# Patient Record
Sex: MALE | Race: WHITE | NOT HISPANIC OR LATINO | Employment: OTHER | URBAN - METROPOLITAN AREA
[De-identification: names, ages, dates, MRNs, and addresses within clinical notes are randomized per-mention and may not be internally consistent; named-entity substitution may affect disease eponyms.]

---

## 2017-01-04 ENCOUNTER — TELEPHONE (OUTPATIENT)
Dept: INTERNAL MEDICINE | Facility: CLINIC | Age: 67
End: 2017-01-04

## 2017-01-04 NOTE — TELEPHONE ENCOUNTER
Pt. Called stating he was having shortness of breath and pain in his rib cage.  He was on hold but I lost him.  I called pt back letting him know that he needs to go to the ER ASAP.  He stated he understood.

## 2017-03-07 DIAGNOSIS — E03.9 ADULT HYPOTHYROIDISM: Primary | ICD-10-CM

## 2017-03-07 DIAGNOSIS — N13.8 BENIGN PROSTATIC HYPERPLASIA WITH URINARY OBSTRUCTION: ICD-10-CM

## 2017-03-07 DIAGNOSIS — N40.1 BENIGN PROSTATIC HYPERPLASIA WITH URINARY OBSTRUCTION: ICD-10-CM

## 2017-03-07 DIAGNOSIS — I10 ESSENTIAL (PRIMARY) HYPERTENSION: ICD-10-CM

## 2017-03-08 RX ORDER — LEVOTHYROXINE SODIUM 0.12 MG/1
TABLET ORAL
Qty: 180 TABLET | Refills: 3 | Status: SHIPPED | OUTPATIENT
Start: 2017-03-08 | End: 2017-05-04

## 2017-03-08 RX ORDER — TAMSULOSIN HYDROCHLORIDE 0.4 MG/1
CAPSULE ORAL
Qty: 90 CAPSULE | Refills: 3 | Status: SHIPPED | OUTPATIENT
Start: 2017-03-08 | End: 2017-04-24

## 2017-03-08 RX ORDER — AMLODIPINE BESYLATE 10 MG/1
TABLET ORAL
Qty: 90 TABLET | Refills: 3 | Status: SHIPPED | OUTPATIENT
Start: 2017-03-08 | End: 2017-05-04

## 2017-03-29 ENCOUNTER — OFFICE VISIT (OUTPATIENT)
Dept: INTERNAL MEDICINE | Facility: CLINIC | Age: 67
End: 2017-03-29

## 2017-03-29 VITALS
DIASTOLIC BLOOD PRESSURE: 84 MMHG | WEIGHT: 315 LBS | SYSTOLIC BLOOD PRESSURE: 146 MMHG | HEIGHT: 75 IN | BODY MASS INDEX: 39.17 KG/M2

## 2017-03-29 DIAGNOSIS — E78.00 ELEVATED CHOLESTEROL: ICD-10-CM

## 2017-03-29 DIAGNOSIS — I10 ESSENTIAL (PRIMARY) HYPERTENSION: Primary | ICD-10-CM

## 2017-03-29 DIAGNOSIS — E03.9 ADULT HYPOTHYROIDISM: ICD-10-CM

## 2017-03-29 DIAGNOSIS — R06.09 DYSPNEA ON EXERTION: ICD-10-CM

## 2017-03-29 DIAGNOSIS — E11.9 TYPE 2 DIABETES MELLITUS WITHOUT COMPLICATION, WITHOUT LONG-TERM CURRENT USE OF INSULIN (HCC): ICD-10-CM

## 2017-03-29 LAB
CHOLEST SERPL-MCNC: 191 MG/DL (ref 0–200)
HBA1C MFR BLD: 6.7 % (ref 4–6)
HDLC SERPL-MCNC: 35 MG/DL (ref 40–81)
LDLC SERPL CALC-MCNC: 118 MG/DL (ref 0–100)
LDLC/HDLC SERPL: 3.38 {RATIO}
TRIGL SERPL-MCNC: 189 MG/DL (ref 0–150)
TSH SERPL DL<=0.05 MIU/L-ACNC: 34.11 MIU/ML (ref 0.4–4.2)
VLDLC SERPL-MCNC: 37.8 MG/DL

## 2017-03-29 PROCEDURE — 99213 OFFICE O/P EST LOW 20 MIN: CPT | Performed by: INTERNAL MEDICINE

## 2017-03-29 PROCEDURE — 83036 HEMOGLOBIN GLYCOSYLATED A1C: CPT | Performed by: INTERNAL MEDICINE

## 2017-03-29 PROCEDURE — 80061 LIPID PANEL: CPT | Performed by: INTERNAL MEDICINE

## 2017-03-29 PROCEDURE — 84443 ASSAY THYROID STIM HORMONE: CPT | Performed by: INTERNAL MEDICINE

## 2017-03-29 PROCEDURE — 36415 COLL VENOUS BLD VENIPUNCTURE: CPT | Performed by: INTERNAL MEDICINE

## 2017-03-29 NOTE — PROGRESS NOTES
Subjective   Durga Valenzuela is a 66 y.o. male.     History of Present Illness he relates approximately a two-month history of dyspnea at rest and with mild exertion on a constant basis.  Yet he does not awaken from sleep or have any increase in leg swelling.  He does use CPAP nightly throughout the night.  He was seen at [facility] emergency room earlier this month and evaluation which included CT scan of the chest was unremarkable.  He was treated with anabiotic's and sterilized without improvement.  He denies any cough or wheezing.        Review of Systems   Constitutional: Negative for activity change, appetite change, chills, diaphoresis, fatigue and fever.   Respiratory: Positive for shortness of breath. Negative for cough, chest tightness and wheezing.    Cardiovascular: Negative for chest pain, palpitations and leg swelling.   Gastrointestinal: Negative for abdominal pain, diarrhea, nausea and vomiting.   Genitourinary: Negative for flank pain and hematuria.   Musculoskeletal: Negative for arthralgias, back pain and gait problem.   Neurological: Negative for dizziness, syncope, weakness and headaches.       Objective   Physical Exam   Constitutional: He is oriented to person, place, and time. He appears well-developed and well-nourished. He is active. He does not appear ill.   Eyes: Conjunctivae are normal.   Neck: Carotid bruit is not present.   Cardiovascular: Normal rate, regular rhythm, S1 normal and S2 normal.  Exam reveals no S3 and no S4.    No murmur heard.  Pulses:       Posterior tibial pulses are 2+ on the right side, and 2+ on the left side.   Pulmonary/Chest: No tachypnea. No respiratory distress. He has no decreased breath sounds. He has no wheezes. He has no rhonchi. He has no rales.   Abdominal: Soft. Normal appearance and bowel sounds are normal. He exhibits no abdominal bruit and no mass. There is no hepatosplenomegaly. There is no tenderness.       Vascular Status -  His exam exhibits  no right foot edema. His exam exhibits no left foot edema.  Neurological: He is alert and oriented to person, place, and time. Gait normal.   Psychiatric: He has a normal mood and affect. His speech is normal and behavior is normal. Judgment and thought content normal. Cognition and memory are normal.       Assessment/Plan the emergency room lab showed a normal CBC, and troponin.  BNP was normal.  CMP showed a glucose of 167.  CT scan of the chest PE protocol was negative.  O2 sat is 97% at rest and 95% post exertion.    Assessment #1 dyspnea-question etiology.  This was discussed with the patient.    Plan #1 no change medication #2 TSH, hemoglobin A1c, lipids today #3 2-D echocardiogram as soon as possible #4 Lexiscan as soon as possible.  Routine follow-up with me in 3 weeks.

## 2017-04-05 ENCOUNTER — HOSPITAL ENCOUNTER (OUTPATIENT)
Dept: CARDIOLOGY | Facility: HOSPITAL | Age: 67
Discharge: HOME | End: 2017-04-05
Attending: INTERNAL MEDICINE

## 2017-04-05 ENCOUNTER — HOSPITAL ENCOUNTER (OUTPATIENT)
Dept: NUCLEAR MEDICINE | Facility: HOSPITAL | Age: 67
Discharge: HOME | End: 2017-04-05
Attending: INTERNAL MEDICINE

## 2017-04-05 ENCOUNTER — HOSPITAL ENCOUNTER (OUTPATIENT)
Dept: CARDIOLOGY | Facility: HOSPITAL | Age: 67
Discharge: HOME | End: 2017-04-05
Attending: INTERNAL MEDICINE | Admitting: INTERNAL MEDICINE

## 2017-04-05 DIAGNOSIS — R06.09 DYSPNEA ON EXERTION: ICD-10-CM

## 2017-04-05 LAB
ASCENDING AORTA: 3.9 CM
BH CV ECHO MEAS - ACS: 2.4 CM
BH CV ECHO MEAS - AO MEAN PG (FULL): 3 MMHG
BH CV ECHO MEAS - AO MEAN PG: 5 MMHG
BH CV ECHO MEAS - AO ROOT AREA (BSA CORRECTED): 1.6
BH CV ECHO MEAS - AO ROOT AREA: 14.5 CM^2
BH CV ECHO MEAS - AO ROOT DIAM: 4.3 CM
BH CV ECHO MEAS - AO V2 MEAN: 96.7 CM/SEC
BH CV ECHO MEAS - AO V2 VTI: 30.3 CM
BH CV ECHO MEAS - ASC AORTA: 3.9 CM
BH CV ECHO MEAS - AVA(I,A): 2.2 CM^2
BH CV ECHO MEAS - AVA(I,D): 2.2 CM^2
BH CV ECHO MEAS - BSA(HAYCOCK): 3 M^2
BH CV ECHO MEAS - BSA: 2.8 M^2
BH CV ECHO MEAS - BZI_BMI: 45.6 KILOGRAMS/M^2
BH CV ECHO MEAS - BZI_METRIC_HEIGHT: 188 CM
BH CV ECHO MEAS - BZI_METRIC_WEIGHT: 161 KG
BH CV ECHO MEAS - CONTRAST EF (2CH): 59.7 ML/M^2
BH CV ECHO MEAS - CONTRAST EF 4CH: 62.8 ML/M^2
BH CV ECHO MEAS - EDV(CUBED): 227 ML
BH CV ECHO MEAS - EDV(MOD-SP2): 124 ML
BH CV ECHO MEAS - EDV(MOD-SP4): 148 ML
BH CV ECHO MEAS - EDV(TEICH): 186.9 ML
BH CV ECHO MEAS - EF(CUBED): 73.9 %
BH CV ECHO MEAS - EF(MOD-SP2): 59.7 %
BH CV ECHO MEAS - EF(MOD-SP4): 62.8 %
BH CV ECHO MEAS - EF(TEICH): 64.7 %
BH CV ECHO MEAS - ESV(CUBED): 59.3 ML
BH CV ECHO MEAS - ESV(MOD-SP2): 50 ML
BH CV ECHO MEAS - ESV(MOD-SP4): 55 ML
BH CV ECHO MEAS - ESV(TEICH): 65.9 ML
BH CV ECHO MEAS - FS: 36.1 %
BH CV ECHO MEAS - IVS/LVPW: 0.91
BH CV ECHO MEAS - IVSD: 1 CM
BH CV ECHO MEAS - LAT PEAK E' VEL: 10 CM/SEC
BH CV ECHO MEAS - LV DIASTOLIC VOL/BSA (35-75): 53.4 ML/M^2
BH CV ECHO MEAS - LV MASS(C)D: 270.5 GRAMS
BH CV ECHO MEAS - LV MASS(C)DI: 97.5 GRAMS/M^2
BH CV ECHO MEAS - LV MEAN PG: 2 MMHG
BH CV ECHO MEAS - LV SYSTOLIC VOL/BSA (12-30): 19.8 ML/M^2
BH CV ECHO MEAS - LV V1 MEAN: 54.8 CM/SEC
BH CV ECHO MEAS - LV V1 VTI: 19.5 CM
BH CV ECHO MEAS - LVIDD: 6.1 CM
BH CV ECHO MEAS - LVIDS: 3.9 CM
BH CV ECHO MEAS - LVLD AP2: 9.8 CM
BH CV ECHO MEAS - LVLD AP4: 9.6 CM
BH CV ECHO MEAS - LVLS AP2: 8.4 CM
BH CV ECHO MEAS - LVLS AP4: 8.1 CM
BH CV ECHO MEAS - LVOT AREA (M): 3.5 CM^2
BH CV ECHO MEAS - LVOT AREA: 3.5 CM^2
BH CV ECHO MEAS - LVOT DIAM: 2.1 CM
BH CV ECHO MEAS - LVPWD: 1.1 CM
BH CV ECHO MEAS - MED PEAK E' VEL: 6 CM/SEC
BH CV ECHO MEAS - MV A DUR: 0.14 SEC
BH CV ECHO MEAS - MV A MAX VEL: 88.8 CM/SEC
BH CV ECHO MEAS - MV DEC SLOPE: 186 CM/SEC^2
BH CV ECHO MEAS - MV DEC TIME: 0.27 SEC
BH CV ECHO MEAS - MV E MAX VEL: 69.6 CM/SEC
BH CV ECHO MEAS - MV E/A: 0.78
BH CV ECHO MEAS - MV MEAN PG: 1 MMHG
BH CV ECHO MEAS - MV P1/2T MAX VEL: 75.6 CM/SEC
BH CV ECHO MEAS - MV P1/2T: 119 MSEC
BH CV ECHO MEAS - MV V2 MEAN: 46.1 CM/SEC
BH CV ECHO MEAS - MV V2 VTI: 28 CM
BH CV ECHO MEAS - MVA P1/2T LCG: 2.9 CM^2
BH CV ECHO MEAS - MVA(P1/2T): 1.8 CM^2
BH CV ECHO MEAS - MVA(VTI): 2.4 CM^2
BH CV ECHO MEAS - PA ACC SLOPE: 0 CM/SEC^2
BH CV ECHO MEAS - PA ACC TIME: 0.11 SEC
BH CV ECHO MEAS - PA MAX PG: 4.2 MMHG
BH CV ECHO MEAS - PA PR(ACCEL): 30 MMHG
BH CV ECHO MEAS - PA V2 MAX: 103 CM/SEC
BH CV ECHO MEAS - PULM A REVS DUR: 0.14 SEC
BH CV ECHO MEAS - PULM A REVS VEL: 27.4 CM/SEC
BH CV ECHO MEAS - PULM DIAS VEL: 36.2 CM/SEC
BH CV ECHO MEAS - PULM S/D: 0.76
BH CV ECHO MEAS - PULM SYS VEL: 27.4 CM/SEC
BH CV ECHO MEAS - QP/QS: 0.55
BH CV ECHO MEAS - RAP SYSTOLE: 8 MMHG
BH CV ECHO MEAS - RV MEAN PG: 1 MMHG
BH CV ECHO MEAS - RV V1 MEAN: 33.5 CM/SEC
BH CV ECHO MEAS - RV V1 VTI: 11.8 CM
BH CV ECHO MEAS - RVOT AREA: 3.1 CM^2
BH CV ECHO MEAS - RVOT DIAM: 2 CM
BH CV ECHO MEAS - SI(AO): 158.7 ML/M^2
BH CV ECHO MEAS - SI(CUBED): 60.5 ML/M^2
BH CV ECHO MEAS - SI(LVOT): 24.4 ML/M^2
BH CV ECHO MEAS - SI(MOD-SP2): 26.7 ML/M^2
BH CV ECHO MEAS - SI(MOD-SP4): 33.5 ML/M^2
BH CV ECHO MEAS - SI(TEICH): 43.6 ML/M^2
BH CV ECHO MEAS - SV(AO): 440 ML
BH CV ECHO MEAS - SV(CUBED): 167.7 ML
BH CV ECHO MEAS - SV(LVOT): 67.5 ML
BH CV ECHO MEAS - SV(MOD-SP2): 74 ML
BH CV ECHO MEAS - SV(MOD-SP4): 93 ML
BH CV ECHO MEAS - SV(RVOT): 37.1 ML
BH CV ECHO MEAS - SV(TEICH): 121 ML
BH CV ECHO MEAS - TAPSE (>1.6): 2.5 CM2
BH CV XLRA - RV BASE: 3.7 CM
BH CV XLRA - TDI S': 12 CM/SEC
E/E' RATIO: 10
LEFT ATRIUM VOLUME INDEX: 22 ML/M2
LV EF 2D ECHO EST: 63 %

## 2017-04-05 PROCEDURE — 25010000002 REGADENOSON 0.4 MG/5ML SOLUTION: Performed by: INTERNAL MEDICINE

## 2017-04-05 PROCEDURE — 78452 HT MUSCLE IMAGE SPECT MULT: CPT | Performed by: INTERNAL MEDICINE

## 2017-04-05 PROCEDURE — C8929 TTE W OR WO FOL WCON,DOPPLER: HCPCS

## 2017-04-05 PROCEDURE — 0 TECHNETIUM SESTAMIBI: Performed by: INTERNAL MEDICINE

## 2017-04-05 PROCEDURE — 25010000002 PERFLUTREN (DEFINITY) 8.476 MG IN SODIUM CHLORIDE 10 ML INJECTION: Performed by: INTERNAL MEDICINE

## 2017-04-05 PROCEDURE — A9500 TC99M SESTAMIBI: HCPCS | Performed by: INTERNAL MEDICINE

## 2017-04-05 PROCEDURE — 93017 CV STRESS TEST TRACING ONLY: CPT

## 2017-04-05 PROCEDURE — 93306 TTE W/DOPPLER COMPLETE: CPT | Performed by: INTERNAL MEDICINE

## 2017-04-05 PROCEDURE — 78452 HT MUSCLE IMAGE SPECT MULT: CPT

## 2017-04-05 PROCEDURE — 93016 CV STRESS TEST SUPVJ ONLY: CPT | Performed by: INTERNAL MEDICINE

## 2017-04-05 PROCEDURE — 93018 CV STRESS TEST I&R ONLY: CPT | Performed by: INTERNAL MEDICINE

## 2017-04-05 RX ADMIN — Medication 1 DOSE: at 10:25

## 2017-04-05 RX ADMIN — REGADENOSON 0.4 MG: 0.08 INJECTION, SOLUTION INTRAVENOUS at 10:25

## 2017-04-05 RX ADMIN — SODIUM CHLORIDE 3 ML: 9 INJECTION INTRAMUSCULAR; INTRAVENOUS; SUBCUTANEOUS at 13:09

## 2017-04-06 ENCOUNTER — TELEPHONE (OUTPATIENT)
Dept: INTERNAL MEDICINE | Facility: CLINIC | Age: 67
End: 2017-04-06

## 2017-04-06 NOTE — TELEPHONE ENCOUNTER
----- Message from Lorene Harmon sent at 4/6/2017  2:57 PM EDT -----  Contact: pt - Dr Lozada's pt - RE: Results  Pt calling and would like results of procedures done yesterday. Could you please call pt w/ results of Echo, Stress test? Please advise. Thanks      Pt # [phone]

## 2017-04-07 ENCOUNTER — TELEPHONE (OUTPATIENT)
Dept: INTERNAL MEDICINE | Facility: CLINIC | Age: 67
End: 2017-04-07

## 2017-04-07 NOTE — TELEPHONE ENCOUNTER
Gave patient Dr. Lozada's instructions.  He was not happy with it.  He was still SOA and was concerned. I did tell pt that if he feels it is too bad that he could go to the ER.

## 2017-04-07 NOTE — TELEPHONE ENCOUNTER
----- Message from Margarita Hernandez MA sent at 4/7/2017  2:49 PM EDT -----  Pt calling for Echo results and does not want to worry the wkend.  Pt says he is still having SOA upon exertion.    Pt#[phone]

## 2017-04-07 NOTE — TELEPHONE ENCOUNTER
2-D echocardiogram is normal.  Cardiolite stress test is normal.  Keep follow-up appointment with me.

## 2017-04-15 LAB
BH CV STRESS COMMENTS STAGE 1: NORMAL
BH CV STRESS DOSE REGADENOSON STAGE 1: 0.4
BH CV STRESS DURATION MIN STAGE 1: 0
BH CV STRESS DURATION SEC STAGE 1: 15
BH CV STRESS PROTOCOL 1: NORMAL
BH CV STRESS RECOVERY BP: NORMAL MMHG
BH CV STRESS RECOVERY HR: 59 BPM
BH CV STRESS STAGE 1: 1
LV EF NUC BP: 66 %
MAXIMAL PREDICTED HEART RATE: 153 BPM
PERCENT MAX PREDICTED HR: 50.33 %
STRESS BASELINE BP: NORMAL MMHG
STRESS BASELINE HR: 56 BPM
STRESS PERCENT HR: 59 %
STRESS POST PEAK BP: NORMAL MMHG
STRESS POST PEAK HR: 77 BPM
STRESS TARGET HR: 130 BPM

## 2017-04-21 ENCOUNTER — OFFICE VISIT (OUTPATIENT)
Dept: INTERNAL MEDICINE | Facility: CLINIC | Age: 67
End: 2017-04-21

## 2017-04-21 VITALS
DIASTOLIC BLOOD PRESSURE: 76 MMHG | SYSTOLIC BLOOD PRESSURE: 126 MMHG | HEART RATE: 66 BPM | WEIGHT: 315 LBS | HEIGHT: 74 IN | BODY MASS INDEX: 40.43 KG/M2

## 2017-04-21 DIAGNOSIS — E78.00 ELEVATED CHOLESTEROL: Primary | ICD-10-CM

## 2017-04-21 DIAGNOSIS — R06.09 DYSPNEA ON EXERTION: ICD-10-CM

## 2017-04-21 DIAGNOSIS — I10 ESSENTIAL (PRIMARY) HYPERTENSION: ICD-10-CM

## 2017-04-21 DIAGNOSIS — E11.9 TYPE 2 DIABETES MELLITUS WITHOUT COMPLICATION, WITHOUT LONG-TERM CURRENT USE OF INSULIN (HCC): ICD-10-CM

## 2017-04-21 PROCEDURE — 99213 OFFICE O/P EST LOW 20 MIN: CPT | Performed by: INTERNAL MEDICINE

## 2017-04-21 RX ORDER — MELOXICAM 15 MG/1
15 TABLET ORAL DAILY
COMMUNITY
End: 2018-05-04

## 2017-04-21 NOTE — PROGRESS NOTES
Subjective   Durga Valenzuela is a 66 y.o. male.     History of Present Illness he has lost 20 pounds over the last month by avoiding sweets and milk.  He also was more conscious of his carbohydrate intake in general.  However she still has dyspnea at rest and with mild exertion now present for 3 months.  There is no PND or chest pain.  He denies any wheezing.  He has occasional dry cough but nothing that is daily or persistent.  He was placed on Mobic by his orthopedist recently which has significantly decreased his right knee pain.        Review of Systems   Constitutional: Negative for activity change, appetite change and fatigue.   Respiratory: Positive for cough and shortness of breath. Negative for chest tightness and wheezing.    Cardiovascular: Negative for chest pain, palpitations and leg swelling.   Gastrointestinal: Negative for abdominal pain, diarrhea, nausea and vomiting.   Genitourinary: Negative for flank pain and hematuria.   Musculoskeletal: Negative for arthralgias and back pain.   Neurological: Negative for dizziness, syncope, weakness and headaches.       Objective   Physical Exam   Constitutional: He is oriented to person, place, and time. Vital signs are normal. He appears well-developed and well-nourished. He is active. He does not appear ill.   Eyes: Conjunctivae are normal.   Cardiovascular: Normal rate, regular rhythm, S1 normal and S2 normal.  Exam reveals no S3 and no S4.    No murmur heard.  Pulmonary/Chest: No tachypnea. No respiratory distress. He has no decreased breath sounds. He has no wheezes. He has no rhonchi. He has no rales.   Abdominal: Soft. Normal appearance and bowel sounds are normal. He exhibits no abdominal bruit and no mass. There is no hepatosplenomegaly. There is no tenderness.       Vascular Status -  His exam exhibits no right foot edema. His exam exhibits no left foot edema.  Neurological: He is alert and oriented to person, place, and time. Gait normal.    Psychiatric: He has a normal mood and affect. His speech is normal and behavior is normal. Judgment and thought content normal. Cognition and memory are normal.       Assessment/Plan   Recent lab showed a hemoglobin A1c of 6.7.  Total cholesterol 191 triglycerides 189 HDL cholesterol 35 LDL cholesterol 118.  TSH 34.  Echocardiogram showed an ejection fraction of 63% and no significant valvular disease.  Lexiscan stress test was negative for ischemia.    Assessment #1 hypertension-good control #2 hypercholesterolemia-good control #3 diabetes mellitus-mild and certainly at the present time controlled with diet #4 dyspnea on exertion-question etiology.  Restrictive lung disease or pulmonary hypertension of course are possibilities.  Coronary artery disease also must be considered despite lack of chest pain.    Plan #1 PFTs #2 cardiology consult as soon as possible for possible cardiac catheterization.  Routine follow-up with me in one month.

## 2017-04-24 ENCOUNTER — TRANSCRIBE ORDERS (OUTPATIENT)
Dept: CARDIOLOGY | Facility: CLINIC | Age: 67
End: 2017-04-24

## 2017-04-24 ENCOUNTER — OFFICE VISIT (OUTPATIENT)
Dept: CARDIOLOGY | Facility: CLINIC | Age: 67
End: 2017-04-24

## 2017-04-24 VITALS
DIASTOLIC BLOOD PRESSURE: 89 MMHG | BODY MASS INDEX: 42.37 KG/M2 | WEIGHT: 315 LBS | SYSTOLIC BLOOD PRESSURE: 144 MMHG | HEART RATE: 60 BPM

## 2017-04-24 DIAGNOSIS — I26.99 OTHER ACUTE PULMONARY EMBOLISM: ICD-10-CM

## 2017-04-24 DIAGNOSIS — R06.09 DYSPNEA ON EXERTION: ICD-10-CM

## 2017-04-24 DIAGNOSIS — F31.9 BIPOLAR AFFECTIVE DISORDER, RAPID CYCLING (HCC): ICD-10-CM

## 2017-04-24 DIAGNOSIS — I10 ESSENTIAL (PRIMARY) HYPERTENSION: ICD-10-CM

## 2017-04-24 DIAGNOSIS — I10 ESSENTIAL (PRIMARY) HYPERTENSION: Primary | ICD-10-CM

## 2017-04-24 DIAGNOSIS — E78.00 ELEVATED CHOLESTEROL: ICD-10-CM

## 2017-04-24 DIAGNOSIS — E78.00 HIGH CHOLESTEROL: ICD-10-CM

## 2017-04-24 DIAGNOSIS — R06.09 DOE (DYSPNEA ON EXERTION): Primary | ICD-10-CM

## 2017-04-24 PROCEDURE — 99204 OFFICE O/P NEW MOD 45 MIN: CPT | Performed by: INTERNAL MEDICINE

## 2017-04-24 PROCEDURE — 93000 ELECTROCARDIOGRAM COMPLETE: CPT | Performed by: INTERNAL MEDICINE

## 2017-05-01 ENCOUNTER — HOSPITAL ENCOUNTER (OUTPATIENT)
Dept: RESPIRATORY THERAPY | Facility: HOSPITAL | Age: 67
Discharge: HOME | End: 2017-05-01
Attending: INTERNAL MEDICINE | Admitting: INTERNAL MEDICINE

## 2017-05-01 PROCEDURE — 94060 EVALUATION OF WHEEZING: CPT

## 2017-05-01 RX ORDER — ALBUTEROL SULFATE 2.5 MG/3ML
2.5 SOLUTION RESPIRATORY (INHALATION) ONCE
Status: COMPLETED | OUTPATIENT
Start: 2017-05-01 | End: 2017-05-01

## 2017-05-01 RX ADMIN — ALBUTEROL SULFATE 2.5 MG: 2.5 SOLUTION RESPIRATORY (INHALATION) at 10:32

## 2017-05-04 ENCOUNTER — LAB (OUTPATIENT)
Dept: LAB | Facility: HOSPITAL | Age: 67
End: 2017-05-04

## 2017-05-04 DIAGNOSIS — E78.00 HIGH CHOLESTEROL: ICD-10-CM

## 2017-05-04 DIAGNOSIS — R06.09 DOE (DYSPNEA ON EXERTION): ICD-10-CM

## 2017-05-04 DIAGNOSIS — I10 ESSENTIAL (PRIMARY) HYPERTENSION: ICD-10-CM

## 2017-05-04 LAB
ANION GAP SERPL CALCULATED.3IONS-SCNC: 16.6 MMOL/L
APTT PPP: 28.3 SECONDS (ref 22.7–35.4)
BASOPHILS # BLD AUTO: 0.02 10*3/MM3 (ref 0–0.2)
BASOPHILS NFR BLD AUTO: 0.3 % (ref 0–1.5)
BUN BLD-MCNC: 20 MG/DL (ref 8–23)
BUN/CREAT SERPL: 18.7 (ref 7–25)
CALCIUM SPEC-SCNC: 9.5 MG/DL (ref 8.6–10.5)
CHLORIDE SERPL-SCNC: 100 MMOL/L (ref 98–107)
CO2 SERPL-SCNC: 23.4 MMOL/L (ref 22–29)
CREAT BLD-MCNC: 1.07 MG/DL (ref 0.76–1.27)
DEPRECATED RDW RBC AUTO: 48.5 FL (ref 37–54)
EOSINOPHIL # BLD AUTO: 0.16 10*3/MM3 (ref 0–0.7)
EOSINOPHIL NFR BLD AUTO: 2.5 % (ref 0.3–6.2)
ERYTHROCYTE [DISTWIDTH] IN BLOOD BY AUTOMATED COUNT: 14.7 % (ref 11.5–14.5)
GFR SERPL CREATININE-BSD FRML MDRD: 69 ML/MIN/1.73
GLUCOSE BLD-MCNC: 111 MG/DL (ref 65–99)
HCT VFR BLD AUTO: 44.8 % (ref 40.4–52.2)
HGB BLD-MCNC: 15.2 G/DL (ref 13.7–17.6)
IMM GRANULOCYTES # BLD: 0 10*3/MM3 (ref 0–0.03)
IMM GRANULOCYTES NFR BLD: 0 % (ref 0–0.5)
INR PPP: 1.11 (ref 0.9–1.1)
LYMPHOCYTES # BLD AUTO: 1.35 10*3/MM3 (ref 0.9–4.8)
LYMPHOCYTES NFR BLD AUTO: 21.1 % (ref 19.6–45.3)
MCH RBC QN AUTO: 30.8 PG (ref 27–32.7)
MCHC RBC AUTO-ENTMCNC: 33.9 G/DL (ref 32.6–36.4)
MCV RBC AUTO: 90.7 FL (ref 79.8–96.2)
MONOCYTES # BLD AUTO: 0.59 10*3/MM3 (ref 0.2–1.2)
MONOCYTES NFR BLD AUTO: 9.2 % (ref 5–12)
NEUTROPHILS # BLD AUTO: 4.28 10*3/MM3 (ref 1.9–8.1)
NEUTROPHILS NFR BLD AUTO: 66.9 % (ref 42.7–76)
PLATELET # BLD AUTO: 182 10*3/MM3 (ref 140–500)
PMV BLD AUTO: 10.5 FL (ref 6–12)
POTASSIUM BLD-SCNC: 4.1 MMOL/L (ref 3.5–5.2)
PROTHROMBIN TIME: 13.9 SECONDS (ref 11.7–14.2)
RBC # BLD AUTO: 4.94 10*6/MM3 (ref 4.6–6)
SODIUM BLD-SCNC: 140 MMOL/L (ref 136–145)
WBC NRBC COR # BLD: 6.4 10*3/MM3 (ref 4.5–10.7)

## 2017-05-04 PROCEDURE — 85025 COMPLETE CBC W/AUTO DIFF WBC: CPT

## 2017-05-04 PROCEDURE — 80048 BASIC METABOLIC PNL TOTAL CA: CPT

## 2017-05-04 PROCEDURE — 85730 THROMBOPLASTIN TIME PARTIAL: CPT

## 2017-05-04 PROCEDURE — 36415 COLL VENOUS BLD VENIPUNCTURE: CPT

## 2017-05-04 PROCEDURE — 85610 PROTHROMBIN TIME: CPT

## 2017-05-04 RX ORDER — AMLODIPINE BESYLATE 10 MG/1
10 TABLET ORAL DAILY
COMMUNITY
End: 2019-09-06

## 2017-05-04 RX ORDER — LEVOTHYROXINE SODIUM 0.12 MG/1
250 TABLET ORAL DAILY
COMMUNITY
End: 2017-09-25 | Stop reason: DRUGHIGH

## 2017-05-10 DIAGNOSIS — E78.00 ELEVATED CHOLESTEROL: ICD-10-CM

## 2017-05-10 DIAGNOSIS — Z01.818 PREOPERATIVE CLEARANCE: ICD-10-CM

## 2017-05-10 DIAGNOSIS — Z51.81 ENCOUNTER FOR THERAPEUTIC DRUG MONITORING: ICD-10-CM

## 2017-05-10 DIAGNOSIS — I10 ESSENTIAL HYPERTENSION: Primary | ICD-10-CM

## 2017-05-10 DIAGNOSIS — R06.09 DYSPNEA ON EXERTION: ICD-10-CM

## 2017-05-10 RX ORDER — CLOBETASOL PROPIONATE 0.5 MG/G
OINTMENT TOPICAL
Qty: 30 G | Refills: 5 | Status: SHIPPED | OUTPATIENT
Start: 2017-05-10 | End: 2017-05-15

## 2017-05-12 ENCOUNTER — OFFICE VISIT (OUTPATIENT)
Dept: INTERNAL MEDICINE | Facility: CLINIC | Age: 67
End: 2017-05-12

## 2017-05-12 VITALS
HEIGHT: 74 IN | SYSTOLIC BLOOD PRESSURE: 122 MMHG | HEART RATE: 72 BPM | BODY MASS INDEX: 40.43 KG/M2 | DIASTOLIC BLOOD PRESSURE: 60 MMHG | WEIGHT: 315 LBS

## 2017-05-12 DIAGNOSIS — R06.09 DYSPNEA ON EXERTION: Primary | ICD-10-CM

## 2017-05-12 PROCEDURE — 99213 OFFICE O/P EST LOW 20 MIN: CPT | Performed by: INTERNAL MEDICINE

## 2017-05-15 ENCOUNTER — LAB (OUTPATIENT)
Dept: LAB | Facility: HOSPITAL | Age: 67
End: 2017-05-15

## 2017-05-15 DIAGNOSIS — R06.09 DYSPNEA ON EXERTION: ICD-10-CM

## 2017-05-15 DIAGNOSIS — Z01.818 PREOPERATIVE CLEARANCE: ICD-10-CM

## 2017-05-15 DIAGNOSIS — E78.00 ELEVATED CHOLESTEROL: ICD-10-CM

## 2017-05-15 DIAGNOSIS — Z51.81 ENCOUNTER FOR THERAPEUTIC DRUG MONITORING: ICD-10-CM

## 2017-05-15 DIAGNOSIS — I10 ESSENTIAL HYPERTENSION: ICD-10-CM

## 2017-05-15 LAB
ANION GAP SERPL CALCULATED.3IONS-SCNC: 11.2 MMOL/L
APTT PPP: 28.3 SECONDS (ref 22.7–35.4)
BASOPHILS # BLD AUTO: 0.01 10*3/MM3 (ref 0–0.2)
BASOPHILS NFR BLD AUTO: 0.2 % (ref 0–1.5)
BUN BLD-MCNC: 21 MG/DL (ref 8–23)
BUN/CREAT SERPL: 21.6 (ref 7–25)
CALCIUM SPEC-SCNC: 10 MG/DL (ref 8.6–10.5)
CHLORIDE SERPL-SCNC: 102 MMOL/L (ref 98–107)
CO2 SERPL-SCNC: 27.8 MMOL/L (ref 22–29)
CREAT BLD-MCNC: 0.97 MG/DL (ref 0.76–1.27)
DEPRECATED RDW RBC AUTO: 47 FL (ref 37–54)
EOSINOPHIL # BLD AUTO: 0.19 10*3/MM3 (ref 0–0.7)
EOSINOPHIL NFR BLD AUTO: 3.2 % (ref 0.3–6.2)
ERYTHROCYTE [DISTWIDTH] IN BLOOD BY AUTOMATED COUNT: 14.4 % (ref 11.5–14.5)
GFR SERPL CREATININE-BSD FRML MDRD: 77 ML/MIN/1.73
GLUCOSE BLD-MCNC: 128 MG/DL (ref 65–99)
HCT VFR BLD AUTO: 44.4 % (ref 40.4–52.2)
HGB BLD-MCNC: 15.4 G/DL (ref 13.7–17.6)
IMM GRANULOCYTES # BLD: 0.02 10*3/MM3 (ref 0–0.03)
IMM GRANULOCYTES NFR BLD: 0.3 % (ref 0–0.5)
INR PPP: 1.11 (ref 0.9–1.1)
LYMPHOCYTES # BLD AUTO: 1.36 10*3/MM3 (ref 0.9–4.8)
LYMPHOCYTES NFR BLD AUTO: 23.1 % (ref 19.6–45.3)
MCH RBC QN AUTO: 31 PG (ref 27–32.7)
MCHC RBC AUTO-ENTMCNC: 34.7 G/DL (ref 32.6–36.4)
MCV RBC AUTO: 89.3 FL (ref 79.8–96.2)
MONOCYTES # BLD AUTO: 0.5 10*3/MM3 (ref 0.2–1.2)
MONOCYTES NFR BLD AUTO: 8.5 % (ref 5–12)
NEUTROPHILS # BLD AUTO: 3.82 10*3/MM3 (ref 1.9–8.1)
NEUTROPHILS NFR BLD AUTO: 64.7 % (ref 42.7–76)
PLATELET # BLD AUTO: 171 10*3/MM3 (ref 140–500)
PMV BLD AUTO: 9.8 FL (ref 6–12)
POTASSIUM BLD-SCNC: 4.4 MMOL/L (ref 3.5–5.2)
PROTHROMBIN TIME: 13.9 SECONDS (ref 11.7–14.2)
RBC # BLD AUTO: 4.97 10*6/MM3 (ref 4.6–6)
SODIUM BLD-SCNC: 141 MMOL/L (ref 136–145)
WBC NRBC COR # BLD: 5.9 10*3/MM3 (ref 4.5–10.7)

## 2017-05-15 PROCEDURE — 85025 COMPLETE CBC W/AUTO DIFF WBC: CPT

## 2017-05-15 PROCEDURE — 80048 BASIC METABOLIC PNL TOTAL CA: CPT

## 2017-05-15 PROCEDURE — 36415 COLL VENOUS BLD VENIPUNCTURE: CPT

## 2017-05-15 PROCEDURE — 85730 THROMBOPLASTIN TIME PARTIAL: CPT

## 2017-05-15 PROCEDURE — 85610 PROTHROMBIN TIME: CPT

## 2017-05-15 RX ORDER — CLOBETASOL PROPIONATE 0.5 MG/G
1 OINTMENT TOPICAL 2 TIMES DAILY PRN
COMMUNITY
End: 2018-09-05 | Stop reason: SDUPTHER

## 2017-05-16 ENCOUNTER — HOSPITAL ENCOUNTER (OUTPATIENT)
Facility: HOSPITAL | Age: 67
Setting detail: HOSPITAL OUTPATIENT SURGERY
Discharge: HOME | End: 2017-05-16
Attending: INTERNAL MEDICINE | Admitting: INTERNAL MEDICINE

## 2017-05-16 VITALS
WEIGHT: 315 LBS | HEART RATE: 64 BPM | DIASTOLIC BLOOD PRESSURE: 79 MMHG | BODY MASS INDEX: 39.17 KG/M2 | RESPIRATION RATE: 16 BRPM | HEIGHT: 75 IN | SYSTOLIC BLOOD PRESSURE: 138 MMHG | TEMPERATURE: 98.9 F | OXYGEN SATURATION: 95 %

## 2017-05-16 DIAGNOSIS — I26.99 OTHER ACUTE PULMONARY EMBOLISM: ICD-10-CM

## 2017-05-16 DIAGNOSIS — F31.9 BIPOLAR AFFECTIVE DISORDER, RAPID CYCLING (HCC): ICD-10-CM

## 2017-05-16 DIAGNOSIS — R06.09 DYSPNEA ON EXERTION: ICD-10-CM

## 2017-05-16 DIAGNOSIS — I10 ESSENTIAL (PRIMARY) HYPERTENSION: ICD-10-CM

## 2017-05-16 DIAGNOSIS — E78.00 ELEVATED CHOLESTEROL: ICD-10-CM

## 2017-05-16 LAB — GLUCOSE BLDC GLUCOMTR-MCNC: 135 MG/DL (ref 70–130)

## 2017-05-16 PROCEDURE — C1894 INTRO/SHEATH, NON-LASER: HCPCS | Performed by: INTERNAL MEDICINE

## 2017-05-16 PROCEDURE — 25010000002 FENTANYL CITRATE (PF) 100 MCG/2ML SOLUTION: Performed by: INTERNAL MEDICINE

## 2017-05-16 PROCEDURE — 99153 MOD SED SAME PHYS/QHP EA: CPT | Performed by: INTERNAL MEDICINE

## 2017-05-16 PROCEDURE — 25010000002 HEPARIN (PORCINE) PER 1000 UNITS: Performed by: INTERNAL MEDICINE

## 2017-05-16 PROCEDURE — 93458 L HRT ARTERY/VENTRICLE ANGIO: CPT | Performed by: INTERNAL MEDICINE

## 2017-05-16 PROCEDURE — 0 IOPAMIDOL PER 1 ML: Performed by: INTERNAL MEDICINE

## 2017-05-16 PROCEDURE — 25010000002 MIDAZOLAM PER 1 MG: Performed by: INTERNAL MEDICINE

## 2017-05-16 PROCEDURE — C1769 GUIDE WIRE: HCPCS | Performed by: INTERNAL MEDICINE

## 2017-05-16 PROCEDURE — 99152 MOD SED SAME PHYS/QHP 5/>YRS: CPT | Performed by: INTERNAL MEDICINE

## 2017-05-16 PROCEDURE — 82962 GLUCOSE BLOOD TEST: CPT

## 2017-05-16 RX ORDER — ROSUVASTATIN CALCIUM 5 MG/1
5 TABLET, COATED ORAL DAILY
Qty: 30 TABLET | Refills: 3 | Status: SHIPPED | OUTPATIENT
Start: 2017-05-16 | End: 2017-06-13 | Stop reason: SDUPTHER

## 2017-05-16 RX ORDER — SODIUM CHLORIDE 9 MG/ML
75 INJECTION, SOLUTION INTRAVENOUS CONTINUOUS
Status: DISCONTINUED | OUTPATIENT
Start: 2017-05-16 | End: 2017-05-16 | Stop reason: HOSPADM

## 2017-05-16 RX ORDER — FENTANYL CITRATE 50 UG/ML
INJECTION, SOLUTION INTRAMUSCULAR; INTRAVENOUS AS NEEDED
Status: DISCONTINUED | OUTPATIENT
Start: 2017-05-16 | End: 2017-05-16 | Stop reason: HOSPADM

## 2017-05-16 RX ORDER — SODIUM CHLORIDE 9 MG/ML
125 INJECTION, SOLUTION INTRAVENOUS CONTINUOUS
Status: DISCONTINUED | OUTPATIENT
Start: 2017-05-16 | End: 2017-05-16 | Stop reason: HOSPADM

## 2017-05-16 RX ORDER — MIDAZOLAM HYDROCHLORIDE 1 MG/ML
INJECTION INTRAMUSCULAR; INTRAVENOUS AS NEEDED
Status: DISCONTINUED | OUTPATIENT
Start: 2017-05-16 | End: 2017-05-16 | Stop reason: HOSPADM

## 2017-05-16 RX ORDER — ONDANSETRON 2 MG/ML
4 INJECTION INTRAMUSCULAR; INTRAVENOUS EVERY 6 HOURS PRN
Status: DISCONTINUED | OUTPATIENT
Start: 2017-05-16 | End: 2017-05-16 | Stop reason: HOSPADM

## 2017-05-16 RX ORDER — PROMETHAZINE HYDROCHLORIDE 25 MG/ML
12.5 INJECTION, SOLUTION INTRAMUSCULAR; INTRAVENOUS EVERY 4 HOURS PRN
Status: DISCONTINUED | OUTPATIENT
Start: 2017-05-16 | End: 2017-05-16 | Stop reason: HOSPADM

## 2017-05-16 RX ORDER — SODIUM CHLORIDE 0.9 % (FLUSH) 0.9 %
1-10 SYRINGE (ML) INJECTION AS NEEDED
Status: DISCONTINUED | OUTPATIENT
Start: 2017-05-16 | End: 2017-05-16 | Stop reason: HOSPADM

## 2017-05-16 RX ORDER — LIDOCAINE HYDROCHLORIDE 10 MG/ML
0.1 INJECTION, SOLUTION EPIDURAL; INFILTRATION; INTRACAUDAL; PERINEURAL ONCE AS NEEDED
Status: DISCONTINUED | OUTPATIENT
Start: 2017-05-16 | End: 2017-05-16 | Stop reason: HOSPADM

## 2017-05-16 RX ORDER — METOCLOPRAMIDE HYDROCHLORIDE 5 MG/ML
10 INJECTION INTRAMUSCULAR; INTRAVENOUS EVERY 6 HOURS PRN
Status: DISCONTINUED | OUTPATIENT
Start: 2017-05-16 | End: 2017-05-16 | Stop reason: HOSPADM

## 2017-05-16 RX ORDER — LIDOCAINE HYDROCHLORIDE 20 MG/ML
INJECTION, SOLUTION INFILTRATION; PERINEURAL AS NEEDED
Status: DISCONTINUED | OUTPATIENT
Start: 2017-05-16 | End: 2017-05-16 | Stop reason: HOSPADM

## 2017-05-16 RX ADMIN — SODIUM CHLORIDE 125 ML/HR: 9 INJECTION, SOLUTION INTRAVENOUS at 09:41

## 2017-05-16 RX ADMIN — SODIUM CHLORIDE 75 ML/HR: 9 INJECTION, SOLUTION INTRAVENOUS at 07:25

## 2017-06-13 ENCOUNTER — OFFICE VISIT (OUTPATIENT)
Dept: CARDIOLOGY | Facility: CLINIC | Age: 67
End: 2017-06-13

## 2017-06-13 VITALS
SYSTOLIC BLOOD PRESSURE: 144 MMHG | BODY MASS INDEX: 40 KG/M2 | HEART RATE: 72 BPM | DIASTOLIC BLOOD PRESSURE: 84 MMHG | WEIGHT: 315 LBS

## 2017-06-13 DIAGNOSIS — G47.33 OBSTRUCTIVE APNEA: ICD-10-CM

## 2017-06-13 DIAGNOSIS — E78.00 ELEVATED CHOLESTEROL: ICD-10-CM

## 2017-06-13 DIAGNOSIS — R06.09 DYSPNEA ON EXERTION: ICD-10-CM

## 2017-06-13 DIAGNOSIS — I10 ESSENTIAL (PRIMARY) HYPERTENSION: Primary | ICD-10-CM

## 2017-06-13 PROCEDURE — 93000 ELECTROCARDIOGRAM COMPLETE: CPT | Performed by: INTERNAL MEDICINE

## 2017-06-13 PROCEDURE — 99213 OFFICE O/P EST LOW 20 MIN: CPT | Performed by: INTERNAL MEDICINE

## 2017-06-13 RX ORDER — ROSUVASTATIN CALCIUM 5 MG/1
10 TABLET, COATED ORAL DAILY
Qty: 30 TABLET | Refills: 3 | Status: SHIPPED | OUTPATIENT
Start: 2017-06-13 | End: 2017-06-14 | Stop reason: SDUPTHER

## 2017-06-13 RX ORDER — DEXTROAMPHETAMINE SACCHARATE, AMPHETAMINE ASPARTATE, DEXTROAMPHETAMINE SULFATE AND AMPHETAMINE SULFATE 5; 5; 5; 5 MG/1; MG/1; MG/1; MG/1
TABLET ORAL
Refills: 0 | Status: ON HOLD | COMMUNITY
Start: 2017-06-08 | End: 2018-05-10

## 2017-06-13 RX ORDER — CEPHALEXIN 500 MG/1
CAPSULE ORAL
Refills: 0 | COMMUNITY
Start: 2017-05-31 | End: 2017-06-13

## 2017-06-13 NOTE — PROGRESS NOTES
Procedure   [facility]  Cardiology Progress Note    Patient Identification:  Name:Durga Valenzuela  Age:66 y.o.  Sex: male  :  1950  MRN: [id number]           2017    Subjective:    Chief Complaint   Patient presents with   • Hypertension       HPI       66 year old male with past history of pulmonnary embolisim in 9 months ago, his ECG showed inferior T abnormality, he stopped the eliquis for 6 months. He said that he had two subsequent CT scans that were normal. He thinks that he developed blood clots due to inactivity.      He is mildly obese and can walk upto a block or so. No angina. He denies any tightness or heaviness in the Chest. No syncope. His ECG shows inferior T abnormality. He had cardiolyte stress test done that shows inferior abnormaliy that could be ischemia or attenuation. Because of this he underwent cardiac cath that did not show any obstructive CAD except some plaque in the LAD, due to this aggressive risk factor modification with weight loss is reemphasized.     He said that he is not short of breath that much now. His LDL is around 110. He is also moderately obese    Review of Systems   Constitution: Negative for decreased appetite, fever and malaise/fatigue.   HENT: Negative for headaches.    Cardiovascular: Negative for chest pain, irregular heartbeat, leg swelling and palpitations.   Respiratory: Positive for snoring (cpap). Negative for shortness of breath.    Skin: Negative for color change.   Musculoskeletal: Positive for arthritis and joint pain.   Gastrointestinal: Negative for abdominal pain, nausea and vomiting.   Neurological: Positive for light-headedness. Negative for dizziness and numbness.   Psychiatric/Behavioral: Depression: meds.       The following portions of the patient's history were reviewed and updated as appropriate: allergies, current medications, past family history, past medical history, past social history,and problem list.    Past  Medical History:   Diagnosis Date   • Clotting disorder    • Hyperlipidemia    • Hypertension        Past Surgical History:   Procedure Laterality Date   • BACK SURGERY  2007   • CARDIAC CATHETERIZATION N/A 5/16/2017    Procedure: Coronary angiography;  Surgeon: Malcolm Chen MD;  Location:  [facility];  Service:    • CARDIAC CATHETERIZATION N/A 5/16/2017    Procedure: Left Heart Cath;  Surgeon: Malcolm Chen MD;  Location:  [facility];  Service:    • CARDIAC CATHETERIZATION N/A 5/16/2017    Procedure: Left ventriculography;  Surgeon: Malcolm Chen MD;  Location:  [facility];  Service:    • REPLACEMENT TOTAL KNEE Left        Social History     Social History   • Marital status:      Spouse name: N/A   • Number of children: N/A   • Years of education: N/A     Occupational History   • Not on file.     Social History Main Topics   • Smoking status: Former Smoker   • Smokeless tobacco: Never Used   • Alcohol use No   • Drug use: Yes     Special: Marijuana   • Sexual activity: Not on file     Other Topics Concern   • Not on file     Social History Narrative       History reviewed. No pertinent family history.    Scheduled Meds:    Current Outpatient Prescriptions:   •  amLODIPine (NORVASC) 10 MG tablet, Take 10 mg by mouth Daily., Disp: , Rfl:   •  amphetamine-dextroamphetamine (ADDERALL) 20 MG tablet, take 1 tablet by mouth twice a day, Disp: , Rfl: 0  •  clobetasol (TEMOVATE) 0.05 % ointment, Apply 1 application topically 2 (Two) Times a Day As Needed., Disp: , Rfl:   •  FLUoxetine (PROzac) 20 MG tablet, take 2 tablets by mouth once daily, Disp: , Rfl: 0  •  lamoTRIgine (LaMICtal) 200 MG tablet, 200 mg Daily. take 1 tablet by mouth at bedtime, Disp: , Rfl: 0  •  levothyroxine (SYNTHROID, LEVOTHROID) 125 MCG tablet, Take 250 mcg by mouth Daily., Disp: , Rfl:   •  meloxicam (MOBIC) 15 MG tablet, Take 15 mg by mouth Daily., Disp: ,  Rfl:   •  rosuvastatin (CRESTOR) 5 MG tablet, Take 1 tablet by mouth Daily., Disp: 30 tablet, Rfl: 3    Objective:  /84  Pulse 72  Wt (!) 320 lb (145 kg)  BMI 40 kg/m2     Physical Exam  Physical Exam:    General: No acute distress. obese   Skin: Warm and dry, no diaphoresis noted   HEENT: No ptosis; external ear and nose normal; oral mucosa moist   Neck: Supple; no carotid bruits; no JVD, Trachea mid line   Heart: S1S2 regular rate and rhythm; no murmurs; no gallop or rub appreciated, apex not displaced   Chest: Respirations regular, unlabored at rest, bilateral breath sounds have good air entry; no  wheezes auscultated.     Abdomen: Soft, non-tender, non-distended, positive bowel sounds  No hepatosplenomegaly   Extremities: Bilateral lower extremities have no pre-tibial pitting edema; Radials are palpable   Neurological: Alert and oriented x 3; no new motor deficits,           ECG 12 Lead  Date/Time: 6/13/2017 9:41 AM  Performed by: MUSA CAMEJO  Authorized by: MUSA CAMEJO   Rhythm: sinus rhythm  Rate: normal  QRS axis: left             Comparison to previous ECG:  Similar to previous ecg     Assessment:  Problem List Items Addressed This Visit        Cardiovascular and Mediastinum    Essential (primary) hypertension - Primary    Elevated cholesterol       Respiratory    Obstructive apnea    Dyspnea on exertion          Plan:    Overall clinically cardiac-wise he has been stable.  He denies any new exertional shortness of breath or any chest pains.  He does have coronary artery disease with only mild plaque.  Hence weight loss is reemphasized.  I did ask him to go up on the dose of the Crestor to 10 mg.,  One I did ask him to follow-up with Dr. Lozada.      I not only counseled the patient today on the risk factor modification of significant factors noted in the assessment and plan, and I also recommended that the patient reduce salt and saturated animal fat intake in diet, about the  advantages of plant based diet, as well as to perform scheduled exercise on a regular basis.    06/13/2017  Vinay Chen MD, FACC

## 2017-06-14 ENCOUNTER — TELEPHONE (OUTPATIENT)
Dept: CARDIOLOGY | Facility: CLINIC | Age: 67
End: 2017-06-14

## 2017-06-14 RX ORDER — ROSUVASTATIN CALCIUM 10 MG/1
10 TABLET, COATED ORAL DAILY
Qty: 30 TABLET | Refills: 3 | Status: SHIPPED | OUTPATIENT
Start: 2017-06-14 | End: 2017-12-06 | Stop reason: SDUPTHER

## 2017-06-14 NOTE — TELEPHONE ENCOUNTER
----- Message from Marbin John sent at 6/13/2017 10:14 AM EDT -----  Regarding: PRESCRIPTION QUESTION & CONCERNS  Contact: [phone]  Lacigeorgiana Arreguin is calling on above patient and wants to know if  Rosuvastatin 5 mg , Patient was taken 1 a day but pharmacy  received 2 a day of 5 mg. Is doc is increasing medication needs to be  changed to 10 mg 1 a day for 30 day supply       Per Dr Chen crestor was increased to 10 mg qd  Pt notified   Crestor 10 mg qd was sent in

## 2017-07-24 ENCOUNTER — OFFICE VISIT (OUTPATIENT)
Dept: INTERNAL MEDICINE | Facility: CLINIC | Age: 67
End: 2017-07-24

## 2017-07-24 VITALS
HEIGHT: 72 IN | DIASTOLIC BLOOD PRESSURE: 60 MMHG | SYSTOLIC BLOOD PRESSURE: 110 MMHG | BODY MASS INDEX: 41.99 KG/M2 | WEIGHT: 310 LBS | HEART RATE: 74 BPM

## 2017-07-24 DIAGNOSIS — I10 ESSENTIAL (PRIMARY) HYPERTENSION: ICD-10-CM

## 2017-07-24 DIAGNOSIS — E78.00 ELEVATED CHOLESTEROL: ICD-10-CM

## 2017-07-24 DIAGNOSIS — I25.10 CORONARY ARTERY DISEASE INVOLVING NATIVE CORONARY ARTERY OF NATIVE HEART WITHOUT ANGINA PECTORIS: Primary | ICD-10-CM

## 2017-07-24 PROBLEM — R06.09 DYSPNEA ON EXERTION: Status: RESOLVED | Noted: 2017-03-29 | Resolved: 2017-07-24

## 2017-07-24 PROCEDURE — 99213 OFFICE O/P EST LOW 20 MIN: CPT | Performed by: INTERNAL MEDICINE

## 2017-07-24 RX ORDER — ZOLPIDEM TARTRATE 10 MG/1
TABLET ORAL
Refills: 0 | Status: ON HOLD | COMMUNITY
Start: 2017-07-03 | End: 2018-05-10

## 2017-07-24 NOTE — PROGRESS NOTES
Subjective   Durga Valenzuela is a 66 y.o. male.     History of Present Illness he is here today for evaluation of some recent orthostatic dizziness as well as follow-up on his coronary artery disease and hypercholesterolemia.  Over the last few weeks he has had daily lightheadedness lasting up to 30 seconds but only when changing position that is going from lying to sitting or sitting to standing.  There is no associated headache or focal neurologic symptoms.  He denies any chest pain or dyspnea.        Review of Systems   Constitutional: Negative for activity change and fatigue.   Respiratory: Negative for cough, chest tightness, shortness of breath and wheezing.    Cardiovascular: Negative for chest pain, palpitations and leg swelling.   Gastrointestinal: Negative for abdominal pain, blood in stool, diarrhea, nausea and vomiting.   Neurological: Positive for dizziness. Negative for syncope, facial asymmetry, speech difficulty, weakness, numbness and headaches.   Psychiatric/Behavioral: Negative for confusion and dysphoric mood. The patient is not nervous/anxious.        Objective   Physical Exam   Constitutional: He is oriented to person, place, and time. Vital signs are normal. He appears well-developed and well-nourished. He is active. He does not appear ill.   Eyes: Conjunctivae and EOM are normal.   Neck: Carotid bruit is not present.   Cardiovascular: Normal rate, regular rhythm, S1 normal and S2 normal.    Pulses:       Posterior tibial pulses are 2+ on the right side, and 2+ on the left side.   Pulmonary/Chest: No tachypnea. No respiratory distress. He has no decreased breath sounds. He has no wheezes. He has no rhonchi. He has no rales.   Abdominal: Soft. Normal appearance and bowel sounds are normal. He exhibits no abdominal bruit and no mass. There is no hepatosplenomegaly. There is no tenderness.       Vascular Status -  His exam exhibits no right foot edema. His exam exhibits no left foot  edema.  Neurological: He is alert and oriented to person, place, and time. He has normal strength. He displays no tremor. No cranial nerve deficit. He displays a negative Romberg sign. Gait normal.   Reflex Scores:       Bicep reflexes are 2+ on the right side and 2+ on the left side.  No dizziness with rapid head turn   Psychiatric: He has a normal mood and affect. His speech is normal and behavior is normal. Judgment and thought content normal. Cognition and memory are normal.       Assessment/Plan blood pressure lying 123/74 and standing 116/74.  Recent cardiac catheterization showed a 50% LAD stenosis.  Ejection fraction 60%.  Total cholesterol 191 triglycerides 189 HDL cholesterol 35  LDL cholesterol 118    Assessment #1 dizziness-secondary to brief orthostasis but no significant drop in blood pressure.  #2 coronary artery disease-subcritical stenosis and this was discussed and the rationale for using Crestor for hypercholesterolemia as well.  He agrees to continue this medication.    Plan #1 no change medication.  Routine follow-up scheduled for September and fasting lab will be done at that time.

## 2017-09-22 ENCOUNTER — OFFICE VISIT (OUTPATIENT)
Dept: INTERNAL MEDICINE | Facility: CLINIC | Age: 67
End: 2017-09-22

## 2017-09-22 ENCOUNTER — TELEPHONE (OUTPATIENT)
Dept: INTERNAL MEDICINE | Facility: CLINIC | Age: 67
End: 2017-09-22

## 2017-09-22 VITALS
BODY MASS INDEX: 38.5 KG/M2 | DIASTOLIC BLOOD PRESSURE: 68 MMHG | HEIGHT: 74 IN | WEIGHT: 300 LBS | SYSTOLIC BLOOD PRESSURE: 110 MMHG | HEART RATE: 68 BPM

## 2017-09-22 DIAGNOSIS — R79.89 HIGH THYROID STIMULATING HORMONE (TSH) LEVEL: ICD-10-CM

## 2017-09-22 DIAGNOSIS — R79.89 HIGH THYROID STIMULATING HORMONE (TSH) LEVEL: Primary | ICD-10-CM

## 2017-09-22 DIAGNOSIS — I25.10 CORONARY ARTERY DISEASE INVOLVING NATIVE CORONARY ARTERY OF NATIVE HEART WITHOUT ANGINA PECTORIS: Primary | ICD-10-CM

## 2017-09-22 DIAGNOSIS — E11.9 TYPE 2 DIABETES MELLITUS WITHOUT COMPLICATION, WITHOUT LONG-TERM CURRENT USE OF INSULIN (HCC): ICD-10-CM

## 2017-09-22 DIAGNOSIS — I10 ESSENTIAL (PRIMARY) HYPERTENSION: ICD-10-CM

## 2017-09-22 DIAGNOSIS — E78.00 ELEVATED CHOLESTEROL: ICD-10-CM

## 2017-09-22 LAB
ALBUMIN SERPL-MCNC: 4.3 G/DL (ref 3.5–5.2)
ALBUMIN/GLOB SERPL: 2 G/DL
ALP SERPL-CCNC: 59 U/L (ref 39–117)
ALT SERPL-CCNC: 21 U/L (ref 1–41)
AST SERPL-CCNC: 14 U/L (ref 1–40)
BILIRUB SERPL-MCNC: 0.6 MG/DL (ref 0.1–1.2)
BUN SERPL-MCNC: 22 MG/DL (ref 8–23)
BUN/CREAT SERPL: 23.7 (ref 7–25)
CALCIUM SERPL-MCNC: 9.7 MG/DL (ref 8.6–10.5)
CHLORIDE SERPL-SCNC: 103 MMOL/L (ref 98–107)
CHOLEST SERPL-MCNC: 86 MG/DL (ref 0–200)
CO2 SERPL-SCNC: 26.6 MMOL/L (ref 22–29)
CREAT SERPL-MCNC: 0.93 MG/DL (ref 0.76–1.27)
GLOBULIN SER CALC-MCNC: 2.2 GM/DL
GLUCOSE SERPL-MCNC: 120 MG/DL (ref 65–99)
HBA1C MFR BLD: 5.9 % (ref 4.8–5.6)
HDLC SERPL-MCNC: 31 MG/DL (ref 40–60)
LDLC SERPL CALC-MCNC: 41 MG/DL (ref 0–100)
POTASSIUM SERPL-SCNC: 4.2 MMOL/L (ref 3.5–5.2)
PROT SERPL-MCNC: 6.5 G/DL (ref 6–8.5)
SODIUM SERPL-SCNC: 142 MMOL/L (ref 136–145)
TRIGL SERPL-MCNC: 72 MG/DL (ref 0–150)
VLDLC SERPL-MCNC: 14.4 MG/DL (ref 5–40)

## 2017-09-22 PROCEDURE — 99213 OFFICE O/P EST LOW 20 MIN: CPT | Performed by: INTERNAL MEDICINE

## 2017-09-22 RX ORDER — TAMSULOSIN HYDROCHLORIDE 0.4 MG/1
1 CAPSULE ORAL DAILY
Refills: 0 | COMMUNITY
Start: 2017-08-20 | End: 2020-03-24 | Stop reason: SDUPTHER

## 2017-09-22 NOTE — TELEPHONE ENCOUNTER
----- Message from Pavan Stoner MLT sent at 9/22/2017 10:57 AM EDT -----  Regarding: Thyroid test  This patient was asking about thyroid testing.  Could you please check with Dr. Lozada to see if he needs to add on to labs drawn today?    Thanks, Pavan

## 2017-09-22 NOTE — PROGRESS NOTES
Subjective   Durga Valenzuela is a 67 y.o. male.     History of Present Illness he is here today for follow-up of hypertension, cholesterolemia, and coronary artery disease.  Generally he has been doing well.  He will have right total knee replacement October 30.  He was seen by pulmonary for clearance and no contraindications were noted.  He denies any PND, cough, wheezing, dyspnea on exertion, chest pain, focal neurologic symptoms.  He does have a 6 month history of transient dizziness with change in head position on a daily basis.  It lasts a few seconds.  There were no associated symptoms.  He has lost 10 more pounds on diet.        Review of Systems   Constitutional: Negative for activity change, appetite change and fatigue.   Respiratory: Negative for cough, chest tightness, shortness of breath and wheezing.    Cardiovascular: Negative for chest pain, palpitations and leg swelling.   Gastrointestinal: Negative for abdominal pain, anal bleeding, blood in stool, diarrhea, nausea and vomiting.   Genitourinary: Negative for dysuria, flank pain, frequency and hematuria.   Musculoskeletal: Positive for arthralgias.   Neurological: Positive for dizziness. Negative for syncope, facial asymmetry, speech difficulty, weakness, numbness and headaches.   Psychiatric/Behavioral: Negative for confusion and dysphoric mood. The patient is not nervous/anxious.        Objective   Physical Exam   Constitutional: He is oriented to person, place, and time. Vital signs are normal. He appears well-developed and well-nourished. He is sleeping and active. He does not appear ill.   Eyes: Conjunctivae are normal.   Neck: Carotid bruit is not present.   Cardiovascular: Normal rate, regular rhythm, S1 normal and S2 normal.  Exam reveals no S3 and no S4.    No murmur heard.  Pulmonary/Chest: No tachypnea. No respiratory distress. He has no decreased breath sounds. He has no wheezes. He has no rhonchi. He has no rales.   Abdominal: Soft.  "Normal appearance and bowel sounds are normal. He exhibits no abdominal bruit and no mass. There is no hepatosplenomegaly. There is no tenderness.       Vascular Status -  His exam exhibits no right foot edema. His exam exhibits no left foot edema.  Neurological: He is alert and oriented to person, place, and time. Gait normal.   Psychiatric: He has a normal mood and affect. His speech is normal and behavior is normal. Judgment and thought content normal. Cognition and memory are normal.       Assessment/Plan assessment #1 hypertension-good control #2 coronary artery disease-subclinical stenosis only #3 hypercholesterolemia-should be well-controlled now on Crestor # 4 history of pulmonary embolism-he will need to be on\" on therapy postoperatively this was discussed with him.  #5 diabetes mellitus-controlled with diet    Plan #1 no change medication #2 CMP, hemoglobin A1c, lipids.  Routine follow-up with me in 6 months.             "

## 2017-09-23 LAB — TSH SERPL-ACNC: 0.01 MIU/ML (ref 0.27–4.2)

## 2017-09-25 ENCOUNTER — TELEPHONE (OUTPATIENT)
Dept: INTERNAL MEDICINE | Facility: CLINIC | Age: 67
End: 2017-09-25

## 2017-09-25 LAB
REF LAB TEST METHOD: NORMAL
SPECIMEN STATUS: NORMAL

## 2017-09-25 RX ORDER — LEVOTHYROXINE SODIUM 112 UG/1
112 TABLET ORAL DAILY
Qty: 90 TABLET | Refills: 3 | Status: SHIPPED | OUTPATIENT
Start: 2017-09-25 | End: 2018-09-05

## 2017-09-25 NOTE — TELEPHONE ENCOUNTER
----- Message from Kentrell Lozada Jr., MD sent at 9/25/2017  8:11 AM EDT -----  LDL cholesterol is very good at 41.  Hemoglobin A1c is very good at 5.9.  Thyroid is too high.  Decrease Synthroid to 0.112 mg by mouth daily.

## 2017-10-25 ENCOUNTER — TELEPHONE (OUTPATIENT)
Dept: INTERNAL MEDICINE | Facility: CLINIC | Age: 67
End: 2017-10-25

## 2017-10-25 NOTE — TELEPHONE ENCOUNTER
I got a letter from Mr. Valenzuela asking about his dose of Levothyroxine.  He was questioning the dose.  It was 125 MG per day.  However on 9/22/2017 Mr. Valenzuela had some lab work done and his TSH was high.  Therefore Dr. Lozada reduced his medication to the 112 MG daily.    I left this message on Mr. Valenzuela's voice mail.

## 2017-12-06 ENCOUNTER — TELEPHONE (OUTPATIENT)
Dept: INTERNAL MEDICINE | Facility: CLINIC | Age: 67
End: 2017-12-06

## 2017-12-06 RX ORDER — ROSUVASTATIN CALCIUM 10 MG/1
10 TABLET, COATED ORAL DAILY
Qty: 90 TABLET | Refills: 3 | Status: SHIPPED | OUTPATIENT
Start: 2017-12-06 | End: 2019-02-05 | Stop reason: DRUGHIGH

## 2017-12-06 NOTE — TELEPHONE ENCOUNTER
----- Message from Margarita Blanco sent at 12/6/2017  1:13 PM EST -----  Contact: Patient  Patient called in requesting refill. Please advise.    rosuvastatin (CRESTOR) 10 MG tablet    [facility] [place], [place] [facility] [address] [facility] [phone]  - [phone] FX    Pt# [phone]

## 2018-03-15 DIAGNOSIS — N13.8 BENIGN PROSTATIC HYPERPLASIA WITH URINARY OBSTRUCTION: ICD-10-CM

## 2018-03-15 DIAGNOSIS — N40.1 BENIGN PROSTATIC HYPERPLASIA WITH URINARY OBSTRUCTION: ICD-10-CM

## 2018-03-15 RX ORDER — TAMSULOSIN HYDROCHLORIDE 0.4 MG/1
CAPSULE ORAL
Qty: 90 CAPSULE | Refills: 3 | Status: SHIPPED | OUTPATIENT
Start: 2018-03-15 | End: 2018-03-23

## 2018-03-23 ENCOUNTER — OFFICE VISIT (OUTPATIENT)
Dept: INTERNAL MEDICINE | Facility: CLINIC | Age: 68
End: 2018-03-23

## 2018-03-23 VITALS
HEIGHT: 74 IN | HEART RATE: 54 BPM | DIASTOLIC BLOOD PRESSURE: 82 MMHG | OXYGEN SATURATION: 98 % | SYSTOLIC BLOOD PRESSURE: 144 MMHG | BODY MASS INDEX: 40.43 KG/M2 | WEIGHT: 315 LBS

## 2018-03-23 DIAGNOSIS — I10 ESSENTIAL (PRIMARY) HYPERTENSION: ICD-10-CM

## 2018-03-23 DIAGNOSIS — N40.1 BENIGN PROSTATIC HYPERPLASIA WITH URINARY OBSTRUCTION: ICD-10-CM

## 2018-03-23 DIAGNOSIS — I25.10 CORONARY ARTERY DISEASE INVOLVING NATIVE CORONARY ARTERY OF NATIVE HEART WITHOUT ANGINA PECTORIS: ICD-10-CM

## 2018-03-23 DIAGNOSIS — E78.00 ELEVATED CHOLESTEROL: ICD-10-CM

## 2018-03-23 DIAGNOSIS — S16.1XXA CERVICAL STRAIN, ACUTE, INITIAL ENCOUNTER: ICD-10-CM

## 2018-03-23 DIAGNOSIS — E11.9 TYPE 2 DIABETES MELLITUS WITHOUT COMPLICATION, WITHOUT LONG-TERM CURRENT USE OF INSULIN (HCC): ICD-10-CM

## 2018-03-23 DIAGNOSIS — N13.8 BENIGN PROSTATIC HYPERPLASIA WITH URINARY OBSTRUCTION: ICD-10-CM

## 2018-03-23 DIAGNOSIS — G47.33 OBSTRUCTIVE APNEA: ICD-10-CM

## 2018-03-23 DIAGNOSIS — E03.9 ADULT HYPOTHYROIDISM: Primary | ICD-10-CM

## 2018-03-23 LAB
APPEARANCE UR: CLEAR
BASOPHILS # BLD AUTO: 0.03 10*3/MM3 (ref 0–0.2)
BASOPHILS NFR BLD AUTO: 0.6 % (ref 0–1.5)
BILIRUB UR QL STRIP: NEGATIVE
COLOR UR: YELLOW
EOSINOPHIL # BLD AUTO: 0.24 10*3/MM3 (ref 0–0.7)
EOSINOPHIL # BLD AUTO: 4.5 % (ref 0.3–6.2)
ERYTHROCYTE [DISTWIDTH] IN BLOOD BY AUTOMATED COUNT: 15 % (ref 11.5–14.5)
GLUCOSE UR QL: NEGATIVE
HBA1C MFR BLD: 6.1 % (ref 4.8–5.6)
HCT VFR BLD AUTO: 43 % (ref 40.4–52.2)
HGB BLD-MCNC: 13.9 G/DL (ref 13.7–17.6)
HGB UR QL STRIP: NEGATIVE
IMM GRANULOCYTES # BLD: 0 10*3/MM3 (ref 0–0.03)
IMM GRANULOCYTES NFR BLD: 0 % (ref 0–0.5)
KETONES UR QL STRIP: NEGATIVE
LEUKOCYTE ESTERASE UR QL STRIP: NEGATIVE
LYMPHOCYTES # BLD AUTO: 1.25 10*3/MM3 (ref 0.9–4.8)
LYMPHOCYTES NFR BLD AUTO: 23.4 % (ref 19.6–45.3)
MCH RBC QN AUTO: 30.3 PG (ref 27–32.7)
MCHC RBC AUTO-ENTMCNC: 32.3 G/DL (ref 32.6–36.4)
MCV RBC AUTO: 93.9 FL (ref 79.8–96.2)
MONOCYTES # BLD AUTO: 0.47 10*3/MM3 (ref 0.2–1.2)
MONOCYTES NFR BLD AUTO: 8.8 % (ref 5–12)
NEUTROPHILS # BLD AUTO: 3.36 10*3/MM3 (ref 1.9–8.1)
NEUTROPHILS NFR BLD AUTO: 62.7 % (ref 42.7–76)
NITRITE UR QL STRIP: NEGATIVE
NRBC BLD AUTO-RTO: 0 /100 WBC (ref 0–0)
PH UR STRIP.AUTO: 6 [PH] (ref 5–8)
PLATELET # BLD AUTO: 196 10*3/MM3 (ref 140–500)
PROTEIN: NEGATIVE
RBC # BLD AUTO: 4.58 10*6/MM3 (ref 4.6–6)
SP GR UR: 1.02 (ref 1–1.03)
TSH SERPL-ACNC: ABNORMAL MIU/ML (ref 0.27–4.2)
UROBILINOGEN UR STRIP-MCNC: NORMAL MG/DL
WBC # BLD AUTO: 5.35 10*3/MM3 (ref 4.5–10.7)

## 2018-03-23 PROCEDURE — 99214 OFFICE O/P EST MOD 30 MIN: CPT | Performed by: INTERNAL MEDICINE

## 2018-03-23 NOTE — PROGRESS NOTES
"Subjective   Durga Valenzuela is a 67 y.o. male.     History of Present Illness he is here today for his annual visit which includes follow-up of hypertension, hypercholesterolemia, coronary artery disease, obstructive sleep apnea, hypothyroidism, BPH, and acute neck pain.  He fell asleep working on his computer and fell to the floor one week ago.  He now has posterior neck pain.  There is no radiation of the pain down the arms or numbness in the arms or hands.  He has some limitation in motion of his head and neck due to the pain.  He relates using his CPAP nightly.  He denies any headache, dizziness, syncope, or focal neurologic episodes.  He denies any PND, FITZPATRICK, chest pain, or swelling in the ankles.  He does have minor daily cough without sputum production usually in the morning which she attributes to \"allergies\".  There is no wheezing.  He relates nocturia upon occasion up to twice per night although he may go nights without nocturia.  He does have urinary hesitancy and a sense of incomplete voiding present for 2 years.  The remainder of his review systems is negative.        Review of Systems   Constitutional: Negative for activity change, appetite change and fatigue.   HENT: Negative for trouble swallowing.    Respiratory: Positive for cough. Negative for chest tightness, shortness of breath and wheezing.    Cardiovascular: Negative for chest pain, palpitations and leg swelling.   Gastrointestinal: Negative for abdominal pain, anal bleeding, blood in stool, constipation, diarrhea, nausea and vomiting.   Genitourinary: Positive for difficulty urinating. Negative for flank pain, frequency, hematuria and urgency.   Musculoskeletal: Positive for neck pain.   Neurological: Negative for dizziness, syncope, facial asymmetry, speech difficulty, weakness, numbness and headaches.   Psychiatric/Behavioral: Negative for depressed mood. The patient is not nervous/anxious.        Objective   Physical Exam   Constitutional: " He is oriented to person, place, and time. He appears well-developed and well-nourished. He is active. He does not appear ill.   Eyes: Conjunctivae are normal.   Fundoscopic exam:       The right eye shows no AV nicking, no exudate and no hemorrhage.        The left eye shows no AV nicking, no exudate and no hemorrhage.   Neck: Carotid bruit is not present. No thyroid mass and no thyromegaly present.   Cardiovascular: Normal rate, regular rhythm, S1 normal and S2 normal.  Exam reveals no S3 and no S4.    No murmur heard.  Pulses:       Posterior tibial pulses are 2+ on the right side, and 2+ on the left side.   Pulmonary/Chest: No tachypnea. No respiratory distress. He has no decreased breath sounds. He has no wheezes. He has no rhonchi. He has no rales.   Abdominal: Soft. Normal appearance and bowel sounds are normal. He exhibits no abdominal bruit and no mass. There is no hepatosplenomegaly. There is no tenderness.   Musculoskeletal:        Arms:    Vascular Status -  His right foot exhibits normal right foot edema. His left foot exhibits normal left foot edema.  Neurological: He is alert and oriented to person, place, and time. Gait normal.   Reflex Scores:       Bicep reflexes are 2+ on the right side.  Psychiatric: He has a normal mood and affect. His speech is normal and behavior is normal. Judgment and thought content normal. Cognition and memory are normal.         Assessment/Plan September lab showed normal CMP.  Hemoglobin A1c 5.9.  Total cholesterol 86 triglycerides 72 HDL cholesterol 31 and LDL cholesterol 41.  TSH 0.014    Assessment #1 hypertension-systolic up today #2 coronary artery disease-quiescent #3 obstructive sleep apnea-compliant #4 acute cervical strain #5 hypothyroidism-hopefully appropriately supplemented now on lower dose of Synthroid #6 diabetes mellitus-treated with diet thus far #7 BPH-symptomatic on Flomax    Plan #1 no change medication #2 physical therapy consult #3 urology consult  #4 CBC, hemoglobin A1c, urinalysis, TSH, free T3.  Routine follow-up with me in 6 months.

## 2018-03-24 LAB — T3FREE SERPL-MCNC: 1.9 PG/ML (ref 2–4.4)

## 2018-05-03 DIAGNOSIS — I10 ESSENTIAL (PRIMARY) HYPERTENSION: ICD-10-CM

## 2018-05-03 RX ORDER — AMLODIPINE BESYLATE 10 MG/1
TABLET ORAL
Qty: 90 TABLET | Refills: 3 | Status: SHIPPED | OUTPATIENT
Start: 2018-05-03 | End: 2018-05-04

## 2018-05-04 ENCOUNTER — OFFICE VISIT (OUTPATIENT)
Dept: INTERNAL MEDICINE | Facility: CLINIC | Age: 68
End: 2018-05-04

## 2018-05-04 VITALS
DIASTOLIC BLOOD PRESSURE: 82 MMHG | BODY MASS INDEX: 40.43 KG/M2 | WEIGHT: 315 LBS | HEART RATE: 63 BPM | SYSTOLIC BLOOD PRESSURE: 138 MMHG | OXYGEN SATURATION: 98 % | HEIGHT: 74 IN

## 2018-05-04 DIAGNOSIS — G44.311 INTRACTABLE ACUTE POST-TRAUMATIC HEADACHE: ICD-10-CM

## 2018-05-04 DIAGNOSIS — S16.1XXA CERVICAL STRAIN, ACUTE, INITIAL ENCOUNTER: Primary | ICD-10-CM

## 2018-05-04 PROBLEM — G44.319 ACUTE POST-TRAUMATIC HEADACHE: Status: ACTIVE | Noted: 2018-05-04

## 2018-05-04 PROCEDURE — 99213 OFFICE O/P EST LOW 20 MIN: CPT | Performed by: INTERNAL MEDICINE

## 2018-05-04 NOTE — PROGRESS NOTES
Srinivas Valenzuela is a 67 y.o. male.     History of Present Illness he is here today for evaluation and treatment of global headache over the last 7 weeks since falling forward while working at his computer.  He struck the front of his head on the computer.  The headache began perhaps a few days after the injury.  He has been undergoing physical therapy for acute cervical strain which has helped that component of this problem greatly.  He denies any nausea or vomiting and he has not had any scotoma.  There is a degree of light sensitivity however.  He has a remote history of migraine as a child.  Presently he has some blurred vision but denies diplopia, paresthesias, focal paresis, dysphagia, dysarthria.  He has been using 800 mg of ibuprofen per day without improvement.  The headache is constant although it does not awaken him from sleep.        Review of Systems   Constitutional: Negative for activity change, appetite change and fatigue.   Respiratory: Negative for cough and shortness of breath.    Cardiovascular: Negative for chest pain.   Gastrointestinal: Negative for nausea and vomiting.   Neurological: Positive for headache. Negative for dizziness, syncope, facial asymmetry, speech difficulty, weakness and numbness.       Objective   Physical Exam   Constitutional: He is oriented to person, place, and time. Vital signs are normal. He appears well-developed and well-nourished. He is active. He does not appear ill.   Eyes: Conjunctivae and EOM are normal.   Fundoscopic exam:       The right eye shows no AV nicking, no exudate and no hemorrhage.        The left eye shows no AV nicking, no exudate and no hemorrhage.   Cardiovascular: Normal rate, regular rhythm, S1 normal and S2 normal.  Exam reveals no S3 and no S4.    No murmur heard.  Pulmonary/Chest: No tachypnea. No respiratory distress. He has no decreased breath sounds. He has no wheezes. He has no rhonchi. He has no rales.   Abdominal: Normal  appearance and bowel sounds are normal. He exhibits no abdominal bruit and no mass. There is no hepatosplenomegaly. There is no tenderness.     Vascular Status -  His right foot exhibits no edema. His left foot exhibits no edema.  Neurological: He is alert and oriented to person, place, and time. He has normal strength. He displays no tremor. He displays a negative Romberg sign. Gait normal.   Reflex Scores:       Bicep reflexes are 2+ on the right side and 2+ on the left side.  Psychiatric: He has a normal mood and affect. His speech is normal and behavior is normal. Judgment and thought content normal. Cognition and memory are normal.         Assessment/Plan assessment #1 posttraumatic headache-question concussion more likely than intracranial bleed.  This was discussed with the patient.    Plan #1 discontinue ibuprofen #2 Tylenol extra strength one or 2 tablets 3 times a day when necessary #3 CT scan of the head without IV contrast within the next week.

## 2018-05-08 ENCOUNTER — HOSPITAL ENCOUNTER (OUTPATIENT)
Dept: CT IMAGING | Facility: HOSPITAL | Age: 68
Discharge: HOME | End: 2018-05-08
Attending: INTERNAL MEDICINE | Admitting: INTERNAL MEDICINE

## 2018-05-08 DIAGNOSIS — G44.311 INTRACTABLE ACUTE POST-TRAUMATIC HEADACHE: ICD-10-CM

## 2018-05-08 PROCEDURE — 70450 CT HEAD/BRAIN W/O DYE: CPT

## 2018-05-09 ENCOUNTER — HOSPITAL ENCOUNTER (OUTPATIENT)
Facility: HOSPITAL | Age: 68
Setting detail: OBSERVATION
Discharge: HOME | End: 2018-05-10
Attending: INTERNAL MEDICINE | Admitting: INTERNAL MEDICINE

## 2018-05-09 ENCOUNTER — APPOINTMENT (OUTPATIENT)
Dept: CT IMAGING | Facility: HOSPITAL | Age: 68
End: 2018-05-09

## 2018-05-09 ENCOUNTER — OFFICE VISIT (OUTPATIENT)
Dept: INTERNAL MEDICINE | Facility: CLINIC | Age: 68
End: 2018-05-09

## 2018-05-09 VITALS
SYSTOLIC BLOOD PRESSURE: 164 MMHG | HEART RATE: 69 BPM | WEIGHT: 315 LBS | DIASTOLIC BLOOD PRESSURE: 82 MMHG | BODY MASS INDEX: 40.43 KG/M2 | OXYGEN SATURATION: 97 % | HEIGHT: 74 IN

## 2018-05-09 DIAGNOSIS — S12.041D CLOSED NONDISPLACED LATERAL MASS FRACTURE OF FIRST CERVICAL VERTEBRA WITH ROUTINE HEALING: Primary | ICD-10-CM

## 2018-05-09 PROBLEM — S12.9XXA CERVICAL SPINE FRACTURE (HCC): Status: ACTIVE | Noted: 2018-05-09

## 2018-05-09 PROBLEM — G44.319 ACUTE POST-TRAUMATIC HEADACHE: Status: RESOLVED | Noted: 2018-05-04 | Resolved: 2018-05-09

## 2018-05-09 LAB
ALBUMIN SERPL-MCNC: 4.6 G/DL (ref 3.5–5.2)
ALBUMIN/GLOB SERPL: 1.8 G/DL
ALP SERPL-CCNC: 61 U/L (ref 39–117)
ALT SERPL W P-5'-P-CCNC: 29 U/L (ref 1–41)
ANION GAP SERPL CALCULATED.3IONS-SCNC: 14.8 MMOL/L
AST SERPL-CCNC: 29 U/L (ref 1–40)
BILIRUB SERPL-MCNC: 0.5 MG/DL (ref 0.1–1.2)
BILIRUB UR QL STRIP: NEGATIVE
BUN BLD-MCNC: 21 MG/DL (ref 8–23)
BUN/CREAT SERPL: 24.4 (ref 7–25)
CALCIUM SPEC-SCNC: 9.4 MG/DL (ref 8.6–10.5)
CHLORIDE SERPL-SCNC: 101 MMOL/L (ref 98–107)
CLARITY UR: CLEAR
CO2 SERPL-SCNC: 24.2 MMOL/L (ref 22–29)
COLOR UR: YELLOW
CREAT BLD-MCNC: 0.86 MG/DL (ref 0.76–1.27)
DEPRECATED RDW RBC AUTO: 51.5 FL (ref 37–54)
ERYTHROCYTE [DISTWIDTH] IN BLOOD BY AUTOMATED COUNT: 14.7 % (ref 11.5–14.5)
GFR SERPL CREATININE-BSD FRML MDRD: 88 ML/MIN/1.73
GLOBULIN UR ELPH-MCNC: 2.6 GM/DL
GLUCOSE BLD-MCNC: 122 MG/DL (ref 65–99)
GLUCOSE UR STRIP-MCNC: NEGATIVE MG/DL
HCT VFR BLD AUTO: 41.9 % (ref 40.4–52.2)
HGB BLD-MCNC: 13.7 G/DL (ref 13.7–17.6)
HGB UR QL STRIP.AUTO: NEGATIVE
INR PPP: 1.11 (ref 0.9–1.1)
KETONES UR QL STRIP: NEGATIVE
LEUKOCYTE ESTERASE UR QL STRIP.AUTO: NEGATIVE
MCH RBC QN AUTO: 31.3 PG (ref 27–32.7)
MCHC RBC AUTO-ENTMCNC: 32.7 G/DL (ref 32.6–36.4)
MCV RBC AUTO: 95.7 FL (ref 79.8–96.2)
NITRITE UR QL STRIP: NEGATIVE
PH UR STRIP.AUTO: 6.5 [PH] (ref 5–8)
PLATELET # BLD AUTO: 172 10*3/MM3 (ref 140–500)
PMV BLD AUTO: 9.9 FL (ref 6–12)
POTASSIUM BLD-SCNC: 3.7 MMOL/L (ref 3.5–5.2)
PROT SERPL-MCNC: 7.2 G/DL (ref 6–8.5)
PROT UR QL STRIP: NEGATIVE
PROTHROMBIN TIME: 14.1 SECONDS (ref 11.7–14.2)
RBC # BLD AUTO: 4.38 10*6/MM3 (ref 4.6–6)
SODIUM BLD-SCNC: 140 MMOL/L (ref 136–145)
SP GR UR STRIP: 1.02 (ref 1–1.03)
UROBILINOGEN UR QL STRIP: NORMAL
WBC NRBC COR # BLD: 8.4 10*3/MM3 (ref 4.5–10.7)

## 2018-05-09 PROCEDURE — 93010 ELECTROCARDIOGRAM REPORT: CPT | Performed by: INTERNAL MEDICINE

## 2018-05-09 PROCEDURE — 99220 PR INITIAL OBSERVATION CARE/DAY 70 MINUTES: CPT | Performed by: INTERNAL MEDICINE

## 2018-05-09 PROCEDURE — G0378 HOSPITAL OBSERVATION PER HR: HCPCS

## 2018-05-09 PROCEDURE — 85027 COMPLETE CBC AUTOMATED: CPT | Performed by: INTERNAL MEDICINE

## 2018-05-09 PROCEDURE — 85610 PROTHROMBIN TIME: CPT | Performed by: INTERNAL MEDICINE

## 2018-05-09 PROCEDURE — 93005 ELECTROCARDIOGRAM TRACING: CPT | Performed by: INTERNAL MEDICINE

## 2018-05-09 PROCEDURE — 72125 CT NECK SPINE W/O DYE: CPT

## 2018-05-09 PROCEDURE — 80053 COMPREHEN METABOLIC PANEL: CPT | Performed by: INTERNAL MEDICINE

## 2018-05-09 PROCEDURE — 99213 OFFICE O/P EST LOW 20 MIN: CPT | Performed by: INTERNAL MEDICINE

## 2018-05-09 PROCEDURE — 81003 URINALYSIS AUTO W/O SCOPE: CPT | Performed by: INTERNAL MEDICINE

## 2018-05-09 RX ORDER — FLUOXETINE HYDROCHLORIDE 20 MG/5ML
20 LIQUID ORAL DAILY
Status: DISCONTINUED | OUTPATIENT
Start: 2018-05-09 | End: 2018-05-09 | Stop reason: CLARIF

## 2018-05-09 RX ORDER — AMLODIPINE BESYLATE 5 MG/1
5 TABLET ORAL
Status: DISCONTINUED | OUTPATIENT
Start: 2018-05-09 | End: 2018-05-09

## 2018-05-09 RX ORDER — SODIUM CHLORIDE 9 MG/ML
100 INJECTION, SOLUTION INTRAVENOUS CONTINUOUS
Status: DISCONTINUED | OUTPATIENT
Start: 2018-05-09 | End: 2018-05-10

## 2018-05-09 RX ORDER — HYDROXYZINE HYDROCHLORIDE 25 MG/1
25 TABLET, FILM COATED ORAL 3 TIMES DAILY PRN
Status: DISCONTINUED | OUTPATIENT
Start: 2018-05-09 | End: 2018-05-10 | Stop reason: HOSPADM

## 2018-05-09 RX ORDER — ROSUVASTATIN CALCIUM 10 MG/1
10 TABLET, COATED ORAL NIGHTLY
Status: DISCONTINUED | OUTPATIENT
Start: 2018-05-09 | End: 2018-05-10 | Stop reason: HOSPADM

## 2018-05-09 RX ORDER — PANTOPRAZOLE SODIUM 40 MG/1
40 TABLET, DELAYED RELEASE ORAL
Status: DISCONTINUED | OUTPATIENT
Start: 2018-05-10 | End: 2018-05-10 | Stop reason: HOSPADM

## 2018-05-09 RX ORDER — FLUOXETINE HYDROCHLORIDE 20 MG/1
20 CAPSULE ORAL DAILY
Status: DISCONTINUED | OUTPATIENT
Start: 2018-05-09 | End: 2018-05-10 | Stop reason: HOSPADM

## 2018-05-09 RX ORDER — HYDROCODONE BITARTRATE AND ACETAMINOPHEN 7.5; 325 MG/1; MG/1
1 TABLET ORAL EVERY 6 HOURS PRN
Status: DISCONTINUED | OUTPATIENT
Start: 2018-05-09 | End: 2018-05-09

## 2018-05-09 RX ORDER — LEVOTHYROXINE SODIUM 112 UG/1
112 TABLET ORAL
Status: DISCONTINUED | OUTPATIENT
Start: 2018-05-10 | End: 2018-05-10 | Stop reason: HOSPADM

## 2018-05-09 RX ORDER — CYCLOBENZAPRINE HCL 10 MG
10 TABLET ORAL 3 TIMES DAILY PRN
Status: DISCONTINUED | OUTPATIENT
Start: 2018-05-09 | End: 2018-05-10 | Stop reason: HOSPADM

## 2018-05-09 RX ORDER — TAMSULOSIN HYDROCHLORIDE 0.4 MG/1
0.4 CAPSULE ORAL DAILY
Status: DISCONTINUED | OUTPATIENT
Start: 2018-05-09 | End: 2018-05-10 | Stop reason: HOSPADM

## 2018-05-09 RX ORDER — ECHINACEA PURPUREA EXTRACT 125 MG
1 TABLET ORAL AS NEEDED
Status: DISCONTINUED | OUTPATIENT
Start: 2018-05-09 | End: 2018-05-10 | Stop reason: HOSPADM

## 2018-05-09 RX ORDER — AMLODIPINE BESYLATE 10 MG/1
10 TABLET ORAL
Status: DISCONTINUED | OUTPATIENT
Start: 2018-05-09 | End: 2018-05-10 | Stop reason: HOSPADM

## 2018-05-09 RX ORDER — ZOLPIDEM TARTRATE 5 MG/1
5 TABLET ORAL NIGHTLY PRN
Status: DISCONTINUED | OUTPATIENT
Start: 2018-05-09 | End: 2018-05-10 | Stop reason: HOSPADM

## 2018-05-09 RX ORDER — HYDROCODONE BITARTRATE AND ACETAMINOPHEN 7.5; 325 MG/1; MG/1
1 TABLET ORAL EVERY 6 HOURS PRN
Status: CANCELLED | OUTPATIENT
Start: 2018-05-09 | End: 2018-05-19

## 2018-05-09 RX ORDER — OXYCODONE HYDROCHLORIDE AND ACETAMINOPHEN 5; 325 MG/1; MG/1
1 TABLET ORAL EVERY 6 HOURS PRN
Status: DISCONTINUED | OUTPATIENT
Start: 2018-05-09 | End: 2018-05-10 | Stop reason: SDUPTHER

## 2018-05-09 RX ADMIN — OXYCODONE HYDROCHLORIDE AND ACETAMINOPHEN 1 TABLET: 5; 325 TABLET ORAL at 18:22

## 2018-05-09 RX ADMIN — OXYCODONE HYDROCHLORIDE AND ACETAMINOPHEN 1 TABLET: 5; 325 TABLET ORAL at 23:59

## 2018-05-09 RX ADMIN — AMLODIPINE BESYLATE 10 MG: 10 TABLET ORAL at 20:14

## 2018-05-09 RX ADMIN — TAMSULOSIN HYDROCHLORIDE 0.4 MG: 0.4 CAPSULE ORAL at 20:14

## 2018-05-09 RX ADMIN — FLUOXETINE HYDROCHLORIDE 20 MG: 20 CAPSULE ORAL at 20:14

## 2018-05-09 RX ADMIN — ROSUVASTATIN CALCIUM 10 MG: 10 TABLET, FILM COATED ORAL at 20:14

## 2018-05-09 RX ADMIN — SODIUM CHLORIDE 100 ML/HR: 9 INJECTION, SOLUTION INTRAVENOUS at 18:22

## 2018-05-09 RX ADMIN — CYCLOBENZAPRINE 10 MG: 10 TABLET, FILM COATED ORAL at 20:22

## 2018-05-09 NOTE — H&P
Problem list #1 C1 vertebral fracture #2 hypertension #3 coronary artery disease (50% LAD stenosis) #4 hypercholesterolemia #5 hypothyroidism #6 obstructive sleep apnea #7 adult-onset diabetes mellitus #8 status post pulmonary embolism (April 2016) #9 bipolar illness #10 attention deficit disorder #11 BPH #12 chronic insomnia #13 generalized osteoarthritis #14 lumbar spine degenerative joint/degenerative disc disease    Subjective  -this pleasant 67 gentleman fell forward while working at his computer approximately 7 weeks ago.  He then fell to the floor without striking his head.  There was no loss of consciousness.  He had onset of neck pain within a few days and was seen in the office at that time.  He was felt to have cervical strain and physical therapy was initiated.  He was seen in follow-up in 2 weeks and his neck pain had diminished significantly but he began having global headache.  MRI of the head was scheduled and done today.  There were no abnormalities and imaging of the head but he was found to have a C1 vertebral body fracture.  He continues to have headache with some radiation to the right lateral neck.  The pain is fairly constant and unchanged overall.  It does awaken him from sleep.  There has been no radiation down the arms or associated numbness.  He denies any bowel or bladder incontinence.  There has been no syncope, blurred vision, diplopia, paresthesias, dysarthria, dysphagia, or focal paresis.    Past medical history shows that he had a pulmonary embolism treated for 6 months with onset in April 2016.  He was evaluated for dyspnea on exertion and he underwent cardiac catheterization in April 2017.  At that time he was found to have 50% LAD stenosis in the mid artery but a normal ejection fraction.  He has never had any angina and he is not on any antianginal therapy.  He has a long history of hypertension presently treated with Norvasc 10 mg daily.  He has a long history of bipolar illness  treated with Prozac milligrams daily.  He has not been hospitalized for this problem in many years.  He is on Crestor 10 mg per day for hypercholesterolemia and Synthroid 0.112 mg by mouth daily for hypothyroidism.  He uses Flomax 0.4 mg each night for BPH and Ambien 10 mg at bedtime for chronic insomnia.  He has obstructive sleep apnea and he is CPAP compliant.  He has known diabetes mellitus diagnosed approximately 2 years ago and treated successfully with diet.  He has a history of attention deficit disorder treated with Adderall although he does not use the medication on a regular basis.    He has had minor cough with green sputum production and rhinorrhea and nasal sinus congestion over the last month.  He relates occasional wheezing.  There has been no dyspnea or PND.  He denies any chest pain or swelling in the ankles.  He denies any abdominal pain, melanotic stool, rectal bleeding, change in bowel habit, or dysphagia.  He denies any dysuria, nocturia, urinary hesitancy, or difficulty with post void residual.  He does not smoke.  He is retired.  He lives at home with his wife.    Objective  -on admission he is in no acute distress.  Blood pressure 164/82 pulse 69 respiratory rate 18 and he is afebrile.  Examination of the skin shows it to be warm and dry without rash petechiae or ecchymoses.  Examination of the eye show the sclera to be clear the conjunctivae to be pink.  Funduscopic exam shows sharp disks and normal vessels.  Examination of the oropharynx shows no buccal lingual or pharyngeal lesions.  Examination of the neck shows no carotid bruits.  There is no thyromegaly or thyroid nodules.  Auscultation of the lungs showed them to be clear without rales rhonchi or wheezes.  Cardiac exam shows a regular rhythm.  S1 and S2 are normal.  There is no S3-S4 rub click or murmur.  Examination of the abdomen shows it to be soft and flat without masses again organomegaly tenderness or bruit.  Examination of the  lower extremity shows no pretibial edema.  DP pulses are 2+ bilaterally.  Neurologic exam on mental status is shows the patient be alert cooperative appropriate and oriented.  Speech is normal as to articulation and content.  Motor exam shows 4 over 4 hand grasp and leg lift bilaterally.  Cranial nerves are grossly symmetric and intact.  Cerebellar exam shows normal gait.    MRI of the head today showed C1 fracture which is acute involving the right anterior lateral ring with 2-3 mm separation of bone fragments.    Assessment #1 C1 vertebral fracture-neurologically stable and the question is of course whether or not he needs neurosurgical intervention.  #2 prior history of pulmonary embolism-without recurrence and his coagulopathy screen in 2016 was completely negative # 3 coronary artery disease-subcritical stenosis one year ago.  No history of angina.    Plan #1 CBC, CMP, PT/INR, urinalysis, EKG now #2 oxycodone 5 mg by mouth 4 times a day when necessary #3 IV fluids with normal saline at 100 cc per hour #4 Protonix 40 mg by mouth daily #5 neurosurgery consult as soon as possible.  #6 BMP in a.m.

## 2018-05-09 NOTE — PROGRESS NOTES
Subjective   Durga Valenzuela is a 67 y.o. male.     History of Present Illness he is here today for follow-up of posttraumatic headache.  He continues to have global headache which appears to originate from the right lateral neck.  There is no radiation down the arms and no associated numbness.  He denies any bowel or bladder incontinence.  The pain is fairly constant and it can awaken him from sleep.        Review of Systems   Constitutional: Negative for activity change, appetite change and fatigue.   HENT: Negative for trouble swallowing.    Respiratory: Positive for cough, shortness of breath and wheezing. Negative for chest tightness.    Cardiovascular: Negative for chest pain and leg swelling.   Gastrointestinal: Negative for abdominal pain and nausea.   Genitourinary: Negative for flank pain.   Neurological: Positive for headache. Negative for dizziness, facial asymmetry, speech difficulty, weakness and numbness.       Objective   Physical Exam   Constitutional: He is oriented to person, place, and time. He appears well-developed and well-nourished. He is active. He does not appear ill.   Cardiovascular: Normal rate, regular rhythm, S1 normal and S2 normal.  Exam reveals no S3 and no S4.    No murmur heard.  Pulmonary/Chest: No tachypnea. No respiratory distress. He has no decreased breath sounds. He has no wheezes. He has no rhonchi. He has no rales.   Abdominal: Soft. Normal appearance and bowel sounds are normal. He exhibits no fluid wave and no mass. There is no hepatosplenomegaly. There is no tenderness.     Vascular Status -  His right foot exhibits no edema. His left foot exhibits no edema.  Neurological: He is alert and oriented to person, place, and time. He has normal strength. Gait normal.   Reflex Scores:       Bicep reflexes are 1+ on the right side and 1+ on the left side.  Psychiatric: He has a normal mood and affect. His speech is normal and behavior is normal. Judgment and thought content  normal. Cognition and memory are normal.         Assessment/Plan MRI of the head done today's showed a right anterior lateral C1 fracture with bone separation of 2-3 mm.    Assessment #1 C1 fracture-obviously he needs to be assessed expeditiously.    Plan #1 admit for pain control and neurosurgical consultation.

## 2018-05-10 ENCOUNTER — TELEPHONE (OUTPATIENT)
Dept: NEUROSURGERY | Facility: CLINIC | Age: 68
End: 2018-05-10

## 2018-05-10 VITALS
HEIGHT: 74 IN | OXYGEN SATURATION: 95 % | WEIGHT: 315 LBS | BODY MASS INDEX: 40.43 KG/M2 | HEART RATE: 61 BPM | DIASTOLIC BLOOD PRESSURE: 84 MMHG | RESPIRATION RATE: 18 BRPM | TEMPERATURE: 98.3 F | SYSTOLIC BLOOD PRESSURE: 139 MMHG

## 2018-05-10 DIAGNOSIS — S12.100A CLOSED ODONTOID FRACTURE, INITIAL ENCOUNTER (HCC): Primary | ICD-10-CM

## 2018-05-10 LAB
ANION GAP SERPL CALCULATED.3IONS-SCNC: 13.9 MMOL/L
BUN BLD-MCNC: 19 MG/DL (ref 8–23)
BUN/CREAT SERPL: 22.1 (ref 7–25)
CALCIUM SPEC-SCNC: 8.7 MG/DL (ref 8.6–10.5)
CHLORIDE SERPL-SCNC: 102 MMOL/L (ref 98–107)
CO2 SERPL-SCNC: 25.1 MMOL/L (ref 22–29)
CREAT BLD-MCNC: 0.86 MG/DL (ref 0.76–1.27)
GFR SERPL CREATININE-BSD FRML MDRD: 88 ML/MIN/1.73
GLUCOSE BLD-MCNC: 139 MG/DL (ref 65–99)
POTASSIUM BLD-SCNC: 3.8 MMOL/L (ref 3.5–5.2)
SODIUM BLD-SCNC: 141 MMOL/L (ref 136–145)

## 2018-05-10 PROCEDURE — 99222 1ST HOSP IP/OBS MODERATE 55: CPT | Performed by: NURSE PRACTITIONER

## 2018-05-10 PROCEDURE — G0378 HOSPITAL OBSERVATION PER HR: HCPCS

## 2018-05-10 PROCEDURE — 99217 PR OBSERVATION CARE DISCHARGE MANAGEMENT: CPT | Performed by: INTERNAL MEDICINE

## 2018-05-10 PROCEDURE — 80048 BASIC METABOLIC PNL TOTAL CA: CPT | Performed by: INTERNAL MEDICINE

## 2018-05-10 RX ORDER — DEXTROAMPHETAMINE SACCHARATE, AMPHETAMINE ASPARTATE, DEXTROAMPHETAMINE SULFATE AND AMPHETAMINE SULFATE 5; 5; 5; 5 MG/1; MG/1; MG/1; MG/1
20 TABLET ORAL 2 TIMES DAILY PRN
COMMUNITY
End: 2018-07-03

## 2018-05-10 RX ORDER — ZOLPIDEM TARTRATE 5 MG/1
10 TABLET ORAL NIGHTLY PRN
COMMUNITY
End: 2018-10-19 | Stop reason: HOSPADM

## 2018-05-10 RX ORDER — LOSARTAN POTASSIUM 50 MG/1
50 TABLET ORAL
Qty: 90 TABLET | Refills: 3 | Status: SHIPPED | OUTPATIENT
Start: 2018-05-11 | End: 2019-05-31 | Stop reason: SDUPTHER

## 2018-05-10 RX ORDER — CYCLOBENZAPRINE HCL 10 MG
10 TABLET ORAL 3 TIMES DAILY PRN
Qty: 60 TABLET | Refills: 0 | Status: SHIPPED | OUTPATIENT
Start: 2018-05-10 | End: 2018-05-24 | Stop reason: SDUPTHER

## 2018-05-10 RX ORDER — OXYCODONE AND ACETAMINOPHEN 10; 325 MG/1; MG/1
1 TABLET ORAL 2 TIMES DAILY PRN
Qty: 60 TABLET | Refills: 0 | Status: SHIPPED | OUTPATIENT
Start: 2018-05-10 | End: 2018-07-24

## 2018-05-10 RX ORDER — LOSARTAN POTASSIUM 50 MG/1
50 TABLET ORAL
Status: DISCONTINUED | OUTPATIENT
Start: 2018-05-10 | End: 2018-05-10 | Stop reason: HOSPADM

## 2018-05-10 RX ORDER — CYCLOBENZAPRINE HCL 10 MG
10 TABLET ORAL 3 TIMES DAILY PRN
Qty: 40 TABLET | Refills: 1 | Status: SHIPPED | OUTPATIENT
Start: 2018-05-10 | End: 2018-08-21

## 2018-05-10 RX ORDER — OXYCODONE AND ACETAMINOPHEN 10; 325 MG/1; MG/1
1 TABLET ORAL 2 TIMES DAILY PRN
Status: DISCONTINUED | OUTPATIENT
Start: 2018-05-10 | End: 2018-05-10 | Stop reason: HOSPADM

## 2018-05-10 RX ORDER — OXYCODONE AND ACETAMINOPHEN 10; 325 MG/1; MG/1
1 TABLET ORAL EVERY 4 HOURS PRN
Status: DISCONTINUED | OUTPATIENT
Start: 2018-05-10 | End: 2018-05-10

## 2018-05-10 RX ORDER — OXYCODONE HYDROCHLORIDE AND ACETAMINOPHEN 5; 325 MG/1; MG/1
1 TABLET ORAL EVERY 6 HOURS PRN
Status: DISCONTINUED | OUTPATIENT
Start: 2018-05-10 | End: 2018-05-10

## 2018-05-10 RX ORDER — OXYCODONE HYDROCHLORIDE AND ACETAMINOPHEN 5; 325 MG/1; MG/1
1 TABLET ORAL EVERY 6 HOURS PRN
Qty: 6 TABLET | Refills: 0 | Status: SHIPPED | OUTPATIENT
Start: 2018-05-10 | End: 2018-05-10 | Stop reason: HOSPADM

## 2018-05-10 RX ADMIN — AMLODIPINE BESYLATE 10 MG: 10 TABLET ORAL at 09:08

## 2018-05-10 RX ADMIN — CYCLOBENZAPRINE 10 MG: 10 TABLET, FILM COATED ORAL at 09:08

## 2018-05-10 RX ADMIN — TAMSULOSIN HYDROCHLORIDE 0.4 MG: 0.4 CAPSULE ORAL at 09:08

## 2018-05-10 RX ADMIN — OXYCODONE HYDROCHLORIDE AND ACETAMINOPHEN 1 TABLET: 5; 325 TABLET ORAL at 06:02

## 2018-05-10 RX ADMIN — SODIUM CHLORIDE 100 ML/HR: 9 INJECTION, SOLUTION INTRAVENOUS at 06:02

## 2018-05-10 RX ADMIN — CYCLOBENZAPRINE 10 MG: 10 TABLET, FILM COATED ORAL at 16:48

## 2018-05-10 RX ADMIN — ZOLPIDEM TARTRATE 5 MG: 5 TABLET ORAL at 00:00

## 2018-05-10 RX ADMIN — OXYCODONE HYDROCHLORIDE AND ACETAMINOPHEN 1 TABLET: 5; 325 TABLET ORAL at 11:50

## 2018-05-10 RX ADMIN — LOSARTAN POTASSIUM 50 MG: 50 TABLET, FILM COATED ORAL at 09:08

## 2018-05-10 RX ADMIN — FLUOXETINE HYDROCHLORIDE 20 MG: 20 CAPSULE ORAL at 09:08

## 2018-05-10 NOTE — PROGRESS NOTES
Problem list #1 C1 vertebral body fracture #2 hypertension #3 coronary artery disease #4 diabetes mellitus    Subjective -his neck pain and headache are controlled.  He denies any shortness of breath or chest pain.  He does have cough with green sputum production.  He denies any arm pain or numbness in the hands or arms.    Objective -he is afebrile.  Blood pressure 159/88 pulse 62 respiratory rate 16 auscultation of the lungs showed them to be clear without rales rhonchi or wheezes.  Cardiac exam shows S1 and S2 to be distant.  The rhythm is regular.  There is no S3, S4, or murmur.  Examination of the lower extremity shows no pretibial edema.  Neurologic exam on mental status testing shows the patient be alert cooperative appropriate and oriented.  Speech is normal as to articulation and content.  Motor exam shows 4 over 4 hand grasp bilaterally.    CT scan of the cervical spine shows a minimally displaced C1 arch fracture.  Sodium 141 potassium 3.8 BUN 19 creatinine 0.86 glucose 139 calcium 8.7.  Liver enzymes normal.  PT INR normal.  Hematocrit 41.9 hemoglobin 13.8 white count 8000 platelet count 172,000.  Urinalysis normal.  EKG shows intraventricular conduction delay and LAD.  QT interval 488 ms    Assessment #1 C1 vertebral fracture-pain controlled.  #2 coronary artery disease-quiescent and subcritical stenosis one year ago #3 hypertension-needs better control #4 diabetes mellitus-good control on diet thus far    Plan #1 begin losartan 50 mg by mouth daily #2 await neurosurgery input.  BMP in a.m.

## 2018-05-10 NOTE — CONSULTS
"Patient name: Durga Valenzuela  Referring Provider: Dr Lozada  Reason for Consultation: C1 fracture s/p fall    Patient Care Team:  Kentrell Lozada Jr., MD as PCP - General (Internal Medicine)    Chief complaint: neck pain    Subjective .     History of present illness:    Patient is a 67 y.o. right handed male who presents with headache that started approximately 7 weeks ago after going sleep and falling forward while working in his computer. He was being evaluated by his primary care provider, Dr. Lozada, for acute cervical strain following the accident.  Initially, he had posterior neck pain without radiculopathy or paresthesias in his arm, according to the patient and PCP office notes. His symptoms overall were much improved with physical therapy.  MRI of the brain was ordered yesterday for further evaluation of a headache and there was an incidental finding of a right anterior lateral C1 fracture with minimal displacement measuring approximately 2-3 mm.  He was admitted to the hospital, placed in a cervical hard collar and neurosurgery was consulted.  CT cervical imaging was ordered without contrast for further evaluation and confirmed the displaced C1 fracture.    He continues to deny any paresthesias, weakness or pain in his arms.  He does have some discomfort in the back of his neck on the right side that is slightly worse since wearing the hard collar.  He has been up walking and denies any balance or gait issues.  He also denies any problems with his legs.  He has no dizziness or lightheadedness.  Headache pain was improved with muscle relaxers.    /88 (BP Location: Left arm, Patient Position: Lying)   Pulse 63   Temp 98 °F (36.7 °C) (Oral)   Resp 18   Ht 188 cm (74.02\")   Wt (!) 154 kg (340 lb 9.8 oz)   SpO2 95%   BMI 43.71 kg/m²       Review of Systems  Review of Systems   Constitutional: Negative.    HENT: Negative.    Eyes: Negative.    Respiratory: Negative.    Cardiovascular: Negative.  "   Gastrointestinal: Negative.    Endocrine: Negative.    Genitourinary: Negative.    Musculoskeletal: Positive for neck pain and neck stiffness.   Skin: Negative.    Allergic/Immunologic: Negative.    Neurological: Negative.    Hematological: Negative.    Psychiatric/Behavioral: Negative.        History  PAST MEDICAL HISTORY  Past Medical History:   Diagnosis Date   • Clotting disorder    • Coronary artery disease involving native coronary artery of native heart without angina pectoris 7/24/2017   • Hyperlipidemia    • Hypertension        PAST SURGICAL HISTORY  Past Surgical History:   Procedure Laterality Date   • BACK SURGERY  2007   • CARDIAC CATHETERIZATION N/A 5/16/2017    Procedure: Coronary angiography;  Surgeon: Malcolm Chen MD;  Location:  [facility];  Service:    • CARDIAC CATHETERIZATION N/A 5/16/2017    Procedure: Left Heart Cath;  Surgeon: Malcolm Chen MD;  Location:  [facility];  Service:    • CARDIAC CATHETERIZATION N/A 5/16/2017    Procedure: Left ventriculography;  Surgeon: Malcolm Chen MD;  Location:  [facility];  Service:    • REPLACEMENT TOTAL KNEE Left        FAMILY HISTORY  No family history on file.    SOCIAL HISTORY  Social History   Substance Use Topics   • Smoking status: Former Smoker     Quit date: 1973   • Smokeless tobacco: Never Used   • Alcohol use No       retired      Allergies:  Hydrocodone and Zocor  [simvastatin]    MEDICATIONS:  Prescriptions Prior to Admission   Medication Sig Dispense Refill Last Dose   • amLODIPine (NORVASC) 10 MG tablet Take 10 mg by mouth Daily.   5/9/2018 at Unknown time   • amphetamine-dextroamphetamine (ADDERALL) 20 MG tablet take 1 tablet by mouth twice a day  0 Taking   • clobetasol (TEMOVATE) 0.05 % ointment Apply 1 application topically 2 (Two) Times a Day As Needed.      • FLUoxetine (PROzac) 20 MG tablet take 2 tablets by mouth once daily  0 Taking   •  levothyroxine (SYNTHROID) 112 MCG tablet Take 1 tablet by mouth Daily. 90 tablet 3 Taking   • Pseudoephedrine-Guaifenesin (MUCINEX D PO) Take  by mouth Daily.      • rosuvastatin (CRESTOR) 10 MG tablet Take 1 tablet by mouth Daily. 90 tablet 3 Taking   • tamsulosin (FLOMAX) 0.4 MG capsule 24 hr capsule   0 Taking   • zolpidem (AMBIEN) 10 MG tablet take 1 tablet by mouth at bedtime  0 Taking       Objective     Results Review:  LABS:    Admission on 05/09/2018   Component Date Value Ref Range Status   • WBC 05/09/2018 8.40  4.50 - 10.70 10*3/mm3 Final   • RBC 05/09/2018 4.38* 4.60 - 6.00 10*6/mm3 Final   • Hemoglobin 05/09/2018 13.7  13.7 - 17.6 g/dL Final   • Hematocrit 05/09/2018 41.9  40.4 - 52.2 % Final   • MCV 05/09/2018 95.7  79.8 - 96.2 fL Final   • MCH 05/09/2018 31.3  27.0 - 32.7 pg Final   • MCHC 05/09/2018 32.7  32.6 - 36.4 g/dL Final   • RDW 05/09/2018 14.7* 11.5 - 14.5 % Final   • RDW-SD 05/09/2018 51.5  37.0 - 54.0 fl Final   • MPV 05/09/2018 9.9  6.0 - 12.0 fL Final   • Platelets 05/09/2018 172  140 - 500 10*3/mm3 Final   • Glucose 05/09/2018 122* 65 - 99 mg/dL Final   • BUN 05/09/2018 21  8 - 23 mg/dL Final   • Creatinine 05/09/2018 0.86  0.76 - 1.27 mg/dL Final   • Sodium 05/09/2018 140  136 - 145 mmol/L Final   • Potassium 05/09/2018 3.7  3.5 - 5.2 mmol/L Final   • Chloride 05/09/2018 101  98 - 107 mmol/L Final   • CO2 05/09/2018 24.2  22.0 - 29.0 mmol/L Final   • Calcium 05/09/2018 9.4  8.6 - 10.5 mg/dL Final   • Total Protein 05/09/2018 7.2  6.0 - 8.5 g/dL Final   • Albumin 05/09/2018 4.60  3.50 - 5.20 g/dL Final   • ALT (SGPT) 05/09/2018 29  1 - 41 U/L Final   • AST (SGOT) 05/09/2018 29  1 - 40 U/L Final   • Alkaline Phosphatase 05/09/2018 61  39 - 117 U/L Final   • Total Bilirubin 05/09/2018 0.5  0.1 - 1.2 mg/dL Final   • eGFR Non African Amer 05/09/2018 88  >60 mL/min/1.73 Final   • Globulin 05/09/2018 2.6  gm/dL Final   • A/G Ratio 05/09/2018 1.8  g/dL Final   • BUN/Creatinine Ratio 05/09/2018  24.4  7.0 - 25.0 Final   • Anion Gap 05/09/2018 14.8  mmol/L Final   • Color, UA 05/09/2018 Yellow  Yellow, Straw Final   • Appearance, UA 05/09/2018 Clear  Clear Final   • pH, UA 05/09/2018 6.5  5.0 - 8.0 Final   • Specific Gravity, UA 05/09/2018 1.024  1.005 - 1.030 Final   • Glucose, UA 05/09/2018 Negative  Negative Final   • Ketones, UA 05/09/2018 Negative  Negative Final   • Bilirubin, UA 05/09/2018 Negative  Negative Final   • Blood, UA 05/09/2018 Negative  Negative Final   • Protein, UA 05/09/2018 Negative  Negative Final   • Leuk Esterase, UA 05/09/2018 Negative  Negative Final   • Nitrite, UA 05/09/2018 Negative  Negative Final   • Urobilinogen, UA 05/09/2018 1.0 E.U./dL  0.2 - 1.0 E.U./dL Final   • Protime 05/09/2018 14.1  11.7 - 14.2 Seconds Final   • INR 05/09/2018 1.11* 0.90 - 1.10 Final   • Glucose 05/10/2018 139* 65 - 99 mg/dL Final   • BUN 05/10/2018 19  8 - 23 mg/dL Final   • Creatinine 05/10/2018 0.86  0.76 - 1.27 mg/dL Final   • Sodium 05/10/2018 141  136 - 145 mmol/L Final   • Potassium 05/10/2018 3.8  3.5 - 5.2 mmol/L Final   • Chloride 05/10/2018 102  98 - 107 mmol/L Final   • CO2 05/10/2018 25.1  22.0 - 29.0 mmol/L Final   • Calcium 05/10/2018 8.7  8.6 - 10.5 mg/dL Final   • eGFR Non  Amer 05/10/2018 88  >60 mL/min/1.73 Final   • BUN/Creatinine Ratio 05/10/2018 22.1  7.0 - 25.0 Final   • Anion Gap 05/10/2018 13.9  mmol/L Final       DIAGNOSTICS:  C1 right-sided anterior ring fracture minimally displaced    Results Review:   I reviewed the patient's new clinical results.  I personally viewed and interpreted the patient's CT cervical spine without contrast    Vital Signs   Temp:  [97.9 °F (36.6 °C)-99 °F (37.2 °C)] 98 °F (36.7 °C)  Heart Rate:  [61-69] 62  Resp:  [16-18] 16  BP: (159-168)/(82-98) 159/88    Physical Exam:  Physical Exam   Constitutional: He is oriented to person, place, and time. He appears well-developed and well-nourished. He is cooperative.  Non-toxic appearance. He  does not have a sickly appearance. He does not appear ill.   Very pleasant, obese, older gentleman   HENT:   Head: Normocephalic and atraumatic.   Eyes:   Corrective lenses   Neck: Neck supple. No tracheal deviation present.   Wearing a well fitting Aspen cervical hard collar   Cardiovascular: Normal rate.    Pulmonary/Chest: Effort normal.   Productive cough   Abdominal: Soft.   Musculoskeletal: He exhibits tenderness (Right sided neck tenderness to palpation). He exhibits no edema or deformity.        Cervical back: He exhibits decreased range of motion, tenderness and pain. He exhibits no bony tenderness, no swelling, no edema, no deformity and no laceration.   Strength equal and normal bilateral upper and lower extremities, normal muscle bulk and tone   Neurological: He is alert and oriented to person, place, and time. He has normal reflexes. He displays no tremor and normal reflexes. No cranial nerve deficit or sensory deficit. Coordination and gait normal. GCS eye subscore is 4. GCS verbal subscore is 5. GCS motor subscore is 6.   Reflex Scores:       Tricep reflexes are 2+ on the right side and 2+ on the left side.       Bicep reflexes are 2+ on the right side and 2+ on the left side.       Brachioradialis reflexes are 2+ on the right side and 2+ on the left side.  Sensory intact throughout  Normal motor exam  Normal deep tendon reflexes  Negative Ryan's  Stable upright gait   Skin: Skin is warm and dry.   Psychiatric: He has a normal mood and affect. His behavior is normal. Judgment and thought content normal.   Vitals reviewed.    Neurologic Exam     Mental Status   Oriented to person, place, and time.     Gait, Coordination, and Reflexes     Reflexes   Right brachioradialis: 2+  Left brachioradialis: 2+  Right biceps: 2+  Left biceps: 2+  Right triceps: 2+  Left triceps: 2+      Assessment/Plan     Active Problems:    Cervical spine fracture      PLAN: He was admitted to the hospital yesterday when  outpatient MRI brain imaging revealed an incidental right anterior minimally displaced C1 ring fracture.  The patient was placed in a cervical hard collar.  CT imaging of the cervical spine confirmed the findings.  As discussed with Dr. Schmidt, he does not need any surgical intervention.  We will keep him in the cervical hard collar for at least 6-12 weeks to allow for complete fracture healing.  He will be evaluated routinely with imaging by our office.  He is okay to be discharged home from our standpoint.    We will arrange outpatient follow-up in 2 weeks with cervical x-rays.  He is to wear the cervical hard collar at all times.  He was instructed not to drive for the duration of wearing the hard collar.  He is not to lift, push or pull anything greater than 10 pounds.  Recommend sponge bathing so as not to get the collar wet.  He is to call our office at anytime with any questions or concerns.  I will give him a prescription for muscle relaxers and we will defer to PCP for pain medication.    Thank you for the consult, we will sign off.    I discussed the patients findings and my recommendations with patient, nursing staff and Dr Brayan Guan, TREY  05/10/18  9:51 AM

## 2018-05-10 NOTE — DISCHARGE SUMMARY
Problem list #1 C1 vertebral fracture #2 hypertension #3 coronary artery disease number for bipolar illness #5 status post pulmonary embolism (2016) #6 hypothyroidism #7 hypercholesterolemia #8 obstructive sleep apnea #9 diabetes mellitus #10 attention deficit disorder #11 degenerative joint disease #12 lumbar spine degenerative joint/degenerative disc disease #13 BPH #14 chronic insomnia    Subjective -this pleasant 67-year-old gentleman fell forward striking his computer approximately 7 weeks ago.  Upon attempting to get up he fell to the floor.  There was no loss of consciousness.  Within a few days he began having neck pain.  He was seen in office and felt to have a cervical strain.  He underwent physical therapy.  His neck pain improved but he began having global headache.  He was seen in follow-up for reevaluation.  MRI of the head did not show any acute cerebral injury but he was found to have a C1 anterior arch fracture.  He was admitted to the hospital for further evaluation and treatment.  On admission he did not complain of any arm pain or paresthesias.    Past medical history shows that he sustained a pulmonary embolism in April 2016.  He was treated with Coumadin for 6 months.  Evaluation at that time was negative for coagulopathy.  He has not had any recurrence.  He has known coronary artery disease and he underwent cardiac catheterization in April 2017.  He was found to have a 50% LAD mid vessel stenosis.  Ejection fraction was normal.  He has known hypertension treated with Norvasc 10 mg daily.  Hypothyroidism has been treated with Synthroid 0.112 mg daily.  Hypercholesterolemia has been treated with Crestor 10 mg by mouth daily.  He has obstructive sleep apnea and uses CPAP regularly.  Diabetes mellitus was diagnosed 2 years ago and thus far has been treated successfully with diet.  He has attention deficit disorder for which he uses Adderall 20 mg by mouth though not on a regular basis.  He uses  Flomax nightly for BPH and Ambien 10 mg daily at bedtime when necessary chronic insomnia.    Objective -physical examination showed the patient be a well-nourished well-developed pleasant white male in no acute distress.  Blood pressure was 164/82 pulse 69 respiratory rate 18 and he was afebrile.    Examination of the skin shows to be warm and dry without rash petechiae or ecchymoses.  Examination of the eye show the sclera to be clear the conjunctivae to be pink.  Funduscopic exam showed sharp disks and normal vessels.  Examination of the oropharynx shows no buccal lingual or pharyngeal lesions.  Examination of the neck showed no carotid bruits.  There was no thyromegaly or thyroid nodules.  Auscultation of the lungs showed them to be clear without rales rhonchi or wheezes.  Cardiac exam showed a regular rhythm.  S1 and S2 are normal.  There was no S3-S4 rub click or murmur.  Abdominal exam shows a soft flat abdomen without masses again mainly tenderness or bruit.  Examination of the lower extremity showed no pretibial edema.  DP pulses are 2+ bilaterally.  Neurologic exam on mental status testing shows the patient be alert cooperative appropriate and oriented.  Speech was normal as to articulation and content.  Motor exam showed 4 over 4 hand grasp and leg lift bilaterally.  Cranial nerves were symmetric and intact.  Reflex exam showed 2+ biceps bilaterally.  Cerebellar exam shows normal gait.     On admission neurosurgery was consulted.  Patient was started on normal saline 100 cc/h.  He was placed on Protonix 40 mg by mouth daily prophylactically and SCDs were placed prophylactically.  Sodium was 141 potassium 3.8 glucose 139 calcium 8.7 BUN 19 creatinine 0.86.  Liver enzymes and PT/INR were normal.  Hematocrit 41.9 hemoglobin 13.1 white count 8000 platelet count 172,000.  Urinalysis normal.  EKG showed an intraventricular conduction delay and left axis deviation.  QTC was 488 ms.  CT scan of the cervical spine  was ordered by neurosurgery.  The patient had been placed in a soft collar on admission.  This was changed to a hard collar.  CT scan of the cervical spine confirmed anterior arch fracture.  Fortunately it was felt that the patient would not require surgery.  Due to elevated blood pressure losartan 50 mg by mouth daily was added to his regular regimen.  He had no neurologic changes during admission.    He will wear the hard collar 24 hours per day for between 6 and 12 weeks.  He will be seen in follow-up by neurosurgery in 2 weeks.  Medications at discharge will include oxycodone 10/325 one tablet by mouth twice a day when necessary #60 no refill.  He will be on Flexeril 10 mg 3 times a day when necessary #40 refill ×1.  Other medication will include Norvasc 10 mg by mouth daily, losartan 50 mg by mouth daily, Prozac 20 mg by mouth daily (for bipolar illness), Synthroid 0.112 mg by mouth daily, Crestor 10 mg by mouth daily, Flomax 0.4 mg by mouth daily at bedtime, Ambien 10 mg by mouth daily at bedtime when necessary, and Adderall 20 mg by mouth daily when necessary.  I shall see the patient in follow-up in one month's time.

## 2018-05-10 NOTE — TELEPHONE ENCOUNTER
----- Message from TREY Peter sent at 5/10/2018  3:52 PM EDT -----  He needs cervical xrays in 2 weeks wearing hard collar for follow up of C1 fracture. OK to see NP. Thx

## 2018-05-11 ENCOUNTER — TELEPHONE (OUTPATIENT)
Dept: INTERNAL MEDICINE | Facility: CLINIC | Age: 68
End: 2018-05-11

## 2018-05-11 RX ORDER — AZITHROMYCIN 250 MG/1
TABLET, FILM COATED ORAL
Qty: 6 TABLET | Refills: 0 | Status: SHIPPED | OUTPATIENT
Start: 2018-05-11 | End: 2018-07-03

## 2018-05-11 RX ORDER — DEXTROMETHORPHAN HYDROBROMIDE AND PROMETHAZINE HYDROCHLORIDE 15; 6.25 MG/5ML; MG/5ML
2.5 SYRUP ORAL 4 TIMES DAILY PRN
Qty: 240 ML | Refills: 0 | Status: SHIPPED | OUTPATIENT
Start: 2018-05-11 | End: 2018-07-03

## 2018-05-11 NOTE — TELEPHONE ENCOUNTER
----- Message from Olga Kwong sent at 5/11/2018  2:54 PM EDT -----  Contact: pt  Pt states his neck is in a lot of pain, wanted to know if there was anything else he could do.      Please advise.  Pt# [phone]

## 2018-05-11 NOTE — TELEPHONE ENCOUNTER
Patient informed. He stated that the medication hasn't helped at all I advised him to alterate heat and ice three times a day for 30 minutes. He said that he would try that and if it didn't help he would give us a call back.

## 2018-05-11 NOTE — TELEPHONE ENCOUNTER
Sent in Z-steve and phenergan to patients pharmacy per Dr Lozada. Left voicemail informing patient.

## 2018-05-24 ENCOUNTER — HOSPITAL ENCOUNTER (OUTPATIENT)
Dept: GENERAL RADIOLOGY | Facility: HOSPITAL | Age: 68
Discharge: HOME | End: 2018-05-24
Admitting: NURSE PRACTITIONER

## 2018-05-24 ENCOUNTER — OFFICE VISIT (OUTPATIENT)
Dept: NEUROSURGERY | Facility: CLINIC | Age: 68
End: 2018-05-24

## 2018-05-24 VITALS
RESPIRATION RATE: 18 BRPM | BODY MASS INDEX: 40.43 KG/M2 | DIASTOLIC BLOOD PRESSURE: 76 MMHG | SYSTOLIC BLOOD PRESSURE: 110 MMHG | HEIGHT: 74 IN | HEART RATE: 73 BPM | WEIGHT: 315 LBS

## 2018-05-24 DIAGNOSIS — S12.041D CLOSED NONDISPLACED LATERAL MASS FRACTURE OF FIRST CERVICAL VERTEBRA WITH ROUTINE HEALING: Primary | ICD-10-CM

## 2018-05-24 DIAGNOSIS — S12.100A CLOSED ODONTOID FRACTURE, INITIAL ENCOUNTER (HCC): ICD-10-CM

## 2018-05-24 PROCEDURE — 99214 OFFICE O/P EST MOD 30 MIN: CPT | Performed by: NURSE PRACTITIONER

## 2018-05-24 PROCEDURE — 72040 X-RAY EXAM NECK SPINE 2-3 VW: CPT

## 2018-05-24 RX ORDER — FLUTICASONE PROPIONATE 50 MCG
2 SPRAY, SUSPENSION (ML) NASAL DAILY
COMMUNITY
End: 2018-07-24

## 2018-05-24 RX ORDER — GUAIFENESIN 600 MG/1
1200 TABLET, EXTENDED RELEASE ORAL 2 TIMES DAILY
COMMUNITY
End: 2018-07-03

## 2018-05-24 NOTE — PROGRESS NOTES
Subjective   Patient ID: Durga Valenzuela is a 67 y.o. male is here today for follow-up C1 fracture. Patient presents accompanied by his wife.    History of Present Illness  Patient presents for follow-up of C1 fracture.  Patient was seen as inpatient in early May for incidental finding of C1 anterior ring fracture.  The patient reportedly had fallen forward out of a chair when he fell asleep personally 7 weeks prior to his hospital admission.  He had neck pain at the time and was treated with physical therapy and improved.  He had persistent headaches on MRI the brain was obtained an incidental finding of the cervical fracture was found.  Patient was admitted and placed in a hard collar.  Since his last encounter he has remained in a hard collar and had cervical x-rays. He reports improved headaches and minimal neck pain. He has reduced his pain med use. He is using Flexeril PRN. No numbness, tingling, and weakness.     The following portions of the patient's history were reviewed and updated as appropriate: allergies, current medications, past family history, past medical history, past social history, past surgical history and problem list.    Review of Systems   Genitourinary: Positive for difficulty urinating (managed with flomax) and enuresis (managed with flomax).   Musculoskeletal: Positive for neck pain.        Denies arm pain     Neurological: Negative for weakness and numbness.   Psychiatric/Behavioral: Negative for sleep disturbance.       Objective   Physical Exam   Constitutional: He appears well-developed and well-nourished.   Body mass index is 43.4 kg/m².     Neck:   Hector [place] collar in place.  Repositioned and raised chin rest.  Patient and family instructed on lowering chin rest for eating   Pulmonary/Chest: Effort normal.   Neurological: He is alert. He has normal strength. Gait normal.   Reflex Scores:       Tricep reflexes are 2+ on the right side and 2+ on the left side.       Bicep reflexes  are 2+ on the right side and 2+ on the left side.       Brachioradialis reflexes are 2+ on the right side and 2+ on the left side.  Skin: Skin is warm and dry.   Psychiatric: He has a normal mood and affect. His behavior is normal. Judgment normal.   Vitals reviewed.    Neurologic Exam     Mental Status   Attention: normal. Concentration: normal.   Level of consciousness: alert  Knowledge: good.   Normal comprehension.     Motor Exam   Muscle bulk: normal    Strength   Strength 5/5 throughout.     Sensory Exam   Right arm light touch: normal  Left arm light touch: normal    Gait, Coordination, and Reflexes     Gait  Gait: normal    Reflexes   Right brachioradialis: 2+  Left brachioradialis: 2+  Right biceps: 2+  Left biceps: 2+  Right triceps: 2+  Left triceps: 2+  Right Ryan: absent  Left Ryan: absent      Assessment/Plan   Independent Review of Radiographic Studies:    Cervical x-rays from [facility] dated May 24, 2018 reveal normal cervical alignment.  Unable to evaluate the C1 right anterior ring fracture on the study.    Medical Decision Making:    Patient presents for follow-up of C1 ring fracture after fall approximately 9 weeks ago.  He has been in a hard collar for 2 weeks.  He reports compliance overall, but he has taken off to shower.  He reports limited use of pain medication at this point.  Denies any numbness tingling or weakness.  His exam as noted above with no neurologic red flags.  He is wearing his Aspen collar today.  X-rays reveal no abnormal alignment.  Unable to assess the C1 fracture on the study.  He will remain in his hard cervical collar at this time.  I will discuss with Dr. Schmidt timing of removal, but I explained to the patient that it could be up to a total of 12 weeks.  He is aware that he may not drive while in hard collar.  He is not to remove the hard collar and less we told him it is okay.  At this time we will plan follow-up in 8 weeks with cervical x-rays  including flexion-extension.  I will contact the patient after I talk with Dr. Schmidt further regarding the plan.    Plan: Return to office 2 months with cervical x-rays including flexion-extension with collar off.    Durga was seen today for neck pain.    Diagnoses and all orders for this visit:    Closed nondisplaced lateral mass fracture of first cervical vertebra with routine healing  -     Cancel: XR Spine Cervical Complete With Flex Ext; Future  -     XR Spine Cervical Complete With Flex Ext; Future      Return in about 2 months (around 7/24/2018) for Follow-up with Dr. Schmidt, with imaging.

## 2018-06-21 RX ORDER — OXYCODONE AND ACETAMINOPHEN 10; 325 MG/1; MG/1
1 TABLET ORAL 2 TIMES DAILY PRN
Qty: 60 TABLET | Refills: 0 | OUTPATIENT
Start: 2018-06-21

## 2018-06-21 NOTE — TELEPHONE ENCOUNTER
Please advise RX refill  Last ov 05/09/2018  Next ov not scheduled  Last filled 05/10/2018  Qty 60

## 2018-06-21 NOTE — TELEPHONE ENCOUNTER
----- Message from Pilar Salazar sent at 6/20/2018 11:28 AM EDT -----  Contact: Patient  Patient requesting refill on    oxyCODONE-acetaminophen (PERCOCET)  MG per tablet    Patient:[phone]

## 2018-07-03 ENCOUNTER — OFFICE VISIT (OUTPATIENT)
Dept: NEUROSURGERY | Facility: CLINIC | Age: 68
End: 2018-07-03

## 2018-07-03 ENCOUNTER — HOSPITAL ENCOUNTER (OUTPATIENT)
Dept: GENERAL RADIOLOGY | Facility: HOSPITAL | Age: 68
Discharge: HOME | End: 2018-07-03
Admitting: NURSE PRACTITIONER

## 2018-07-03 VITALS
SYSTOLIC BLOOD PRESSURE: 132 MMHG | DIASTOLIC BLOOD PRESSURE: 74 MMHG | HEIGHT: 74 IN | WEIGHT: 315 LBS | RESPIRATION RATE: 16 BRPM | HEART RATE: 76 BPM | BODY MASS INDEX: 40.43 KG/M2

## 2018-07-03 DIAGNOSIS — S12.041D CLOSED NONDISPLACED LATERAL MASS FRACTURE OF FIRST CERVICAL VERTEBRA WITH ROUTINE HEALING: Primary | ICD-10-CM

## 2018-07-03 DIAGNOSIS — S12.041D CLOSED NONDISPLACED LATERAL MASS FRACTURE OF FIRST CERVICAL VERTEBRA WITH ROUTINE HEALING: ICD-10-CM

## 2018-07-03 PROBLEM — S12.01XD: Status: ACTIVE | Noted: 2018-07-03

## 2018-07-03 PROCEDURE — 99213 OFFICE O/P EST LOW 20 MIN: CPT | Performed by: NEUROLOGICAL SURGERY

## 2018-07-03 PROCEDURE — 72052 X-RAY EXAM NECK SPINE 6/>VWS: CPT

## 2018-07-03 NOTE — PROGRESS NOTES
Subjective   Patient ID: Durga Valenzuela is a 67 y.o. male is here today for follow-up of C1 fracture. He remains in hard collar and has undergone new imaging today. He is unaccompanied.    History of Present Illness     The patient returns the office now about a month after falling out of his office chair breaking his neck.  His been in a hard collar.  Initially the CT scan demonstrated a small nondisplaced fracture of the right side of the anterior arch of C1.  He states that he is not having neck pain.  He has been wearing his hard collar religiously.  He has had flexion extension x-rays of the cervical spine.    The following portions of the patient's history were reviewed and updated as appropriate: allergies, current medications, past family history, past medical history, past social history, past surgical history and problem list.    Review of Systems   Musculoskeletal: Negative for gait problem and neck pain.   Neurological: Negative for weakness and numbness.       Objective   Physical Exam   Constitutional: He is oriented to person, place, and time.   Eyes: EOM are normal. Pupils are equal, round, and reactive to light.   Neurological: He is oriented to person, place, and time. He has a normal Finger-Nose-Finger Test, a normal Heel to Shin Test, a normal Romberg Test and a normal Tandem Gait Test. Gait normal.   Reflex Scores:       Tricep reflexes are 2+ on the right side and 2+ on the left side.       Bicep reflexes are 2+ on the right side and 2+ on the left side.       Brachioradialis reflexes are 2+ on the right side and 2+ on the left side.       Patellar reflexes are 2+ on the right side and 2+ on the left side.       Achilles reflexes are 2+ on the right side and 2+ on the left side.  Psychiatric: His speech is normal.     Neurologic Exam     Mental Status   Oriented to person, place, and time.   Registration: recalls 3 of 3 objects. Recall at 5 minutes: recalls 3 of 3 objects. Follows 3 step  commands.   Attention: normal. Concentration: normal.   Speech: speech is normal   Level of consciousness: alert  Knowledge: good.   Able to name object. Able to read. Able to repeat. Able to write. Normal comprehension.     Cranial Nerves     CN II   Visual fields full to confrontation.     CN III, IV, VI   Pupils are equal, round, and reactive to light.  Extraocular motions are normal.   Right pupil: Consensual response: intact.   Left pupil: Consensual response: intact.   CN III: no CN III palsy  CN VI: no CN VI palsy  Nystagmus: none   Diplopia: none  Ophthalmoparesis: none  Upgaze: normal  Downgaze: normal  Conjugate gaze: present  Vestibulo-ocular reflex: present    CN V   Facial sensation intact.     CN VII   Facial expression full, symmetric.     CN VIII   CN VIII normal.     CN IX, X   CN IX normal.     CN XI   CN XI normal.     CN XII   CN XII normal.     Motor Exam   Muscle bulk: normal  Overall muscle tone: normal  Right arm tone: normal  Left arm tone: normal  Right arm pronator drift: absent  Left arm pronator drift: absent  Right leg tone: normal  Left leg tone: normal    Strength   Right neck flexion: 5/5  Left neck flexion: 5/5  Right neck extension: 5/5  Left neck extension: 5/5  Right deltoid: 5/5  Left deltoid: 5/5  Right biceps: 5/5  Left biceps: 5/5  Right triceps: 5/5  Left triceps: 5/5  Right wrist flexion: 5/5  Left wrist flexion: 5/5  Right wrist extension: 5/5  Left wrist extension: 5/5  Right interossei: 5/5  Left interossei: 5/5  Right abdominals: 5/5  Left abdominals: 5/5  Right iliopsoas: 5/5  Left iliopsoas: 5/5  Right quadriceps: 5/5  Left quadriceps: 5/5  Right hamstrin/5  Left hamstrin/5  Right glutei: 5/5  Left glutei: 5/5  Right anterior tibial: 5/5  Left anterior tibial: 5/5  Right posterior tibial: 5/5  Left posterior tibial: 5/5  Right peroneal: 5/5  Left peroneal: 5/5  Right gastroc: 5/5  Left gastroc: 5/5    Sensory Exam   Light touch normal.   Vibration normal.    Proprioception normal.   Pinprick normal.     Gait, Coordination, and Reflexes     Gait  Gait: normal    Coordination   Romberg: negative  Finger to nose coordination: normal  Heel to shin coordination: normal  Tandem walking coordination: normal    Tremor   Resting tremor: absent  Intention tremor: absent  Action tremor: absent    Reflexes   Right brachioradialis: 2+  Left brachioradialis: 2+  Right biceps: 2+  Left biceps: 2+  Right triceps: 2+  Left triceps: 2+  Right patellar: 2+  Left patellar: 2+  Right achilles: 2+  Left achilles: 2+  Right : 2+  Left : 2+  Right plantar: normal  Left plantar: normal  Right Ryan: absent  Left Ryan: absent  Right ankle clonus: absent  Left ankle clonus: absent      Assessment/Plan   Independent Review of Radiographic Studies:    I personally reviewed the plain x-rays with flexion-extension of the cervical spine.  There is no evidence of abnormal subluxation at the C1 2 region.  There is of course degenerative arthritic change and cervical spine as expected.  Medical Decision Making:    The patient clinically is doing well.  Radiographically he is doing well.  I do not think that he needs further follow-up.  He may come out of his hard collar.    I suggested that he not fall asleep and fall out of his office chair again.    Durga was seen today for c1 fracture.    Diagnoses and all orders for this visit:    Closed nondisplaced lateral mass fracture of first cervical vertebra with routine healing      Return if symptoms worsen or fail to improve.

## 2018-07-24 ENCOUNTER — OFFICE VISIT (OUTPATIENT)
Dept: INTERNAL MEDICINE | Facility: CLINIC | Age: 68
End: 2018-07-24

## 2018-07-24 VITALS
HEIGHT: 74 IN | DIASTOLIC BLOOD PRESSURE: 70 MMHG | SYSTOLIC BLOOD PRESSURE: 132 MMHG | WEIGHT: 315 LBS | BODY MASS INDEX: 40.43 KG/M2

## 2018-07-24 DIAGNOSIS — I10 ESSENTIAL (PRIMARY) HYPERTENSION: Primary | ICD-10-CM

## 2018-07-24 DIAGNOSIS — S12.000G CLOSED DISPLACED FRACTURE OF FIRST CERVICAL VERTEBRA WITH DELAYED HEALING, UNSPECIFIED FRACTURE MORPHOLOGY, SUBSEQUENT ENCOUNTER: ICD-10-CM

## 2018-07-24 DIAGNOSIS — M15.9 PRIMARY OSTEOARTHRITIS INVOLVING MULTIPLE JOINTS: ICD-10-CM

## 2018-07-24 DIAGNOSIS — S12.01XD CLOSED STABLE BURST FRACTURE OF FIRST CERVICAL VERTEBRA WITH ROUTINE HEALING: ICD-10-CM

## 2018-07-24 DIAGNOSIS — I25.10 CORONARY ARTERY DISEASE INVOLVING NATIVE CORONARY ARTERY OF NATIVE HEART WITHOUT ANGINA PECTORIS: ICD-10-CM

## 2018-07-24 PROBLEM — S12.9XXA CERVICAL SPINE FRACTURE: Status: RESOLVED | Noted: 2018-05-09 | Resolved: 2018-07-24

## 2018-07-24 PROBLEM — S12.041D CLOSED NONDISPLACED LATERAL MASS FRACTURE OF FIRST CERVICAL VERTEBRA WITH ROUTINE HEALING: Status: RESOLVED | Noted: 2018-05-09 | Resolved: 2018-07-24

## 2018-07-24 PROCEDURE — 99213 OFFICE O/P EST LOW 20 MIN: CPT | Performed by: INTERNAL MEDICINE

## 2018-07-24 NOTE — PROGRESS NOTES
"Srinivas Valenzuela is a 67 y.o. male.     History of Present Illness he is here today for follow-up of hypertension, C1 burst fracture, coronary artery disease, and history of pulmonary embolism.  Fortunately he has completely recovered from his traumatic C1 vertebral fracture.  He has no associated pain or limitation in range of motion.  He does have right knee pain and clicking and he has to walk with his right foot and valgus position.  He has these problems with any weightbearing.  He had total knee replacement in November 2017.  He has been back to his orthopedist several times and he was told that there was \"nothing wrong\".  He denies any FITZPATRICK, PND, or chest pain.  There has been no swelling in the legs.  He denies any dizziness, syncope, or focal neurologic symptoms.        Review of Systems   Constitutional: Negative for activity change, appetite change and fatigue.   HENT: Negative for trouble swallowing.    Respiratory: Negative for cough, chest tightness, shortness of breath and wheezing.    Cardiovascular: Negative for chest pain, palpitations and leg swelling.   Gastrointestinal: Negative for abdominal pain.   Genitourinary: Negative for flank pain.   Musculoskeletal: Positive for arthralgias. Negative for gait problem.   Neurological: Negative for dizziness, syncope, weakness and headache.       Objective   Physical Exam   Constitutional: He is oriented to person, place, and time. Vital signs are normal. He appears well-developed and well-nourished. He is active. He does not appear ill. No distress.   Eyes: Conjunctivae are normal.   Neck: Carotid bruit is not present.   Cardiovascular: Normal rate, regular rhythm, S1 normal and S2 normal.  Exam reveals no S3 and no S4.    No murmur heard.  Pulmonary/Chest: No tachypnea. No respiratory distress. He has no decreased breath sounds. He has no wheezes. He has no rhonchi. He has no rales.   Abdominal: Soft. Normal appearance and bowel sounds are " normal. He exhibits no abdominal bruit and no mass. There is no hepatosplenomegaly. There is no tenderness.   Musculoskeletal:        Legs:    Vascular Status -  His right foot exhibits no edema. His left foot exhibits no edema.  Neurological: He is alert and oriented to person, place, and time. Gait normal.   Psychiatric: He has a normal mood and affect. His speech is normal and behavior is normal. Judgment and thought content normal. Cognition and memory are normal.         Assessment/Plan assessment #1 hypertension-good control #2 status post C1 vertebral fracture-she has done well and he has not required surgery.  #3 osteoarthritis-there is a problem with his prosthesis and we discussed this at some length.    Plan #1 no change medication #2 orthopedic consult for second opinion.  Routine follow-up in 4 months.

## 2018-08-21 ENCOUNTER — OFFICE VISIT (OUTPATIENT)
Dept: INTERNAL MEDICINE | Facility: CLINIC | Age: 68
End: 2018-08-21

## 2018-08-21 VITALS
SYSTOLIC BLOOD PRESSURE: 130 MMHG | HEIGHT: 72 IN | WEIGHT: 315 LBS | HEART RATE: 78 BPM | DIASTOLIC BLOOD PRESSURE: 70 MMHG | OXYGEN SATURATION: 98 % | BODY MASS INDEX: 42.66 KG/M2

## 2018-08-21 DIAGNOSIS — E55.9 VITAMIN D DEFICIENCY: Chronic | ICD-10-CM

## 2018-08-21 DIAGNOSIS — N40.1 BENIGN PROSTATIC HYPERPLASIA WITH URINARY OBSTRUCTION: Chronic | ICD-10-CM

## 2018-08-21 DIAGNOSIS — J30.1 CHRONIC SEASONAL ALLERGIC RHINITIS DUE TO POLLEN: Chronic | ICD-10-CM

## 2018-08-21 DIAGNOSIS — L40.9 PSORIASIS: Chronic | ICD-10-CM

## 2018-08-21 DIAGNOSIS — I10 BENIGN ESSENTIAL HYPERTENSION: Chronic | ICD-10-CM

## 2018-08-21 DIAGNOSIS — N13.8 BENIGN PROSTATIC HYPERPLASIA WITH URINARY OBSTRUCTION: Chronic | ICD-10-CM

## 2018-08-21 DIAGNOSIS — E78.5 HYPERLIPIDEMIA, UNSPECIFIED HYPERLIPIDEMIA TYPE: Chronic | ICD-10-CM

## 2018-08-21 DIAGNOSIS — F51.04 CHRONIC INSOMNIA: Chronic | ICD-10-CM

## 2018-08-21 DIAGNOSIS — E03.9 PRIMARY HYPOTHYROIDISM: Chronic | ICD-10-CM

## 2018-08-21 DIAGNOSIS — Z11.59 NEED FOR HEPATITIS C SCREENING TEST: ICD-10-CM

## 2018-08-21 DIAGNOSIS — R73.01 IMPAIRED FASTING GLUCOSE: Primary | Chronic | ICD-10-CM

## 2018-08-21 DIAGNOSIS — Z91.09 MULTIPLE ENVIRONMENTAL ALLERGIES: Chronic | ICD-10-CM

## 2018-08-21 DIAGNOSIS — Z86.711 HISTORY OF PULMONARY EMBOLISM: Chronic | ICD-10-CM

## 2018-08-21 DIAGNOSIS — F41.8 DEPRESSION WITH ANXIETY: Chronic | ICD-10-CM

## 2018-08-21 DIAGNOSIS — G47.33 OBSTRUCTIVE SLEEP APNEA: Chronic | ICD-10-CM

## 2018-08-21 DIAGNOSIS — Z51.81 THERAPEUTIC DRUG MONITORING: ICD-10-CM

## 2018-08-21 DIAGNOSIS — M51.36 LUMBAR DEGENERATIVE DISC DISEASE: Chronic | ICD-10-CM

## 2018-08-21 DIAGNOSIS — R60.0 BILATERAL LOWER EXTREMITY EDEMA: Chronic | ICD-10-CM

## 2018-08-21 DIAGNOSIS — I25.10 NON-OCCLUSIVE CORONARY ARTERY DISEASE: Chronic | ICD-10-CM

## 2018-08-21 DIAGNOSIS — F43.10 POST TRAUMATIC STRESS DISORDER (PTSD): Chronic | ICD-10-CM

## 2018-08-21 DIAGNOSIS — R73.01 IMPAIRED FASTING GLUCOSE: Chronic | ICD-10-CM

## 2018-08-21 PROBLEM — M17.12 PRIMARY OSTEOARTHRITIS OF LEFT KNEE: Status: ACTIVE | Noted: 2018-08-21

## 2018-08-21 PROBLEM — Z87.81 HISTORY OF CERVICAL FRACTURE: Status: RESOLVED | Noted: 2018-07-03 | Resolved: 2018-08-21

## 2018-08-21 PROBLEM — M17.0 PRIMARY OSTEOARTHRITIS OF BOTH KNEES: Status: ACTIVE | Noted: 2018-08-21

## 2018-08-21 PROBLEM — M17.9 DJD (DEGENERATIVE JOINT DISEASE) OF KNEE: Status: ACTIVE | Noted: 2018-08-21

## 2018-08-21 PROBLEM — J44.9 STAGE 1 MILD COPD BY GOLD CLASSIFICATION: Status: ACTIVE | Noted: 2018-08-21

## 2018-08-21 PROBLEM — Z86.718 HISTORY OF DEEP VENOUS THROMBOSIS (DVT) OF DISTAL VEIN OF RIGHT LOWER EXTREMITY: Status: ACTIVE | Noted: 2018-08-21

## 2018-08-21 PROBLEM — M17.11 PRIMARY OSTEOARTHRITIS OF RIGHT KNEE: Status: ACTIVE | Noted: 2018-08-21

## 2018-08-21 PROBLEM — J44.9 STAGE 1 MILD COPD BY GOLD CLASSIFICATION: Chronic | Status: ACTIVE | Noted: 2018-08-21

## 2018-08-21 PROBLEM — M17.11 PRIMARY OSTEOARTHRITIS OF RIGHT KNEE: Chronic | Status: RESOLVED | Noted: 2018-08-21 | Resolved: 2018-08-21

## 2018-08-21 PROBLEM — M17.11 PRIMARY OSTEOARTHRITIS OF RIGHT KNEE: Chronic | Status: ACTIVE | Noted: 2018-08-21

## 2018-08-21 PROBLEM — S12.041D CLOSED NONDISPLACED LATERAL MASS FRACTURE OF FIRST CERVICAL VERTEBRA WITH ROUTINE HEALING: Status: RESOLVED | Noted: 2018-05-09 | Resolved: 2018-08-21

## 2018-08-21 PROBLEM — S83.011A: Status: ACTIVE | Noted: 2018-08-13

## 2018-08-21 PROBLEM — Z86.718 HISTORY OF DEEP VENOUS THROMBOSIS (DVT) OF DISTAL VEIN OF RIGHT LOWER EXTREMITY: Status: RESOLVED | Noted: 2018-08-21 | Resolved: 2018-08-21

## 2018-08-21 PROBLEM — M17.0 PRIMARY OSTEOARTHRITIS OF BOTH KNEES: Chronic | Status: ACTIVE | Noted: 2018-08-21

## 2018-08-21 PROBLEM — J31.0 RHINITIS, CHRONIC: Status: ACTIVE | Noted: 2018-08-21

## 2018-08-21 PROBLEM — Z87.81 HISTORY OF CERVICAL FRACTURE: Status: ACTIVE | Noted: 2018-07-03

## 2018-08-21 PROBLEM — M65.342 TRIGGER RING FINGER OF LEFT HAND: Status: ACTIVE | Noted: 2018-08-21

## 2018-08-21 PROBLEM — M17.12 PRIMARY OSTEOARTHRITIS OF LEFT KNEE: Chronic | Status: ACTIVE | Noted: 2018-08-21

## 2018-08-21 PROCEDURE — 99215 OFFICE O/P EST HI 40 MIN: CPT | Performed by: INTERNAL MEDICINE

## 2018-08-21 NOTE — PROGRESS NOTES
08/21/2018    Patient Information  Durga Valenzuela                                                                                          [address]  [facility] [address]      1950  [phone]      Chief Complaint:     Follow-up impaired fasting glucose, hypertension, hyperlipidemia, hypothyroidism, obstructive sleep apnea, history of DVT/pulmonary embolism, nonocclusive coronary artery disease, psoriasis, environmental allergies/allergic rhinitis, depression with anxiety, PTSD, vitamin D deficiency, BPH, chronic insomnia, lumbar disc disease, bilateral lower extremity edema.  Complaining of lower extremity edema.    History of Present Illness:    New patient to me to get established but not new to the [facility].  He presents today to follow-up on multiple medical problems as listed in the chief complaint.  His medical record was reviewed extensively today and revealed multiple incorrect/inaccurate diagnoses including a diagnosis of diabetes which he does not have.  The entire medical record was corrected and brought up to date and as accurate as can be accomplished at this time.  Past medical history reviewed and updated where necessary including health maintenance parameters and this reveals several deficiencies that will be corrected over the next several weeks.  Patient has complaints of lower extremity swelling which is worse towards the end of the day and tends to go away overnight.  He also has complaints of osteoarthritis, in particular has problems with his right knee and apparently is scheduled for knee surgery in the near future for a subluxed patella.      Review of Systems   Constitution: Negative.   HENT: Negative.    Eyes: Negative.    Cardiovascular: Positive for leg swelling.   Respiratory: Negative.    Endocrine: Negative.    Hematologic/Lymphatic: Negative.    Skin: Negative.    Musculoskeletal: Positive for arthritis and joint pain.   Gastrointestinal: Negative.     Genitourinary: Negative.    Neurological: Negative.    Psychiatric/Behavioral: Negative.    Allergic/Immunologic: Negative.        Active Problems:    Patient Active Problem List   Diagnosis   • Post traumatic stress disorder (PTSD)   • Benign prostatic hypertrophy   • Chronic insomnia   • Lumbar degenerative disc disease   • Impaired fasting glucose   • Benign essential hypertension   • Primary osteoarthritis involving multiple joints   • Hyperlipidemia   • Primary hypothyroidism   • Obstructive sleep apnea, tolerates CPAP well.  Previously failed oral appliance.   • History of pulmonary embolism   • Non-occlusive coronary artery disease, 05/16/2017--normal LM; proximal LAD mild LI; 50% mid LAD.  Mild distal LAD LI; normal Cx.  Mild LI marginal branches; normal RCA, nondominant.  EF 60%.   • Therapeutic drug monitoring   • Vitamin D deficiency   • Closed traumatic lateral subluxation of patellofemoral joint, right, initial encounter   • Psoriasis   • Allergic rhinitis   • Depression with anxiety   • Trigger ring finger of left hand   • Multiple environmental allergies   • Bilateral lower extremity edema         Past Medical History:   Diagnosis Date   • Allergic rhinitis 8/21/2018    Patient has had allergy testing in the past which revealed allergies to molds and grass.  Immunotherapy for about 2 years in the 1980s.   • Benign essential hypertension 2/25/2016   • Benign prostatic hypertrophy 2/25/2016   • Bilateral lower extremity edema 8/21/2018   • Chronic insomnia 2/25/2016   • Depression with anxiety 8/21/2018   • History of deep venous thrombosis (DVT) of distal vein of right lower extremity 8/21/2018 04/26/2016--CTA of the chest performed for elevated d-dimer and progressive shortness of breath and chest pain for one month revealed bilateral occlusive in near occlusive segmental and subsegmental pulmonary emboli in all lobes of the right lung as well as within the left lower lobe.  No evidence of  pulmonary infarct.  Nonspecific multifocal groundglass attenuation in the right pulmonary apex, perhaps representing pneumonitis or submental atelectasis.  Questionable cholelithiasis.  Doppler venous study was positive for DVT in the right popliteal vein.  Hypercoagulable workup was normal.  He was treated with Eliquis for a total of 6 months and was subsequently discontinued.   • History of pneumonia    • History of pulmonary embolism 6/10/2016    04/26/2016--CTA of the chest performed for elevated d-dimer and progressive shortness of breath and chest pain for one month revealed bilateral occlusive in near occlusive segmental and subsegmental pulmonary emboli in all lobes of the right lung as well as within the left lower lobe.  No evidence of pulmonary infarct.  Nonspecific multifocal groundglass attenuation in the right pulmonary apex, perhaps representing pneumonitis or submental atelectasis.  Questionable cholelithiasis.  Doppler venous study was positive for DVT in the right popliteal vein.  Hypercoagulable workup was normal.  He was treated with Eliquis for a total of 6 months and was subsequently discontinued.   • Hyperlipidemia 2/25/2016   • Impaired fasting glucose 2/25/2016   • Lumbar degenerative disc disease 2/25/2016   • Multiple environmental allergies 8/21/2018    Patient has had allergy testing in the past which revealed allergies to molds and grass.  Immunotherapy for about 2 years in the 1980s.   • Non-occlusive coronary artery disease, 05/16/2017--normal LM; proximal LAD mild LI; 50% mid LAD.  Mild distal LAD LI; normal Cx.  Mild LI marginal branches; normal RCA, nondominant.  EF 60%. 7/24/2017 05/16/2017--cardiac catheterization performed for abnormal stress test.  Normal LV with ejection fraction 60%.  Dilated aortic root.  No wall motion abnormalities.  Normal left main.  Ramus branch small caliber and normal.  Proximal LAD large caliber and mild luminal irregularities.  50% mid LAD.  Mild  luminal irregularities distal LAD.  3 diagonal branches of small caliber with mild luminal irregularities.  Normal septal branches.  Circumflex is large caliber and free of disease.  Marginal branches are medium caliber with mild luminal irregularities.  RCA is a medium caliber and free of disease.  It is nondominant.   • Obstructive sleep apnea, tolerates CPAP well.  Previously failed oral appliance. 2/25/2016   • Post traumatic stress disorder (PTSD) 2/25/2016   • Primary hypothyroidism 2/25/2016   • Primary osteoarthritis involving multiple joints 2/25/2016   • Psoriasis 9/17/2014   • Vitamin D deficiency 8/21/2018         Past Surgical History:   Procedure Laterality Date   • CARDIAC CATHETERIZATION N/A 5/16/2017    Procedure: Coronary angiography;  Surgeon: Malcolm Chen MD;  [facility];  Service:    • CARDIAC CATHETERIZATION N/A 5/16/2017    Procedure: Left Heart Cath;  Surgeon: Malcolm Chen MD;  Location[facility] LOC[facility];  Service:    • CARDIAC CATHETERIZATION N/A 5/16/2017    Procedure: Left ventriculography;  Surgeon: Malcolm Chen MD;  Location: [facility]AS[facility] LOC[facility];  Service:    • COLONOSCOPY  2005 2005--colonoscopy reportedly normal.  Records not available.   • LUMBAR DECOMPRESSION  2007 2007--lumbar decompression without fusion or hardware.   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   • TOTAL KNEE ARTHROPLASTY Left 11/03/2017 11/03/2017--right total knee replacement complicated by patellofemoral subluxation.   • TOTAL KNEE ARTHROPLASTY Left 06/2014 June 2014--left total knee replacement.         Allergies   Allergen Reactions   • Hydrocodone Other (See Comments)     Severe vomiting - but CAN tolerate oxycodone per patient   • Zocor  [Simvastatin]      MYALGIA           Current Outpatient Prescriptions:   •  amLODIPine  "(NORVASC) 10 MG tablet, Take 10 mg by mouth Daily., Disp: , Rfl:   •  clobetasol (TEMOVATE) 0.05 % ointment, Apply 1 application topically 2 (Two) Times a Day As Needed., Disp: , Rfl:   •  FLUoxetine (PROzac) 20 MG tablet, take 2 tablets by mouth once daily, Disp: , Rfl: 0  •  levothyroxine (SYNTHROID) 112 MCG tablet, Take 1 tablet by mouth Daily., Disp: 90 tablet, Rfl: 3  •  losartan (COZAAR) 50 MG tablet, Take 1 tablet by mouth Daily., Disp: 90 tablet, Rfl: 3  •  rosuvastatin (CRESTOR) 10 MG tablet, Take 1 tablet by mouth Daily., Disp: 90 tablet, Rfl: 3  •  tamsulosin (FLOMAX) 0.4 MG capsule 24 hr capsule, 1 capsule Daily., Disp: , Rfl: 0  •  zolpidem (AMBIEN) 5 MG tablet, Take 10 mg by mouth At Night As Needed for Sleep., Disp: , Rfl:   •  aspirin 81 MG tablet, One by mouth daily, Disp: , Rfl:       Family History   Problem Relation Age of Onset   • Coronary artery disease Mother         Status post CABG   • Alzheimer's disease Mother    • Stroke Father    • Liver cancer Father         Father with biliary cancer including gallbladder   • No Known Problems Sister    • No Known Problems Brother          Social History     Social History   • Marital status:      Spouse name: N/A   • Number of children: N/A   • Years of education: N/A     Occupational History   • Retired June 2015--      Social History Main Topics   • Smoking status: Never Smoker   • Smokeless tobacco: Never Used   • Alcohol use No   • Drug use: No   • Sexual activity: Yes     Partners: Female     Other Topics Concern   • Not on file     Social History Narrative   • No narrative on file         Vitals:    08/21/18 1553   BP: 130/70   Pulse: 78   SpO2: 98%   Weight: (!) 151 kg (333 lb)   Height: 183 cm (72.05\")          Physical Exam:    General: Alert and oriented x 3.  Obese.  No acute distress.  Normal affect.  HEENT: Pupils equal, round, reactive to light; extraocular movements intact; sclerae nonicteric; pharynx, ear " canals and TMs normal.  Neck: Without JVD, thyromegaly, bruit, or adenopathy.  Lungs: Clear to auscultation in all fields.  Heart: Regular rate and rhythm without murmur, rub, gallop, or click.  Abdomen: Soft, nontender, without hepatosplenomegaly or hernia.  Bowel sounds normal.  : Deferred.  Rectal: Deferred.  Extremities: Without clubbing, cyanosis, or pulse deficit.  1+ bilateral lower extremity edema that extends midway to the knees. Neurologic: Intact without focal deficit.  Normal station and gait observed during ingress and egress from the examination room.  Skin: Without significant lesion there are changes consistent with psoriasis at the elbows on the extensor surfaces.  Musculoskeletal: Changes consistent with degenerative arthritis present.  Obvious bilateral total knee replacements.      Lab/other results:    I reviewed extensive documentation including his most recent lab work, radiographic studies including studies documented bilateral pulmonary emboli, heart catheterization, and pulmonary function tests.    Assessment/Plan:     Diagnosis Plan   1. Impaired fasting glucose     2. Benign essential hypertension     3. Hyperlipidemia, unspecified hyperlipidemia type     4. Primary hypothyroidism     5. Obstructive sleep apnea, tolerates CPAP well.  Previously failed oral appliance.     6. History of pulmonary embolism     7. Non-occlusive coronary artery disease, 05/16/2017--normal LM; proximal LAD mild LI; 50% mid LAD.  Mild distal LAD LI; normal Cx.  Mild LI marginal branches; normal RCA, nondominant.  EF 60%.  aspirin 81 MG tablet   8. Psoriasis     9. Multiple environmental allergies     10. Allergic rhinitis     11. Depression with anxiety     12. Vitamin D deficiency     13. Post traumatic stress disorder (PTSD)     14. Benign prostatic hypertrophy     15. Chronic insomnia     16. Lumbar degenerative disc disease     17. Bilateral lower extremity edema     18. Therapeutic drug monitoring        Patient has impaired fasting glucose although the medical record indicates that he has diabetes.  This is absolutely not true.  She does not have diabetes.  He has hyperlipidemia that needs to be reassessed given his multiple risk factors and the fact that he has non-occlusive coronary artery disease.  He has a history of unprovoked pulmonary emboli and DVT approximately 2 years ago without recurrence.  He has been off anticoagulation for well over a year.  He has primary hypothyroidism that needs to be reassessed.  Patient has sleep apnea and seems to tolerate CPAP well.  He has multiple environmental allergies/allergic rhinitis which currently is not a big issue.  Patient does have depression with anxiety and history of posttraumatic stress disorder related to [place] and he sees a psychiatrist as well as a therapist.  The medical record indicated that he had ADD and also bipolar disorder but this is absolutely not correct.  Patient has BPH that seems well controlled with Flomax.  He does have a history of lumbar degenerative disc disease and had previous decompression surgery that was successful and currently has no significant back pain and has absolutely no lumbar radicular symptoms.    Plan is as follows: No changes in current medical regimen except start baby aspirin 81 mg per day.  Fasting lab work to be done and patient will follow-up in the near future.  This will also be his subsequent Medicare wellness visit.  At that time we will need to schedule colonoscopy and also administer necessary vaccinations once we have better documentation regarding his previous vaccinations.  Patient will check on this and bring that information in when he comes.  We also had a discussion regarding the new shingles vaccination and I recommended that he obtain this at the local drug store.    Patient has been erroneously marked as diabetic. Based on the available clinical information, he does not have diabetes and should  therefore be excluded from diabetic health maintenance and quality measures for the remainder of the reporting period.    Note: Greater than 40 minutes was spent today evaluating his multiple medical problems and making necessary entries and corrections in the electronic medical record.  Greater than 50% of this time was spent counseling patient regarding his numerous medical problems, therapeutic options and plans as well as planned diagnostic studies.  Complex patient with multiple medical problems.  [id number] level of service justified.    Procedures

## 2018-08-23 LAB
25(OH)D3+25(OH)D2 SERPL-MCNC: 15.9 NG/ML (ref 30–100)
ALBUMIN SERPL-MCNC: 4.7 G/DL (ref 3.6–4.8)
ALBUMIN/GLOB SERPL: 2.1 {RATIO} (ref 1.2–2.2)
ALP SERPL-CCNC: 57 IU/L (ref 39–117)
ALT SERPL-CCNC: 21 IU/L (ref 0–44)
AST SERPL-CCNC: 22 IU/L (ref 0–40)
BILIRUB SERPL-MCNC: 0.4 MG/DL (ref 0–1.2)
BUN SERPL-MCNC: 21 MG/DL (ref 8–27)
BUN/CREAT SERPL: 22 (ref 10–24)
CALCIUM SERPL-MCNC: 9.8 MG/DL (ref 8.6–10.2)
CHLORIDE SERPL-SCNC: 102 MMOL/L (ref 96–106)
CHOLEST SERPL-MCNC: 97 MG/DL (ref 100–199)
CK SERPL-CCNC: 339 U/L (ref 24–204)
CO2 SERPL-SCNC: 22 MMOL/L (ref 20–29)
CREAT SERPL-MCNC: 0.96 MG/DL (ref 0.76–1.27)
ERYTHROCYTE [DISTWIDTH] IN BLOOD BY AUTOMATED COUNT: 14.1 % (ref 12.3–15.4)
GLOBULIN SER CALC-MCNC: 2.2 G/DL (ref 1.5–4.5)
GLUCOSE SERPL-MCNC: 119 MG/DL (ref 65–99)
HBA1C MFR BLD: 6.2 % (ref 4.8–5.6)
HCT VFR BLD AUTO: 41.2 % (ref 37.5–51)
HCV AB S/CO SERPL IA: <0.1 S/CO RATIO (ref 0–0.9)
HDL SERPL-SCNC: 26.7 UMOL/L
HDLC SERPL-MCNC: 30 MG/DL
HGB BLD-MCNC: 14.3 G/DL (ref 13–17.7)
LDL SERPL QN: 20.4 NM
LDL SERPL-SCNC: 570 NMOL/L
LDL SMALL SERPL-SCNC: 251 NMOL/L
LDLC SERPL CALC-MCNC: 38 MG/DL (ref 0–99)
MCH RBC QN AUTO: 31.9 PG (ref 26.6–33)
MCHC RBC AUTO-ENTMCNC: 34.7 G/DL (ref 31.5–35.7)
MCV RBC AUTO: 92 FL (ref 79–97)
PLATELET # BLD AUTO: 207 X10E3/UL (ref 150–379)
POTASSIUM SERPL-SCNC: 4.3 MMOL/L (ref 3.5–5.2)
PROT SERPL-MCNC: 6.9 G/DL (ref 6–8.5)
PSA SERPL-MCNC: 0.6 NG/ML (ref 0–4)
RBC # BLD AUTO: 4.48 X10E6/UL (ref 4.14–5.8)
SODIUM SERPL-SCNC: 141 MMOL/L (ref 134–144)
T3FREE SERPL-MCNC: 2.7 PG/ML (ref 2–4.4)
T4 FREE SERPL-MCNC: 0.86 NG/DL (ref 0.82–1.77)
TRIGL SERPL-MCNC: 143 MG/DL (ref 0–149)
TSH SERPL DL<=0.005 MIU/L-ACNC: 31.9 UIU/ML (ref 0.45–4.5)
WBC # BLD AUTO: 7 X10E3/UL (ref 3.4–10.8)

## 2018-09-05 ENCOUNTER — OFFICE VISIT (OUTPATIENT)
Dept: INTERNAL MEDICINE | Facility: CLINIC | Age: 68
End: 2018-09-05

## 2018-09-05 VITALS
HEIGHT: 72 IN | SYSTOLIC BLOOD PRESSURE: 130 MMHG | BODY MASS INDEX: 42.66 KG/M2 | DIASTOLIC BLOOD PRESSURE: 70 MMHG | OXYGEN SATURATION: 98 % | WEIGHT: 315 LBS | HEART RATE: 71 BPM

## 2018-09-05 DIAGNOSIS — Z91.09 MULTIPLE ENVIRONMENTAL ALLERGIES: Chronic | ICD-10-CM

## 2018-09-05 DIAGNOSIS — R60.0 BILATERAL LOWER EXTREMITY EDEMA: Chronic | ICD-10-CM

## 2018-09-05 DIAGNOSIS — G56.22 CUBITAL TUNNEL SYNDROME ON LEFT: ICD-10-CM

## 2018-09-05 DIAGNOSIS — J30.1 CHRONIC SEASONAL ALLERGIC RHINITIS DUE TO POLLEN: Chronic | ICD-10-CM

## 2018-09-05 DIAGNOSIS — G56.02 LEFT CARPAL TUNNEL SYNDROME: ICD-10-CM

## 2018-09-05 DIAGNOSIS — E78.5 HYPERLIPIDEMIA, UNSPECIFIED HYPERLIPIDEMIA TYPE: Chronic | ICD-10-CM

## 2018-09-05 DIAGNOSIS — Z23 NEED FOR INFLUENZA VACCINATION: ICD-10-CM

## 2018-09-05 DIAGNOSIS — E55.9 VITAMIN D DEFICIENCY: Chronic | ICD-10-CM

## 2018-09-05 DIAGNOSIS — I25.10 NON-OCCLUSIVE CORONARY ARTERY DISEASE: Chronic | ICD-10-CM

## 2018-09-05 DIAGNOSIS — Z00.00 MEDICARE ANNUAL WELLNESS VISIT, SUBSEQUENT: Primary | ICD-10-CM

## 2018-09-05 DIAGNOSIS — S83.011A: ICD-10-CM

## 2018-09-05 DIAGNOSIS — G47.33 OBSTRUCTIVE SLEEP APNEA: Chronic | ICD-10-CM

## 2018-09-05 DIAGNOSIS — M65.342 TRIGGER RING FINGER OF LEFT HAND: ICD-10-CM

## 2018-09-05 DIAGNOSIS — F51.04 CHRONIC INSOMNIA: Chronic | ICD-10-CM

## 2018-09-05 DIAGNOSIS — Z51.81 THERAPEUTIC DRUG MONITORING: ICD-10-CM

## 2018-09-05 DIAGNOSIS — R73.01 IMPAIRED FASTING GLUCOSE: Chronic | ICD-10-CM

## 2018-09-05 DIAGNOSIS — E03.9 PRIMARY HYPOTHYROIDISM: Chronic | ICD-10-CM

## 2018-09-05 DIAGNOSIS — I10 BENIGN ESSENTIAL HYPERTENSION: Chronic | ICD-10-CM

## 2018-09-05 DIAGNOSIS — N40.1 BENIGN PROSTATIC HYPERPLASIA WITH URINARY OBSTRUCTION: Chronic | ICD-10-CM

## 2018-09-05 DIAGNOSIS — Z86.711 HISTORY OF PULMONARY EMBOLISM: Chronic | ICD-10-CM

## 2018-09-05 DIAGNOSIS — L40.9 PSORIASIS: Chronic | ICD-10-CM

## 2018-09-05 DIAGNOSIS — N13.8 BENIGN PROSTATIC HYPERPLASIA WITH URINARY OBSTRUCTION: Chronic | ICD-10-CM

## 2018-09-05 DIAGNOSIS — F41.8 DEPRESSION WITH ANXIETY: Chronic | ICD-10-CM

## 2018-09-05 PROCEDURE — 99214 OFFICE O/P EST MOD 30 MIN: CPT | Performed by: INTERNAL MEDICINE

## 2018-09-05 PROCEDURE — G0008 ADMIN INFLUENZA VIRUS VAC: HCPCS | Performed by: INTERNAL MEDICINE

## 2018-09-05 PROCEDURE — G0439 PPPS, SUBSEQ VISIT: HCPCS | Performed by: INTERNAL MEDICINE

## 2018-09-05 PROCEDURE — 90662 IIV NO PRSV INCREASED AG IM: CPT | Performed by: INTERNAL MEDICINE

## 2018-09-05 RX ORDER — CLOBETASOL PROPIONATE 0.5 MG/G
OINTMENT TOPICAL
Qty: 60 G | Refills: 6 | Status: SHIPPED | OUTPATIENT
Start: 2018-09-05 | End: 2021-08-29 | Stop reason: SDUPTHER

## 2018-09-05 RX ORDER — DEXTROAMPHETAMINE SACCHARATE, AMPHETAMINE ASPARTATE, DEXTROAMPHETAMINE SULFATE AND AMPHETAMINE SULFATE 5; 5; 5; 5 MG/1; MG/1; MG/1; MG/1
1 TABLET ORAL DAILY
COMMUNITY
Start: 2017-06-08 | End: 2020-09-17

## 2018-09-05 RX ORDER — LEVOTHYROXINE SODIUM 0.15 MG/1
TABLET ORAL
Qty: 30 TABLET | Refills: 2 | Status: SHIPPED | OUTPATIENT
Start: 2018-09-05 | End: 2018-12-24

## 2018-09-05 NOTE — PROGRESS NOTES
QUICK REFERENCE INFORMATION:  The ABCs of the Annual Wellness Visit    Subsequent Medicare Wellness Visit    HEALTH RISK ASSESSMENT    1950    Recent Hospitalizations:  Recently treated at the following:  [facility].        Current Medical Providers:  Patient Care Team:  Florian Burt MD as PCP - General (Internal Medicine)        Smoking Status:  History   Smoking Status   • Never Smoker   Smokeless Tobacco   • Never Used       Alcohol Consumption:  History   Alcohol Use No       Depression Screen:   No flowsheet data found.    Health Habits and Functional and Cognitive Screening:  No flowsheet data found.        Does the patient have evidence of cognitive impairment? No    Aspirin use counseling: Taking ASA appropriately as indicated      Recent Lab Results:  CMP:  Lab Results   Component Value Date     (H) 08/21/2018    BUN 21 08/21/2018    CREATININE 0.96 08/21/2018    EGFRIFNONA 81 08/21/2018    EGFRIFAFRI 94 08/21/2018    BCR 22 08/21/2018     08/21/2018    K 4.3 08/21/2018    CO2 22 08/21/2018    CALCIUM 9.8 08/21/2018    PROTENTOTREF 6.9 08/21/2018    ALBUMIN 4.7 08/21/2018    LABGLOBREF 2.2 08/21/2018    LABIL2 2.1 08/21/2018    BILITOT 0.4 08/21/2018    ALKPHOS 57 08/21/2018    AST 22 08/21/2018    ALT 21 08/21/2018     Lipid Panel:  Lab Results   Component Value Date    CHOL 191 03/29/2017    TRIG 143 08/21/2018    HDL 31 (L) 09/22/2017    VLDL 14.4 09/22/2017    LDLHDL 3.38 03/29/2017     HbA1c:  Lab Results   Component Value Date    HGBA1C 6.2 (H) 08/21/2018       Visual Acuity:  No exam data present    Age-appropriate Screening Schedule:  Refer to the list below for future screening recommendations based on patient's age, sex and/or medical conditions. Orders for these recommended tests are listed in the plan section. The patient has been provided with a written plan.    Health Maintenance   Topic Date Due   • COLONOSCOPY  03/18/2016   • INFLUENZA VACCINE   08/01/2018   • PNEUMOCOCCAL VACCINES (65+ LOW/MEDIUM RISK) (2 of 2 - PCV13) 12/18/2018   • LIPID PANEL  08/21/2019   • TDAP/TD VACCINES  Excluded   • ZOSTER VACCINE  Excluded        Subjective   History of Present Illness    Durga Valenzuela is a 67 y.o. male who presents for an Subsequent Wellness Visit.    The following portions of the patient's history were reviewed and updated as appropriate: allergies, current medications, past family history, past medical history, past social history, past surgical history and problem list.    Outpatient Medications Prior to Visit   Medication Sig Dispense Refill   • amLODIPine (NORVASC) 10 MG tablet Take 10 mg by mouth Daily.     • aspirin 81 MG tablet One by mouth daily     • FLUoxetine (PROzac) 20 MG tablet take 2 tablets by mouth once daily  0   • losartan (COZAAR) 50 MG tablet Take 1 tablet by mouth Daily. 90 tablet 3   • rosuvastatin (CRESTOR) 10 MG tablet Take 1 tablet by mouth Daily. 90 tablet 3   • tamsulosin (FLOMAX) 0.4 MG capsule 24 hr capsule 1 capsule Daily.  0   • zolpidem (AMBIEN) 5 MG tablet Take 10 mg by mouth At Night As Needed for Sleep.     • clobetasol (TEMOVATE) 0.05 % ointment Apply 1 application topically 2 (Two) Times a Day As Needed.     • levothyroxine (SYNTHROID) 112 MCG tablet Take 1 tablet by mouth Daily. 90 tablet 3     No facility-administered medications prior to visit.        Patient Active Problem List   Diagnosis   • Post traumatic stress disorder (PTSD)   • Benign prostatic hypertrophy   • Chronic insomnia   • Lumbar degenerative disc disease   • Impaired fasting glucose   • Benign essential hypertension   • Primary osteoarthritis involving multiple joints   • Hyperlipidemia   • Primary hypothyroidism   • Obstructive sleep apnea, tolerates CPAP well.  Previously failed oral appliance.   • History of pulmonary embolism   • Non-occlusive coronary artery disease, 05/16/2017--normal LM; proximal LAD mild LI; 50% mid LAD.  Mild distal LAD LI;  normal Cx.  Mild LI marginal branches; normal RCA, nondominant.  EF 60%.   • Therapeutic drug monitoring   • Vitamin D deficiency   • Closed traumatic lateral subluxation of patellofemoral joint, right, initial encounter   • Psoriasis   • Allergic rhinitis   • Depression with anxiety   • Trigger ring finger of left hand   • Multiple environmental allergies   • Bilateral lower extremity edema   • Left carpal tunnel syndrome   • Cubital tunnel syndrome on left       Advance Care Planning:  has NO advance directive - information provided to the patient today    Identification of Risk Factors:  Risk factors include: weight  and cardiovascular risk.    Review of Systems   Constitutional:        Weight gain   Musculoskeletal:        Right knee pain   All other systems reviewed and are negative.      Compared to one year ago, the patient feels his physical health is worse.  Compared to one year ago, the patient feels his mental health is the same.    Objective       Physical Exam    General: Alert and oriented x 3.  No acute distress.  Obese. Normal affect.  HEENT: Pupils equal, round, reactive to light; extraocular movements intact; sclerae nonicteric; pharynx, ear canals and TMs normal.  Neck: Without JVD, thyromegaly, bruit, or adenopathy.  Lungs: Clear to auscultation in all fields.  Heart: Regular rate and rhythm without murmur, rub, gallop, or click.  Abdomen: Soft, nontender, without hepatosplenomegaly or hernia.  Bowel sounds normal.  : Deferred.  Rectal: Deferred.  Extremities: Without clubbing, cyanosis, edema, or pulse deficit.  Neurologic: Intact without focal deficit.  Normal station and gait observed during ingress and egress from the examination room.  Skin: Without significant lesion.  Musculoskeletal: Unremarkable.    Vitals:    09/05/18 1030   BP: 130/70  Comment: Per Dr. Burt   BP Location: Right arm   Patient Position: Sitting   Cuff Size: Large Adult   Pulse: 71   SpO2: 98%   Weight: (!) 150 kg (330  "lb)   Height: 183 cm (72.05\")   PainSc: 5  Comment: knee,neck,left arm       Patient's Body mass index is 44.7 kg/m². BMI is above normal parameters. Recommendations include: exercise counseling, nutrition counseling and referral to primary care.      Assessment/Plan   Patient Self-Management and Personalized Health Advice  The patient has been provided with information about: diet, exercise, weight management, prevention of cardiac or vascular disease, fall prevention and designing advance directives and preventive services including:   · Advance directive, Counseling for cardiovascular disease risk reduction, Diabetes screening, see lab orders, Exercise counseling provided, Fall Risk assessment done, Glaucoma screening recommended, Influenza vaccine, Nutrition counseling provided, Prostate cancer screening discussed, Zostavax vaccine (Herpes Zoster).    Visit Diagnoses:    ICD-10-CM ICD-9-CM   1. Medicare annual wellness visit, subsequent Z00.00 V70.0   2. Impaired fasting glucose R73.01 790.21   3. Hyperlipidemia, unspecified hyperlipidemia type E78.5 272.4   4. Primary hypothyroidism E03.9 244.9   5. Benign prostatic hypertrophy N40.1 600.01    N13.8 599.69   6. Benign essential hypertension I10 401.1   7. Obstructive sleep apnea, tolerates CPAP well.  Previously failed oral appliance. G47.33 327.23   8. History of pulmonary embolism Z86.711 V12.55   9. Non-occlusive coronary artery disease, 05/16/2017--normal LM; proximal LAD mild LI; 50% mid LAD.  Mild distal LAD LI; normal Cx.  Mild LI marginal branches; normal RCA, nondominant.  EF 60%. I25.10 414.00   10. Depression with anxiety F41.8 300.4   11. Multiple environmental allergies Z91.09 V15.09   12. Bilateral lower extremity edema R60.0 782.3   13. Vitamin D deficiency E55.9 268.9   14. Chronic insomnia F51.04 780.52   15. Psoriasis L40.9 696.1   16. Allergic rhinitis J30.1 477.0   17. Closed traumatic lateral subluxation of patellofemoral joint, right, " initial encounter S83.011A 836.59   18. Trigger ring finger of left hand M65.342 727.03   19. Left carpal tunnel syndrome G56.02 354.0   20. Cubital tunnel syndrome on left G56.22 354.2   21. Therapeutic drug monitoring Z51.81 V58.83   22. Need for influenza vaccination Z23 V04.81       Orders Placed This Encounter   Procedures   • Flu Vaccine High Dose PF 65YR+ ([id number])   • T4, Free   • T3, Free   • TSH       Outpatient Encounter Prescriptions as of 9/5/2018   Medication Sig Dispense Refill   • amLODIPine (NORVASC) 10 MG tablet Take 10 mg by mouth Daily.     • amphetamine-dextroamphetamine (ADDERALL) 20 MG tablet 1 tablet Daily.     • aspirin 81 MG tablet One by mouth daily     • clobetasol (TEMOVATE) 0.05 % ointment Apply twice a day as directed 60 g 6   • FLUoxetine (PROzac) 20 MG tablet take 2 tablets by mouth once daily  0   • losartan (COZAAR) 50 MG tablet Take 1 tablet by mouth Daily. 90 tablet 3   • rosuvastatin (CRESTOR) 10 MG tablet Take 1 tablet by mouth Daily. 90 tablet 3   • tamsulosin (FLOMAX) 0.4 MG capsule 24 hr capsule 1 capsule Daily.  0   • zolpidem (AMBIEN) 5 MG tablet Take 10 mg by mouth At Night As Needed for Sleep.     • [DISCONTINUED] clobetasol (TEMOVATE) 0.05 % ointment Apply 1 application topically 2 (Two) Times a Day As Needed.     • [DISCONTINUED] levothyroxine (SYNTHROID) 112 MCG tablet Take 1 tablet by mouth Daily. 90 tablet 3   • levothyroxine (SYNTHROID) 150 MCG tablet One by mouth daily for low thyroid 30 tablet 2     No facility-administered encounter medications on file as of 9/5/2018.        Reviewed use of high risk medication in the elderly: yes  Reviewed for potential of harmful drug interactions in the elderly: yes    Follow Up:  Return in about 5 weeks (around 10/10/2018) for Next scheduled follow up with lab prior.     An After Visit Summary and PPPS with all of these plans were given to the patient.

## 2018-09-05 NOTE — PROGRESS NOTES
09/05/2018    Patient Information  Durga Valenzuela                                                                                          [address]  [facility] [address]      1950  [phone]      Chief Complaint:     Subsequent Medicare wellness visit.  Follow-up impaired fasting glucose, hyperlipidemia, hypothyroidism, BPH, hypertension, sleep apnea, history of pulmonary embolism, nonocclusive coronary artery disease, history of depression with anxiety, environmental allergies/allergic rhinitis, lower extremity edema, chronic insomnia, psoriasis, closed subluxation of patellofemoral joint, left trigger finger, new diagnosis of left carpal and cubital tunnel syndrome.  No new acute complaints.    History of Present Illness:    Patient with a history of multiple medical problems as outlined in the chief complaint presents today for his subsequent Medicare wellness visit.  He also had lab work in order to monitor his chronic medical issues.  His past medical history reviewed and updated where necessary including health maintenance parameters.  This reveals he is up-to-date with the exception of influenza vaccination which we can give today.  Also discussed the new shingles vaccination which he can receive at the local drug store at later date.  Patient also needs a colonoscopy which we will defer for a few months due to upcoming surgery.    Review of Systems   Constitution: Negative.   HENT: Negative.    Eyes: Negative.    Cardiovascular: Negative.    Respiratory: Negative.    Endocrine: Negative.    Hematologic/Lymphatic: Negative.    Skin: Negative.    Musculoskeletal: Positive for arthritis.   Gastrointestinal: Negative.    Genitourinary: Negative.    Neurological: Negative.    Psychiatric/Behavioral: Negative.    Allergic/Immunologic: Negative.        Active Problems:    Patient Active Problem List   Diagnosis   • Post traumatic stress disorder (PTSD)   • Benign prostatic hypertrophy    • Chronic insomnia   • Lumbar degenerative disc disease   • Impaired fasting glucose   • Benign essential hypertension   • Primary osteoarthritis involving multiple joints   • Hyperlipidemia   • Primary hypothyroidism   • Obstructive sleep apnea, tolerates CPAP well.  Previously failed oral appliance.   • History of pulmonary embolism   • Non-occlusive coronary artery disease, 05/16/2017--normal LM; proximal LAD mild LI; 50% mid LAD.  Mild distal LAD LI; normal Cx.  Mild LI marginal branches; normal RCA, nondominant.  EF 60%.   • Therapeutic drug monitoring   • Vitamin D deficiency   • Closed traumatic lateral subluxation of patellofemoral joint, right, initial encounter   • Psoriasis   • Allergic rhinitis   • Depression with anxiety   • Trigger ring finger of left hand   • Multiple environmental allergies   • Bilateral lower extremity edema   • Left carpal tunnel syndrome   • Cubital tunnel syndrome on left         Past Medical History:   Diagnosis Date   • Allergic rhinitis 8/21/2018    Patient has had allergy testing in the past which revealed allergies to molds and grass.  Immunotherapy for about 2 years in the 1980s.   • Benign essential hypertension 2/25/2016   • Benign prostatic hypertrophy 2/25/2016   • Bilateral lower extremity edema 8/21/2018   • Chronic insomnia 2/25/2016   • Depression with anxiety 8/21/2018   • History of deep venous thrombosis (DVT) of distal vein of right lower extremity 8/21/2018 04/26/2016--CTA of the chest performed for elevated d-dimer and progressive shortness of breath and chest pain for one month revealed bilateral occlusive in near occlusive segmental and subsegmental pulmonary emboli in all lobes of the right lung as well as within the left lower lobe.  No evidence of pulmonary infarct.  Nonspecific multifocal groundglass attenuation in the right pulmonary apex, perhaps representing pneumonitis or submental atelectasis.  Questionable cholelithiasis.  Doppler venous study  was positive for DVT in the right popliteal vein.  Hypercoagulable workup was normal.  He was treated with Eliquis for a total of 6 months and was subsequently discontinued.   • History of pneumonia    • History of pulmonary embolism 6/10/2016    04/26/2016--CTA of the chest performed for elevated d-dimer and progressive shortness of breath and chest pain for one month revealed bilateral occlusive in near occlusive segmental and subsegmental pulmonary emboli in all lobes of the right lung as well as within the left lower lobe.  No evidence of pulmonary infarct.  Nonspecific multifocal groundglass attenuation in the right pulmonary apex, perhaps representing pneumonitis or submental atelectasis.  Questionable cholelithiasis.  Doppler venous study was positive for DVT in the right popliteal vein.  Hypercoagulable workup was normal.  He was treated with Eliquis for a total of 6 months and was subsequently discontinued.   • Hyperlipidemia 2/25/2016   • Impaired fasting glucose 2/25/2016   • Lumbar degenerative disc disease 2/25/2016   • Multiple environmental allergies 8/21/2018    Patient has had allergy testing in the past which revealed allergies to molds and grass.  Immunotherapy for about 2 years in the 1980s.   • Non-occlusive coronary artery disease, 05/16/2017--normal LM; proximal LAD mild LI; 50% mid LAD.  Mild distal LAD LI; normal Cx.  Mild LI marginal branches; normal RCA, nondominant.  EF 60%. 7/24/2017 05/16/2017--cardiac catheterization performed for abnormal stress test.  Normal LV with ejection fraction 60%.  Dilated aortic root.  No wall motion abnormalities.  Normal left main.  Ramus branch small caliber and normal.  Proximal LAD large caliber and mild luminal irregularities.  50% mid LAD.  Mild luminal irregularities distal LAD.  3 diagonal branches of small caliber with mild luminal irregularities.  Normal septal branches.  Circumflex is large caliber and free of disease.  Marginal branches are  medium caliber with mild luminal irregularities.  RCA is a medium caliber and free of disease.  It is nondominant.   • Obstructive sleep apnea, tolerates CPAP well.  Previously failed oral appliance. 2/25/2016   • Post traumatic stress disorder (PTSD) 2/25/2016   • Primary hypothyroidism 2/25/2016   • Primary osteoarthritis involving multiple joints 2/25/2016   • Psoriasis 9/17/2014   • Vitamin D deficiency 8/21/2018         Past Surgical History:   Procedure Laterality Date   • CARDIAC CATHETERIZATION N/A 5/16/2017    Procedure: Coronary angiography;  Surgeon: Malcolm Chen MD;  Location:  [facility];  Service:    • CARDIAC CATHETERIZATION N/A 5/16/2017    Procedure: Left Heart Cath;  Surgeon: Malcolm Chen MD;  Location:  [facility];  Service:    • CARDIAC CATHETERIZATION N/A 5/16/2017    Procedure: Left ventriculography;  Surgeon: Malcolm Chen MD;  Location:  [facility];  Service:    • COLONOSCOPY  2005 2005--colonoscopy reportedly normal.  Records not available.   • LUMBAR DECOMPRESSION  2007 2007--lumbar decompression without fusion or hardware.   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   • TOTAL KNEE ARTHROPLASTY Left 11/03/2017 11/03/2017--right total knee replacement complicated by patellofemoral subluxation.   • TOTAL KNEE ARTHROPLASTY Left 06/2014 June 2014--left total knee replacement.         Allergies   Allergen Reactions   • Hydrocodone Other (See Comments)     Severe vomiting - but CAN tolerate oxycodone per patient   • Zocor  [Simvastatin]      MYALGIA           Current Outpatient Prescriptions:   •  amLODIPine (NORVASC) 10 MG tablet, Take 10 mg by mouth Daily., Disp: , Rfl:   •  amphetamine-dextroamphetamine (ADDERALL) 20 MG tablet, 1 tablet Daily., Disp: , Rfl:   •  aspirin 81 MG tablet, One by mouth daily, Disp:  ", Rfl:   •  clobetasol (TEMOVATE) 0.05 % ointment, Apply 1 application topically 2 (Two) Times a Day As Needed., Disp: , Rfl:   •  FLUoxetine (PROzac) 20 MG tablet, take 2 tablets by mouth once daily, Disp: , Rfl: 0  •  levothyroxine (SYNTHROID) 112 MCG tablet, Take 1 tablet by mouth Daily., Disp: 90 tablet, Rfl: 3  •  losartan (COZAAR) 50 MG tablet, Take 1 tablet by mouth Daily., Disp: 90 tablet, Rfl: 3  •  rosuvastatin (CRESTOR) 10 MG tablet, Take 1 tablet by mouth Daily., Disp: 90 tablet, Rfl: 3  •  tamsulosin (FLOMAX) 0.4 MG capsule 24 hr capsule, 1 capsule Daily., Disp: , Rfl: 0  •  zolpidem (AMBIEN) 5 MG tablet, Take 10 mg by mouth At Night As Needed for Sleep., Disp: , Rfl:       Family History   Problem Relation Age of Onset   • Coronary artery disease Mother         Status post CABG   • Alzheimer's disease Mother    • Stroke Father    • Liver cancer Father         Father with biliary cancer including gallbladder   • No Known Problems Sister    • No Known Problems Brother          Social History     Social History   • Marital status:      Spouse name: N/A   • Number of children: N/A   • Years of education: N/A     Occupational History   • Retired June 2015--      Social History Main Topics   • Smoking status: Never Smoker   • Smokeless tobacco: Never Used   • Alcohol use No   • Drug use: No   • Sexual activity: Yes     Partners: Female     Other Topics Concern   • Not on file     Social History Narrative   • No narrative on file         Vitals:    09/05/18 1030   BP: 130/70   BP Location: Right arm   Patient Position: Sitting   Cuff Size: Large Adult   Pulse: 71   SpO2: 98%   Weight: (!) 150 kg (330 lb)   Height: 183 cm (72.05\")          Physical Exam:    General: Alert and oriented x 3.  No acute distress. Obese. Normal affect.  HEENT: Pupils equal, round, reactive to light; extraocular movements intact; sclerae nonicteric; pharynx, ear canals and TMs normal.  Neck: Without JVD, " thyromegaly, bruit, or adenopathy.  Lungs: Clear to auscultation in all fields.  Heart: Regular rate and rhythm without murmur, rub, gallop, or click.  Abdomen: Soft, nontender, without hepatosplenomegaly or hernia.  Bowel sounds normal.  : Deferred.  Rectal: Deferred.  Extremities: Without clubbing, cyanosis, edema, or pulse deficit.  Neurologic: Intact without focal deficit.  Normal station and gait observed during ingress and egress from the examination room.  Skin: Without significant lesion.  Musculoskeletal: Unremarkable.      Lab/other results:    NMR reveals total cholesterol 97.  Triglycerides 143.  LDL particle number excellent at 570.  Small LDL particle number excellent at 251.  HDL particle number is low at 26.7.  CMP normal except blood sugar elevated at 119.  Hemoglobin A1c 6.2.  CBC is normal.  TSH elevated at 31.9.  Free T3 normal at 2.7.  Free T4 normal at 0.86.  PSA normal at 0.6.  Vitamin D is low at 15.9.  Hepatitis C antibody screening is negative.  CPK mildly elevated at 339.    Assessment/Plan:     Diagnosis Plan   1. Medicare annual wellness visit, subsequent     2. Impaired fasting glucose     3. Hyperlipidemia, unspecified hyperlipidemia type     4. Primary hypothyroidism     5. Benign prostatic hypertrophy     6. Benign essential hypertension     7. Obstructive sleep apnea, tolerates CPAP well.  Previously failed oral appliance.     8. History of pulmonary embolism     9. Non-occlusive coronary artery disease, 05/16/2017--normal LM; proximal LAD mild LI; 50% mid LAD.  Mild distal LAD LI; normal Cx.  Mild LI marginal branches; normal RCA, nondominant.  EF 60%.     10. Depression with anxiety     11. Multiple environmental allergies     12. Bilateral lower extremity edema     13. Vitamin D deficiency     14. Chronic insomnia     15. Psoriasis     16. Allergic rhinitis     17. Closed traumatic lateral subluxation of patellofemoral joint, right, initial encounter     18. Trigger ring finger  of left hand     19. Left carpal tunnel syndrome     20. Cubital tunnel syndrome on left     21. Therapeutic drug monitoring       The subsequent Medicare wellness visit is documented on a separate note.    Patient has impaired fasting glucose at does not require medication.  He is not currently a diabetic.  Hyperlipidemia is under excellent control which is important given his nonocclusive coronary artery disease.  His thyroid is subtherapeutic and dose adjustment indicated.  BPH is symptomatic and treated with Flomax.  Blood pressure is controlled.  Patient has sleep apnea and tolerate CPAP well.  He has remote history of pulmonary embolism but is not on any blood thinner.  Depression with anxiety treated with fluoxetine.  Patient does have a history of PTSD related to [place].  Environmental allergies currently not an issue.  Bilateral lower extremity edema is well controlled.  His vitamin D is low when he needs vitamin D supplementation.  Chronic insomnia treated with Ambien.  He has no problems with right knee pain after a total knee replacement and has subluxation of the patella.  He is scheduled for surgery in the near future.  Patient had recent surgery for left carpal and cubital tunnel as well as an injection for a trigger finger.  Hopefully this will result in resolution of his symptoms.    Plan is as follows: Increase levothyroxine to 150 µg per day.  No other changes in medical regimen.  I will have patient follow-up in about 5-6 weeks with thyroid function tests prior.    Procedures

## 2018-09-06 ENCOUNTER — TELEPHONE (OUTPATIENT)
Dept: INTERNAL MEDICINE | Facility: CLINIC | Age: 68
End: 2018-09-06

## 2018-09-06 NOTE — TELEPHONE ENCOUNTER
Pt needs surgery clearance note for his knee surgery. He was just here yesterday. Can we do an addendum to that note. Call [phone].

## 2018-09-11 NOTE — TELEPHONE ENCOUNTER
I will need his preoperative lab work prior to doing an addendum.  Find out exactly what knee surgery he is having and which knee.

## 2018-09-17 NOTE — TELEPHONE ENCOUNTER
I spoke with pt several times. I called [facility] where he said that he had pre-op testing done. They said they did not do an EKG. I called pt the next day and he said it was done there the same days as labs. I called them back and I was transferred to cardiology and they said that they did have an order there for him but it was never done. They said that he could go over there right then to get done. I called pt and explained everything and he said that was not going to go get it done because he already had it done. I told him that we have not received a phone called from the surgeon concerning his surgery clearance request from us. I told him that he may want to call the surgeon and ask if they needed clearance from us since they have never called us. He said several times that he was tired of dealing with all of this that he was going to reschedule his surgery. I called him the next and asked if he was sure it was done at [facility] and he said that he may have had it at [facility]. I called [facility] and they said they would fax it to us. I got the EKG late the next ( 9-13-18 ). I called pt the next morning and left a message stating that we received an EKG but it was from 3-1-17.       9-14-18 - Pt called after I left for the day at 4:20pm wanting to know if we have faxed clearance to his surgeon. We informed him that we never received everything that we needed.

## 2018-10-16 LAB
T3FREE SERPL-MCNC: 3.1 PG/ML (ref 2–4.4)
T4 FREE SERPL-MCNC: 0.85 NG/DL (ref 0.93–1.7)
TSH SERPL DL<=0.005 MIU/L-ACNC: 39.55 MIU/ML (ref 0.27–4.2)

## 2018-10-19 ENCOUNTER — OFFICE VISIT (OUTPATIENT)
Dept: INTERNAL MEDICINE | Facility: CLINIC | Age: 68
End: 2018-10-19

## 2018-10-19 VITALS
BODY MASS INDEX: 42.66 KG/M2 | DIASTOLIC BLOOD PRESSURE: 70 MMHG | HEART RATE: 88 BPM | HEIGHT: 72 IN | WEIGHT: 315 LBS | OXYGEN SATURATION: 99 % | SYSTOLIC BLOOD PRESSURE: 124 MMHG

## 2018-10-19 DIAGNOSIS — R73.01 IMPAIRED FASTING GLUCOSE: Chronic | ICD-10-CM

## 2018-10-19 DIAGNOSIS — E55.9 VITAMIN D DEFICIENCY: Chronic | ICD-10-CM

## 2018-10-19 DIAGNOSIS — S83.011A: Chronic | ICD-10-CM

## 2018-10-19 DIAGNOSIS — E78.5 HYPERLIPIDEMIA, UNSPECIFIED HYPERLIPIDEMIA TYPE: Chronic | ICD-10-CM

## 2018-10-19 DIAGNOSIS — E03.9 PRIMARY HYPOTHYROIDISM: Primary | Chronic | ICD-10-CM

## 2018-10-19 DIAGNOSIS — I10 BENIGN ESSENTIAL HYPERTENSION: Chronic | ICD-10-CM

## 2018-10-19 DIAGNOSIS — Z51.81 THERAPEUTIC DRUG MONITORING: ICD-10-CM

## 2018-10-19 DIAGNOSIS — E66.01 MORBIDLY OBESE (HCC): ICD-10-CM

## 2018-10-19 PROBLEM — G56.22 CUBITAL TUNNEL SYNDROME ON LEFT: Chronic | Status: ACTIVE | Noted: 2018-09-05

## 2018-10-19 PROBLEM — M65.342 TRIGGER RING FINGER OF LEFT HAND: Chronic | Status: ACTIVE | Noted: 2018-08-21

## 2018-10-19 PROBLEM — G56.02 LEFT CARPAL TUNNEL SYNDROME: Chronic | Status: ACTIVE | Noted: 2018-09-05

## 2018-10-19 PROCEDURE — 99214 OFFICE O/P EST MOD 30 MIN: CPT | Performed by: INTERNAL MEDICINE

## 2018-10-19 RX ORDER — TRIAZOLAM 0.12 MG/1
1 TABLET ORAL AS NEEDED
Refills: 0 | COMMUNITY
Start: 2018-09-10 | End: 2020-09-17

## 2018-10-19 RX ORDER — CEPHALEXIN 500 MG/1
1 CAPSULE ORAL 4 TIMES DAILY
Refills: 0 | COMMUNITY
Start: 2018-10-12 | End: 2018-12-24

## 2018-10-19 NOTE — PROGRESS NOTES
10/19/2018    Patient Information  Durga Valenzuela                                                                                          [address]  [facility] [address]      1950  [phone]      Chief Complaint:     Follow-up primary hypothyroidism, hypertension, recent right knee surgery, hyperlipidemia, impaired fasting glucose.    History of Present Illness:    Patient with a history of medical problems as outlined in chief complaint presents today to follow-up with lab prior in primarily to assess his thyroid function tests which have been abnormal.  We increased his dose from 112 to 150 µg several weeks ago.  In the interim, patient had a right total knee revision.  It sounds as if it was complicated by a superficial skin infection for which he is taking cephalexin.  Seems to be improving although it still swollen.      .Review of Systems   Constitution: Negative.   HENT: Negative.    Eyes: Negative.    Cardiovascular: Negative.    Respiratory: Negative.    Endocrine: Negative.    Hematologic/Lymphatic: Negative.    Skin: Negative.    Musculoskeletal: Positive for joint pain and joint swelling.   Gastrointestinal: Negative.    Genitourinary: Negative.    Neurological: Negative.    Psychiatric/Behavioral: Negative.    Allergic/Immunologic: Negative.        Active Problems:    Patient Active Problem List   Diagnosis   • Post traumatic stress disorder (PTSD)   • Benign prostatic hypertrophy   • Chronic insomnia   • Lumbar degenerative disc disease   • Impaired fasting glucose   • Benign essential hypertension   • Primary osteoarthritis involving multiple joints   • Hyperlipidemia   • Primary hypothyroidism   • Obstructive sleep apnea, tolerates CPAP well.  Previously failed oral appliance.   • History of pulmonary embolism   • Non-occlusive coronary artery disease, 05/16/2017--normal LM; proximal LAD mild LI; 50% mid LAD.  Mild distal LAD LI; normal Cx.  Mild LI marginal branches;  normal RCA, nondominant.  EF 60%.   • Therapeutic drug monitoring   • Vitamin D deficiency   • Closed traumatic lateral subluxation of patellofemoral joint, right, initial encounter   • Psoriasis   • Allergic rhinitis   • Depression with anxiety   • Trigger ring finger of left hand   • Multiple environmental allergies   • Bilateral lower extremity edema   • Left carpal tunnel syndrome   • Cubital tunnel syndrome on left   • Morbidly obese (CMS/HCC)         Past Medical History:   Diagnosis Date   • Allergic rhinitis 8/21/2018    Patient has had allergy testing in the past which revealed allergies to molds and grass.  Immunotherapy for about 2 years in the 1980s.   • Benign essential hypertension 2/25/2016   • Benign prostatic hypertrophy 2/25/2016   • Bilateral lower extremity edema 8/21/2018   • Chronic insomnia 2/25/2016   • Closed traumatic lateral subluxation of patellofemoral joint, right, initial encounter 8/13/2018    08/10/2018--patient was evaluated by the orthopedist for right knee pain and found to have lateral subluxation of the right patella associated with presence of right artificial knee joint.  Physical therapy no help.  Surgery is scheduled 09/17/2018   • Cubital tunnel syndrome on left 9/5/2018 08/29/2018--left carpal and cubital tunnel decompression as well as A1 pulley release left ring finger for trigger finger.   • Depression with anxiety 8/21/2018   • History of deep venous thrombosis (DVT) of distal vein of right lower extremity 8/21/2018 04/26/2016--CTA of the chest performed for elevated d-dimer and progressive shortness of breath and chest pain for one month revealed bilateral occlusive in near occlusive segmental and subsegmental pulmonary emboli in all lobes of the right lung as well as within the left lower lobe.  No evidence of pulmonary infarct.  Nonspecific multifocal groundglass attenuation in the right pulmonary apex, perhaps representing pneumonitis or submental  atelectasis.  Questionable cholelithiasis.  Doppler venous study was positive for DVT in the right popliteal vein.  Hypercoagulable workup was normal.  He was treated with Eliquis for a total of 6 months and was subsequently discontinued.   • History of pneumonia    • History of pulmonary embolism 6/10/2016    04/26/2016--CTA of the chest performed for elevated d-dimer and progressive shortness of breath and chest pain for one month revealed bilateral occlusive in near occlusive segmental and subsegmental pulmonary emboli in all lobes of the right lung as well as within the left lower lobe.  No evidence of pulmonary infarct.  Nonspecific multifocal groundglass attenuation in the right pulmonary apex, perhaps representing pneumonitis or submental atelectasis.  Questionable cholelithiasis.  Doppler venous study was positive for DVT in the right popliteal vein.  Hypercoagulable workup was normal.  He was treated with Eliquis for a total of 6 months and was subsequently discontinued.   • Hyperlipidemia 2/25/2016   • Impaired fasting glucose 2/25/2016   • Left carpal tunnel syndrome 9/5/2018 08/29/2018--left carpal and cubital tunnel decompression as well as A1 pulley release left ring finger for trigger finger.   • Lumbar degenerative disc disease 2/25/2016   • Multiple environmental allergies 8/21/2018    Patient has had allergy testing in the past which revealed allergies to molds and grass.  Immunotherapy for about 2 years in the 1980s.   • Non-occlusive coronary artery disease, 05/16/2017--normal LM; proximal LAD mild LI; 50% mid LAD.  Mild distal LAD LI; normal Cx.  Mild LI marginal branches; normal RCA, nondominant.  EF 60%. 7/24/2017 05/16/2017--cardiac catheterization performed for abnormal stress test.  Normal LV with ejection fraction 60%.  Dilated aortic root.  No wall motion abnormalities.  Normal left main.  Ramus branch small caliber and normal.  Proximal LAD large caliber and mild luminal  irregularities.  50% mid LAD.  Mild luminal irregularities distal LAD.  3 diagonal branches of small caliber with mild luminal irregularities.  Normal septal branches.  Circumflex is large caliber and free of disease.  Marginal branches are medium caliber with mild luminal irregularities.  RCA is a medium caliber and free of disease.  It is nondominant.   • Obstructive sleep apnea, tolerates CPAP well.  Previously failed oral appliance. 2/25/2016   • Post traumatic stress disorder (PTSD) 2/25/2016   • Primary hypothyroidism 2/25/2016   • Primary osteoarthritis involving multiple joints 2/25/2016   • Psoriasis 9/17/2014   • Trigger ring finger of left hand 8/21/2018 08/29/2018--left carpal and cubital tunnel decompression as well as A1 pulley release left ring finger for trigger finger.   • Vitamin D deficiency 8/21/2018         Past Surgical History:   Procedure Laterality Date   • CARDIAC CATHETERIZATION N/A 5/16/2017    Procedure: Coronary angiography;  Surgeon: Malcolm Chen MD;  Location:  [facility];  Service:    • CARDIAC CATHETERIZATION N/A 5/16/2017    Procedure: Left Heart Cath;  Surgeon: Malcolm Chen MD;  Location:  [facility];  Service:    • CARDIAC CATHETERIZATION N/A 5/16/2017    Procedure: Left ventriculography;  Surgeon: Malcolm Chen MD;  Location:  [facility];  Service:    • COLONOSCOPY  2005 2005--colonoscopy reportedly normal.  Records not available.   • LUMBAR DECOMPRESSION  2007 2007--lumbar decompression without fusion or hardware.   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   • REVISION TOTAL KNEE ARTHROPLASTY Right 09/17/2018 09/17/2018--right total knee arthroplasty revision due to lateral subluxation of the patella due to multiple factors.   • TOTAL KNEE ARTHROPLASTY Left 11/03/2017 11/03/2017--right  total knee replacement complicated by patellofemoral subluxation.   • TOTAL KNEE ARTHROPLASTY Left 06/2014 June 2014--left total knee replacement.         Allergies   Allergen Reactions   • Hydrocodone Other (See Comments)     Severe vomiting - but CAN tolerate oxycodone per patient   • Zocor  [Simvastatin]      MYALGIA           Current Outpatient Prescriptions:   •  amLODIPine (NORVASC) 10 MG tablet, Take 10 mg by mouth Daily., Disp: , Rfl:   •  amphetamine-dextroamphetamine (ADDERALL) 20 MG tablet, 1 tablet Daily., Disp: , Rfl:   •  aspirin 81 MG tablet, One by mouth daily, Disp: , Rfl:   •  cephalexin (KEFLEX) 500 MG capsule, 1 capsule 4 (Four) Times a Day., Disp: , Rfl: 0  •  Cholecalciferol (VITAMIN D3) 5000 units capsule capsule, 1 by mouth daily as directed, Disp: 30 capsule, Rfl:   •  clobetasol (TEMOVATE) 0.05 % ointment, Apply twice a day as directed, Disp: 60 g, Rfl: 6  •  FLUoxetine (PROzac) 20 MG tablet, take 2 tablets by mouth once daily, Disp: , Rfl: 0  •  levothyroxine (SYNTHROID) 150 MCG tablet, One by mouth daily for low thyroid, Disp: 30 tablet, Rfl: 2  •  losartan (COZAAR) 50 MG tablet, Take 1 tablet by mouth Daily., Disp: 90 tablet, Rfl: 3  •  rosuvastatin (CRESTOR) 10 MG tablet, Take 1 tablet by mouth Daily., Disp: 90 tablet, Rfl: 3  •  tamsulosin (FLOMAX) 0.4 MG capsule 24 hr capsule, 1 capsule Daily., Disp: , Rfl: 0  •  triazolam (HALCION) 0.125 MG tablet, 1 tablet As Needed., Disp: , Rfl: 0      Family History   Problem Relation Age of Onset   • Coronary artery disease Mother         Status post CABG   • Alzheimer's disease Mother    • Stroke Father    • Liver cancer Father         Father with biliary cancer including gallbladder   • No Known Problems Sister    • No Known Problems Brother          Social History     Social History   • Marital status:      Spouse name: N/A   • Number of children: N/A   • Years of education: N/A     Occupational History   • Retired June  "2015--      Social History Main Topics   • Smoking status: Never Smoker   • Smokeless tobacco: Never Used   • Alcohol use No   • Drug use: No   • Sexual activity: Yes     Partners: Female     Other Topics Concern   • Not on file     Social History Narrative   • No narrative on file         Vitals:    10/19/18 1104   BP: 124/70   Pulse: 88   SpO2: 99%   Weight: (!) 151 kg (333 lb)   Height: 183 cm (72.05\")          Physical Exam:    General: Alert and oriented x 3.  No acute distress.  Normal affect.  HEENT: Pupils equal, round, reactive to light; extraocular movements intact; sclerae nonicteric; pharynx, ear canals and TMs normal.  Neck: Without JVD, thyromegaly, bruit, or adenopathy.  Lungs: Clear to auscultation in all fields.  Heart: Regular rate and rhythm without murmur, rub, gallop, or click.  Abdomen: Soft, nontender, without hepatosplenomegaly or hernia.  Bowel sounds normal.  : Deferred.  Rectal: Deferred.  Extremities: Without clubbing, cyanosis, edema, or pulse deficit.  Neurologic: Intact without focal deficit.  Normal station and gait observed during ingress and egress from the examination room.  Skin: Without significant lesion.  Musculoskeletal: Unremarkable.      Lab/other results:    TSH markedly elevated at 39.55.  Free T4 low at 0.85.  Surprisingly free T3 is normal at 3.1.    Assessment/Plan:     Diagnosis Plan   1. Primary hypothyroidism     2. Benign essential hypertension     3. Closed traumatic lateral subluxation of patellofemoral joint, right, initial encounter     4. Morbidly obese (CMS/HCC)     5. Hyperlipidemia, unspecified hyperlipidemia type     6. Impaired fasting glucose     7. Vitamin D deficiency     8. Therapeutic drug monitoring       Patient has primary hypothyroidism that is subtherapeutic.  Patient reports he has not been taking his thyroid medication as he should.  Importance of this was discussed today.  He has been taking it at night and I have " recommended that he take this medication every morning since he takes all the rest of his medications at night.  No other medical problems that need to be reevaluated in the near future as well.      Plan is as follows: Patient will start taking his levothyroxine in the morning without any other medications.  We will set him up for fasting lab work and follow-up in 6 weeks.  We will reassess his hyperlipidemia and impaired fasting glucose at that time.          Procedures

## 2018-11-29 DIAGNOSIS — E55.9 VITAMIN D DEFICIENCY: Chronic | ICD-10-CM

## 2018-11-29 DIAGNOSIS — E03.9 PRIMARY HYPOTHYROIDISM: Chronic | ICD-10-CM

## 2018-11-29 DIAGNOSIS — I10 BENIGN ESSENTIAL HYPERTENSION: Chronic | ICD-10-CM

## 2018-11-29 DIAGNOSIS — R73.01 IMPAIRED FASTING GLUCOSE: Chronic | ICD-10-CM

## 2018-11-29 DIAGNOSIS — Z51.81 THERAPEUTIC DRUG MONITORING: ICD-10-CM

## 2018-11-29 DIAGNOSIS — E78.5 HYPERLIPIDEMIA, UNSPECIFIED HYPERLIPIDEMIA TYPE: Chronic | ICD-10-CM

## 2018-12-02 LAB
25(OH)D3+25(OH)D2 SERPL-MCNC: 14.1 NG/ML (ref 30–100)
ALBUMIN SERPL-MCNC: 4.2 G/DL (ref 3.5–5.2)
ALBUMIN/GLOB SERPL: 1.8 G/DL
ALP SERPL-CCNC: 74 U/L (ref 39–117)
ALT SERPL-CCNC: 15 U/L (ref 1–41)
AST SERPL-CCNC: 16 U/L (ref 1–40)
BILIRUB SERPL-MCNC: 0.3 MG/DL (ref 0.1–1.2)
BUN SERPL-MCNC: 17 MG/DL (ref 8–23)
BUN/CREAT SERPL: 17.9 (ref 7–25)
CALCIUM SERPL-MCNC: 9.1 MG/DL (ref 8.6–10.5)
CHLORIDE SERPL-SCNC: 103 MMOL/L (ref 98–107)
CHOLEST SERPL-MCNC: 87 MG/DL (ref 100–199)
CK SERPL-CCNC: 332 U/L (ref 20–200)
CO2 SERPL-SCNC: 27.9 MMOL/L (ref 22–29)
CREAT SERPL-MCNC: 0.95 MG/DL (ref 0.76–1.27)
ERYTHROCYTE [DISTWIDTH] IN BLOOD BY AUTOMATED COUNT: 14 % (ref 11.5–14.5)
GLOBULIN SER CALC-MCNC: 2.4 GM/DL
GLUCOSE SERPL-MCNC: 205 MG/DL (ref 65–99)
HBA1C MFR BLD: 6.5 % (ref 4.8–5.6)
HCT VFR BLD AUTO: 41.1 % (ref 40.4–52.2)
HDL SERPL-SCNC: 23.9 UMOL/L
HDLC SERPL-MCNC: 28 MG/DL
HGB BLD-MCNC: 12.4 G/DL (ref 13.7–17.6)
LDL SERPL QN: 20.5 NM
LDL SERPL-SCNC: 418 NMOL/L
LDL SMALL SERPL-SCNC: 183 NMOL/L
LDLC SERPL CALC-MCNC: 36 MG/DL (ref 0–99)
MCH RBC QN AUTO: 27.7 PG (ref 27–32.7)
MCHC RBC AUTO-ENTMCNC: 30.2 G/DL (ref 32.6–36.4)
MCV RBC AUTO: 91.7 FL (ref 79.8–96.2)
PLATELET # BLD AUTO: 226 10*3/MM3 (ref 140–500)
POTASSIUM SERPL-SCNC: 4.7 MMOL/L (ref 3.5–5.2)
PROT SERPL-MCNC: 6.6 G/DL (ref 6–8.5)
RBC # BLD AUTO: 4.48 10*6/MM3 (ref 4.6–6)
SODIUM SERPL-SCNC: 142 MMOL/L (ref 136–145)
T3FREE SERPL-MCNC: 3.2 PG/ML (ref 2–4.4)
T4 FREE SERPL-MCNC: 0.77 NG/DL (ref 0.93–1.7)
TRIGL SERPL-MCNC: 113 MG/DL (ref 0–149)
TSH SERPL DL<=0.005 MIU/L-ACNC: 11.21 MIU/ML (ref 0.27–4.2)
WBC # BLD AUTO: 4.68 10*3/MM3 (ref 4.5–10.7)

## 2018-12-24 ENCOUNTER — OFFICE VISIT (OUTPATIENT)
Dept: INTERNAL MEDICINE | Facility: CLINIC | Age: 68
End: 2018-12-24

## 2018-12-24 VITALS
BODY MASS INDEX: 42.66 KG/M2 | HEIGHT: 72 IN | HEART RATE: 88 BPM | SYSTOLIC BLOOD PRESSURE: 122 MMHG | DIASTOLIC BLOOD PRESSURE: 78 MMHG | WEIGHT: 315 LBS | OXYGEN SATURATION: 98 %

## 2018-12-24 DIAGNOSIS — N40.1 BENIGN PROSTATIC HYPERPLASIA WITH URINARY OBSTRUCTION: Chronic | ICD-10-CM

## 2018-12-24 DIAGNOSIS — E78.5 HYPERLIPIDEMIA, UNSPECIFIED HYPERLIPIDEMIA TYPE: Chronic | ICD-10-CM

## 2018-12-24 DIAGNOSIS — E55.9 VITAMIN D DEFICIENCY: Chronic | ICD-10-CM

## 2018-12-24 DIAGNOSIS — G47.33 OBSTRUCTIVE SLEEP APNEA: Chronic | ICD-10-CM

## 2018-12-24 DIAGNOSIS — Z51.81 THERAPEUTIC DRUG MONITORING: ICD-10-CM

## 2018-12-24 DIAGNOSIS — I25.10 NON-OCCLUSIVE CORONARY ARTERY DISEASE: Chronic | ICD-10-CM

## 2018-12-24 DIAGNOSIS — N13.8 BENIGN PROSTATIC HYPERPLASIA WITH URINARY OBSTRUCTION: Chronic | ICD-10-CM

## 2018-12-24 DIAGNOSIS — R73.01 IMPAIRED FASTING GLUCOSE: Primary | Chronic | ICD-10-CM

## 2018-12-24 DIAGNOSIS — R60.0 BILATERAL LOWER EXTREMITY EDEMA: Chronic | ICD-10-CM

## 2018-12-24 DIAGNOSIS — E03.9 PRIMARY HYPOTHYROIDISM: Chronic | ICD-10-CM

## 2018-12-24 DIAGNOSIS — Z23 NEED FOR PNEUMOCOCCAL VACCINATION: ICD-10-CM

## 2018-12-24 DIAGNOSIS — Z12.11 COLON CANCER SCREENING: ICD-10-CM

## 2018-12-24 PROBLEM — E66.01 MORBIDLY OBESE: Chronic | Status: ACTIVE | Noted: 2018-10-19

## 2018-12-24 PROBLEM — S83.011A: Status: RESOLVED | Noted: 2018-08-13 | Resolved: 2018-12-24

## 2018-12-24 PROCEDURE — 99214 OFFICE O/P EST MOD 30 MIN: CPT | Performed by: INTERNAL MEDICINE

## 2018-12-24 PROCEDURE — G0009 ADMIN PNEUMOCOCCAL VACCINE: HCPCS | Performed by: INTERNAL MEDICINE

## 2018-12-24 PROCEDURE — 90670 PCV13 VACCINE IM: CPT | Performed by: INTERNAL MEDICINE

## 2018-12-24 RX ORDER — LEVOTHYROXINE SODIUM 0.2 MG/1
TABLET ORAL
Qty: 30 TABLET | Refills: 3 | Status: SHIPPED | OUTPATIENT
Start: 2018-12-24 | End: 2019-05-17 | Stop reason: SDUPTHER

## 2018-12-24 NOTE — PROGRESS NOTES
12/24/2018    Patient Information  Durga Valenzuela                                                                                          [address]  [facility] [address]      1950  [unfilled]  There is no work phone number on file.    Chief Complaint:     Follow-up impaired fasting glucose, hyperlipidemia, hypothyroidism, sleep apnea, coronary artery disease, vitamin D deficiency, lower extremity edema.  No new acute complaints.    History of Present Illness:    Patient with a history of medical problems as outlined in chief complaint presents today for a follow-up with lab prior in order to monitor his chronic medical issues.  He currently feels fairly well although he continues to have pain and swelling of his right knee which was recently revised.  Past medical history reviewed and updated where necessary including health maintenance parameters.  This reveals he is up-to-date except for a colonoscopy.  He also needs his final pneumococcal vaccination which we will give today.  We also discussed the hepatitis A and the new shingles vaccine.    Review of Systems   Constitution: Negative.   HENT: Negative.    Eyes: Negative.    Cardiovascular: Negative.    Respiratory: Negative.    Endocrine: Negative.    Hematologic/Lymphatic: Negative.    Skin: Negative.    Musculoskeletal: Positive for arthritis.   Gastrointestinal: Negative.    Genitourinary: Negative.    Neurological: Negative.    Psychiatric/Behavioral: Negative.    Allergic/Immunologic: Negative.        Active Problems:    Patient Active Problem List   Diagnosis   • Post traumatic stress disorder (PTSD)   • Benign prostatic hypertrophy   • Chronic insomnia   • Lumbar degenerative disc disease   • Impaired fasting glucose   • Benign essential hypertension   • Primary osteoarthritis involving multiple joints   • Hyperlipidemia   • Primary hypothyroidism   • Obstructive sleep apnea, tolerates CPAP well.  Previously failed oral  appliance.   • History of pulmonary embolism   • Non-occlusive coronary artery disease, 05/16/2017--normal LM; proximal LAD mild LI; 50% mid LAD.  Mild distal LAD LI; normal Cx.  Mild LI marginal branches; normal RCA, nondominant.  EF 60%.   • Therapeutic drug monitoring   • Vitamin D deficiency   • Psoriasis   • Allergic rhinitis   • Depression with anxiety   • Trigger ring finger of left hand   • Multiple environmental allergies   • Bilateral lower extremity edema   • Left carpal tunnel syndrome   • Cubital tunnel syndrome on left   • Morbidly obese (CMS/HCC)         Past Medical History:   Diagnosis Date   • Allergic rhinitis 8/21/2018    Patient has had allergy testing in the past which revealed allergies to molds and grass.  Immunotherapy for about 2 years in the 1980s.   • Benign essential hypertension 2/25/2016   • Benign prostatic hypertrophy 2/25/2016   • Bilateral lower extremity edema 8/21/2018   • Chronic insomnia 2/25/2016   • Closed traumatic lateral subluxation of patellofemoral joint, right, initial encounter 8/13/2018    08/10/2018--patient was evaluated by the orthopedist for right knee pain and found to have lateral subluxation of the right patella associated with presence of right artificial knee joint.  Physical therapy no help.  Surgery is scheduled 09/17/2018   • Cubital tunnel syndrome on left 9/5/2018 08/29/2018--left carpal and cubital tunnel decompression as well as A1 pulley release left ring finger for trigger finger.   • Depression with anxiety 8/21/2018   • History of deep venous thrombosis (DVT) of distal vein of right lower extremity 8/21/2018 04/26/2016--CTA of the chest performed for elevated d-dimer and progressive shortness of breath and chest pain for one month revealed bilateral occlusive in near occlusive segmental and subsegmental pulmonary emboli in all lobes of the right lung as well as within the left lower lobe.  No evidence of pulmonary infarct.  Nonspecific  multifocal groundglass attenuation in the right pulmonary apex, perhaps representing pneumonitis or submental atelectasis.  Questionable cholelithiasis.  Doppler venous study was positive for DVT in the right popliteal vein.  Hypercoagulable workup was normal.  He was treated with Eliquis for a total of 6 months and was subsequently discontinued.   • History of pneumonia    • History of pulmonary embolism 6/10/2016    04/26/2016--CTA of the chest performed for elevated d-dimer and progressive shortness of breath and chest pain for one month revealed bilateral occlusive in near occlusive segmental and subsegmental pulmonary emboli in all lobes of the right lung as well as within the left lower lobe.  No evidence of pulmonary infarct.  Nonspecific multifocal groundglass attenuation in the right pulmonary apex, perhaps representing pneumonitis or submental atelectasis.  Questionable cholelithiasis.  Doppler venous study was positive for DVT in the right popliteal vein.  Hypercoagulable workup was normal.  He was treated with Eliquis for a total of 6 months and was subsequently discontinued.   • Hyperlipidemia 2/25/2016   • Impaired fasting glucose 2/25/2016   • Left carpal tunnel syndrome 9/5/2018 08/29/2018--left carpal and cubital tunnel decompression as well as A1 pulley release left ring finger for trigger finger.   • Lumbar degenerative disc disease 2/25/2016   • Morbidly obese (CMS/HCC) 10/19/2018   • Multiple environmental allergies 8/21/2018    Patient has had allergy testing in the past which revealed allergies to molds and grass.  Immunotherapy for about 2 years in the 1980s.   • Non-occlusive coronary artery disease, 05/16/2017--normal LM; proximal LAD mild LI; 50% mid LAD.  Mild distal LAD LI; normal Cx.  Mild LI marginal branches; normal RCA, nondominant.  EF 60%. 7/24/2017 05/16/2017--cardiac catheterization performed for abnormal stress test.  Normal LV with ejection fraction 60%.  Dilated aortic  root.  No wall motion abnormalities.  Normal left main.  Ramus branch small caliber and normal.  Proximal LAD large caliber and mild luminal irregularities.  50% mid LAD.  Mild luminal irregularities distal LAD.  3 diagonal branches of small caliber with mild luminal irregularities.  Normal septal branches.  Circumflex is large caliber and free of disease.  Marginal branches are medium caliber with mild luminal irregularities.  RCA is a medium caliber and free of disease.  It is nondominant.   • Obstructive sleep apnea, tolerates CPAP well.  Previously failed oral appliance. 2/25/2016   • Post traumatic stress disorder (PTSD) 2/25/2016   • Primary hypothyroidism 2/25/2016   • Primary osteoarthritis involving multiple joints 2/25/2016   • Psoriasis 9/17/2014   • Trigger ring finger of left hand 8/21/2018 08/29/2018--left carpal and cubital tunnel decompression as well as A1 pulley release left ring finger for trigger finger.   • Vitamin D deficiency 8/21/2018         Past Surgical History:   Procedure Laterality Date   • CARDIAC CATHETERIZATION N/A 5/16/2017    Procedure: Coronary angiography;  Surgeon: Malcolm Chen MD;  Location: [facility] CATH INVASIVE LOCATION;  Service:    • CARDIAC CATHETERIZATION N/A 5/16/2017    Procedure: Left Heart Cath;  Surgeon: Malcolm Chen MD;  Location: [facility] CATH INVASIVE LOCATION;  Service:    • CARDIAC CATHETERIZATION N/A 5/16/2017    Procedure: Left ventriculography;  Surgeon: Malcolm Chen MD;  Location: [facility] CATH INVASIVE LOCATION;  Service:    • COLONOSCOPY  2005 2005--colonoscopy reportedly normal.  Records not available.   • LUMBAR DECOMPRESSION  2007 2007--lumbar decompression without fusion or hardware.   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   • REVISION TOTAL KNEE ARTHROPLASTY Right 09/17/2018 09/17/2018--right total knee arthroplasty  revision due to lateral subluxation of the patella due to multiple factors.   • TOTAL KNEE ARTHROPLASTY Left 11/03/2017 11/03/2017--right total knee replacement complicated by patellofemoral subluxation.   • TOTAL KNEE ARTHROPLASTY Left 06/2014 June 2014--left total knee replacement.         Allergies   Allergen Reactions   • Hydrocodone Other (See Comments)     Severe vomiting - but CAN tolerate oxycodone per patient   • Zocor  [Simvastatin]      MYALGIA           Current Outpatient Medications:   •  amLODIPine (NORVASC) 10 MG tablet, Take 10 mg by mouth Daily., Disp: , Rfl:   •  amphetamine-dextroamphetamine (ADDERALL) 20 MG tablet, 1 tablet Daily., Disp: , Rfl:   •  aspirin 81 MG tablet, One by mouth daily, Disp: , Rfl:   •  Cholecalciferol (VITAMIN D3) 5000 units capsule capsule, 1 by mouth daily as directed, Disp: 30 capsule, Rfl:   •  clobetasol (TEMOVATE) 0.05 % ointment, Apply twice a day as directed, Disp: 60 g, Rfl: 6  •  FLUoxetine (PROzac) 20 MG tablet, take 2 tablets by mouth once daily, Disp: , Rfl: 0  •  levothyroxine (SYNTHROID) 150 MCG tablet, One by mouth daily for low thyroid, Disp: 30 tablet, Rfl: 2  •  losartan (COZAAR) 50 MG tablet, Take 1 tablet by mouth Daily., Disp: 90 tablet, Rfl: 3  •  rosuvastatin (CRESTOR) 10 MG tablet, Take 1 tablet by mouth Daily., Disp: 90 tablet, Rfl: 3  •  tamsulosin (FLOMAX) 0.4 MG capsule 24 hr capsule, 1 capsule Daily., Disp: , Rfl: 0  •  triazolam (HALCION) 0.125 MG tablet, 1 tablet As Needed., Disp: , Rfl: 0    Current Facility-Administered Medications:   •  pneumococcal conj. 13-valent (PREVNAR-13) vaccine 0.5 mL, 0.5 mL, Intramuscular, Once, Florian Burt MD      Family History   Problem Relation Age of Onset   • Coronary artery disease Mother         Status post CABG   • Alzheimer's disease Mother    • Stroke Father    • Liver cancer Father         Father with biliary cancer including gallbladder   • No Known Problems Sister    • No Known Problems  "Brother          Social History     Socioeconomic History   • Marital status:      Spouse name: Not on file   • Number of children: Not on file   • Years of education: Not on file   • Highest education level: Bachelor's degree (e.g., BA, AB, BS)   Social Needs   • Financial resource strain: Not hard at all   • Food insecurity - worry: Never true   • Food insecurity - inability: Never true   • Transportation needs - medical: No   • Transportation needs - non-medical: No   Occupational History   • Occupation: Retired June 2015--   Tobacco Use   • Smoking status: Never Smoker   • Smokeless tobacco: Never Used   Substance and Sexual Activity   • Alcohol use: No   • Drug use: No   • Sexual activity: Yes     Partners: Female   Other Topics Concern   • Not on file   Social History Narrative   • Not on file         Vitals:    12/24/18 1133   BP: 122/78   Pulse: 88   SpO2: 98%   Weight: (!) 152 kg (336 lb)   Height: 183 cm (72.05\")          Physical Exam:    General: Alert and oriented x 3.  No acute distress.  Normal affect. Obese. HEENT: Pupils equal, round, reactive to light; extraocular movements intact; sclerae nonicteric; pharynx, ear canals and TMs normal.  Neck: Without JVD, thyromegaly, bruit, or adenopathy.  Lungs: Clear to auscultation in all fields.  Heart: Regular rate and rhythm without murmur, rub, gallop, or click.  Abdomen: Soft, nontender, without hepatosplenomegaly or hernia.  Bowel sounds normal.  : Deferred.  Rectal: Deferred.  Extremities: Without clubbing, cyanosis, edema, or pulse deficit.  Neurologic: Intact without focal deficit.  Normal station and gait observed during ingress and egress from the examination room.  Skin: Without significant lesion.  Musculoskeletal: Unremarkable.      Lab/other results:    NMR reveals total cholesterol of 87.  Triglycerides are 113.  LDL particle number excellent at 4 and 18.  Small LDL particle number excellent at 183.  HDL particle " number is low at 23.9.  CMP normal except blood sugar 205.  CBC normal except hemoglobin low at 12.4.  However, hematocrit is normal at 41.1.  Hemoglobin A1c 6.5.  TSH elevated 11.21.  Free T4 is low.  Free T3 is normal.  CPK mildly elevated at 333.    Assessment/Plan:     Diagnosis Plan   1. Impaired fasting glucose     2. Hyperlipidemia, unspecified hyperlipidemia type     3. Primary hypothyroidism     4. Obstructive sleep apnea, tolerates CPAP well.  Previously failed oral appliance.     5. Non-occlusive coronary artery disease, 05/16/2017--normal LM; proximal LAD mild LI; 50% mid LAD.  Mild distal LAD LI; normal Cx.  Mild LI marginal branches; normal RCA, nondominant.  EF 60%.     6. Vitamin D deficiency     7. Bilateral lower extremity edema     8. Benign prostatic hypertrophy     9. Therapeutic drug monitoring     10. Need for pneumococcal vaccination  pneumococcal conj. 13-valent (PREVNAR-13) vaccine 0.5 mL       Patient with impaired fasting glucose which technically is now mild diabetes.  However, I will not make that diagnosis based on this reading alone.  Patient has hyperlipidemia and strict control is important given his coronary artery disease.  However, I think that his total cholesterol is a little low for my comfort.  Dose adjustment indicated.  Patient does have sleep apnea and tolerate CPAP well.  His vitamin D is low when I encouraged him to take 5000 units daily.  Lower extremity edema seems controlled.  BPH symptoms controlled with Flomax.    Plan is as follows: Patient instructed to start cutting Crestor in half each day.  Increase levothyroxine to 200 µg daily.  Prevnar 13 given.  Recommend the new shingles and hepatitis A vaccine at the local drug store.  Colonoscopy ordered.  Patient will obtain fasting lab work and follow-up in about 6-8 weeks.      Procedures

## 2019-01-11 ENCOUNTER — TELEPHONE (OUTPATIENT)
Dept: INTERNAL MEDICINE | Facility: CLINIC | Age: 69
End: 2019-01-11

## 2019-01-11 NOTE — TELEPHONE ENCOUNTER
Pt called in and said he has a runny nose and has been sneezing.  He has tried otc antihistamine and not working.  He wants something called in.    [phone]

## 2019-01-11 NOTE — TELEPHONE ENCOUNTER
This sounds like a virus.  Needs symptomatic treatment over-the-counter.  I can't prescribe anything better than anything he can get over-the-counter.  He might try Stahist AD.  Antibiotics would be of no use of that is what he is asking for.

## 2019-01-15 ENCOUNTER — PREP FOR SURGERY (OUTPATIENT)
Dept: OTHER | Facility: HOSPITAL | Age: 69
End: 2019-01-15

## 2019-01-15 DIAGNOSIS — Z12.11 SCREEN FOR COLON CANCER: Primary | ICD-10-CM

## 2019-01-31 PROBLEM — Z12.11 SCREEN FOR COLON CANCER: Status: ACTIVE | Noted: 2019-01-31

## 2019-02-01 DIAGNOSIS — E03.9 PRIMARY HYPOTHYROIDISM: Chronic | ICD-10-CM

## 2019-02-01 DIAGNOSIS — E78.5 HYPERLIPIDEMIA, UNSPECIFIED HYPERLIPIDEMIA TYPE: Chronic | ICD-10-CM

## 2019-02-04 ENCOUNTER — TELEPHONE (OUTPATIENT)
Dept: SURGERY | Facility: CLINIC | Age: 69
End: 2019-02-04

## 2019-02-05 LAB
ALBUMIN SERPL-MCNC: 4.5 G/DL (ref 3.5–5.2)
ALBUMIN/GLOB SERPL: 1.9 G/DL
ALP SERPL-CCNC: 72 U/L (ref 39–117)
ALT SERPL-CCNC: 18 U/L (ref 1–41)
AST SERPL-CCNC: 13 U/L (ref 1–40)
BILIRUB SERPL-MCNC: 0.4 MG/DL (ref 0.1–1.2)
BUN SERPL-MCNC: 20 MG/DL (ref 8–23)
BUN/CREAT SERPL: 22 (ref 7–25)
CALCIUM SERPL-MCNC: 10 MG/DL (ref 8.6–10.5)
CHLORIDE SERPL-SCNC: 101 MMOL/L (ref 98–107)
CHOLEST SERPL-MCNC: 93 MG/DL (ref 100–199)
CK SERPL-CCNC: 165 U/L (ref 20–200)
CO2 SERPL-SCNC: 25.9 MMOL/L (ref 22–29)
CREAT SERPL-MCNC: 0.91 MG/DL (ref 0.76–1.27)
GLOBULIN SER CALC-MCNC: 2.4 GM/DL
GLUCOSE SERPL-MCNC: 171 MG/DL (ref 65–99)
HDL SERPL-SCNC: 25.3 UMOL/L
HDLC SERPL-MCNC: 30 MG/DL
LDL SERPL QN: 19.7 NM
LDL SERPL-SCNC: 496 NMOL/L
LDL SMALL SERPL-SCNC: 386 NMOL/L
LDLC SERPL CALC-MCNC: 41 MG/DL (ref 0–99)
POTASSIUM SERPL-SCNC: 4.6 MMOL/L (ref 3.5–5.2)
PROT SERPL-MCNC: 6.9 G/DL (ref 6–8.5)
SODIUM SERPL-SCNC: 143 MMOL/L (ref 136–145)
T3FREE SERPL-MCNC: 4.2 PG/ML (ref 2–4.4)
T4 FREE SERPL-MCNC: 1.43 NG/DL (ref 0.93–1.7)
TRIGL SERPL-MCNC: 110 MG/DL (ref 0–149)
TSH SERPL DL<=0.005 MIU/L-ACNC: 0.34 MIU/ML (ref 0.27–4.2)

## 2019-02-05 RX ORDER — ROSUVASTATIN CALCIUM 5 MG/1
TABLET, COATED ORAL
Qty: 30 TABLET | Refills: 5 | Status: SHIPPED | OUTPATIENT
Start: 2019-02-05 | End: 2019-02-11

## 2019-02-11 ENCOUNTER — OFFICE VISIT (OUTPATIENT)
Dept: INTERNAL MEDICINE | Facility: CLINIC | Age: 69
End: 2019-02-11

## 2019-02-11 VITALS
HEIGHT: 72 IN | BODY MASS INDEX: 42.66 KG/M2 | DIASTOLIC BLOOD PRESSURE: 70 MMHG | WEIGHT: 315 LBS | SYSTOLIC BLOOD PRESSURE: 122 MMHG | OXYGEN SATURATION: 98 % | HEART RATE: 81 BPM

## 2019-02-11 DIAGNOSIS — Z51.81 THERAPEUTIC DRUG MONITORING: ICD-10-CM

## 2019-02-11 DIAGNOSIS — R60.0 BILATERAL LOWER EXTREMITY EDEMA: Chronic | ICD-10-CM

## 2019-02-11 DIAGNOSIS — E55.9 VITAMIN D DEFICIENCY: Chronic | ICD-10-CM

## 2019-02-11 DIAGNOSIS — R73.01 IMPAIRED FASTING GLUCOSE: Chronic | ICD-10-CM

## 2019-02-11 DIAGNOSIS — I10 BENIGN ESSENTIAL HYPERTENSION: Chronic | ICD-10-CM

## 2019-02-11 DIAGNOSIS — Z91.09 MULTIPLE ENVIRONMENTAL ALLERGIES: Chronic | ICD-10-CM

## 2019-02-11 DIAGNOSIS — E66.01 MORBIDLY OBESE (HCC): Chronic | ICD-10-CM

## 2019-02-11 DIAGNOSIS — E78.5 HYPERLIPIDEMIA, UNSPECIFIED HYPERLIPIDEMIA TYPE: Chronic | ICD-10-CM

## 2019-02-11 DIAGNOSIS — I25.10 NON-OCCLUSIVE CORONARY ARTERY DISEASE: Chronic | ICD-10-CM

## 2019-02-11 DIAGNOSIS — J30.1 CHRONIC SEASONAL ALLERGIC RHINITIS DUE TO POLLEN: Chronic | ICD-10-CM

## 2019-02-11 DIAGNOSIS — E03.9 PRIMARY HYPOTHYROIDISM: Chronic | ICD-10-CM

## 2019-02-11 DIAGNOSIS — J01.01 ACUTE RECURRENT MAXILLARY SINUSITIS: Primary | ICD-10-CM

## 2019-02-11 PROBLEM — Z12.11 SCREEN FOR COLON CANCER: Status: RESOLVED | Noted: 2019-01-31 | Resolved: 2019-02-11

## 2019-02-11 PROCEDURE — 99214 OFFICE O/P EST MOD 30 MIN: CPT | Performed by: INTERNAL MEDICINE

## 2019-02-11 RX ORDER — CEFDINIR 300 MG/1
CAPSULE ORAL
Qty: 20 CAPSULE | Refills: 0 | Status: SHIPPED | OUTPATIENT
Start: 2019-02-11 | End: 2019-04-19

## 2019-02-11 RX ORDER — PREDNISONE 10 MG/1
TABLET ORAL
Qty: 46 TABLET | Refills: 0 | Status: SHIPPED | OUTPATIENT
Start: 2019-02-11 | End: 2019-04-19

## 2019-02-11 RX ORDER — IPRATROPIUM BROMIDE 42 UG/1
2 SPRAY, METERED NASAL 4 TIMES DAILY
Qty: 1 EACH | Refills: 12 | Status: SHIPPED | OUTPATIENT
Start: 2019-02-11 | End: 2019-06-13 | Stop reason: HOSPADM

## 2019-02-11 NOTE — PROGRESS NOTES
02/11/2019    Patient Information  Durga Valenzuela                                                                                          [address]  [place] [address]    Patient has been erroneously marked as diabetic. Based on the available clinical information, he does not have diabetes and should therefore be excluded from diabetic health maintenance and quality measures for the remainder of the reporting period.    1950  [unfilled]  There is no work phone number on file.    Chief Complaint:     Complaining of head and nasal congestion, posterior nasal drainage.  Follow-up hyperlipidemia, impaired fasting glucose, hypertension, hypothyroidism, coronary artery disease, environmental allergies/allergic rhinitis, lower extremity edema.    History of Present Illness:    Patient with a history of medical problems as outlined in chief complaint presents today to follow-up after recent medication changes.  He had lab work which we will review.  Patient also has complaints of head congestion, sinus pressure, posterior nasal drainage and this will be described below under the assessment and plan.  Past medical history reviewed and updated where necessary including health maintenance parameters.  This reveals he is up-to-date with the exception of needing a colonoscopy.    Review of Systems   Constitution: Positive for diaphoresis. Negative for chills, fever and night sweats.   HENT: Positive for congestion.         Head congestion, sinus pressure, posterior nasal drainage of green phlegm.   Eyes: Negative.    Cardiovascular: Negative.    Respiratory: Negative.    Endocrine: Negative.    Hematologic/Lymphatic: Negative.    Skin: Negative.    Musculoskeletal: Negative.    Gastrointestinal: Negative.    Genitourinary: Negative.    Neurological: Negative.    Psychiatric/Behavioral: Negative.    Allergic/Immunologic: Positive for environmental allergies.       Active Problems:    Patient Active  Problem List   Diagnosis   • Post traumatic stress disorder (PTSD)   • Benign prostatic hypertrophy   • Chronic insomnia   • Lumbar degenerative disc disease   • Impaired fasting glucose   • Benign essential hypertension   • Primary osteoarthritis involving multiple joints   • Hyperlipidemia   • Primary hypothyroidism   • Obstructive sleep apnea, tolerates CPAP well.  Previously failed oral appliance.   • History of pulmonary embolism   • Non-occlusive coronary artery disease, 05/16/2017--normal LM; proximal LAD mild LI; 50% mid LAD.  Mild distal LAD LI; normal Cx.  Mild LI marginal branches; normal RCA, nondominant.  EF 60%.   • Therapeutic drug monitoring   • Vitamin D deficiency   • Psoriasis   • Allergic rhinitis   • Depression with anxiety   • Trigger ring finger of left hand   • Multiple environmental allergies   • Bilateral lower extremity edema   • Left carpal tunnel syndrome   • Cubital tunnel syndrome on left   • Morbidly obese (CMS/HCC)   • Acute recurrent maxillary sinusitis         Past Medical History:   Diagnosis Date   • Allergic rhinitis 8/21/2018    Patient has had allergy testing in the past which revealed allergies to molds and grass.  Immunotherapy for about 2 years in the 1980s.   • Benign essential hypertension 2/25/2016   • Benign prostatic hypertrophy 2/25/2016   • Bilateral lower extremity edema 8/21/2018   • Chronic insomnia 2/25/2016   • Closed traumatic lateral subluxation of patellofemoral joint, right, initial encounter 8/13/2018    08/10/2018--patient was evaluated by the orthopedist for right knee pain and found to have lateral subluxation of the right patella associated with presence of right artificial knee joint.  Physical therapy no help.  Surgery is scheduled 09/17/2018   • Cubital tunnel syndrome on left 9/5/2018 08/29/2018--left carpal and cubital tunnel decompression as well as A1 pulley release left ring finger for trigger finger.   • Depression with anxiety 8/21/2018   •  History of deep venous thrombosis (DVT) of distal vein of right lower extremity 8/21/2018 04/26/2016--CTA of the chest performed for elevated d-dimer and progressive shortness of breath and chest pain for one month revealed bilateral occlusive in near occlusive segmental and subsegmental pulmonary emboli in all lobes of the right lung as well as within the left lower lobe.  No evidence of pulmonary infarct.  Nonspecific multifocal groundglass attenuation in the right pulmonary apex, perhaps representing pneumonitis or submental atelectasis.  Questionable cholelithiasis.  Doppler venous study was positive for DVT in the right popliteal vein.  Hypercoagulable workup was normal.  He was treated with Eliquis for a total of 6 months and was subsequently discontinued.   • History of pneumonia    • History of pulmonary embolism 6/10/2016    04/26/2016--CTA of the chest performed for elevated d-dimer and progressive shortness of breath and chest pain for one month revealed bilateral occlusive in near occlusive segmental and subsegmental pulmonary emboli in all lobes of the right lung as well as within the left lower lobe.  No evidence of pulmonary infarct.  Nonspecific multifocal groundglass attenuation in the right pulmonary apex, perhaps representing pneumonitis or submental atelectasis.  Questionable cholelithiasis.  Doppler venous study was positive for DVT in the right popliteal vein.  Hypercoagulable workup was normal.  He was treated with Eliquis for a total of 6 months and was subsequently discontinued.   • Hyperlipidemia 2/25/2016   • Impaired fasting glucose 2/25/2016   • Left carpal tunnel syndrome 9/5/2018 08/29/2018--left carpal and cubital tunnel decompression as well as A1 pulley release left ring finger for trigger finger.   • Lumbar degenerative disc disease 2/25/2016   • Morbidly obese (CMS/HCC) 10/19/2018   • Multiple environmental allergies 8/21/2018    Patient has had allergy testing in the past  which revealed allergies to molds and grass.  Immunotherapy for about 2 years in the 1980s.   • Non-occlusive coronary artery disease, 05/16/2017--normal LM; proximal LAD mild LI; 50% mid LAD.  Mild distal LAD LI; normal Cx.  Mild LI marginal branches; normal RCA, nondominant.  EF 60%. 7/24/2017 05/16/2017--cardiac catheterization performed for abnormal stress test.  Normal LV with ejection fraction 60%.  Dilated aortic root.  No wall motion abnormalities.  Normal left main.  Ramus branch small caliber and normal.  Proximal LAD large caliber and mild luminal irregularities.  50% mid LAD.  Mild luminal irregularities distal LAD.  3 diagonal branches of small caliber with mild luminal irregularities.  Normal septal branches.  Circumflex is large caliber and free of disease.  Marginal branches are medium caliber with mild luminal irregularities.  RCA is a medium caliber and free of disease.  It is nondominant.   • Obstructive sleep apnea, tolerates CPAP well.  Previously failed oral appliance. 2/25/2016   • Post traumatic stress disorder (PTSD) 2/25/2016   • Primary hypothyroidism 2/25/2016   • Primary osteoarthritis involving multiple joints 2/25/2016   • Psoriasis 9/17/2014   • Trigger ring finger of left hand 8/21/2018 08/29/2018--left carpal and cubital tunnel decompression as well as A1 pulley release left ring finger for trigger finger.   • Vitamin D deficiency 8/21/2018         Past Surgical History:   Procedure Laterality Date   • CARDIAC CATHETERIZATION N/A 5/16/2017    Procedure: Coronary angiography;  Surgeon: Malcolm Chen MD;  Location: [facility] INVASIVE LOCATION;  Service:    • CARDIAC CATHETERIZATION N/A 5/16/2017    Procedure: Left Heart Cath;  Surgeon: Malcolm Chen MD;  Location: [facility] CATH INVASIVE LOCATION;  Service:    • CARDIAC CATHETERIZATION N/A 5/16/2017    Procedure: Left ventriculography;  Surgeon: Malcolm Chen MD;  Location: [facility] INVASIVE  LOCATION;  Service:    • COLONOSCOPY  2005 2005--colonoscopy reportedly normal.  Records not available.   • LUMBAR DECOMPRESSION  2007 2007--lumbar decompression without fusion or hardware.   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   • REVISION TOTAL KNEE ARTHROPLASTY Right 09/17/2018 09/17/2018--right total knee arthroplasty revision due to lateral subluxation of the patella due to multiple factors.   • TOTAL KNEE ARTHROPLASTY Left 11/03/2017 11/03/2017--right total knee replacement complicated by patellofemoral subluxation.   • TOTAL KNEE ARTHROPLASTY Left 06/2014 June 2014--left total knee replacement.         Allergies   Allergen Reactions   • Hydrocodone Other (See Comments)     Severe vomiting - but CAN tolerate oxycodone per patient   • Zocor  [Simvastatin]      MYALGIA           Current Outpatient Medications:   •  amLODIPine (NORVASC) 10 MG tablet, Take 10 mg by mouth Daily., Disp: , Rfl:   •  amphetamine-dextroamphetamine (ADDERALL) 20 MG tablet, 1 tablet Daily., Disp: , Rfl:   •  aspirin 81 MG tablet, One by mouth daily, Disp: , Rfl:   •  Cholecalciferol (VITAMIN D3) 5000 units capsule capsule, 1 by mouth daily as directed, Disp: 30 capsule, Rfl:   •  clobetasol (TEMOVATE) 0.05 % ointment, Apply twice a day as directed, Disp: 60 g, Rfl: 6  •  FLUoxetine (PROzac) 20 MG tablet, take 2 tablets by mouth once daily, Disp: , Rfl: 0  •  levothyroxine (SYNTHROID) 200 MCG tablet, Take 1 by mouth daily for low thyroid, Disp: 30 tablet, Rfl: 3  •  losartan (COZAAR) 50 MG tablet, Take 1 tablet by mouth Daily., Disp: 90 tablet, Rfl: 3  •  rosuvastatin (CRESTOR) 5 MG tablet, take 1 tablet by mouth once daily, Disp: 30 tablet, Rfl: 5  •  tamsulosin (FLOMAX) 0.4 MG capsule 24 hr capsule, 1 capsule Daily., Disp: , Rfl: 0  •  triazolam (HALCION) 0.125 MG tablet, 1 tablet As Needed., Disp: , Rfl: 0      Family  "History   Problem Relation Age of Onset   • Coronary artery disease Mother         Status post CABG   • Alzheimer's disease Mother    • Stroke Father    • Liver cancer Father         Father with biliary cancer including gallbladder   • No Known Problems Sister    • No Known Problems Brother          Social History     Socioeconomic History   • Marital status:      Spouse name: Not on file   • Number of children: Not on file   • Years of education: Not on file   • Highest education level: Bachelor's degree (e.g., BA, AB, BS)   Social Needs   • Financial resource strain: Not hard at all   • Food insecurity - worry: Never true   • Food insecurity - inability: Never true   • Transportation needs - medical: No   • Transportation needs - non-medical: No   Occupational History   • Occupation: Retired June 2015--   Tobacco Use   • Smoking status: Never Smoker   • Smokeless tobacco: Never Used   Substance and Sexual Activity   • Alcohol use: No   • Drug use: No   • Sexual activity: Yes     Partners: Female   Other Topics Concern   • Not on file   Social History Narrative   • Not on file         Vitals:    02/11/19 1041   BP: 122/70   Pulse: 81   SpO2: 98%   Weight: (!) 147 kg (325 lb)   Height: 183 cm (72.05\")          Physical Exam:    General: Alert and oriented x 3.  No acute distress. Obese. Normal affect.  HEENT: Pupils equal, round, reactive to light; extraocular movements intact; sclerae nonicteric; pharynx, ear canals and TMs normal.  There is boggy nasal mucosa and tenderness to percussion over the maxillary sinuses.  Neck: Without JVD, thyromegaly, bruit, or adenopathy.  Lungs: Clear to auscultation in all fields.  Heart: Regular rate and rhythm without murmur, rub, gallop, or click.  Abdomen: Soft, nontender, without hepatosplenomegaly or hernia.  Bowel sounds normal.  : Deferred.  Rectal: Deferred.  Extremities: Without clubbing, cyanosis, edema, or pulse deficit.  Neurologic: Intact " without focal deficit.  Normal station and gait observed during ingress and egress from the examination room.  Skin: Without significant lesion.  Musculoskeletal: Unremarkable.    Lab/other results:    NMR reveals a total cholesterol of 93.  Triglycerides are normal at 110.  LDL particle number excellent at 496.  Small LDL particle number excellent at 386.  HDL particle number low at 25.3.  Thyroid function tests are now normal.  CMP normal except blood sugar elevated at 171.    Assessment/Plan:     Diagnosis Plan   1. Acute recurrent maxillary sinusitis     2. Hyperlipidemia, unspecified hyperlipidemia type     3. Impaired fasting glucose     4. Benign essential hypertension     5. Primary hypothyroidism     6. Non-occlusive coronary artery disease, 05/16/2017--normal LM; proximal LAD mild LI; 50% mid LAD.  Mild distal LAD LI; normal Cx.  Mild LI marginal branches; normal RCA, nondominant.  EF 60%.     7. Vitamin D deficiency     8. Multiple environmental allergies     9. Allergic rhinitis     10. Bilateral lower extremity edema     11. Morbidly obese (CMS/HCC)     12. Therapeutic drug monitoring       Patient presents with a history and exam consistent with acute maxillary sinusitis.  I definitely think environmental allergies are playing a role.  His cholesterol remains low despite cutting his Crestor in half.  I think it is time to take him off of cholesterol medication altogether and reassess.  Patient has impaired fasting glucose which is narrowing mild overt diabetes but I have not yet given him this diagnosis.  We will remove this diagnosis from the medical record unless definitely demonstrated in the future.  Blood pressure seems controlled.  His thyroid is now therapeutic after medication adjustment.  Patient does have mild nonocclusive coronary artery disease and therefore low-dose statin would be indicated but I think Crestor may be stronger than what is absolutely necessary.  Vitamin D is been  therapeutic.  Lower extremity edema seems adequate.  Patient has been working on his morbid obesity and his weight is down 11 pounds.    Plan is as follows: Discontinue Crestor altogether.  No other changes in medical regimen.  Cefdinir 300 mg by mouth twice a day ×10 days.  Stahist AD one by mouth twice a day.  Prednisone 50 mg by mouth daily ×5 days, taper and discontinue.  Prescription for ipratropium bromide nasal spray.  I will have patient follow-up in about 2 months with lab prior to reassess his situation after discontinuation of the cholesterol medication.        Procedures

## 2019-04-01 ENCOUNTER — TELEPHONE (OUTPATIENT)
Dept: INTERNAL MEDICINE | Facility: CLINIC | Age: 69
End: 2019-04-01

## 2019-04-01 RX ORDER — TRIAMCINOLONE ACETONIDE 5 MG/G
OINTMENT TOPICAL 2 TIMES DAILY
Qty: 30 G | Refills: 0 | Status: SHIPPED | OUTPATIENT
Start: 2019-04-01 | End: 2019-04-03 | Stop reason: SDUPTHER

## 2019-04-01 NOTE — TELEPHONE ENCOUNTER
Pt ins doesn't cover clobetasol 0.05%.  They cover     Betamethasone aug or triamcinolone.  Will either one work??  [facility]

## 2019-04-03 RX ORDER — TRIAMCINOLONE ACETONIDE 5 MG/G
OINTMENT TOPICAL 2 TIMES DAILY
Qty: 30 G | Refills: 1 | Status: SHIPPED | OUTPATIENT
Start: 2019-04-03 | End: 2019-06-13 | Stop reason: HOSPADM

## 2019-04-12 DIAGNOSIS — Z51.81 THERAPEUTIC DRUG MONITORING: ICD-10-CM

## 2019-04-12 DIAGNOSIS — E55.9 VITAMIN D DEFICIENCY: Chronic | ICD-10-CM

## 2019-04-12 DIAGNOSIS — E78.5 HYPERLIPIDEMIA, UNSPECIFIED HYPERLIPIDEMIA TYPE: Chronic | ICD-10-CM

## 2019-04-12 DIAGNOSIS — E03.9 PRIMARY HYPOTHYROIDISM: Chronic | ICD-10-CM

## 2019-04-12 DIAGNOSIS — R73.01 IMPAIRED FASTING GLUCOSE: Chronic | ICD-10-CM

## 2019-04-16 LAB
25(OH)D3+25(OH)D2 SERPL-MCNC: 29.4 NG/ML (ref 30–100)
ALBUMIN SERPL-MCNC: 4.6 G/DL (ref 3.5–5.2)
ALBUMIN/GLOB SERPL: 2.1 G/DL
ALP SERPL-CCNC: 71 U/L (ref 39–117)
ALT SERPL-CCNC: 23 U/L (ref 1–41)
AST SERPL-CCNC: 18 U/L (ref 1–40)
BILIRUB SERPL-MCNC: 0.3 MG/DL (ref 0.2–1.2)
BUN SERPL-MCNC: 21 MG/DL (ref 8–23)
BUN/CREAT SERPL: 22.1 (ref 7–25)
CALCIUM SERPL-MCNC: 9.9 MG/DL (ref 8.6–10.5)
CHLORIDE SERPL-SCNC: 102 MMOL/L (ref 98–107)
CHOLEST SERPL-MCNC: 163 MG/DL (ref 100–199)
CK SERPL-CCNC: 280 U/L (ref 20–200)
CO2 SERPL-SCNC: 28.6 MMOL/L (ref 22–29)
CREAT SERPL-MCNC: 0.95 MG/DL (ref 0.76–1.27)
ERYTHROCYTE [DISTWIDTH] IN BLOOD BY AUTOMATED COUNT: 15.2 % (ref 12.3–15.4)
GLOBULIN SER CALC-MCNC: 2.2 GM/DL
GLUCOSE SERPL-MCNC: 169 MG/DL (ref 65–99)
HBA1C MFR BLD: 6.8 % (ref 4.8–5.6)
HCT VFR BLD AUTO: 43.9 % (ref 37.5–51)
HDL SERPL-SCNC: 24 UMOL/L
HDLC SERPL-MCNC: 28 MG/DL
HGB BLD-MCNC: 13.5 G/DL (ref 13–17.7)
LDL SERPL QN: 20.6 NM
LDL SERPL-SCNC: 1120 NMOL/L
LDL SMALL SERPL-SCNC: 417 NMOL/L
LDLC SERPL CALC-MCNC: 89 MG/DL (ref 0–99)
MCH RBC QN AUTO: 27.5 PG (ref 26.6–33)
MCHC RBC AUTO-ENTMCNC: 30.8 G/DL (ref 31.5–35.7)
MCV RBC AUTO: 89.4 FL (ref 79–97)
PLATELET # BLD AUTO: 221 10*3/MM3 (ref 140–450)
POTASSIUM SERPL-SCNC: 4.9 MMOL/L (ref 3.5–5.2)
PROT SERPL-MCNC: 6.8 G/DL (ref 6–8.5)
RBC # BLD AUTO: 4.91 10*6/MM3 (ref 4.14–5.8)
SODIUM SERPL-SCNC: 143 MMOL/L (ref 136–145)
T3FREE SERPL-MCNC: 3.6 PG/ML (ref 2–4.4)
T4 FREE SERPL-MCNC: 0.99 NG/DL (ref 0.93–1.7)
TRIGL SERPL-MCNC: 229 MG/DL (ref 0–149)
TSH SERPL DL<=0.005 MIU/L-ACNC: 1.31 MIU/ML (ref 0.27–4.2)
WBC # BLD AUTO: 5.91 10*3/MM3 (ref 3.4–10.8)

## 2019-04-19 ENCOUNTER — OFFICE VISIT (OUTPATIENT)
Dept: INTERNAL MEDICINE | Facility: CLINIC | Age: 69
End: 2019-04-19

## 2019-04-19 VITALS
HEIGHT: 72 IN | BODY MASS INDEX: 42.66 KG/M2 | DIASTOLIC BLOOD PRESSURE: 70 MMHG | SYSTOLIC BLOOD PRESSURE: 122 MMHG | HEART RATE: 65 BPM | OXYGEN SATURATION: 98 % | WEIGHT: 315 LBS

## 2019-04-19 DIAGNOSIS — N13.8 BENIGN PROSTATIC HYPERPLASIA WITH URINARY OBSTRUCTION: Chronic | ICD-10-CM

## 2019-04-19 DIAGNOSIS — E03.9 PRIMARY HYPOTHYROIDISM: Chronic | ICD-10-CM

## 2019-04-19 DIAGNOSIS — G47.33 OBSTRUCTIVE SLEEP APNEA: Chronic | ICD-10-CM

## 2019-04-19 DIAGNOSIS — Z86.711 HISTORY OF PULMONARY EMBOLISM: Chronic | ICD-10-CM

## 2019-04-19 DIAGNOSIS — N40.1 BENIGN PROSTATIC HYPERPLASIA WITH URINARY OBSTRUCTION: Chronic | ICD-10-CM

## 2019-04-19 DIAGNOSIS — I25.10 NON-OCCLUSIVE CORONARY ARTERY DISEASE: Chronic | ICD-10-CM

## 2019-04-19 DIAGNOSIS — F41.8 DEPRESSION WITH ANXIETY: Chronic | ICD-10-CM

## 2019-04-19 DIAGNOSIS — Z12.11 COLON CANCER SCREENING: ICD-10-CM

## 2019-04-19 DIAGNOSIS — E66.01 MORBIDLY OBESE (HCC): Chronic | ICD-10-CM

## 2019-04-19 DIAGNOSIS — F43.10 POST TRAUMATIC STRESS DISORDER (PTSD): Chronic | ICD-10-CM

## 2019-04-19 DIAGNOSIS — E55.9 VITAMIN D DEFICIENCY: Chronic | ICD-10-CM

## 2019-04-19 DIAGNOSIS — E78.5 HYPERLIPIDEMIA, UNSPECIFIED HYPERLIPIDEMIA TYPE: Chronic | ICD-10-CM

## 2019-04-19 DIAGNOSIS — R60.0 BILATERAL LOWER EXTREMITY EDEMA: Chronic | ICD-10-CM

## 2019-04-19 DIAGNOSIS — R73.01 IMPAIRED FASTING GLUCOSE: Primary | Chronic | ICD-10-CM

## 2019-04-19 DIAGNOSIS — Z51.81 THERAPEUTIC DRUG MONITORING: ICD-10-CM

## 2019-04-19 DIAGNOSIS — I10 BENIGN ESSENTIAL HYPERTENSION: Chronic | ICD-10-CM

## 2019-04-19 DIAGNOSIS — Z91.09 MULTIPLE ENVIRONMENTAL ALLERGIES: Chronic | ICD-10-CM

## 2019-04-19 DIAGNOSIS — F51.04 CHRONIC INSOMNIA: Chronic | ICD-10-CM

## 2019-04-19 PROBLEM — G56.02 LEFT CARPAL TUNNEL SYNDROME: Chronic | Status: RESOLVED | Noted: 2018-09-05 | Resolved: 2019-04-19

## 2019-04-19 PROBLEM — G56.22 CUBITAL TUNNEL SYNDROME ON LEFT: Chronic | Status: RESOLVED | Noted: 2018-09-05 | Resolved: 2019-04-19

## 2019-04-19 PROBLEM — J01.01 ACUTE RECURRENT MAXILLARY SINUSITIS: Status: RESOLVED | Noted: 2019-02-11 | Resolved: 2019-04-19

## 2019-04-19 PROBLEM — M65.342 TRIGGER RING FINGER OF LEFT HAND: Chronic | Status: RESOLVED | Noted: 2018-08-21 | Resolved: 2019-04-19

## 2019-04-19 PROCEDURE — 99214 OFFICE O/P EST MOD 30 MIN: CPT | Performed by: INTERNAL MEDICINE

## 2019-04-19 RX ORDER — PRAVASTATIN SODIUM 20 MG
TABLET ORAL
Qty: 30 TABLET | Refills: 6 | Status: SHIPPED | OUTPATIENT
Start: 2019-04-19 | End: 2020-09-17 | Stop reason: SDUPTHER

## 2019-04-19 NOTE — PROGRESS NOTES
04/19/2019    Patient Information  Durga Valenzuela                                                                                          [address]  [facility] [address]      1950  [unfilled]  There is no work phone number on file.    Chief Complaint:     Follow-up impaired fasting glucose, hyperlipidemia, hypothyroidism, BPH, sleep apnea, history of pulmonary embolism, nonocclusive coronary artery disease, environmental allergies, lower extremity edema.  No new acute complaints.    History of Present Illness:    Patient with a history of medical problems as outlined in chief complaint presents today for follow-up with lab prior in order to monitor his chronic medical issues.  We saw him about 4 months ago at which time we had him start cutting his Crestor in half and then subsequently we had him discontinue it altogether.  This needs to be reassessed today.  Also his levothyroxine was increased to a total of 200 mcg/day.  Patient currently is feeling fairly well and has no new acute complaints.    Review of Systems   Constitution: Negative.   HENT: Negative.    Eyes: Negative.    Cardiovascular: Negative.    Respiratory: Negative.    Endocrine: Negative.    Hematologic/Lymphatic: Negative.    Skin: Negative.    Musculoskeletal: Negative.    Gastrointestinal: Negative.    Genitourinary: Negative.    Neurological: Negative.    Psychiatric/Behavioral: Negative.    Allergic/Immunologic: Negative.        Active Problems:    Patient Active Problem List   Diagnosis   • Post traumatic stress disorder (PTSD)   • Benign prostatic hypertrophy   • Chronic insomnia   • Lumbar degenerative disc disease   • Impaired fasting glucose   • Benign essential hypertension   • Primary osteoarthritis involving multiple joints   • Hyperlipidemia   • Primary hypothyroidism   • Obstructive sleep apnea, tolerates CPAP well.  Previously failed oral appliance.   • History of pulmonary embolism   • Non-occlusive  coronary artery disease, 05/16/2017--normal LM; proximal LAD mild LI; 50% mid LAD.  Mild distal LAD LI; normal Cx.  Mild LI marginal branches; normal RCA, nondominant.  EF 60%.   • Therapeutic drug monitoring   • Vitamin D deficiency   • Psoriasis   • Allergic rhinitis   • Depression with anxiety   • Multiple environmental allergies   • Bilateral lower extremity edema   • Morbidly obese (CMS/HCC)         Past Medical History:   Diagnosis Date   • Allergic rhinitis 8/21/2018    Patient has had allergy testing in the past which revealed allergies to molds and grass.  Immunotherapy for about 2 years in the 1980s.   • Benign essential hypertension 2/25/2016   • Benign prostatic hypertrophy 2/25/2016   • Bilateral lower extremity edema 8/21/2018   • Chronic insomnia 2/25/2016   • Closed traumatic lateral subluxation of patellofemoral joint, right, initial encounter 8/13/2018    08/10/2018--patient was evaluated by the orthopedist for right knee pain and found to have lateral subluxation of the right patella associated with presence of right artificial knee joint.  Physical therapy no help.  Surgery is scheduled 09/17/2018   • Depression with anxiety 8/21/2018   • History of deep venous thrombosis (DVT) of distal vein of right lower extremity 8/21/2018 04/26/2016--CTA of the chest performed for elevated d-dimer and progressive shortness of breath and chest pain for one month revealed bilateral occlusive in near occlusive segmental and subsegmental pulmonary emboli in all lobes of the right lung as well as within the left lower lobe.  No evidence of pulmonary infarct.  Nonspecific multifocal groundglass attenuation in the right pulmonary apex, perhaps representing pneumonitis or submental atelectasis.  Questionable cholelithiasis.  Doppler venous study was positive for DVT in the right popliteal vein.  Hypercoagulable workup was normal.  He was treated with Eliquis for a total of 6 months and was subsequently  discontinued.   • History of pneumonia    • History of pulmonary embolism 6/10/2016    04/26/2016--CTA of the chest performed for elevated d-dimer and progressive shortness of breath and chest pain for one month revealed bilateral occlusive in near occlusive segmental and subsegmental pulmonary emboli in all lobes of the right lung as well as within the left lower lobe.  No evidence of pulmonary infarct.  Nonspecific multifocal groundglass attenuation in the right pulmonary apex, perhaps representing pneumonitis or submental atelectasis.  Questionable cholelithiasis.  Doppler venous study was positive for DVT in the right popliteal vein.  Hypercoagulable workup was normal.  He was treated with Eliquis for a total of 6 months and was subsequently discontinued.   • Hyperlipidemia 2/25/2016   • Impaired fasting glucose 2/25/2016   • Lumbar degenerative disc disease 2/25/2016   • Morbidly obese (CMS/HCC) 10/19/2018   • Multiple environmental allergies 8/21/2018    Patient has had allergy testing in the past which revealed allergies to molds and grass.  Immunotherapy for about 2 years in the 1980s.   • Non-occlusive coronary artery disease, 05/16/2017--normal LM; proximal LAD mild LI; 50% mid LAD.  Mild distal LAD LI; normal Cx.  Mild LI marginal branches; normal RCA, nondominant.  EF 60%. 7/24/2017 05/16/2017--cardiac catheterization performed for abnormal stress test.  Normal LV with ejection fraction 60%.  Dilated aortic root.  No wall motion abnormalities.  Normal left main.  Ramus branch small caliber and normal.  Proximal LAD large caliber and mild luminal irregularities.  50% mid LAD.  Mild luminal irregularities distal LAD.  3 diagonal branches of small caliber with mild luminal irregularities.  Normal septal branches.  Circumflex is large caliber and free of disease.  Marginal branches are medium caliber with mild luminal irregularities.  RCA is a medium caliber and free of disease.  It is nondominant.   •  Obstructive sleep apnea, tolerates CPAP well.  Previously failed oral appliance. 2/25/2016   • Post traumatic stress disorder (PTSD) 2/25/2016   • Primary hypothyroidism 2/25/2016   • Primary osteoarthritis involving multiple joints 2/25/2016   • Psoriasis 9/17/2014   • Vitamin D deficiency 8/21/2018         Past Surgical History:   Procedure Laterality Date   • CARDIAC CATHETERIZATION N/A 5/16/2017    Procedure: Coronary angiography;  Surgeon: Malcolm Chen MD;  Location:  [facility];  Service:    • CARDIAC CATHETERIZATION N/A 5/16/2017    Procedure: Left Heart Cath;  Surgeon: Malcolm Chen MD;  Location:  [facility];  Service:    • CARDIAC CATHETERIZATION N/A 5/16/2017    Procedure: Left ventriculography;  Surgeon: Malcolm Chen MD;  Location:  [facility];  Service:    • COLONOSCOPY  2005 2005--colonoscopy reportedly normal.  Records not available.   • LUMBAR DECOMPRESSION  2007 2007--lumbar decompression without fusion or hardware.   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   • REVISION TOTAL KNEE ARTHROPLASTY Right 09/17/2018 09/17/2018--right total knee arthroplasty revision due to lateral subluxation of the patella due to multiple factors.   • TOTAL KNEE ARTHROPLASTY Left 11/03/2017 11/03/2017--right total knee replacement complicated by patellofemoral subluxation.   • TOTAL KNEE ARTHROPLASTY Left 06/2014 June 2014--left total knee replacement.         Allergies   Allergen Reactions   • Hydrocodone Other (See Comments)     Severe vomiting - but CAN tolerate oxycodone per patient   • Zocor  [Simvastatin]      MYALGIA           Current Outpatient Medications:   •  amLODIPine (NORVASC) 10 MG tablet, Take 10 mg by mouth Daily., Disp: , Rfl:   •  amphetamine-dextroamphetamine (ADDERALL) 20 MG tablet, 1 tablet Daily., Disp: ,  Rfl:   •  aspirin 81 MG tablet, One by mouth daily, Disp: , Rfl:   •  Cholecalciferol (VITAMIN D3) 5000 units capsule capsule, 1 by mouth daily as directed, Disp: 30 capsule, Rfl:   •  clobetasol (TEMOVATE) 0.05 % ointment, Apply twice a day as directed, Disp: 60 g, Rfl: 6  •  FLUoxetine (PROzac) 20 MG tablet, take 2 tablets by mouth once daily, Disp: , Rfl: 0  •  ipratropium (ATROVENT) 0.06 % nasal spray, 2 sprays into the nostril(s) as directed by provider 4 (Four) Times a Day., Disp: 1 each, Rfl: 12  •  levothyroxine (SYNTHROID) 200 MCG tablet, Take 1 by mouth daily for low thyroid, Disp: 30 tablet, Rfl: 3  •  losartan (COZAAR) 50 MG tablet, Take 1 tablet by mouth Daily., Disp: 90 tablet, Rfl: 3  •  tamsulosin (FLOMAX) 0.4 MG capsule 24 hr capsule, 1 capsule Daily., Disp: , Rfl: 0  •  triamcinolone (KENALOG) 0.5 % ointment, Apply  topically to the appropriate area as directed 2 (Two) Times a Day., Disp: 30 g, Rfl: 1  •  triazolam (HALCION) 0.125 MG tablet, 1 tablet As Needed., Disp: , Rfl: 0      Family History   Problem Relation Age of Onset   • Coronary artery disease Mother         Status post CABG   • Alzheimer's disease Mother    • Stroke Father    • Liver cancer Father         Father with biliary cancer including gallbladder   • No Known Problems Sister    • No Known Problems Brother          Social History     Socioeconomic History   • Marital status:      Spouse name: Not on file   • Number of children: Not on file   • Years of education: Not on file   • Highest education level: Bachelor's degree (e.g., BA, AB, BS)   Occupational History   • Occupation: Retired June 2015--   Social Needs   • Financial resource strain: Not hard at all   • Food insecurity:     Worry: Never true     Inability: Never true   • Transportation needs:     Medical: No     Non-medical: No   Tobacco Use   • Smoking status: Never Smoker   • Smokeless tobacco: Never Used   Substance and Sexual Activity   •  "Alcohol use: No   • Drug use: No   • Sexual activity: Yes     Partners: Female   Lifestyle   • Physical activity:     Days per week: 0 days     Minutes per session: 0 min   • Stress: Not at all   Relationships   • Social connections:     Talks on phone: More than three times a week     Gets together: More than three times a week     Attends Taoism service: Never     Active member of club or organization: No     Attends meetings of clubs or organizations: Never     Relationship status:          Vitals:    04/19/19 0937   BP: 122/70   Pulse: 65   SpO2: 98%   Weight: (!) 151 kg (332 lb)   Height: 183 cm (72.05\")          Physical Exam:    General: Alert and oriented x 3.  No acute distress.  Normal affect.  HEENT: Pupils equal, round, reactive to light; extraocular movements intact; sclerae nonicteric; pharynx, ear canals and TMs normal.  Neck: Without JVD, thyromegaly, bruit, or adenopathy.  Lungs: Clear to auscultation in all fields.  Heart: Regular rate and rhythm without murmur, rub, gallop, or click.  Abdomen: Soft, nontender, without hepatosplenomegaly or hernia.  Bowel sounds normal.  : Deferred.  Rectal: Deferred.  Extremities: Without clubbing, cyanosis, edema, or pulse deficit.  Neurologic: Intact without focal deficit.  Normal station and gait observed during ingress and egress from the examination room.  Skin: Without significant lesion.  Musculoskeletal: Unremarkable.    Lab/other results:    NMR reveals a total cholesterol 163.  Triglycerides elevated at 229.  LDL particle number slightly elevated 1120.  Small LDL particle number excellent at 417.  HDL particle number is low at 24.  CMP normal except blood sugar elevated 169.  CBC is normal.  Hemoglobin A1c 6.8.  Thyroid function tests were normal.  CPK slightly elevated at 280.  Vitamin D is a little low at 29.4.    Assessment/Plan:     Diagnosis Plan   1. Impaired fasting glucose     2. Hyperlipidemia, unspecified hyperlipidemia type     3. " Primary hypothyroidism     4. Benign prostatic hypertrophy     5. Benign essential hypertension     6. Obstructive sleep apnea, tolerates CPAP well.  Previously failed oral appliance.     7. History of pulmonary embolism     8. Non-occlusive coronary artery disease, 05/16/2017--normal LM; proximal LAD mild LI; 50% mid LAD.  Mild distal LAD LI; normal Cx.  Mild LI marginal branches; normal RCA, nondominant.  EF 60%.     9. Vitamin D deficiency     10. Multiple environmental allergies     11. Bilateral lower extremity edema     12. Morbidly obese (CMS/HCC)     13. Post traumatic stress disorder (PTSD)     14. Chronic insomnia     15. Depression with anxiety     16. Therapeutic drug monitoring       It appears that patient's impaired fasting glucose has evolved into mild overt diabetes.  However, I will not formally add that diagnosis based on the one hemoglobin A1c reading.  Patient encouraged to lose weight and exercise.  Patient has hyperlipidemia for which he was previously on Crestor.  His cholesterol profile is not that bad but it is bad enough to warrant low-dose medication, particularly given his nonocclusive coronary artery disease.  BPH symptoms controlled with Flomax.  Patient has remote history of pulmonary embolism without recurrence.  He does have sleep apnea and tolerates CPAP well.  He failed an oral appliance.  His vitamin D is a little low but patient has been taking vitamin D 5000 units/day.  We will give this more time.  Lower extremity edema well controlled.  His PTSD and depression with anxiety is being managed by the therapist.    Plan is as follows: Start pravastatin 20 mg, one half p.o. daily.  Patient encouraged to work on low carbohydrate diet, weight loss, and exercise.  I will have him follow-up after September 5, 2019 with labs prior and this will also be subsequent Medicare wellness visit.  No other changes to medication.        Procedures

## 2019-05-17 RX ORDER — HYDROXYZINE HYDROCHLORIDE 25 MG/1
25 TABLET, FILM COATED ORAL 3 TIMES DAILY PRN
Qty: 60 TABLET | Refills: 1 | Status: SHIPPED | OUTPATIENT
Start: 2019-05-17 | End: 2021-08-23 | Stop reason: SDUPTHER

## 2019-05-17 RX ORDER — LEVOTHYROXINE SODIUM 0.2 MG/1
TABLET ORAL
Qty: 30 TABLET | Refills: 3 | Status: SHIPPED | OUTPATIENT
Start: 2019-05-17 | End: 2019-10-15 | Stop reason: SDUPTHER

## 2019-05-17 NOTE — TELEPHONE ENCOUNTER
Pt called and said that his previous dr (livia) gave him a rx for hydroxyzine 25mg to help with itching.  He ask if you could give him an rx for it.    [phone]

## 2019-05-30 ENCOUNTER — PREP FOR SURGERY (OUTPATIENT)
Dept: OTHER | Facility: HOSPITAL | Age: 69
End: 2019-05-30

## 2019-05-30 DIAGNOSIS — Z12.11 SCREEN FOR COLON CANCER: Primary | ICD-10-CM

## 2019-05-31 RX ORDER — LOSARTAN POTASSIUM 50 MG/1
50 TABLET ORAL
Qty: 90 TABLET | Refills: 1 | Status: SHIPPED | OUTPATIENT
Start: 2019-05-31 | End: 2019-11-22 | Stop reason: SDUPTHER

## 2019-06-03 DIAGNOSIS — I10 ESSENTIAL (PRIMARY) HYPERTENSION: ICD-10-CM

## 2019-06-03 RX ORDER — AMLODIPINE BESYLATE 10 MG/1
TABLET ORAL
Qty: 90 TABLET | Refills: 3 | Status: SHIPPED | OUTPATIENT
Start: 2019-06-03 | End: 2020-08-21 | Stop reason: SDUPTHER

## 2019-06-13 ENCOUNTER — HOSPITAL ENCOUNTER (OUTPATIENT)
Facility: HOSPITAL | Age: 69
Setting detail: HOSPITAL OUTPATIENT SURGERY
Discharge: HOME | End: 2019-06-13
Attending: SURGERY | Admitting: SURGERY

## 2019-06-13 ENCOUNTER — ANESTHESIA (OUTPATIENT)
Dept: GASTROENTEROLOGY | Facility: HOSPITAL | Age: 69
End: 2019-06-13

## 2019-06-13 ENCOUNTER — ANESTHESIA EVENT (OUTPATIENT)
Dept: GASTROENTEROLOGY | Facility: HOSPITAL | Age: 69
End: 2019-06-13

## 2019-06-13 VITALS
HEIGHT: 74 IN | DIASTOLIC BLOOD PRESSURE: 51 MMHG | HEART RATE: 59 BPM | OXYGEN SATURATION: 96 % | BODY MASS INDEX: 40.43 KG/M2 | WEIGHT: 315 LBS | TEMPERATURE: 97.7 F | RESPIRATION RATE: 17 BRPM | SYSTOLIC BLOOD PRESSURE: 120 MMHG

## 2019-06-13 DIAGNOSIS — Z12.11 SCREEN FOR COLON CANCER: ICD-10-CM

## 2019-06-13 PROBLEM — K63.5 COLON POLYPS: Status: ACTIVE | Noted: 2019-06-13

## 2019-06-13 PROCEDURE — 88305 TISSUE EXAM BY PATHOLOGIST: CPT | Performed by: SURGERY

## 2019-06-13 PROCEDURE — 25010000002 PROPOFOL 10 MG/ML EMULSION: Performed by: NURSE ANESTHETIST, CERTIFIED REGISTERED

## 2019-06-13 PROCEDURE — S0260 H&P FOR SURGERY: HCPCS | Performed by: SURGERY

## 2019-06-13 PROCEDURE — 45380 COLONOSCOPY AND BIOPSY: CPT | Performed by: SURGERY

## 2019-06-13 RX ORDER — PROPOFOL 10 MG/ML
VIAL (ML) INTRAVENOUS CONTINUOUS PRN
Status: DISCONTINUED | OUTPATIENT
Start: 2019-06-13 | End: 2019-06-13 | Stop reason: SURG

## 2019-06-13 RX ORDER — PROPOFOL 10 MG/ML
VIAL (ML) INTRAVENOUS AS NEEDED
Status: DISCONTINUED | OUTPATIENT
Start: 2019-06-13 | End: 2019-06-13 | Stop reason: SURG

## 2019-06-13 RX ORDER — SODIUM CHLORIDE 0.9 % (FLUSH) 0.9 %
3 SYRINGE (ML) INJECTION AS NEEDED
Status: DISCONTINUED | OUTPATIENT
Start: 2019-06-13 | End: 2019-06-13 | Stop reason: HOSPADM

## 2019-06-13 RX ORDER — SODIUM CHLORIDE, SODIUM LACTATE, POTASSIUM CHLORIDE, CALCIUM CHLORIDE 600; 310; 30; 20 MG/100ML; MG/100ML; MG/100ML; MG/100ML
1000 INJECTION, SOLUTION INTRAVENOUS CONTINUOUS
Status: DISCONTINUED | OUTPATIENT
Start: 2019-06-13 | End: 2019-06-13 | Stop reason: HOSPADM

## 2019-06-13 RX ORDER — LIDOCAINE HYDROCHLORIDE 20 MG/ML
INJECTION, SOLUTION INFILTRATION; PERINEURAL AS NEEDED
Status: DISCONTINUED | OUTPATIENT
Start: 2019-06-13 | End: 2019-06-13 | Stop reason: SURG

## 2019-06-13 RX ADMIN — LIDOCAINE HYDROCHLORIDE 60 MG: 20 INJECTION, SOLUTION INFILTRATION; PERINEURAL at 08:08

## 2019-06-13 RX ADMIN — SODIUM CHLORIDE, POTASSIUM CHLORIDE, SODIUM LACTATE AND CALCIUM CHLORIDE 1000 ML: 600; 310; 30; 20 INJECTION, SOLUTION INTRAVENOUS at 07:55

## 2019-06-13 RX ADMIN — PROPOFOL 250 MCG/KG/MIN: 10 INJECTION, EMULSION INTRAVENOUS at 08:10

## 2019-06-13 RX ADMIN — PROPOFOL 100 MG: 10 INJECTION, EMULSION INTRAVENOUS at 08:10

## 2019-06-13 NOTE — ANESTHESIA PREPROCEDURE EVALUATION
Anesthesia Evaluation     Patient summary reviewed   NPO Solid Status: > 8 hours  NPO Liquid Status: > 8 hours           Airway   Mallampati: III  TM distance: >3 FB  Neck ROM: full  No difficulty expected  Dental - normal exam     Pulmonary     breath sounds clear to auscultation  Cardiovascular   Exercise tolerance: good (4-7 METS)    Rhythm: regular  Rate: normal        Neuro/Psych  GI/Hepatic/Renal/Endo      Musculoskeletal     Abdominal    Substance History      OB/GYN          Other                        Anesthesia Plan    ASA 3     MAC     intravenous induction   Anesthetic plan, all risks, benefits, and alternatives have been provided, discussed and informed consent has been obtained with: patient.

## 2019-06-13 NOTE — DISCHARGE INSTRUCTIONS

## 2019-06-13 NOTE — ANESTHESIA POSTPROCEDURE EVALUATION
Patient: Durga Valenzuela    Procedure Summary     Date:  06/13/19 Room / Location:  [facility] ENDOSCOPY 8 /  [facility]    Anesthesia Start:  0803 Anesthesia Stop:  0835    Procedure:  COLONOSCOPY INTO CECUM WITH COLD BIOPSY POLYPECTOMY (N/A ) Diagnosis:       Screen for colon cancer      (Screen for colon cancer [Z12.11])    Surgeon:  Ayaan Gallardo MD Provider:  Shaka Daniels MD    Anesthesia Type:  MAC ASA Status:  3          Anesthesia Type: MAC  Last vitals  BP   120/51 (06/13/19 0851)   Temp   36.5 °C (97.7 °F) (06/13/19 0726)   Pulse   59 (06/13/19 0851)   Resp   17 (06/13/19 0851)     SpO2   96 % (06/13/19 0851)     Post Anesthesia Care and Evaluation    Patient location during evaluation: bedside  Patient participation: complete - patient participated  Level of consciousness: awake and alert  Pain score: 0  Pain management: adequate  Airway patency: patent  Anesthetic complications: No anesthetic complications  PONV Status: none  Cardiovascular status: acceptable  Respiratory status: acceptable  Hydration status: acceptable  Post Neuraxial Block status: Motor and sensory function returned to baseline

## 2019-06-13 NOTE — H&P
Subjective:   Reason for Visit: Screening colonoscopy    HPI:  Patient presents for evaluation for colon cancer screening.  There is no family history of colon neoplasia. No family hx of gastric, ovarian, or uterine cancer.  Patient denies changes in bowel habits, diarrhea, constipation, abdominal pain, decreased caliber of stool, melena, brbpr and weight loss.  There is no personal or family history of bleeding disorder or untoward reaction to anesthesia.  Patient has had a colonoscopy 13 year(s) ago.      Past Medical History:   Diagnosis Date   • Allergic rhinitis 8/21/2018    Patient has had allergy testing in the past which revealed allergies to molds and grass.  Immunotherapy for about 2 years in the 1980s.   • Benign essential hypertension 2/25/2016   • Benign prostatic hypertrophy 2/25/2016   • Bilateral lower extremity edema 8/21/2018   • Chronic insomnia 2/25/2016   • Closed traumatic lateral subluxation of patellofemoral joint, right, initial encounter 8/13/2018    08/10/2018--patient was evaluated by the orthopedist for right knee pain and found to have lateral subluxation of the right patella associated with presence of right artificial knee joint.  Physical therapy no help.  Surgery is scheduled 09/17/2018   • Depression with anxiety 8/21/2018   • History of deep venous thrombosis (DVT) of distal vein of right lower extremity 8/21/2018 04/26/2016--CTA of the chest performed for elevated d-dimer and progressive shortness of breath and chest pain for one month revealed bilateral occlusive in near occlusive segmental and subsegmental pulmonary emboli in all lobes of the right lung as well as within the left lower lobe.  No evidence of pulmonary infarct.  Nonspecific multifocal groundglass attenuation in the right pulmonary apex, perhaps representing pneumonitis or submental atelectasis.  Questionable cholelithiasis.  Doppler venous study was positive for DVT in the right popliteal vein.   Hypercoagulable workup was normal.  He was treated with Eliquis for a total of 6 months and was subsequently discontinued.   • History of pneumonia    • History of pulmonary embolism 6/10/2016    04/26/2016--CTA of the chest performed for elevated d-dimer and progressive shortness of breath and chest pain for one month revealed bilateral occlusive in near occlusive segmental and subsegmental pulmonary emboli in all lobes of the right lung as well as within the left lower lobe.  No evidence of pulmonary infarct.  Nonspecific multifocal groundglass attenuation in the right pulmonary apex, perhaps representing pneumonitis or submental atelectasis.  Questionable cholelithiasis.  Doppler venous study was positive for DVT in the right popliteal vein.  Hypercoagulable workup was normal.  He was treated with Eliquis for a total of 6 months and was subsequently discontinued.   • Hyperlipidemia 2/25/2016   • Impaired fasting glucose 2/25/2016   • Lumbar degenerative disc disease 2/25/2016   • Morbidly obese (CMS/HCC) 10/19/2018   • Multiple environmental allergies 8/21/2018    Patient has had allergy testing in the past which revealed allergies to molds and grass.  Immunotherapy for about 2 years in the 1980s.   • Non-occlusive coronary artery disease, 05/16/2017--normal LM; proximal LAD mild LI; 50% mid LAD.  Mild distal LAD LI; normal Cx.  Mild LI marginal branches; normal RCA, nondominant.  EF 60%. 7/24/2017 05/16/2017--cardiac catheterization performed for abnormal stress test.  Normal LV with ejection fraction 60%.  Dilated aortic root.  No wall motion abnormalities.  Normal left main.  Ramus branch small caliber and normal.  Proximal LAD large caliber and mild luminal irregularities.  50% mid LAD.  Mild luminal irregularities distal LAD.  3 diagonal branches of small caliber with mild luminal irregularities.  Normal septal branches.  Circumflex is large caliber and free of disease.  Marginal branches are medium  caliber with mild luminal irregularities.  RCA is a medium caliber and free of disease.  It is nondominant.   • Obstructive sleep apnea, tolerates CPAP well.  Previously failed oral appliance. 2/25/2016   • Post traumatic stress disorder (PTSD) 2/25/2016   • Primary hypothyroidism 2/25/2016   • Primary osteoarthritis involving multiple joints 2/25/2016   • Psoriasis 9/17/2014   • Vitamin D deficiency 8/21/2018       Past Surgical History:   Procedure Laterality Date   • CARDIAC CATHETERIZATION N/A 5/16/2017    Procedure: Coronary angiography;  Surgeon: Malcolm Chen MD;  Location:  [facility];  Service:    • CARDIAC CATHETERIZATION N/A 5/16/2017    Procedure: Left Heart Cath;  Surgeon: Malcolm Chen MD;  Location:  [facility];  Service:    • CARDIAC CATHETERIZATION N/A 5/16/2017    Procedure: Left ventriculography;  Surgeon: Malcolm Chen MD;  Location:  [facility];  Service:    • COLONOSCOPY  2005 2005--colonoscopy reportedly normal.  Records not available.   • LUMBAR DECOMPRESSION  2007 2007--lumbar decompression without fusion or hardware.   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   • REVISION TOTAL KNEE ARTHROPLASTY Right 09/17/2018 09/17/2018--right total knee arthroplasty revision due to lateral subluxation of the patella due to multiple factors.   • TOTAL KNEE ARTHROPLASTY Left 11/03/2017 11/03/2017--right total knee replacement complicated by patellofemoral subluxation.   • TOTAL KNEE ARTHROPLASTY Left 06/2014 June 2014--left total knee replacement.         Current Facility-Administered Medications:   •  lactated ringers infusion 1,000 mL, 1,000 mL, Intravenous, Continuous, Ayaan Gallardo MD, Last Rate: 25 mL/hr at 06/13/19 0755, 1,000 mL at 06/13/19 0755  •  sodium chloride 0.9 % flush 3 mL, 3 mL,  Intravenous, PRN, Ayaan Gallardo MD    Facility-Administered Medications Ordered in Other Encounters:   •  lidocaine (XYLOCAINE) 2% injection, , Intravenous, PRN, Kamille Green CRNA, 60 mg at 06/13/19 0808    Allergies   Allergen Reactions   • Hydrocodone Other (See Comments)     Severe vomiting - but CAN tolerate oxycodone per patient   • Zocor  [Simvastatin]      MYALGIA       Family History   Problem Relation Age of Onset   • Coronary artery disease Mother         Status post CABG   • Alzheimer's disease Mother    • Stroke Father    • Liver cancer Father         Father with biliary cancer including gallbladder   • No Known Problems Sister    • No Known Problems Brother        Social History     Socioeconomic History   • Marital status:      Spouse name: Not on file   • Number of children: Not on file   • Years of education: Not on file   • Highest education level: Bachelor's degree (e.g., BA, AB, BS)   Occupational History   • Occupation: Retired June 2015--   Social Needs   • Financial resource strain: Not hard at all   • Food insecurity:     Worry: Never true     Inability: Never true   • Transportation needs:     Medical: No     Non-medical: No   Tobacco Use   • Smoking status: Never Smoker   • Smokeless tobacco: Never Used   Substance and Sexual Activity   • Alcohol use: No   • Drug use: Yes     Types: Marijuana   • Sexual activity: Yes     Partners: Female   Lifestyle   • Physical activity:     Days per week: 0 days     Minutes per session: 0 min   • Stress: Not at all   Relationships   • Social connections:     Talks on phone: More than three times a week     Gets together: More than three times a week     Attends Orthodoxy service: Never     Active member of club or organization: No     Attends meetings of clubs or organizations: Never     Relationship status:         Review Of Systems:  A comprehensive review of systems was negative.    Objective:  "  Physical exam:  /60 (BP Location: Left arm, Patient Position: Lying)   Pulse 54   Temp 97.7 °F (36.5 °C) (Oral)   Ht 188 cm (74\")   Wt (!) 148 kg (326 lb 2 oz)   SpO2 98%   BMI 41.87 kg/m²     General Appearance:  awake, alert, oriented, in no acute distress  Lungs:  Normal expansion.  Clear to auscultation.  No rales, rhonchi, or wheezing.  Heart:  Heart sounds are normal.  Regular rate and rhythm without murmur, gallop or rub.  Abdomen:  Soft, non-tender, normal bowel sounds; no bruits, organomegaly or masses.    Assessment:    Durga Valenzuela is a 68 y.o. male who presents for evaluation for colon cancer screening.    Patient has no increased risk factors or warning signs or symptoms.  I reviewed current ACG guidelines for colon cancer screening:    A)  Flex sig q 5yrs with yearly fecal occult blood testing  B)  Virtual colonoscopy  C)  Colonoscopy with possible biopsy/polypectomy with IV sedation at appropriate       screening intervals    The patient has no family history of colon cancer.  He  has no personal history of colon polyp who presents for colon cancer screening evaluation.   I would advise a colonscopy.     The rationale for each option as well as all risks and benefits of each alternative were discussed with the patient in detail.  The patient understands and does wish to proceed with Colonoscopy Screening        The rationale for colonoscopy with possible biopsy, possible polypectomy, with IV sedation as well as all risks, benefits, and alternatives were discussed with the patient in detail. Risks including but not limited to perforation, bleeding,need for blood transfusion or emergent surgery ,and missed neoplasm were reviewed in detail with the patient The patient demonstrates full understanding and wishes to proceed with the colonoscopy.  "

## 2019-06-14 LAB
CYTO UR: NORMAL
LAB AP CASE REPORT: NORMAL
PATH REPORT.FINAL DX SPEC: NORMAL
PATH REPORT.GROSS SPEC: NORMAL

## 2019-06-17 ENCOUNTER — TELEPHONE (OUTPATIENT)
Dept: SURGERY | Facility: CLINIC | Age: 69
End: 2019-06-17

## 2019-06-18 ENCOUNTER — TELEPHONE (OUTPATIENT)
Dept: SURGERY | Facility: CLINIC | Age: 69
End: 2019-06-18

## 2019-06-18 NOTE — TELEPHONE ENCOUNTER
I called and spoke with Durga Valenzuela regarding his colonoscopy results.     Pathology report:       Component    Final Diagnosis   1.  Cecal Biopsy:                 A.  Adenomatous colonic polyp, benign.                B.  No significant dysplastic change is identified.       I recommend that he undergoes colonoscopy in 5 years for surveillance.     He verbalized understanding and agreed    Ayaan Gallardo MD  General, Minimally Invasive and Endoscopic Surgery  [facility]    [address], [address]  [place], [place], [address]  P: [phone]  F: [phone]

## 2019-08-30 DIAGNOSIS — N13.8 BENIGN PROSTATIC HYPERPLASIA WITH URINARY OBSTRUCTION: Chronic | ICD-10-CM

## 2019-08-30 DIAGNOSIS — E55.9 VITAMIN D DEFICIENCY: Chronic | ICD-10-CM

## 2019-08-30 DIAGNOSIS — N40.1 BENIGN PROSTATIC HYPERPLASIA WITH URINARY OBSTRUCTION: Chronic | ICD-10-CM

## 2019-08-30 DIAGNOSIS — E78.5 HYPERLIPIDEMIA, UNSPECIFIED HYPERLIPIDEMIA TYPE: Chronic | ICD-10-CM

## 2019-08-30 DIAGNOSIS — E03.9 PRIMARY HYPOTHYROIDISM: Chronic | ICD-10-CM

## 2019-08-30 DIAGNOSIS — R73.01 IMPAIRED FASTING GLUCOSE: Chronic | ICD-10-CM

## 2019-09-02 LAB
25(OH)D3+25(OH)D2 SERPL-MCNC: 52.9 NG/ML (ref 30–100)
ALBUMIN SERPL-MCNC: 4.6 G/DL (ref 3.5–5.2)
ALBUMIN/GLOB SERPL: 1.8 G/DL
ALP SERPL-CCNC: 75 U/L (ref 39–117)
ALT SERPL-CCNC: 21 U/L (ref 1–41)
AST SERPL-CCNC: 19 U/L (ref 1–40)
BILIRUB SERPL-MCNC: 0.6 MG/DL (ref 0.2–1.2)
BUN SERPL-MCNC: 21 MG/DL (ref 8–23)
BUN/CREAT SERPL: 24.1 (ref 7–25)
CALCIUM SERPL-MCNC: 9.6 MG/DL (ref 8.6–10.5)
CHLORIDE SERPL-SCNC: 101 MMOL/L (ref 98–107)
CHOLEST SERPL-MCNC: 115 MG/DL (ref 100–199)
CK SERPL-CCNC: 217 U/L (ref 20–200)
CO2 SERPL-SCNC: 28.6 MMOL/L (ref 22–29)
CREAT SERPL-MCNC: 0.87 MG/DL (ref 0.76–1.27)
GLOBULIN SER CALC-MCNC: 2.5 GM/DL
GLUCOSE SERPL-MCNC: 131 MG/DL (ref 65–99)
HBA1C MFR BLD: 6.2 % (ref 4.8–5.6)
HDL SERPL-SCNC: 19.2 UMOL/L
HDLC SERPL-MCNC: 27 MG/DL
LDL SERPL QN: 20.9 NM
LDL SERPL-SCNC: 793 NMOL/L
LDL SMALL SERPL-SCNC: 237 NMOL/L
LDLC SERPL CALC-MCNC: 61 MG/DL (ref 0–99)
POTASSIUM SERPL-SCNC: 4.4 MMOL/L (ref 3.5–5.2)
PROT SERPL-MCNC: 7.1 G/DL (ref 6–8.5)
PSA SERPL-MCNC: 0.85 NG/ML (ref 0–4)
SODIUM SERPL-SCNC: 141 MMOL/L (ref 136–145)
T3FREE SERPL-MCNC: 3.5 PG/ML (ref 2–4.4)
T4 FREE SERPL-MCNC: 1.25 NG/DL (ref 0.93–1.7)
TRIGL SERPL-MCNC: 137 MG/DL (ref 0–149)
TSH SERPL DL<=0.005 MIU/L-ACNC: 0.33 UIU/ML (ref 0.27–4.2)

## 2019-09-06 ENCOUNTER — OFFICE VISIT (OUTPATIENT)
Dept: INTERNAL MEDICINE | Facility: CLINIC | Age: 69
End: 2019-09-06

## 2019-09-06 VITALS
HEART RATE: 67 BPM | DIASTOLIC BLOOD PRESSURE: 48 MMHG | HEIGHT: 74 IN | BODY MASS INDEX: 40.43 KG/M2 | SYSTOLIC BLOOD PRESSURE: 142 MMHG | WEIGHT: 315 LBS | OXYGEN SATURATION: 98 %

## 2019-09-06 DIAGNOSIS — R60.0 BILATERAL LOWER EXTREMITY EDEMA: Chronic | ICD-10-CM

## 2019-09-06 DIAGNOSIS — Z51.81 THERAPEUTIC DRUG MONITORING: ICD-10-CM

## 2019-09-06 DIAGNOSIS — E78.5 HYPERLIPIDEMIA, UNSPECIFIED HYPERLIPIDEMIA TYPE: Chronic | ICD-10-CM

## 2019-09-06 DIAGNOSIS — G47.33 OBSTRUCTIVE SLEEP APNEA: Chronic | ICD-10-CM

## 2019-09-06 DIAGNOSIS — F41.8 DEPRESSION WITH ANXIETY: Chronic | ICD-10-CM

## 2019-09-06 DIAGNOSIS — Z00.00 MEDICARE ANNUAL WELLNESS VISIT, SUBSEQUENT: Primary | ICD-10-CM

## 2019-09-06 DIAGNOSIS — N13.8 BENIGN PROSTATIC HYPERPLASIA WITH URINARY OBSTRUCTION: Chronic | ICD-10-CM

## 2019-09-06 DIAGNOSIS — I10 BENIGN ESSENTIAL HYPERTENSION: Chronic | ICD-10-CM

## 2019-09-06 DIAGNOSIS — E55.9 VITAMIN D DEFICIENCY: Chronic | ICD-10-CM

## 2019-09-06 DIAGNOSIS — E03.9 PRIMARY HYPOTHYROIDISM: Chronic | ICD-10-CM

## 2019-09-06 DIAGNOSIS — R73.01 IMPAIRED FASTING GLUCOSE: Chronic | ICD-10-CM

## 2019-09-06 DIAGNOSIS — F43.10 POST TRAUMATIC STRESS DISORDER (PTSD): Chronic | ICD-10-CM

## 2019-09-06 DIAGNOSIS — E66.01 MORBIDLY OBESE (HCC): Chronic | ICD-10-CM

## 2019-09-06 DIAGNOSIS — N40.1 BENIGN PROSTATIC HYPERPLASIA WITH URINARY OBSTRUCTION: Chronic | ICD-10-CM

## 2019-09-06 DIAGNOSIS — Z23 NEED FOR INFLUENZA VACCINATION: ICD-10-CM

## 2019-09-06 DIAGNOSIS — I25.10 NON-OCCLUSIVE CORONARY ARTERY DISEASE: Chronic | ICD-10-CM

## 2019-09-06 PROBLEM — Z12.11 SCREEN FOR COLON CANCER: Status: RESOLVED | Noted: 2019-05-30 | Resolved: 2019-09-06

## 2019-09-06 PROCEDURE — 90662 IIV NO PRSV INCREASED AG IM: CPT | Performed by: INTERNAL MEDICINE

## 2019-09-06 PROCEDURE — G0439 PPPS, SUBSEQ VISIT: HCPCS | Performed by: INTERNAL MEDICINE

## 2019-09-06 PROCEDURE — 99214 OFFICE O/P EST MOD 30 MIN: CPT | Performed by: INTERNAL MEDICINE

## 2019-09-06 PROCEDURE — G0008 ADMIN INFLUENZA VIRUS VAC: HCPCS | Performed by: INTERNAL MEDICINE

## 2019-09-06 NOTE — PROGRESS NOTES
The ABCs of the Annual Wellness Visit  Subsequent Medicare Wellness Visit    No chief complaint on file.      Subjective   History of Present Illness:  Durga Valenzuela is a 68 y.o. male who presents for a Subsequent Medicare Wellness Visit.    HEALTH RISK ASSESSMENT    Recent Hospitalizations:  No hospitalization(s) within the last year.    Current Medical Providers:  Patient Care Team:  Florian Burt MD as PCP - General (Internal Medicine)  Florian Montgomery MD as PCP - Claims Attributed    Smoking Status:  Social History     Tobacco Use   Smoking Status Never Smoker   Smokeless Tobacco Never Used       Alcohol Consumption:  Social History     Substance and Sexual Activity   Alcohol Use No       Depression Screen:   PHQ-2/PHQ-9 Depression Screening 9/6/2019   Little interest or pleasure in doing things 0   Feeling down, depressed, or hopeless 0   Total Score 0       Fall Risk Screen:  RICHARADI Fall Risk Assessment was completed, and patient is at LOW risk for falls.Assessment completed on:9/6/2019    Health Habits and Functional and Cognitive Screening:  Functional & Cognitive Status 9/6/2019   Do you have difficulty preparing food and eating? No   Do you have difficulty bathing yourself, getting dressed or grooming yourself? No   Do you have difficulty using the toilet? No   Do you have difficulty moving around from place to place? No   Do you have trouble with steps or getting out of a bed or a chair? No   Current Diet Well Balanced Diet   Dental Exam Up to date   Eye Exam Up to date   Exercise (times per week) 5 times per week   Current Exercise Activities Include Walking   Do you need help using the phone?  No   Are you deaf or do you have serious difficulty hearing?  No   Do you need help with transportation? No   Do you need help shopping? No   Do you need help preparing meals?  No   Do you need help with housework?  No   Do you need help with laundry? No   Do you need help taking your medications? No    Do you need help managing money? No   Do you ever drive or ride in a car without wearing a seat belt? No   Have you felt unusual stress, anger or loneliness in the last month? No   Who do you live with? Spouse   If you need help, do you have trouble finding someone available to you? No   Have you been bothered in the last four weeks by sexual problems? No   Do you have difficulty concentrating, remembering or making decisions? Yes         Does the patient have evidence of cognitive impairment? No    Asprin use counseling:Taking ASA appropriately as indicated    Age-appropriate Screening Schedule:  Refer to the list below for future screening recommendations based on patient's age, sex and/or medical conditions. Orders for these recommended tests are listed in the plan section. The patient has been provided with a written plan.    Health Maintenance   Topic Date Due   • INFLUENZA VACCINE  08/01/2019   • LIPID PANEL  08/30/2020   • COLONOSCOPY  06/13/2024   • PNEUMOCOCCAL VACCINES (65+ LOW/MEDIUM RISK)  Completed   • TDAP/TD VACCINES  Discontinued   • ZOSTER VACCINE  Discontinued          The following portions of the patient's history were reviewed and updated as appropriate: allergies, current medications, past family history, past medical history, past social history, past surgical history and problem list.    Outpatient Medications Prior to Visit   Medication Sig Dispense Refill   • amLODIPine (NORVASC) 10 MG tablet take 1 tablet by mouth once daily 90 tablet 3   • amphetamine-dextroamphetamine (ADDERALL) 20 MG tablet 1 tablet Daily.     • aspirin 81 MG tablet One by mouth daily     • Cholecalciferol (VITAMIN D3) 5000 units capsule capsule 1 by mouth daily as directed 30 capsule    • clobetasol (TEMOVATE) 0.05 % ointment Apply twice a day as directed 60 g 6   • FLUoxetine (PROzac) 20 MG tablet take 2 tablets by mouth once daily  0   • hydrOXYzine (ATARAX) 25 MG tablet Take 1 tablet by mouth 3 (Three) Times a Day  As Needed for Itching. 60 tablet 1   • levothyroxine (SYNTHROID, LEVOTHROID) 200 MCG tablet take 1 tablet by mouth once daily 30 tablet 3   • losartan (COZAAR) 50 MG tablet Take 1 tablet by mouth Daily. 90 tablet 1   • pravastatin (PRAVACHOL) 20 MG tablet Take 1 p.o. daily for high cholesterol 30 tablet 6   • tamsulosin (FLOMAX) 0.4 MG capsule 24 hr capsule 1 capsule Daily.  0   • triazolam (HALCION) 0.125 MG tablet 1 tablet As Needed.  0   • amLODIPine (NORVASC) 10 MG tablet Take 10 mg by mouth Daily.       No facility-administered medications prior to visit.        Patient Active Problem List   Diagnosis   • Post traumatic stress disorder (PTSD)   • Benign prostatic hypertrophy   • Chronic insomnia   • Lumbar degenerative disc disease   • Impaired fasting glucose   • Benign essential hypertension   • Primary osteoarthritis involving multiple joints   • Hyperlipidemia   • Primary hypothyroidism   • Obstructive sleep apnea, tolerates CPAP well.  Previously failed oral appliance.   • History of pulmonary embolism   • Non-occlusive coronary artery disease, 05/16/2017--normal LM; proximal LAD mild LI; 50% mid LAD.  Mild distal LAD LI; normal Cx.  Mild LI marginal branches; normal RCA, nondominant.  EF 60%.   • Therapeutic drug monitoring   • Vitamin D deficiency   • Psoriasis   • Allergic rhinitis   • Depression with anxiety   • Multiple environmental allergies   • Bilateral lower extremity edema   • Morbidly obese (CMS/[facility])       Advanced Care Planning:  Patient does not have an advance directive - information provided to the patient today    Review of Systems   Musculoskeletal: Positive for arthralgias.   All other systems reviewed and are negative.      Compared to one year ago, the patient feels his physical health is better.  Compared to one year ago, the patient feels his mental health is better.    Reviewed chart for potential of high risk medication in the elderly: yes  Reviewed chart for potential of harmful  "drug interactions in the elderly:yes    Objective         Vitals:    09/06/19 0936   BP: 142/48   BP Location: Left arm   Pulse: 67   SpO2: 98%   Weight: (!) 144 kg (317 lb 9.6 oz)   Height: 188 cm (74.02\")       Body mass index is 40.76 kg/m².  Discussed the patient's BMI with him. The BMI is above average; BMI management plan is completed.    Physical Exam  General: Alert and oriented x 3.  No acute distress.  Normal affect. Obese. HEENT: Pupils equal, round, reactive to light; extraocular movements intact; sclerae nonicteric; pharynx, ear canals and TMs normal.  Neck: Without JVD, thyromegaly, bruit, or adenopathy.  Lungs: Clear to auscultation in all fields.  Heart: Regular rate and rhythm without murmur, rub, gallop, or click.  Abdomen: Soft, nontender, without hepatosplenomegaly or hernia.  Bowel sounds normal.  : Deferred.  Rectal: Deferred.  Extremities: Without clubbing, cyanosis, edema, or pulse deficit.  Neurologic: Intact without focal deficit.  Normal station and gait observed during ingress and egress from the examination room.  Skin: Without significant lesion.  Musculoskeletal: Unremarkable.    Lab Results   Component Value Date     (H) 08/30/2019    CHLPL 115 08/30/2019    TRIG 137 08/30/2019    HGBA1C 6.20 (H) 08/30/2019        Assessment/Plan   Medicare Risks and Personalized Health Plan  CMS Preventative Services Quick Reference  Advance Directive Discussion  Cardiovascular risk  Diabetic Lab Screening   Immunizations Discussed/Encouraged (specific immunizations; Influenza )  Obesity/Overweight   Prostate Cancer Screening     The above risks/problems have been discussed with the patient.  Pertinent information has been shared with the patient in the After Visit Summary.  Follow up plans and orders are seen below in the Assessment/Plan Section.    Diagnoses and all orders for this visit:    1. Medicare annual wellness visit, subsequent (Primary)    2. Impaired fasting glucose  -     " Comprehensive Metabolic Panel; Future  -     Hemoglobin A1c; Future    3. Hyperlipidemia, unspecified hyperlipidemia type  -     CK; Future  -     Comprehensive Metabolic Panel; Future  -     NMR LipoProfile; Future    4. Primary hypothyroidism  -     TSH; Future  -     T4, Free; Future  -     T3, Free; Future    5. Non-occlusive coronary artery disease, 05/16/2017--normal LM; proximal LAD mild LI; 50% mid LAD.  Mild distal LAD LI; normal Cx.  Mild LI marginal branches; normal RCA, nondominant.  EF 60%.    6. Benign essential hypertension    7. Benign prostatic hypertrophy    8. Obstructive sleep apnea, tolerates CPAP well.  Previously failed oral appliance.    9. Vitamin D deficiency  -     Vitamin D 25 Hydroxy; Future    10. Depression with anxiety    11. Post traumatic stress disorder (PTSD)    12. Bilateral lower extremity edema    13. Morbidly obese (CMS/HCC)    14. Therapeutic drug monitoring  -     CBC (No Diff); Future    15. Need for influenza vaccination  -     FluZone High Dose 65YR+ ([id number])      Follow Up:  Return in about 6 months (around 3/6/2020) for Next scheduled follow up with lab prior.     An After Visit Summary and PPPS were given to the patient.

## 2019-09-06 NOTE — PROGRESS NOTES
09/06/2019    Patient Information  Durga Valenzuela                                                                                          [address]  [facility] [address]      1950  [unfilled]  There is no work phone number on file.    Chief Complaint:     Subsequent Medicare wellness visit.  Follow-up impaired fasting glucose, hyperlipidemia, hypothyroidism, nonocclusive coronary artery disease, BPH, sleep apnea, vitamin D deficiency, depression with anxiety and history of PTSD, lower extremity edema.  No new acute complaints.    History of Present Illness:    Patient with a history of medical problems as outlined in chief complaint presents today for subsequent Medicare wellness visit.  Patient also had lab work in order to monitor his chronic medical issues.  His past medical history reviewed and updated were necessary including health maintenance parameters.  This reveals he will be up-to-date or else accounted for after today's visit.    Review of Systems   Constitution: Negative.   HENT: Negative.    Eyes: Negative.    Cardiovascular: Negative.    Respiratory: Negative.    Endocrine: Negative.    Hematologic/Lymphatic: Negative.    Skin: Negative.    Musculoskeletal: Negative.    Gastrointestinal: Negative.    Genitourinary: Negative.    Neurological: Negative.    Psychiatric/Behavioral: Negative.    Allergic/Immunologic: Negative.        Active Problems:    Patient Active Problem List   Diagnosis   • Post traumatic stress disorder (PTSD)   • Benign prostatic hypertrophy   • Chronic insomnia   • Lumbar degenerative disc disease   • Impaired fasting glucose   • Benign essential hypertension   • Primary osteoarthritis involving multiple joints   • Hyperlipidemia   • Primary hypothyroidism   • Obstructive sleep apnea, tolerates CPAP well.  Previously failed oral appliance.   • History of pulmonary embolism   • Non-occlusive coronary artery disease, 05/16/2017--normal LM; proximal LAD  mild LI; 50% mid LAD.  Mild distal LAD LI; normal Cx.  Mild LI marginal branches; normal RCA, nondominant.  EF 60%.   • Therapeutic drug monitoring   • Vitamin D deficiency   • Psoriasis   • Allergic rhinitis   • Depression with anxiety   • Multiple environmental allergies   • Bilateral lower extremity edema   • Morbidly obese (CMS/HCC)         Past Medical History:   Diagnosis Date   • Allergic rhinitis 8/21/2018    Patient has had allergy testing in the past which revealed allergies to molds and grass.  Immunotherapy for about 2 years in the 1980s.   • Benign essential hypertension 2/25/2016   • Benign prostatic hypertrophy 2/25/2016   • Bilateral lower extremity edema 8/21/2018   • Chronic insomnia 2/25/2016   • Closed traumatic lateral subluxation of patellofemoral joint, right, initial encounter 8/13/2018    08/10/2018--patient was evaluated by the orthopedist for right knee pain and found to have lateral subluxation of the right patella associated with presence of right artificial knee joint.  Physical therapy no help.  Surgery is scheduled 09/17/2018   • Depression with anxiety 8/21/2018   • History of deep venous thrombosis (DVT) of distal vein of right lower extremity 8/21/2018 04/26/2016--CTA of the chest performed for elevated d-dimer and progressive shortness of breath and chest pain for one month revealed bilateral occlusive in near occlusive segmental and subsegmental pulmonary emboli in all lobes of the right lung as well as within the left lower lobe.  No evidence of pulmonary infarct.  Nonspecific multifocal groundglass attenuation in the right pulmonary apex, perhaps representing pneumonitis or submental atelectasis.  Questionable cholelithiasis.  Doppler venous study was positive for DVT in the right popliteal vein.  Hypercoagulable workup was normal.  He was treated with Eliquis for a total of 6 months and was subsequently discontinued.   • History of pneumonia    • History of pulmonary  embolism 6/10/2016    04/26/2016--CTA of the chest performed for elevated d-dimer and progressive shortness of breath and chest pain for one month revealed bilateral occlusive in near occlusive segmental and subsegmental pulmonary emboli in all lobes of the right lung as well as within the left lower lobe.  No evidence of pulmonary infarct.  Nonspecific multifocal groundglass attenuation in the right pulmonary apex, perhaps representing pneumonitis or submental atelectasis.  Questionable cholelithiasis.  Doppler venous study was positive for DVT in the right popliteal vein.  Hypercoagulable workup was normal.  He was treated with Eliquis for a total of 6 months and was subsequently discontinued.   • Hyperlipidemia 2/25/2016   • Impaired fasting glucose 2/25/2016   • Lumbar degenerative disc disease 2/25/2016   • Morbidly obese (CMS/HCC) 10/19/2018   • Multiple environmental allergies 8/21/2018    Patient has had allergy testing in the past which revealed allergies to molds and grass.  Immunotherapy for about 2 years in the 1980s.   • Non-occlusive coronary artery disease, 05/16/2017--normal LM; proximal LAD mild LI; 50% mid LAD.  Mild distal LAD LI; normal Cx.  Mild LI marginal branches; normal RCA, nondominant.  EF 60%. 7/24/2017 05/16/2017--cardiac catheterization performed for abnormal stress test.  Normal LV with ejection fraction 60%.  Dilated aortic root.  No wall motion abnormalities.  Normal left main.  Ramus branch small caliber and normal.  Proximal LAD large caliber and mild luminal irregularities.  50% mid LAD.  Mild luminal irregularities distal LAD.  3 diagonal branches of small caliber with mild luminal irregularities.  Normal septal branches.  Circumflex is large caliber and free of disease.  Marginal branches are medium caliber with mild luminal irregularities.  RCA is a medium caliber and free of disease.  It is nondominant.   • Obstructive sleep apnea, tolerates CPAP well.  Previously failed  oral appliance. 2/25/2016   • Post traumatic stress disorder (PTSD) 2/25/2016   • Primary hypothyroidism 2/25/2016   • Primary osteoarthritis involving multiple joints 2/25/2016   • Psoriasis 9/17/2014   • Vitamin D deficiency 8/21/2018         Past Surgical History:   Procedure Laterality Date   • CARDIAC CATHETERIZATION N/A 5/16/2017    Procedure: Coronary angiography;  Surgeon: Malcolm Chen MD;  Location:  [facility] C[facility] INVAS[facility];  Service:    • CARDIAC CATHETERIZATION N/A 5/16/2017    Procedure: Left Heart Cath;  Surgeon: Malcolm Chen MD;  Location:  [facility];  Service:    • CARDIAC CATHETERIZATION N/A 5/16/2017    Procedure: Left ventriculography;  Surgeon: Malcolm Chen MD;  [facility];  Service:    • COLONOSCOPY  2005 2005--colonoscopy reportedly normal.  Records not available.   • COLONOSCOPY N/A 6/13/2019    Procedure: COLONOSCOPY INTO CECUM WITH COLD BIOPSY POLYPECTOMY;  Surgeon: Ayaan Gallardo MD;  Location: [facility] ENDOSCOPY;  Service: General   • LUMBAR DECOMPRESSION  2007 2007--lumbar decompression without fusion or hardware.   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   • REVISION TOTAL KNEE ARTHROPLASTY Right 09/17/2018 09/17/2018--right total knee arthroplasty revision due to lateral subluxation of the patella due to multiple factors.   • TOTAL KNEE ARTHROPLASTY Left 11/03/2017 11/03/2017--right total knee replacement complicated by patellofemoral subluxation.   • TOTAL KNEE ARTHROPLASTY Left 06/2014 June 2014--left total knee replacement.         Allergies   Allergen Reactions   • Hydrocodone Other (See Comments)     Severe vomiting - but CAN tolerate oxycodone per patient   • Zocor  [Simvastatin]      MYALGIA           Current Outpatient Medications:   •  amLODIPine (NORVASC) 10 MG tablet, take 1  tablet by mouth once daily, Disp: 90 tablet, Rfl: 3  •  amphetamine-dextroamphetamine (ADDERALL) 20 MG tablet, 1 tablet Daily., Disp: , Rfl:   •  aspirin 81 MG tablet, One by mouth daily, Disp: , Rfl:   •  Cholecalciferol (VITAMIN D3) 5000 units capsule capsule, 1 by mouth daily as directed, Disp: 30 capsule, Rfl:   •  clobetasol (TEMOVATE) 0.05 % ointment, Apply twice a day as directed, Disp: 60 g, Rfl: 6  •  FLUoxetine (PROzac) 20 MG tablet, take 2 tablets by mouth once daily, Disp: , Rfl: 0  •  hydrOXYzine (ATARAX) 25 MG tablet, Take 1 tablet by mouth 3 (Three) Times a Day As Needed for Itching., Disp: 60 tablet, Rfl: 1  •  levothyroxine (SYNTHROID, LEVOTHROID) 200 MCG tablet, take 1 tablet by mouth once daily, Disp: 30 tablet, Rfl: 3  •  losartan (COZAAR) 50 MG tablet, Take 1 tablet by mouth Daily., Disp: 90 tablet, Rfl: 1  •  pravastatin (PRAVACHOL) 20 MG tablet, Take 1 p.o. daily for high cholesterol, Disp: 30 tablet, Rfl: 6  •  tamsulosin (FLOMAX) 0.4 MG capsule 24 hr capsule, 1 capsule Daily., Disp: , Rfl: 0  •  triazolam (HALCION) 0.125 MG tablet, 1 tablet As Needed., Disp: , Rfl: 0      Family History   Problem Relation Age of Onset   • Coronary artery disease Mother         Status post CABG   • Alzheimer's disease Mother    • Stroke Father    • Liver cancer Father         Father with biliary cancer including gallbladder   • No Known Problems Sister    • No Known Problems Brother          Social History     Socioeconomic History   • Marital status:      Spouse name: jan   • Number of children: Not on file   • Years of education: Not on file   • Highest education level: Bachelor's degree (e.g., BA, AB, BS)   Occupational History   • Occupation: Retired June 2015--   Social Needs   • Financial resource strain: Not hard at all   • Food insecurity:     Worry: Never true     Inability: Never true   • Transportation needs:     Medical: No     Non-medical: No   Tobacco Use   • Smoking  "status: Never Smoker   • Smokeless tobacco: Never Used   Substance and Sexual Activity   • Alcohol use: No   • Drug use: Yes     Types: Marijuana   • Sexual activity: Yes     Partners: Female   Lifestyle   • Physical activity:     Days per week: 5 days     Minutes per session: 30 min   • Stress: Only a little   Relationships   • Social connections:     Talks on phone: More than three times a week     Gets together: More than three times a week     Attends Voodoo service: Never     Active member of club or organization: No     Attends meetings of clubs or organizations: Never     Relationship status:          Vitals:    09/06/19 0936   BP: 142/48   BP Location: Left arm   Pulse: 67   SpO2: 98%   Weight: (!) 144 kg (317 lb 9.6 oz)   Height: 188 cm (74.02\")          Physical Exam:    General: Alert and oriented x 3.  No acute distress.  Normal affect. Obese. HEENT: Pupils equal, round, reactive to light; extraocular movements intact; sclerae nonicteric; pharynx, ear canals and TMs normal.  Neck: Without JVD, thyromegaly, bruit, or adenopathy.  Lungs: Clear to auscultation in all fields.  Heart: Regular rate and rhythm without murmur, rub, gallop, or click.  Abdomen: Soft, nontender, without hepatosplenomegaly or hernia.  Bowel sounds normal.  : Deferred.  Rectal: Deferred.  Extremities: Without clubbing, cyanosis, edema, or pulse deficit.  Neurologic: Intact without focal deficit.  Normal station and gait observed during ingress and egress from the examination room.  Skin: Without significant lesion.  Musculoskeletal: Unremarkable.    Lab/other results:    NMR is perfect other than HDL particle number low at 19.2.  Total cholesterol is only 115.  CMP normal except blood sugar elevated 131, hemoglobin A1c 6.2.  Thyroid function tests are normal.  PSA normal at 0.855.  Vitamin D normal.  CPK normal.    Assessment/Plan:     Diagnosis Plan   1. Medicare annual wellness visit, subsequent     2. Impaired fasting " glucose     3. Hyperlipidemia, unspecified hyperlipidemia type     4. Primary hypothyroidism     5. Non-occlusive coronary artery disease, 05/16/2017--normal LM; proximal LAD mild LI; 50% mid LAD.  Mild distal LAD LI; normal Cx.  Mild LI marginal branches; normal RCA, nondominant.  EF 60%.     6. Benign essential hypertension     7. Benign prostatic hypertrophy     8. Obstructive sleep apnea, tolerates CPAP well.  Previously failed oral appliance.     9. Vitamin D deficiency     10. Depression with anxiety     11. Post traumatic stress disorder (PTSD)     12. Bilateral lower extremity edema     13. Morbidly obese (CMS/[facility])     14. Therapeutic drug monitoring     15. Need for influenza vaccination  FluZone High Dose 65YR+ ([id number])     The subsequent Medicare wellness visit is documented on separate note.    Patient has impaired fasting glucose that does not require medication.  His A1c is better than it was previously and patient has lost some weight.  Hyperlipidemia is under the best control if we can get it.  His thyroid is therapeutic.  Nonocclusive coronary artery disease stable.  Blood pressure is little elevated today but not enough to make any changes.  BPH controlled with Flomax.  Patient has sleep apnea and tolerates CPAP well.  Vitamin D is therapeutic.  Depression with anxiety and PTSD seems to be reasonably controlled.  Patient does see a therapist which is helpful.  Lower extremity edema well controlled.  Patient does need to continue to work on his weight loss and that will improve numerous problems he has.    Plan is as follows: No change in current medical regimen.  High-dose influenza vaccine given.  Patient will follow-up in 6 months with lab prior or follow-up as needed.    Procedures

## 2019-10-15 RX ORDER — LEVOTHYROXINE SODIUM 0.2 MG/1
TABLET ORAL
Qty: 30 TABLET | Refills: 3 | Status: SHIPPED | OUTPATIENT
Start: 2019-10-15 | End: 2020-02-13

## 2019-11-22 RX ORDER — LOSARTAN POTASSIUM 50 MG/1
TABLET ORAL
Qty: 90 TABLET | Refills: 1 | Status: SHIPPED | OUTPATIENT
Start: 2019-11-22 | End: 2020-07-30 | Stop reason: SDUPTHER

## 2020-02-13 RX ORDER — LEVOTHYROXINE SODIUM 0.2 MG/1
TABLET ORAL
Qty: 90 TABLET | Refills: 0 | Status: SHIPPED | OUTPATIENT
Start: 2020-02-13 | End: 2020-05-11

## 2020-03-16 ENCOUNTER — OFFICE VISIT (OUTPATIENT)
Dept: INTERNAL MEDICINE | Facility: CLINIC | Age: 70
End: 2020-03-16

## 2020-03-16 VITALS
DIASTOLIC BLOOD PRESSURE: 68 MMHG | OXYGEN SATURATION: 98 % | HEART RATE: 60 BPM | SYSTOLIC BLOOD PRESSURE: 126 MMHG | HEIGHT: 74 IN | WEIGHT: 315 LBS | BODY MASS INDEX: 40.43 KG/M2

## 2020-03-16 DIAGNOSIS — E55.9 VITAMIN D DEFICIENCY: Chronic | ICD-10-CM

## 2020-03-16 DIAGNOSIS — I10 BENIGN ESSENTIAL HYPERTENSION: Chronic | ICD-10-CM

## 2020-03-16 DIAGNOSIS — E78.2 MIXED HYPERLIPIDEMIA: Chronic | ICD-10-CM

## 2020-03-16 DIAGNOSIS — Z91.09 MULTIPLE ENVIRONMENTAL ALLERGIES: Chronic | ICD-10-CM

## 2020-03-16 DIAGNOSIS — F41.8 DEPRESSION WITH ANXIETY: Chronic | ICD-10-CM

## 2020-03-16 DIAGNOSIS — N13.8 BENIGN PROSTATIC HYPERPLASIA WITH URINARY OBSTRUCTION: Chronic | ICD-10-CM

## 2020-03-16 DIAGNOSIS — R73.01 IMPAIRED FASTING GLUCOSE: Primary | Chronic | ICD-10-CM

## 2020-03-16 DIAGNOSIS — E03.9 PRIMARY HYPOTHYROIDISM: Chronic | ICD-10-CM

## 2020-03-16 DIAGNOSIS — G47.33 OBSTRUCTIVE SLEEP APNEA: Chronic | ICD-10-CM

## 2020-03-16 DIAGNOSIS — I25.10 NON-OCCLUSIVE CORONARY ARTERY DISEASE: Chronic | ICD-10-CM

## 2020-03-16 DIAGNOSIS — J20.9 ACUTE BRONCHITIS, UNSPECIFIED ORGANISM: ICD-10-CM

## 2020-03-16 DIAGNOSIS — N40.1 BENIGN PROSTATIC HYPERPLASIA WITH URINARY OBSTRUCTION: Chronic | ICD-10-CM

## 2020-03-16 DIAGNOSIS — Z86.711 HISTORY OF PULMONARY EMBOLISM: Chronic | ICD-10-CM

## 2020-03-16 DIAGNOSIS — R60.0 BILATERAL LOWER EXTREMITY EDEMA: Chronic | ICD-10-CM

## 2020-03-16 DIAGNOSIS — E66.01 MORBIDLY OBESE (HCC): Chronic | ICD-10-CM

## 2020-03-16 PROCEDURE — 99214 OFFICE O/P EST MOD 30 MIN: CPT | Performed by: INTERNAL MEDICINE

## 2020-03-16 RX ORDER — CEFDINIR 300 MG/1
CAPSULE ORAL
Qty: 20 CAPSULE | Refills: 0 | Status: SHIPPED | OUTPATIENT
Start: 2020-03-16 | End: 2020-09-17

## 2020-03-16 RX ORDER — BENZONATATE 200 MG/1
CAPSULE ORAL
Qty: 30 CAPSULE | Refills: 0 | Status: SHIPPED | OUTPATIENT
Start: 2020-03-16 | End: 2020-09-17

## 2020-03-16 NOTE — PROGRESS NOTES
03/16/2020    Patient Information  Durga Valenzuela                                                                                          [address]  [facility] [address]      1950  [unfilled]  There is no work phone number on file.    Chief Complaint:     Follow-up lab work in order to monitor chronic medical issues listed in the history of present illness.  No new acute complaints.    History of Present Illness:    Patient with a history of impaired fasting glucose, hyperlipidemia, hypothyroidism, hypertension, lower extremity edema, history of pulmonary embolism, nonocclusive coronary artery disease, sleep apnea, vitamin D deficiency, depression with anxiety, PTSD, multiple environmental allergies, BPH, morbid obesity.  He presents today for follow-up with lab prior in order to monitor his chronic medical issues.  His past medical history reviewed and updated were necessary including health maintenance parameters.  This reveals he is currently up-to-date or else accounted for.    Review of Systems   Constitution: Negative. Negative for chills, fever and night sweats.   HENT: Negative.    Eyes: Negative.    Cardiovascular: Positive for leg swelling.   Respiratory: Positive for cough and sputum production. Negative for shortness of breath and wheezing.    Endocrine: Negative.    Hematologic/Lymphatic: Negative.    Skin: Negative.    Musculoskeletal: Positive for arthritis, back pain and joint pain.   Gastrointestinal: Negative.    Genitourinary: Negative.    Neurological: Negative.    Psychiatric/Behavioral: Negative.    Allergic/Immunologic: Negative.        Active Problems:    Patient Active Problem List   Diagnosis   • Post traumatic stress disorder (PTSD)   • Benign prostatic hypertrophy   • Chronic insomnia   • Lumbar degenerative disc disease   • Impaired fasting glucose   • Benign essential hypertension   • Primary osteoarthritis involving multiple joints   • Hyperlipidemia   •  Primary hypothyroidism   • Obstructive sleep apnea, tolerates CPAP well.  Previously failed oral appliance.   • History of pulmonary embolism   • Non-occlusive coronary artery disease, 05/16/2017--normal LM; proximal LAD mild LI; 50% mid LAD.  Mild distal LAD LI; normal Cx.  Mild LI marginal branches; normal RCA, nondominant.  EF 60%.   • Therapeutic drug monitoring   • Vitamin D deficiency   • Psoriasis   • Allergic rhinitis   • Depression with anxiety   • Multiple environmental allergies   • Bilateral lower extremity edema   • Morbidly obese (CMS/HCC)   • Acute bronchitis         Past Medical History:   Diagnosis Date   • Allergic rhinitis 8/21/2018    Patient has had allergy testing in the past which revealed allergies to molds and grass.  Immunotherapy for about 2 years in the 1980s.   • Benign essential hypertension 2/25/2016   • Benign prostatic hypertrophy 2/25/2016   • Bilateral lower extremity edema 8/21/2018   • Chronic insomnia 2/25/2016   • Closed traumatic lateral subluxation of patellofemoral joint, right, initial encounter 8/13/2018    08/10/2018--patient was evaluated by the orthopedist for right knee pain and found to have lateral subluxation of the right patella associated with presence of right artificial knee joint.  Physical therapy no help.  Surgery is scheduled 09/17/2018   • Depression with anxiety 8/21/2018   • History of deep venous thrombosis (DVT) of distal vein of right lower extremity 8/21/2018 04/26/2016--CTA of the chest performed for elevated d-dimer and progressive shortness of breath and chest pain for one month revealed bilateral occlusive in near occlusive segmental and subsegmental pulmonary emboli in all lobes of the right lung as well as within the left lower lobe.  No evidence of pulmonary infarct.  Nonspecific multifocal groundglass attenuation in the right pulmonary apex, perhaps representing pneumonitis or submental atelectasis.  Questionable cholelithiasis.  Doppler  venous study was positive for DVT in the right popliteal vein.  Hypercoagulable workup was normal.  He was treated with Eliquis for a total of 6 months and was subsequently discontinued.   • History of pneumonia    • History of pulmonary embolism 6/10/2016    04/26/2016--CTA of the chest performed for elevated d-dimer and progressive shortness of breath and chest pain for one month revealed bilateral occlusive in near occlusive segmental and subsegmental pulmonary emboli in all lobes of the right lung as well as within the left lower lobe.  No evidence of pulmonary infarct.  Nonspecific multifocal groundglass attenuation in the right pulmonary apex, perhaps representing pneumonitis or submental atelectasis.  Questionable cholelithiasis.  Doppler venous study was positive for DVT in the right popliteal vein.  Hypercoagulable workup was normal.  He was treated with Eliquis for a total of 6 months and was subsequently discontinued.   • Hyperlipidemia 2/25/2016   • Impaired fasting glucose 2/25/2016   • Lumbar degenerative disc disease 2/25/2016   • Morbidly obese (CMS/HCC) 10/19/2018   • Multiple environmental allergies 8/21/2018    Patient has had allergy testing in the past which revealed allergies to molds and grass.  Immunotherapy for about 2 years in the 1980s.   • Non-occlusive coronary artery disease, 05/16/2017--normal LM; proximal LAD mild LI; 50% mid LAD.  Mild distal LAD LI; normal Cx.  Mild LI marginal branches; normal RCA, nondominant.  EF 60%. 7/24/2017 05/16/2017--cardiac catheterization performed for abnormal stress test.  Normal LV with ejection fraction 60%.  Dilated aortic root.  No wall motion abnormalities.  Normal left main.  Ramus branch small caliber and normal.  Proximal LAD large caliber and mild luminal irregularities.  50% mid LAD.  Mild luminal irregularities distal LAD.  3 diagonal branches of small caliber with mild luminal irregularities.  Normal septal branches.  Circumflex is large  caliber and free of disease.  Marginal branches are medium caliber with mild luminal irregularities.  RCA is a medium caliber and free of disease.  It is nondominant.   • Obstructive sleep apnea, tolerates CPAP well.  Previously failed oral appliance. 2/25/2016   • Post traumatic stress disorder (PTSD) 2/25/2016   • Primary hypothyroidism 2/25/2016   • Primary osteoarthritis involving multiple joints 2/25/2016   • Psoriasis 9/17/2014   • Vitamin D deficiency 8/21/2018         Past Surgical History:   Procedure Laterality Date   • CARDIAC CATHETERIZATION N/A 5/16/2017    Procedure: Coronary angiography;  Surgeon: Malcolm Chen MD;  Location:  [facility]VAS[facility];  Service:    • CARDIAC CATHETERIZATION N/A 5/16/2017    Procedure: Left Heart Cath;  Surgeon: Malcolm Chen MD;  Location:  [facility];  Service:    • CARDIAC CATHETERIZATION N/A 5/16/2017    Procedure: Left ventriculography;  Surgeon: Malcolm Chen MD;  [facility];  Service:    • COLONOSCOPY  2005 2005--colonoscopy reportedly normal.  Records not available.   • COLONOSCOPY N/A 6/13/2019    Procedure: COLONOSCOPY INTO CECUM WITH COLD BIOPSY POLYPECTOMY;  Surgeon: Ayaan Gallardo MD;  Location: [facility] ENDOSCOPY;  Service: General   • LUMBAR DECOMPRESSION  2007 2007--lumbar decompression without fusion or hardware.   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   • REVISION TOTAL KNEE ARTHROPLASTY Right 09/17/2018 09/17/2018--right total knee arthroplasty revision due to lateral subluxation of the patella due to multiple factors.   • TOTAL KNEE ARTHROPLASTY Left 11/03/2017 11/03/2017--right total knee replacement complicated by patellofemoral subluxation.   • TOTAL KNEE ARTHROPLASTY Left 06/2014 June 2014--left total knee replacement.         Allergies   Allergen  Reactions   • Hydrocodone Other (See Comments)     Severe vomiting - but CAN tolerate oxycodone per patient   • Zocor  [Simvastatin]      MYALGIA           Current Outpatient Medications:   •  amLODIPine (NORVASC) 10 MG tablet, take 1 tablet by mouth once daily, Disp: 90 tablet, Rfl: 3  •  amphetamine-dextroamphetamine (ADDERALL) 20 MG tablet, 1 tablet Daily., Disp: , Rfl:   •  aspirin 81 MG tablet, One by mouth daily, Disp: , Rfl:   •  Cholecalciferol (VITAMIN D3) 5000 units capsule capsule, 1 by mouth daily as directed, Disp: 30 capsule, Rfl:   •  clobetasol (TEMOVATE) 0.05 % ointment, Apply twice a day as directed, Disp: 60 g, Rfl: 6  •  FLUoxetine (PROzac) 20 MG tablet, take 2 tablets by mouth once daily, Disp: , Rfl: 0  •  hydrOXYzine (ATARAX) 25 MG tablet, Take 1 tablet by mouth 3 (Three) Times a Day As Needed for Itching., Disp: 60 tablet, Rfl: 1  •  levothyroxine (SYNTHROID, LEVOTHROID) 200 MCG tablet, TAKE 1 TABLET BY MOUTH ONCE DAILY, Disp: 90 tablet, Rfl: 0  •  losartan (COZAAR) 50 MG tablet, take 1 tablet by mouth once daily, Disp: 90 tablet, Rfl: 1  •  pravastatin (PRAVACHOL) 20 MG tablet, Take 1 p.o. daily for high cholesterol, Disp: 30 tablet, Rfl: 6  •  tamsulosin (FLOMAX) 0.4 MG capsule 24 hr capsule, 1 capsule Daily., Disp: , Rfl: 0  •  triazolam (HALCION) 0.125 MG tablet, 1 tablet As Needed., Disp: , Rfl: 0  •  benzonatate (TESSALON) 200 MG capsule, Will take 1 p.o. 3 times daily as needed for cough, Disp: 30 capsule, Rfl: 0  •  cefdinir (OMNICEF) 300 MG capsule, Take 1 p.o. twice daily until gone, Disp: 20 capsule, Rfl: 0      Family History   Problem Relation Age of Onset   • Coronary artery disease Mother         Status post CABG   • Alzheimer's disease Mother    • Stroke Father    • Liver cancer Father         Father with biliary cancer including gallbladder   • No Known Problems Sister    • No Known Problems Brother          Social History     Socioeconomic History   • Marital status:   "    Spouse name: italo   • Number of children: Not on file   • Years of education: Not on file   • Highest education level: Bachelor's degree (e.g., BA, AB, BS)   Occupational History   • Occupation: Retired June 2015--   Social Needs   • Financial resource strain: Not hard at all   • Food insecurity:     Worry: Never true     Inability: Never true   • Transportation needs:     Medical: No     Non-medical: No   Tobacco Use   • Smoking status: Never Smoker   • Smokeless tobacco: Never Used   Substance and Sexual Activity   • Alcohol use: No   • Drug use: Yes     Types: Marijuana   • Sexual activity: Yes     Partners: Female   Lifestyle   • Physical activity:     Days per week: 5 days     Minutes per session: 30 min   • Stress: Only a little   Relationships   • Social connections:     Talks on phone: More than three times a week     Gets together: More than three times a week     Attends Taoism service: Never     Active member of club or organization: No     Attends meetings of clubs or organizations: Never     Relationship status:          Vitals:    03/16/20 0925   BP: 126/68   BP Location: Left arm   Pulse: 60   SpO2: 98%   Weight: (!) 146 kg (322 lb)   Height: 188 cm (74.02\")        Body mass index is 41.32 kg/m².      Physical Exam:    General: Alert and oriented x 3.  No acute distress.  Normal affect.  Morbidly obese.  HEENT: Pupils equal, round, reactive to light; extraocular movements intact; sclerae nonicteric; pharynx, ear canals and TMs normal.  Neck: Without JVD, thyromegaly, bruit, or adenopathy.  Lungs: Clear to auscultation in all fields, except there were very faint scattered rhonchi upon close auscultation.  Heart: Regular rate and rhythm without murmur, rub, gallop, or click.  Abdomen: Soft, nontender, without hepatosplenomegaly or hernia.  Bowel sounds normal.  : Deferred.  Rectal: Deferred.  Extremities: Without clubbing, cyanosis, edema, or pulse deficit.  Neurologic: " Intact without focal deficit.  Normal station and gait observed during ingress and egress from the examination room.  Skin: Without significant lesion.  Musculoskeletal: Unremarkable.    Lab/other results:    NMR reveals a total cholesterol 152.  Triglycerides are slightly elevated 179.  LDL particle number elevated 1147.  Small LDL particle number excellent at 508.  HDL particle number is low at 23.2.  CMP normal except glucose elevated 151.  CBC is normal.  Hemoglobin A1c 6.4.  TSH mildly elevated at 5.2.  Vitamin D normal.  CPK elevated at 303.    Assessment/Plan:     Diagnosis Plan   1. Impaired fasting glucose     2. Hyperlipidemia     3. Primary hypothyroidism     4. Benign essential hypertension     5. Bilateral lower extremity edema     6. History of pulmonary embolism     7. Non-occlusive coronary artery disease, 05/16/2017--normal LM; proximal LAD mild LI; 50% mid LAD.  Mild distal LAD LI; normal Cx.  Mild LI marginal branches; normal RCA, nondominant.  EF 60%.     8. Obstructive sleep apnea, tolerates CPAP well.  Previously failed oral appliance.     9. Vitamin D deficiency     10. Depression with anxiety     11. Multiple environmental allergies     12. Morbidly obese (CMS/HCC)     13. Benign prostatic hypertrophy     14. Acute bronchitis, unspecified organism  cefdinir (OMNICEF) 300 MG capsule    benzonatate (TESSALON) 200 MG capsule     Patient has been erroneously marked as diabetic. Based on the available clinical information, he does not have diabetes and should therefore be excluded from diabetic health maintenance and quality measures for the remainder of the reporting period.    Patient has impaired fasting glucose although his hemoglobin A1c is now extremely borderline.  Also his cholesterol profile is not nearly as good as it was previously.  His TSH is also slightly elevated whereas previously it was normal.  His blood pressure is under excellent control.  Lower extremity edema is well  controlled and currently not existent.  Patient does have a history of pulmonary embolism but no recurrence.  He has nonocclusive coronary artery disease and therefore we need to be very aggressive in controlling risk factors.  Patient has sleep apnea and tolerates CPAP well.  Vitamin D in the normal range.  BPH symptoms reasonably controlled with Flomax.    Plan is as follows: Strongly encouraged patient to work on low carbohydrate diet, exercise and weight loss.  I will not make any changes to his medical regimen at the present time and we will evaluate in 6 months, specifically after September 6, 2020 with lab prior and this will also be subsequent Medicare wellness visit.     Addendum: March 16, 2020--at the end of the visit patient reports a 2-day history of chest congestion and cough productive of initially clear phlegm but subsequently has turned green.  No fever, chills, or other systemic signs or symptoms.  Patient reports he started taking Mucinex which seems to have helped some.  On closer chest exam, there are a few scattered rhonchi but no wheezes.  Given patient's medical history and risk factors, I will treat him somewhat aggressively with cefdinir 300 mg p.o. twice daily x10 days.   Patient should contact me if his symptoms persist and certainly if they worsen.    Procedures

## 2020-03-24 RX ORDER — TAMSULOSIN HYDROCHLORIDE 0.4 MG/1
1 CAPSULE ORAL DAILY
Qty: 90 CAPSULE | Refills: 0 | Status: SHIPPED | OUTPATIENT
Start: 2020-03-24 | End: 2022-06-21 | Stop reason: SDUPTHER

## 2020-05-11 RX ORDER — LEVOTHYROXINE SODIUM 0.2 MG/1
TABLET ORAL
Qty: 90 TABLET | Refills: 0 | Status: SHIPPED | OUTPATIENT
Start: 2020-05-11 | End: 2020-08-13

## 2020-07-30 RX ORDER — LOSARTAN POTASSIUM 50 MG/1
50 TABLET ORAL DAILY
Qty: 90 TABLET | Refills: 1 | Status: SHIPPED | OUTPATIENT
Start: 2020-07-30 | End: 2020-08-04 | Stop reason: SDUPTHER

## 2020-07-30 NOTE — TELEPHONE ENCOUNTER
Caller: Durga Valenzuela    Relationship: Self    Best call back number: [phone]    Medication needed:   Requested Prescriptions     Pending Prescriptions Disp Refills   • losartan (COZAAR) 50 MG tablet 90 tablet 1     Sig: Take 1 tablet by mouth Daily.       When do you need the refill by:  7/30/2020    What details did the patient provide when requesting the medication: Out of medication refills.    Does the patient have less than a 3 day supply:  [x] Yes  [] No    What [facility] the [facility] [address] [address] [place] [facility] - [phone]  - [phone] FX

## 2020-08-04 RX ORDER — LOSARTAN POTASSIUM 50 MG/1
50 TABLET ORAL DAILY
Qty: 90 TABLET | Refills: 1 | Status: SHIPPED | OUTPATIENT
Start: 2020-08-04 | End: 2021-04-16 | Stop reason: SDUPTHER

## 2020-08-13 RX ORDER — LEVOTHYROXINE SODIUM 0.2 MG/1
TABLET ORAL
Qty: 90 TABLET | Refills: 0 | Status: SHIPPED | OUTPATIENT
Start: 2020-08-13 | End: 2020-12-08 | Stop reason: SDUPTHER

## 2020-08-17 RX ORDER — FLUOXETINE 20 MG/1
40 TABLET, FILM COATED ORAL DAILY
Qty: 60 TABLET | Refills: 2 | Status: SHIPPED | OUTPATIENT
Start: 2020-08-17 | End: 2020-09-23 | Stop reason: SDUPTHER

## 2020-08-17 NOTE — TELEPHONE ENCOUNTER
Caller: Durga Valenzuela    Relationship: Self    Best call back number:  [phone]    Medication needed:   Requested Prescriptions     Pending Prescriptions Disp Refills   • FLUoxetine (PROzac) 20 MG tablet  0     Sig: Take 2 tablets by mouth Daily.       When do you need the refill by: 8/17/2020    What details did the patient provide when requesting the medication: Has one day left.    Does the patient have less than a 3 day supply:  [x] Yes  [] No    [place] the [place] [facility] [address] [address] [place] [facility] [phone] [facility] [phone] FX

## 2020-08-21 DIAGNOSIS — I10 ESSENTIAL (PRIMARY) HYPERTENSION: ICD-10-CM

## 2020-08-21 RX ORDER — AMLODIPINE BESYLATE 10 MG/1
10 TABLET ORAL DAILY
Qty: 90 TABLET | Refills: 3 | Status: SHIPPED | OUTPATIENT
Start: 2020-08-21 | End: 2020-12-17 | Stop reason: SDUPTHER

## 2020-08-21 NOTE — TELEPHONE ENCOUNTER
Caller: Durga Valenzuela    Relationship: Self    Best call back number: [phone]    Medication needed:   Requested Prescriptions     Pending Prescriptions Disp Refills   • amLODIPine (NORVASC) 10 MG tablet 90 tablet 3     Sig: Take 1 tablet by mouth Daily.       [facility] [address] [address] [place] - [phone]  - [phone] FX

## 2020-09-01 ENCOUNTER — LAB (OUTPATIENT)
Dept: LAB | Facility: HOSPITAL | Age: 70
End: 2020-09-01

## 2020-09-01 DIAGNOSIS — N40.1 BENIGN PROSTATIC HYPERPLASIA WITH URINARY OBSTRUCTION: Chronic | ICD-10-CM

## 2020-09-01 DIAGNOSIS — F41.8 DEPRESSION WITH ANXIETY: Chronic | ICD-10-CM

## 2020-09-01 DIAGNOSIS — Z51.81 THERAPEUTIC DRUG MONITORING: ICD-10-CM

## 2020-09-01 DIAGNOSIS — E55.9 VITAMIN D DEFICIENCY: Chronic | ICD-10-CM

## 2020-09-01 DIAGNOSIS — R73.01 IMPAIRED FASTING GLUCOSE: Chronic | ICD-10-CM

## 2020-09-01 DIAGNOSIS — N13.8 BENIGN PROSTATIC HYPERPLASIA WITH URINARY OBSTRUCTION: Chronic | ICD-10-CM

## 2020-09-01 DIAGNOSIS — E78.2 MIXED HYPERLIPIDEMIA: Primary | Chronic | ICD-10-CM

## 2020-09-01 DIAGNOSIS — E78.2 MIXED HYPERLIPIDEMIA: Chronic | ICD-10-CM

## 2020-09-01 DIAGNOSIS — E03.9 PRIMARY HYPOTHYROIDISM: Chronic | ICD-10-CM

## 2020-09-01 LAB
25(OH)D3 SERPL-MCNC: 68.6 NG/ML (ref 30–100)
ALBUMIN SERPL-MCNC: 4.2 G/DL (ref 3.5–5.2)
ALBUMIN/GLOB SERPL: 1.6 G/DL
ALP SERPL-CCNC: 55 U/L (ref 39–117)
ALT SERPL W P-5'-P-CCNC: 26 U/L (ref 1–41)
ANION GAP SERPL CALCULATED.3IONS-SCNC: 10.6 MMOL/L (ref 5–15)
AST SERPL-CCNC: 22 U/L (ref 1–40)
BILIRUB SERPL-MCNC: 0.5 MG/DL (ref 0–1.2)
BUN SERPL-MCNC: 23 MG/DL (ref 8–23)
BUN/CREAT SERPL: 24.2 (ref 7–25)
CALCIUM SPEC-SCNC: 9.4 MG/DL (ref 8.6–10.5)
CHLORIDE SERPL-SCNC: 103 MMOL/L (ref 98–107)
CK SERPL-CCNC: 454 U/L (ref 20–200)
CO2 SERPL-SCNC: 24.4 MMOL/L (ref 22–29)
CREAT SERPL-MCNC: 0.95 MG/DL (ref 0.76–1.27)
DEPRECATED RDW RBC AUTO: 47.7 FL (ref 37–54)
ERYTHROCYTE [DISTWIDTH] IN BLOOD BY AUTOMATED COUNT: 14.2 % (ref 12.3–15.4)
GFR SERPL CREATININE-BSD FRML MDRD: 78 ML/MIN/1.73
GLOBULIN UR ELPH-MCNC: 2.6 GM/DL
GLUCOSE SERPL-MCNC: 139 MG/DL (ref 65–99)
HBA1C MFR BLD: 6.26 % (ref 4.8–5.6)
HCT VFR BLD AUTO: 39.1 % (ref 37.5–51)
HGB BLD-MCNC: 13.2 G/DL (ref 13–17.7)
MCH RBC QN AUTO: 30.8 PG (ref 26.6–33)
MCHC RBC AUTO-ENTMCNC: 33.8 G/DL (ref 31.5–35.7)
MCV RBC AUTO: 91.1 FL (ref 79–97)
PLATELET # BLD AUTO: 200 10*3/MM3 (ref 140–450)
PMV BLD AUTO: 10.2 FL (ref 6–12)
POTASSIUM SERPL-SCNC: 4.3 MMOL/L (ref 3.5–5.2)
PROT SERPL-MCNC: 6.8 G/DL (ref 6–8.5)
PSA SERPL-MCNC: 0.66 NG/ML (ref 0–4)
RBC # BLD AUTO: 4.29 10*6/MM3 (ref 4.14–5.8)
SODIUM SERPL-SCNC: 138 MMOL/L (ref 136–145)
T3FREE SERPL-MCNC: 3.41 PG/ML (ref 2–4.4)
T4 FREE SERPL-MCNC: 1.25 NG/DL (ref 0.93–1.7)
TSH SERPL DL<=0.05 MIU/L-ACNC: 8.63 UIU/ML (ref 0.27–4.2)
WBC # BLD AUTO: 5.3 10*3/MM3 (ref 3.4–10.8)

## 2020-09-01 PROCEDURE — 84153 ASSAY OF PSA TOTAL: CPT | Performed by: INTERNAL MEDICINE

## 2020-09-01 PROCEDURE — 80061 LIPID PANEL: CPT | Performed by: INTERNAL MEDICINE

## 2020-09-01 PROCEDURE — 80053 COMPREHEN METABOLIC PANEL: CPT | Performed by: INTERNAL MEDICINE

## 2020-09-01 PROCEDURE — 36415 COLL VENOUS BLD VENIPUNCTURE: CPT

## 2020-09-01 PROCEDURE — 84439 ASSAY OF FREE THYROXINE: CPT | Performed by: INTERNAL MEDICINE

## 2020-09-01 PROCEDURE — 82306 VITAMIN D 25 HYDROXY: CPT | Performed by: INTERNAL MEDICINE

## 2020-09-01 PROCEDURE — 83036 HEMOGLOBIN GLYCOSYLATED A1C: CPT | Performed by: INTERNAL MEDICINE

## 2020-09-01 PROCEDURE — 84443 ASSAY THYROID STIM HORMONE: CPT | Performed by: INTERNAL MEDICINE

## 2020-09-01 PROCEDURE — 82550 ASSAY OF CK (CPK): CPT | Performed by: INTERNAL MEDICINE

## 2020-09-01 PROCEDURE — 85027 COMPLETE CBC AUTOMATED: CPT | Performed by: INTERNAL MEDICINE

## 2020-09-01 PROCEDURE — 83704 LIPOPROTEIN BLD QUAN PART: CPT | Performed by: INTERNAL MEDICINE

## 2020-09-01 PROCEDURE — 84481 FREE ASSAY (FT-3): CPT | Performed by: INTERNAL MEDICINE

## 2020-09-02 LAB
CHOLEST SERPL-MCNC: 155 MG/DL (ref 100–199)
HDL SERPL-SCNC: 23.1 UMOL/L
HDLC SERPL-MCNC: 28 MG/DL
LDL-P: 1184 NMOL/L
LDLC REAL SIZE PAT SERPL: 20.6 NM
LDLC SERPL CALC-MCNC: 100 MG/DL (ref 0–99)
SMALL LDL-P: 665 NMOL/L
TRIGL SERPL-MCNC: 150 MG/DL (ref 0–149)

## 2020-09-17 ENCOUNTER — OFFICE VISIT (OUTPATIENT)
Dept: INTERNAL MEDICINE | Facility: CLINIC | Age: 70
End: 2020-09-17

## 2020-09-17 VITALS
HEART RATE: 54 BPM | WEIGHT: 315 LBS | DIASTOLIC BLOOD PRESSURE: 58 MMHG | BODY MASS INDEX: 40.43 KG/M2 | OXYGEN SATURATION: 98 % | HEIGHT: 74 IN | SYSTOLIC BLOOD PRESSURE: 134 MMHG

## 2020-09-17 DIAGNOSIS — E78.5 HYPERLIPIDEMIA, UNSPECIFIED HYPERLIPIDEMIA TYPE: Chronic | ICD-10-CM

## 2020-09-17 DIAGNOSIS — R60.0 BILATERAL LOWER EXTREMITY EDEMA: Chronic | ICD-10-CM

## 2020-09-17 DIAGNOSIS — Z51.81 THERAPEUTIC DRUG MONITORING: ICD-10-CM

## 2020-09-17 DIAGNOSIS — Z00.00 MEDICARE ANNUAL WELLNESS VISIT, SUBSEQUENT: Primary | ICD-10-CM

## 2020-09-17 DIAGNOSIS — N40.1 BENIGN PROSTATIC HYPERPLASIA WITH URINARY OBSTRUCTION: Chronic | ICD-10-CM

## 2020-09-17 DIAGNOSIS — E78.2 MIXED HYPERLIPIDEMIA: Chronic | ICD-10-CM

## 2020-09-17 DIAGNOSIS — Z23 NEED FOR INFLUENZA VACCINATION: ICD-10-CM

## 2020-09-17 DIAGNOSIS — E03.9 PRIMARY HYPOTHYROIDISM: Chronic | ICD-10-CM

## 2020-09-17 DIAGNOSIS — E55.9 VITAMIN D DEFICIENCY: Chronic | ICD-10-CM

## 2020-09-17 DIAGNOSIS — F41.8 DEPRESSION WITH ANXIETY: Chronic | ICD-10-CM

## 2020-09-17 DIAGNOSIS — E66.01 MORBIDLY OBESE (HCC): Chronic | ICD-10-CM

## 2020-09-17 DIAGNOSIS — G47.33 OBSTRUCTIVE SLEEP APNEA: Chronic | ICD-10-CM

## 2020-09-17 DIAGNOSIS — I10 BENIGN ESSENTIAL HYPERTENSION: Chronic | ICD-10-CM

## 2020-09-17 DIAGNOSIS — N13.8 BENIGN PROSTATIC HYPERPLASIA WITH URINARY OBSTRUCTION: Chronic | ICD-10-CM

## 2020-09-17 DIAGNOSIS — Z91.09 MULTIPLE ENVIRONMENTAL ALLERGIES: Chronic | ICD-10-CM

## 2020-09-17 DIAGNOSIS — I25.10 NON-OCCLUSIVE CORONARY ARTERY DISEASE: Chronic | ICD-10-CM

## 2020-09-17 DIAGNOSIS — Z86.711 HISTORY OF PULMONARY EMBOLISM: Chronic | ICD-10-CM

## 2020-09-17 DIAGNOSIS — R73.01 IMPAIRED FASTING GLUCOSE: Chronic | ICD-10-CM

## 2020-09-17 PROBLEM — J20.9 ACUTE BRONCHITIS: Status: RESOLVED | Noted: 2020-03-16 | Resolved: 2020-09-17

## 2020-09-17 PROCEDURE — G0439 PPPS, SUBSEQ VISIT: HCPCS | Performed by: INTERNAL MEDICINE

## 2020-09-17 PROCEDURE — 99214 OFFICE O/P EST MOD 30 MIN: CPT | Performed by: INTERNAL MEDICINE

## 2020-09-17 PROCEDURE — 90694 VACC AIIV4 NO PRSRV 0.5ML IM: CPT | Performed by: INTERNAL MEDICINE

## 2020-09-17 PROCEDURE — G0008 ADMIN INFLUENZA VIRUS VAC: HCPCS | Performed by: INTERNAL MEDICINE

## 2020-09-17 RX ORDER — PRAVASTATIN SODIUM 20 MG
TABLET ORAL
Qty: 90 TABLET | Refills: 3 | Status: SHIPPED | OUTPATIENT
Start: 2020-09-17 | End: 2021-09-28

## 2020-09-17 NOTE — PROGRESS NOTES
09/17/2020    Patient Information  Durga Valenzuela                                                                                          [id number] [facility] [address]      1950  [unfilled]  There is no work phone number on file.    Chief Complaint:     Subsequent Medicare wellness visit.  Follow-up lab work in order to monitor chronic medical issues listed in history of present illness.  No new acute complaints.    History of Present Illness:     Patient with a history of impaired fasting glucose, hyperlipidemia, hypertension, hypothyroidism, nonocclusive coronary artery disease, history of pulmonary embolism, BPH, sleep apnea, vitamin D deficiency, depression with anxiety, environmental allergies, bilateral lower extremity edema, morbid obesity.  He presents today for subsequent Medicare wellness visit.  He also had lab work in order to monitor his chronic medical issues.  His past medical history reviewed and updated were necessary including health maintenance parameters.  This reveals he will be up-to-date or else accounted for after today's visit.    Review of Systems   Constitution: Negative.   HENT: Negative.    Eyes: Negative.    Cardiovascular: Negative.    Respiratory: Negative.    Endocrine: Negative.    Hematologic/Lymphatic: Negative.    Skin: Negative.    Musculoskeletal: Positive for arthritis, back pain and joint pain.   Gastrointestinal: Negative.    Genitourinary: Negative.    Neurological: Negative.    Psychiatric/Behavioral: Negative.    Allergic/Immunologic: Negative.        Active Problems:    Patient Active Problem List   Diagnosis   • Post traumatic stress disorder (PTSD)   • Benign prostatic hypertrophy   • Chronic insomnia   • Lumbar degenerative disc disease   • Impaired fasting glucose   • Benign essential hypertension   • Primary osteoarthritis involving multiple joints   • Hyperlipidemia   • Primary hypothyroidism   • Obstructive sleep apnea, tolerates  CPAP well.  Previously failed oral appliance.   • History of pulmonary embolism   • Non-occlusive coronary artery disease, 05/16/2017--normal LM; proximal LAD mild LI; 50% mid LAD.  Mild distal LAD LI; normal Cx.  Mild LI marginal branches; normal RCA, nondominant.  EF 60%.   • Therapeutic drug monitoring   • Vitamin D deficiency   • Psoriasis   • Allergic rhinitis   • Depression with anxiety   • Multiple environmental allergies   • Bilateral lower extremity edema   • Morbidly obese (CMS/HCC)         Past Medical History:   Diagnosis Date   • Allergic rhinitis 8/21/2018    Patient has had allergy testing in the past which revealed allergies to molds and grass.  Immunotherapy for about 2 years in the 1980s.   • Benign essential hypertension 2/25/2016   • Benign prostatic hypertrophy 2/25/2016   • Bilateral lower extremity edema 8/21/2018   • Chronic insomnia 2/25/2016   • Closed traumatic lateral subluxation of patellofemoral joint, right, initial encounter 8/13/2018    08/10/2018--patient was evaluated by the orthopedist for right knee pain and found to have lateral subluxation of the right patella associated with presence of right artificial knee joint.  Physical therapy no help.  Surgery is scheduled 09/17/2018   • Depression with anxiety 8/21/2018   • History of deep venous thrombosis (DVT) of distal vein of right lower extremity 8/21/2018 04/26/2016--CTA of the chest performed for elevated d-dimer and progressive shortness of breath and chest pain for one month revealed bilateral occlusive in near occlusive segmental and subsegmental pulmonary emboli in all lobes of the right lung as well as within the left lower lobe.  No evidence of pulmonary infarct.  Nonspecific multifocal groundglass attenuation in the right pulmonary apex, perhaps representing pneumonitis or submental atelectasis.  Questionable cholelithiasis.  Doppler venous study was positive for DVT in the right popliteal vein.  Hypercoagulable  workup was normal.  He was treated with Eliquis for a total of 6 months and was subsequently discontinued.   • History of pneumonia    • History of pulmonary embolism 6/10/2016    04/26/2016--CTA of the chest performed for elevated d-dimer and progressive shortness of breath and chest pain for one month revealed bilateral occlusive in near occlusive segmental and subsegmental pulmonary emboli in all lobes of the right lung as well as within the left lower lobe.  No evidence of pulmonary infarct.  Nonspecific multifocal groundglass attenuation in the right pulmonary apex, perhaps representing pneumonitis or submental atelectasis.  Questionable cholelithiasis.  Doppler venous study was positive for DVT in the right popliteal vein.  Hypercoagulable workup was normal.  He was treated with Eliquis for a total of 6 months and was subsequently discontinued.   • Hyperlipidemia 2/25/2016   • Impaired fasting glucose 2/25/2016   • Lumbar degenerative disc disease 2/25/2016   • Morbidly obese (CMS/HCC) 10/19/2018   • Multiple environmental allergies 8/21/2018    Patient has had allergy testing in the past which revealed allergies to molds and grass.  Immunotherapy for about 2 years in the 1980s.   • Non-occlusive coronary artery disease, 05/16/2017--normal LM; proximal LAD mild LI; 50% mid LAD.  Mild distal LAD LI; normal Cx.  Mild LI marginal branches; normal RCA, nondominant.  EF 60%. 7/24/2017 05/16/2017--cardiac catheterization performed for abnormal stress test.  Normal LV with ejection fraction 60%.  Dilated aortic root.  No wall motion abnormalities.  Normal left main.  Ramus branch small caliber and normal.  Proximal LAD large caliber and mild luminal irregularities.  50% mid LAD.  Mild luminal irregularities distal LAD.  3 diagonal branches of small caliber with mild luminal irregularities.  Normal septal branches.  Circumflex is large caliber and free of disease.  Marginal branches are medium caliber with mild  luminal irregularities.  RCA is a medium caliber and free of disease.  It is nondominant.   • Obstructive sleep apnea, tolerates CPAP well.  Previously failed oral appliance. 2/25/2016   • Post traumatic stress disorder (PTSD) 2/25/2016   • Primary hypothyroidism 2/25/2016   • Primary osteoarthritis involving multiple joints 2/25/2016   • Psoriasis 9/17/2014   • Vitamin D deficiency 8/21/2018         Past Surgical History:   Procedure Laterality Date   • CARDIAC CATHETERIZATION N/A 5/16/2017    Procedure: Coronary angiography;  Surgeon: Malcolm Chen MD;  Location:  [facility] CATH INVASIVE LOCATION;  Service:    • CARDIAC CATHETERIZATION N/A 5/16/2017    Procedure: Left Heart Cath;  Surgeon: Malcolm Chen MD;  [facility];  Service:    • CARDIAC CATHETERIZATION N/A 5/16/2017    Procedure: Left ventriculography;  Surgeon: Malcolm Chen MD;  [facility];  Service:    • COLONOSCOPY  2005 2005--colonoscopy reportedly normal.  Records not available.   • COLONOSCOPY N/A 6/13/2019    Procedure: COLONOSCOPY INTO CECUM WITH COLD BIOPSY POLYPECTOMY;  Surgeon: Ayaan Gallardo MD;  Location: [facility] ENDOSCOPY;  Service: General   • LUMBAR DECOMPRESSION  2007 2007--lumbar decompression without fusion or hardware.   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   • REVISION TOTAL KNEE ARTHROPLASTY Right 09/17/2018 09/17/2018--right total knee arthroplasty revision due to lateral subluxation of the patella due to multiple factors.   • TOTAL KNEE ARTHROPLASTY Left 11/03/2017 11/03/2017--right total knee replacement complicated by patellofemoral subluxation.   • TOTAL KNEE ARTHROPLASTY Left 06/2014 June 2014--left total knee replacement.         Allergies   Allergen Reactions   • Hydrocodone Other (See Comments)     Severe vomiting - but CAN  tolerate oxycodone per patient   • Zocor  [Simvastatin]      MYALGIA           Current Outpatient Medications:   •  amLODIPine (NORVASC) 10 MG tablet, Take 1 tablet by mouth Daily., Disp: 90 tablet, Rfl: 3  •  aspirin 81 MG tablet, One by mouth daily, Disp: , Rfl:   •  Cholecalciferol (VITAMIN D3) 5000 units capsule capsule, 1 by mouth daily as directed, Disp: 30 capsule, Rfl:   •  clobetasol (TEMOVATE) 0.05 % ointment, Apply twice a day as directed, Disp: 60 g, Rfl: 6  •  FLUoxetine (PROzac) 20 MG tablet, Take 2 tablets by mouth Daily., Disp: 60 tablet, Rfl: 2  •  hydrOXYzine (ATARAX) 25 MG tablet, Take 1 tablet by mouth 3 (Three) Times a Day As Needed for Itching., Disp: 60 tablet, Rfl: 1  •  levothyroxine (SYNTHROID, LEVOTHROID) 200 MCG tablet, TAKE 1 TABLET BY MOUTH EVERY DAY, Disp: 90 tablet, Rfl: 0  •  losartan (COZAAR) 50 MG tablet, Take 1 tablet by mouth Daily., Disp: 90 tablet, Rfl: 1  •  pravastatin (PRAVACHOL) 20 MG tablet, Take 1 p.o. daily for high cholesterol, Disp: 30 tablet, Rfl: 6  •  tamsulosin (FLOMAX) 0.4 MG capsule 24 hr capsule, Take 1 capsule by mouth Daily., Disp: 90 capsule, Rfl: 0      Family History   Problem Relation Age of Onset   • Coronary artery disease Mother         Status post CABG   • Alzheimer's disease Mother    • Stroke Father    • Liver cancer Father         Father with biliary cancer including gallbladder   • No Known Problems Sister    • No Known Problems Brother          Social History     Socioeconomic History   • Marital status:      Spouse name: jan   • Number of children: Not on file   • Years of education: Not on file   • Highest education level: Bachelor's degree (e.g., BA, AB, BS)   Occupational History   • Occupation: Retired June 2015--   Social Needs   • Financial resource strain: Not hard at all   • Food insecurity     Worry: Never true     Inability: Never true   • Transportation needs     Medical: No     Non-medical: No   Tobacco Use  "  • Smoking status: Never Smoker   • Smokeless tobacco: Never Used   Substance and Sexual Activity   • Alcohol use: No   • Drug use: Yes     Types: Marijuana   • Sexual activity: Yes     Partners: Female   Lifestyle   • Physical activity     Days per week: 5 days     Minutes per session: 30 min   • Stress: Only a little   Relationships   • Social connections     Talks on phone: More than three times a week     Gets together: More than three times a week     Attends Alevism service: Never     Active member of club or organization: No     Attends meetings of clubs or organizations: Never     Relationship status:          Vitals:    09/17/20 0939   BP: 134/58   BP Location: Left arm   Pulse: 54   SpO2: 98%   Weight: (!) 145 kg (320 lb 3.2 oz)   Height: 188 cm (74.02\")        Body mass index is 41.09 kg/m².      Physical Exam:    General: Alert and oriented x 3.  No acute distress.  Normal affect.  Morbidly obese.  HEENT: Pupils equal, round, reactive to light; extraocular movements intact; sclerae nonicteric; pharynx, ear canals and TMs normal.  Neck: Without JVD, thyromegaly, bruit, or adenopathy.  Lungs: Clear to auscultation in all fields.  Heart: Regular rate and rhythm without murmur, rub, gallop, or click.  Abdomen: Soft, nontender, without hepatosplenomegaly or hernia.  Bowel sounds normal.  : Deferred.  Rectal: Deferred.  Extremities: Without clubbing, cyanosis, edema, or pulse deficit.  Neurologic: Intact without focal deficit.  Normal station and gait observed during ingress and egress from the examination room.  Skin: Without significant lesion.  Musculoskeletal: Unremarkable.    Lab/other results:    NMR reveals a total cholesterol of 155.  Triglycerides 150.  LDL particle number slightly elevated 1184.  Small LDL particle number elevated at 665.  HDL particle number low at 23.1.  CPK elevated at 454.  CMP normal except glucose 139.  Hemoglobin A1c 6.26.  CBC is normal.  Vitamin D normal at 68.6. "  TSH is elevated at 8.63.  Free T3 and free T4 are normal.  PSA normal at 0.656.    Assessment/Plan:     Diagnosis Plan   1. Medicare annual wellness visit, subsequent     2. Impaired fasting glucose     3. Hyperlipidemia     4. Benign essential hypertension     5. Primary hypothyroidism     6. Non-occlusive coronary artery disease, 05/16/2017--normal LM; proximal LAD mild LI; 50% mid LAD.  Mild distal LAD LI; normal Cx.  Mild LI marginal branches; normal RCA, nondominant.  EF 60%.     7. History of pulmonary embolism     8. Benign prostatic hypertrophy     9. Obstructive sleep apnea, tolerates CPAP well.  Previously failed oral appliance.     10. Vitamin D deficiency     11. Depression with anxiety     12. Multiple environmental allergies     13. Bilateral lower extremity edema     14. Morbidly obese (CMS/[facility])     15. Therapeutic drug monitoring     16. Need for influenza vaccination  Fluad Quad 65+ yrs ([id number])     The subsequent Medicare wellness visit is documented on a separate note.    Patient has impaired fasting glucose that does not require medication.  Strongly recommend low carbohydrate diet and weight loss.  Hyperlipidemia is under fairly reasonable control.  Blood pressure seems to be controlled.  His TSH is elevated suggesting he is not therapeutic on his levothyroxine.  However, he is taking 200 mcg/day.  I question whether or not the patient has missed a dose or 2.  His nonocclusive coronary artery disease is stable.  He has a remote history of pulmonary embolism with no recurrence.  BPH symptoms seem to be controlled with Flomax.  He has sleep apnea and tolerates CPAP well.  Vitamin D in the normal range.  Depression with anxiety seems to be controlled with Prozac.  His environmental allergies currently not much of an issue.  Lower extremity edema is well controlled.  Patient continues to have problems with morbid obesity and likely always will.    Plan is as follows: High-dose influenza vaccine  given.  Strongly recommend low carbohydrate diet, weight loss, and exercise if at all possible.  I am not going to make any changes in his medical regimen at the present time.  I will have him follow-up in 6 months with lab prior or follow-up as needed.  If his TSH remains elevated at that time then we will increase his levothyroxine.     Addendum: At the end of the visit we realized the patient has not been taking pravastatin 20 mg/day.  Given his NMR profile and his history of coronary artery disease, we will restart this medication.    Procedures

## 2020-09-17 NOTE — PROGRESS NOTES
The ABCs of the Annual Wellness Visit  Subsequent Medicare Wellness Visit    No chief complaint on file.      Subjective   History of Present Illness:  Durga Valenzuela is a 70 y.o. male who presents for a Subsequent Medicare Wellness Visit.    HEALTH RISK ASSESSMENT    Recent Hospitalizations:  No hospitalization(s) within the last year.    Current Medical Providers:  Patient Care Team:  Florian Burt MD as PCP - General (Internal Medicine)  Florian Montgomery MD as PCP - Claims Attributed    Smoking Status:  Social History     Tobacco Use   Smoking Status Never Smoker   Smokeless Tobacco Never Used       Alcohol Consumption:  Social History     Substance and Sexual Activity   Alcohol Use No       Depression Screen:   PHQ-2/PHQ-9 Depression Screening 9/17/2020   Little interest or pleasure in doing things 0   Feeling down, depressed, or hopeless 0   Total Score 0       Fall Risk Screen:  RICHARADI Fall Risk Assessment was completed, and patient is at LOW risk for falls.Assessment completed on:9/17/2020    Health Habits and Functional and Cognitive Screening:  Functional & Cognitive Status 9/17/2020   Do you have difficulty preparing food and eating? No   Do you have difficulty bathing yourself, getting dressed or grooming yourself? No   Do you have difficulty using the toilet? No   Do you have difficulty moving around from place to place? No   Do you have trouble with steps or getting out of a bed or a chair? No   Current Diet Unhealthy Diet   Dental Exam Up to date   Eye Exam Up to date   Exercise (times per week) 3 times per week   Current Exercise Activities Include Yard Work   Do you need help using the phone?  No   Are you deaf or do you have serious difficulty hearing?  Yes   Do you need help with transportation? No   Do you need help shopping? No   Do you need help preparing meals?  No   Do you need help with housework?  No   Do you need help with laundry? No   Do you need help taking your medications? No    Do you need help managing money? No   Do you ever drive or ride in a car without wearing a seat belt? No   Have you felt unusual stress, anger or loneliness in the last month? No   Who do you live with? Spouse   If you need help, do you have trouble finding someone available to you? No   Have you been bothered in the last four weeks by sexual problems? No   Do you have difficulty concentrating, remembering or making decisions? No         Does the patient have evidence of cognitive impairment? No    Asprin use counseling:Taking ASA appropriately as indicated    Age-appropriate Screening Schedule:  Refer to the list below for future screening recommendations based on patient's age, sex and/or medical conditions. Orders for these recommended tests are listed in the plan section. The patient has been provided with a written plan.    Health Maintenance   Topic Date Due   • INFLUENZA VACCINE  08/01/2020   • LIPID PANEL  09/01/2021   • COLONOSCOPY  06/13/2024   • TDAP/TD VACCINES  Discontinued   • ZOSTER VACCINE  Discontinued          The following portions of the patient's history were reviewed and updated as appropriate: allergies, current medications, past family history, past medical history, past social history, past surgical history and problem list.    Outpatient Medications Prior to Visit   Medication Sig Dispense Refill   • amLODIPine (NORVASC) 10 MG tablet Take 1 tablet by mouth Daily. 90 tablet 3   • aspirin 81 MG tablet One by mouth daily     • Cholecalciferol (VITAMIN D3) 5000 units capsule capsule 1 by mouth daily as directed 30 capsule    • clobetasol (TEMOVATE) 0.05 % ointment Apply twice a day as directed 60 g 6   • FLUoxetine (PROzac) 20 MG tablet Take 2 tablets by mouth Daily. 60 tablet 2   • hydrOXYzine (ATARAX) 25 MG tablet Take 1 tablet by mouth 3 (Three) Times a Day As Needed for Itching. 60 tablet 1   • levothyroxine (SYNTHROID, LEVOTHROID) 200 MCG tablet TAKE 1 TABLET BY MOUTH EVERY DAY 90 tablet  0   • losartan (COZAAR) 50 MG tablet Take 1 tablet by mouth Daily. 90 tablet 1   • pravastatin (PRAVACHOL) 20 MG tablet Take 1 p.o. daily for high cholesterol 30 tablet 6   • tamsulosin (FLOMAX) 0.4 MG capsule 24 hr capsule Take 1 capsule by mouth Daily. 90 capsule 0   • amphetamine-dextroamphetamine (ADDERALL) 20 MG tablet 1 tablet Daily.     • benzonatate (TESSALON) 200 MG capsule Will take 1 p.o. 3 times daily as needed for cough 30 capsule 0   • cefdinir (OMNICEF) 300 MG capsule Take 1 p.o. twice daily until gone 20 capsule 0   • triazolam (HALCION) 0.125 MG tablet 1 tablet As Needed.  0     No facility-administered medications prior to visit.        Patient Active Problem List   Diagnosis   • Post traumatic stress disorder (PTSD)   • Benign prostatic hypertrophy   • Chronic insomnia   • Lumbar degenerative disc disease   • Impaired fasting glucose   • Benign essential hypertension   • Primary osteoarthritis involving multiple joints   • Hyperlipidemia   • Primary hypothyroidism   • Obstructive sleep apnea, tolerates CPAP well.  Previously failed oral appliance.   • History of pulmonary embolism   • Non-occlusive coronary artery disease, 05/16/2017--normal LM; proximal LAD mild LI; 50% mid LAD.  Mild distal LAD LI; normal Cx.  Mild LI marginal branches; normal RCA, nondominant.  EF 60%.   • Therapeutic drug monitoring   • Vitamin D deficiency   • Psoriasis   • Allergic rhinitis   • Depression with anxiety   • Multiple environmental allergies   • Bilateral lower extremity edema   • Morbidly obese (CMS/[facility])       Advanced Care Planning:  ACP discussion was held with the patient during this visit. Patient has an advance directive (not in EMR), copy requested.    Review of Systems   Constitutional: Negative.    HENT: Negative.    Eyes: Negative.    Respiratory: Negative.    Gastrointestinal: Negative.    Endocrine: Negative.    Genitourinary: Negative.    Musculoskeletal: Positive for arthralgias and back pain.  "  Skin: Negative.    Neurological: Negative.    Hematological: Negative.    Psychiatric/Behavioral: Negative.        Compared to one year ago, the patient feels his physical health is better.  Compared to one year ago, the patient feels his mental health is better.    Reviewed chart for potential of high risk medication in the elderly: yes  Reviewed chart for potential of harmful drug interactions in the elderly:yes    Objective         Vitals:    09/17/20 0939   BP: 134/58   BP Location: Left arm   Pulse: 54   SpO2: 98%   Weight: (!) 145 kg (320 lb 3.2 oz)   Height: 188 cm (74.02\")       Body mass index is 41.09 kg/m².  Discussed the patient's BMI with him. The BMI is above average; BMI management plan is completed.    Physical Exam     General: Alert and oriented x 3.  No acute distress.  Normal affect.  Morbidly obese.  HEENT: Pupils equal, round, reactive to light; extraocular movements intact; sclerae nonicteric; pharynx, ear canals and TMs normal.  Neck: Without JVD, thyromegaly, bruit, or adenopathy.  Lungs: Clear to auscultation in all fields.  Heart: Regular rate and rhythm without murmur, rub, gallop, or click.  Abdomen: Soft, nontender, without hepatosplenomegaly or hernia.  Bowel sounds normal.  : Deferred.  Rectal: Deferred.  Extremities: Without clubbing, cyanosis, edema, or pulse deficit.  Neurologic: Intact without focal deficit.  Normal station and gait observed during ingress and egress from the examination room.  Skin: Without significant lesion.  Musculoskeletal: Unremarkable.    Lab Results   Component Value Date    CHLPL 155 09/01/2020    TRIG 150 (H) 09/01/2020    HGBA1C 6.26 (H) 09/01/2020        Assessment/Plan   Medicare Risks and Personalized Health Plan  CMS Preventative Services Quick Reference  Advance Directive Discussion  Cardiovascular risk  Diabetic Lab Screening   Immunizations Discussed/Encouraged (specific immunizations; Influenza )  Obesity/Overweight   Prostate Cancer " Screening     The above risks/problems have been discussed with the patient.  Pertinent information has been shared with the patient in the After Visit Summary.  Follow up plans and orders are seen below in the Assessment/Plan Section.    Diagnoses and all orders for this visit:    1. Medicare annual wellness visit, subsequent (Primary)    2. Impaired fasting glucose    3. Hyperlipidemia    4. Benign essential hypertension    5. Primary hypothyroidism    6. Non-occlusive coronary artery disease, 05/16/2017--normal LM; proximal LAD mild LI; 50% mid LAD.  Mild distal LAD LI; normal Cx.  Mild LI marginal branches; normal RCA, nondominant.  EF 60%.    7. History of pulmonary embolism    8. Benign prostatic hypertrophy    9. Obstructive sleep apnea, tolerates CPAP well.  Previously failed oral appliance.    10. Vitamin D deficiency    11. Depression with anxiety    12. Multiple environmental allergies    13. Bilateral lower extremity edema    14. Morbidly obese (CMS/HCC)    15. Therapeutic drug monitoring    16. Need for influenza vaccination  -     Fluad Quad 65+ yrs ([id number])      Follow Up:  Return in about 6 months (around 3/17/2021) for Next scheduled follow up with lab prior.     An After Visit Summary and PPPS were given to the patient.

## 2020-09-24 RX ORDER — FLUOXETINE 20 MG/1
40 TABLET, FILM COATED ORAL DAILY
Qty: 60 TABLET | Refills: 5 | Status: SHIPPED | OUTPATIENT
Start: 2020-09-24 | End: 2020-12-29 | Stop reason: SDUPTHER

## 2020-12-08 NOTE — TELEPHONE ENCOUNTER
Caller: Durga Valenzuela    Relationship: Self    Best call back number: [phone]   Medication needed:   Requested Prescriptions     Pending Prescriptions Disp Refills   • levothyroxine (SYNTHROID, LEVOTHROID) 200 MCG tablet 90 tablet 0     Sig: Take 1 tablet by mouth Daily.       When do you need the refill by: TODAY    What details did the patient provide when requesting the medication: 1 DAY MEDICATION LEFT   Does the patient have less than a 3 day supply:  [x] Yes  [] No    What is the patient's preferred pharmacy: [facility] [address] [address] [facility] - [phone] [facility] [phone] FX

## 2020-12-10 RX ORDER — LEVOTHYROXINE SODIUM 0.2 MG/1
200 TABLET ORAL DAILY
Qty: 90 TABLET | Refills: 1 | Status: SHIPPED | OUTPATIENT
Start: 2020-12-10 | End: 2020-12-17 | Stop reason: SDUPTHER

## 2020-12-17 DIAGNOSIS — I10 ESSENTIAL (PRIMARY) HYPERTENSION: ICD-10-CM

## 2020-12-17 RX ORDER — LEVOTHYROXINE SODIUM 0.2 MG/1
200 TABLET ORAL DAILY
Qty: 90 TABLET | Refills: 1 | Status: SHIPPED | OUTPATIENT
Start: 2020-12-17 | End: 2021-09-30 | Stop reason: SDUPTHER

## 2020-12-17 RX ORDER — AMLODIPINE BESYLATE 10 MG/1
10 TABLET ORAL DAILY
Qty: 90 TABLET | Refills: 3 | Status: SHIPPED | OUTPATIENT
Start: 2020-12-17 | End: 2021-04-30 | Stop reason: SDUPTHER

## 2020-12-30 RX ORDER — FLUOXETINE 20 MG/1
40 TABLET, FILM COATED ORAL DAILY
Qty: 60 TABLET | Refills: 5 | Status: SHIPPED | OUTPATIENT
Start: 2020-12-30 | End: 2021-02-22 | Stop reason: SDUPTHER

## 2021-01-13 ENCOUNTER — HOSPITAL ENCOUNTER (OUTPATIENT)
Dept: GENERAL RADIOLOGY | Facility: HOSPITAL | Age: 71
Discharge: HOME | End: 2021-01-13
Admitting: INTERNAL MEDICINE

## 2021-01-13 ENCOUNTER — OFFICE VISIT (OUTPATIENT)
Dept: INTERNAL MEDICINE | Facility: CLINIC | Age: 71
End: 2021-01-13

## 2021-01-13 VITALS
OXYGEN SATURATION: 98 % | DIASTOLIC BLOOD PRESSURE: 72 MMHG | BODY MASS INDEX: 40.43 KG/M2 | HEART RATE: 55 BPM | HEIGHT: 74 IN | WEIGHT: 315 LBS | SYSTOLIC BLOOD PRESSURE: 120 MMHG

## 2021-01-13 DIAGNOSIS — E66.01 MORBIDLY OBESE (HCC): Chronic | ICD-10-CM

## 2021-01-13 DIAGNOSIS — M54.41 ACUTE RIGHT-SIDED LOW BACK PAIN WITH RIGHT-SIDED SCIATICA: Primary | ICD-10-CM

## 2021-01-13 DIAGNOSIS — M51.36 LUMBAR DEGENERATIVE DISC DISEASE: Chronic | ICD-10-CM

## 2021-01-13 PROCEDURE — 72110 X-RAY EXAM L-2 SPINE 4/>VWS: CPT

## 2021-01-13 PROCEDURE — 99214 OFFICE O/P EST MOD 30 MIN: CPT | Performed by: INTERNAL MEDICINE

## 2021-01-13 RX ORDER — FLUTICASONE PROPIONATE 50 MCG
2 SPRAY, SUSPENSION (ML) NASAL DAILY
COMMUNITY
Start: 2020-11-19 | End: 2022-04-01

## 2021-01-13 RX ORDER — PREDNISONE 10 MG/1
TABLET ORAL
Qty: 56 TABLET | Refills: 0 | Status: SHIPPED | OUTPATIENT
Start: 2021-01-13 | End: 2021-03-17

## 2021-01-13 RX ORDER — OXYCODONE AND ACETAMINOPHEN 10; 325 MG/1; MG/1
TABLET ORAL
Qty: 60 TABLET | Refills: 0 | Status: SHIPPED | OUTPATIENT
Start: 2021-01-13 | End: 2021-04-30 | Stop reason: SDUPTHER

## 2021-01-13 RX ORDER — DEXTROAMPHETAMINE SACCHARATE, AMPHETAMINE ASPARTATE, DEXTROAMPHETAMINE SULFATE AND AMPHETAMINE SULFATE 5; 5; 5; 5 MG/1; MG/1; MG/1; MG/1
20 TABLET ORAL DAILY
Status: ON HOLD | COMMUNITY
Start: 2020-12-11 | End: 2022-12-05

## 2021-01-13 RX ORDER — TRIAZOLAM 0.12 MG/1
TABLET ORAL
COMMUNITY
Start: 2020-10-28 | End: 2021-01-13

## 2021-01-13 RX ORDER — ZOLPIDEM TARTRATE 10 MG/1
10 TABLET ORAL NIGHTLY PRN
COMMUNITY
Start: 2021-01-09 | End: 2022-11-16 | Stop reason: SDUPTHER

## 2021-01-13 NOTE — PROGRESS NOTES
01/13/2021    Patient Information  Durga Valenzuela                                                                                          [id number] [facility] [address]      1950  [unfilled]  There is no work phone number on file.    Chief Complaint:     Complaining of severe right-sided lower back pain with radiation in the right leg.    History of Present Illness:    Patient with a history of hyperlipidemia, hypothyroidism, sleep apnea, previous lumbar disc degenerative disease, primary osteoarthritis, hypertension, nonocclusive coronary artery disease, history of pulmonary embolism, bilateral lower extremity edema.  He presents today with complaints of low back pain and right-sided sciatica as described below.  Past medical history reviewed and updated were necessary including health maintenance parameters and this reveals that he is currently up-to-date or else accounted for.    The history regarding lumbar disc disease and acute episode of right sciatica:    January 13, 2021--patient with a previous history of lumbar disc disease that was treated surgically several years ago with decompression.  He presents with acute onset about 2 weeks ago significant right-sided lower back pain with radiation into his right lower extremity posterior laterally down to the level of the right ankle.  The pain is so bad he is using a wheeled walker to assist him.  He took ibuprofen without any relief.  No known injury.  No change in urinary symptoms and no significant change in bowel habits.  The right leg is weak.  No other neurologic symptoms.  On exam patient is in obvious discomfort, particularly with ambulation using the assistance of a wheeled walker.  There are tight tender lumbar paraspinous muscles particularly on the right.  Right straight leg raising is positive.  No rash noted.  Patient is clearly having sciatica and could have a ruptured disc.  Plan is as follows: X-ray lumbar spine.   MRI lumbar spine.  Start prednisone 50 mg p.o. daily x 7 days taper and discontinue.  Patient does not tolerate hydrocodone but cannot tolerate oxycodone.  Prescription given.  Patient may need orthopedic spine/neurologic referral.    Review of Systems   Constitution: Negative.   HENT: Negative.    Eyes: Negative.    Cardiovascular: Negative.    Respiratory: Negative.    Endocrine: Negative.    Hematologic/Lymphatic: Negative.    Skin: Negative.    Musculoskeletal: Positive for back pain and muscle weakness.   Gastrointestinal: Negative.    Genitourinary: Negative.    Neurological: Positive for numbness and paresthesias.   Psychiatric/Behavioral: Negative.    Allergic/Immunologic: Negative.        Active Problems:    Patient Active Problem List   Diagnosis   • Post traumatic stress disorder (PTSD)   • Benign prostatic hypertrophy   • Chronic insomnia   • Lumbar degenerative disc disease   • Impaired fasting glucose   • Benign essential hypertension   • Primary osteoarthritis involving multiple joints   • Hyperlipidemia   • Primary hypothyroidism   • Obstructive sleep apnea, tolerates CPAP well.  Previously failed oral appliance.   • History of pulmonary embolism   • Non-occlusive coronary artery disease, 05/16/2017--normal LM; proximal LAD mild LI; 50% mid LAD.  Mild distal LAD LI; normal Cx.  Mild LI marginal branches; normal RCA, nondominant.  EF 60%.   • Therapeutic drug monitoring   • Vitamin D deficiency   • Psoriasis   • Allergic rhinitis   • Depression with anxiety   • Multiple environmental allergies   • Bilateral lower extremity edema   • Morbidly obese (CMS/HCC)   • Acute right-sided low back pain with right-sided sciatica         Past Medical History:   Diagnosis Date   • Allergic rhinitis 8/21/2018    Patient has had allergy testing in the past which revealed allergies to molds and grass.  Immunotherapy for about 2 years in the 1980s.   • Benign essential hypertension 2/25/2016   • Benign prostatic  hypertrophy 2/25/2016   • Bilateral lower extremity edema 8/21/2018   • Chronic insomnia 2/25/2016   • Closed traumatic lateral subluxation of patellofemoral joint, right, initial encounter 8/13/2018    08/10/2018--patient was evaluated by the orthopedist for right knee pain and found to have lateral subluxation of the right patella associated with presence of right artificial knee joint.  Physical therapy no help.  Surgery is scheduled 09/17/2018   • Depression with anxiety 8/21/2018   • History of deep venous thrombosis (DVT) of distal vein of right lower extremity 8/21/2018 04/26/2016--CTA of the chest performed for elevated d-dimer and progressive shortness of breath and chest pain for one month revealed bilateral occlusive in near occlusive segmental and subsegmental pulmonary emboli in all lobes of the right lung as well as within the left lower lobe.  No evidence of pulmonary infarct.  Nonspecific multifocal groundglass attenuation in the right pulmonary apex, perhaps representing pneumonitis or submental atelectasis.  Questionable cholelithiasis.  Doppler venous study was positive for DVT in the right popliteal vein.  Hypercoagulable workup was normal.  He was treated with Eliquis for a total of 6 months and was subsequently discontinued.   • History of pneumonia    • History of pulmonary embolism 6/10/2016    04/26/2016--CTA of the chest performed for elevated d-dimer and progressive shortness of breath and chest pain for one month revealed bilateral occlusive in near occlusive segmental and subsegmental pulmonary emboli in all lobes of the right lung as well as within the left lower lobe.  No evidence of pulmonary infarct.  Nonspecific multifocal groundglass attenuation in the right pulmonary apex, perhaps representing pneumonitis or submental atelectasis.  Questionable cholelithiasis.  Doppler venous study was positive for DVT in the right popliteal vein.  Hypercoagulable workup was normal.  He was  treated with Eliquis for a total of 6 months and was subsequently discontinued.   • Hyperlipidemia 2/25/2016   • Impaired fasting glucose 2/25/2016   • Lumbar degenerative disc disease 2/25/2016   • Morbidly obese (CMS/HCC) 10/19/2018   • Multiple environmental allergies 8/21/2018    Patient has had allergy testing in the past which revealed allergies to molds and grass.  Immunotherapy for about 2 years in the 1980s.   • Non-occlusive coronary artery disease, 05/16/2017--normal LM; proximal LAD mild LI; 50% mid LAD.  Mild distal LAD LI; normal Cx.  Mild LI marginal branches; normal RCA, nondominant.  EF 60%. 7/24/2017 05/16/2017--cardiac catheterization performed for abnormal stress test.  Normal LV with ejection fraction 60%.  Dilated aortic root.  No wall motion abnormalities.  Normal left main.  Ramus branch small caliber and normal.  Proximal LAD large caliber and mild luminal irregularities.  50% mid LAD.  Mild luminal irregularities distal LAD.  3 diagonal branches of small caliber with mild luminal irregularities.  Normal septal branches.  Circumflex is large caliber and free of disease.  Marginal branches are medium caliber with mild luminal irregularities.  RCA is a medium caliber and free of disease.  It is nondominant.   • Obstructive sleep apnea, tolerates CPAP well.  Previously failed oral appliance. 2/25/2016   • Post traumatic stress disorder (PTSD) 2/25/2016   • Primary hypothyroidism 2/25/2016   • Primary osteoarthritis involving multiple joints 2/25/2016   • Psoriasis 9/17/2014   • Vitamin D deficiency 8/21/2018         Past Surgical History:   Procedure Laterality Date   • CARDIAC CATHETERIZATION N/A 5/16/2017    Procedure: Coronary angiography;  Surgeon: Malcolm Chen MD;  Location: [facility] CATH INVASIVE LOCATION;  Service:    • CARDIAC CATHETERIZATION N/A 5/16/2017    Procedure: Left Heart Cath;  Surgeon: Malcolm Chen MD;  Location: [facility] CATH INVASIVE LOCATION;  Service:     • CARDIAC CATHETERIZATION N/A 5/16/2017    Procedure: Left ventriculography;  Surgeon: Malcolm Chen MD;  Location:  [facility] CATH INVASIVE LOCATION;  Service:    • COLONOSCOPY  2005 2005--colonoscopy reportedly normal.  Records not available.   • COLONOSCOPY N/A 6/13/2019    Procedure: COLONOSCOPY INTO CECUM WITH COLD BIOPSY POLYPECTOMY;  Surgeon: Ayaan Gallardo MD;  Location: [facility] ENDOSCOPY;  Service: General   • LUMBAR DECOMPRESSION  2007 2007--lumbar decompression without fusion or hardware.   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   • REVISION TOTAL KNEE ARTHROPLASTY Right 09/17/2018 09/17/2018--right total knee arthroplasty revision due to lateral subluxation of the patella due to multiple factors.   • TOTAL KNEE ARTHROPLASTY Left 11/03/2017 11/03/2017--right total knee replacement complicated by patellofemoral subluxation.   • TOTAL KNEE ARTHROPLASTY Left 06/2014 June 2014--left total knee replacement.         Allergies   Allergen Reactions   • Hydrocodone Other (See Comments)     Severe vomiting - but CAN tolerate oxycodone per patient   • Zocor  [Simvastatin]      MYALGIA           Current Outpatient Medications:   •  amLODIPine (NORVASC) 10 MG tablet, Take 1 tablet by mouth Daily., Disp: 90 tablet, Rfl: 3  •  amphetamine-dextroamphetamine (ADDERALL) 20 MG tablet, , Disp: , Rfl:   •  aspirin 81 MG tablet, One by mouth daily, Disp: , Rfl:   •  Cholecalciferol (VITAMIN D3) 5000 units capsule capsule, 1 by mouth daily as directed, Disp: 30 capsule, Rfl:   •  clobetasol (TEMOVATE) 0.05 % ointment, Apply twice a day as directed, Disp: 60 g, Rfl: 6  •  FLUoxetine (PROzac) 20 MG tablet, Take 2 tablets by mouth Daily., Disp: 60 tablet, Rfl: 5  •  fluticasone (FLONASE) 50 MCG/ACT nasal spray, , Disp: , Rfl:   •  hydrOXYzine (ATARAX) 25 MG tablet, Take 1 tablet by mouth 3 (Three) Times a  Day As Needed for Itching., Disp: 60 tablet, Rfl: 1  •  levothyroxine (SYNTHROID, LEVOTHROID) 200 MCG tablet, Take 1 tablet by mouth Daily., Disp: 90 tablet, Rfl: 1  •  losartan (COZAAR) 50 MG tablet, Take 1 tablet by mouth Daily., Disp: 90 tablet, Rfl: 1  •  pravastatin (Pravachol) 20 MG tablet, Take 1 p.o. daily for high cholesterol, Disp: 90 tablet, Rfl: 3  •  tamsulosin (FLOMAX) 0.4 MG capsule 24 hr capsule, Take 1 capsule by mouth Daily., Disp: 90 capsule, Rfl: 0  •  zolpidem (AMBIEN) 10 MG tablet, , Disp: , Rfl:   •  oxyCODONE-acetaminophen (PERCOCET)  MG per tablet, Take 1-2 p.o. every 6 hours for moderate to severe low back pain., Disp: 60 tablet, Rfl: 0  •  predniSONE (DELTASONE) 10 MG tablet, 5 by mouth daily 7 days, then 4 daily 2 days, 3 daily 2 days, 2 daily 2 days, 1 daily 2 days, one half daily 2 days and discontinue., Disp: 56 tablet, Rfl: 0      Family History   Problem Relation Age of Onset   • Coronary artery disease Mother         Status post CABG   • Alzheimer's disease Mother    • Stroke Father    • Liver cancer Father         Father with biliary cancer including gallbladder   • No Known Problems Sister    • No Known Problems Brother          Social History     Socioeconomic History   • Marital status:      Spouse name: jan   • Number of children: Not on file   • Years of education: Not on file   • Highest education level: Bachelor's degree (e.g., BA, AB, BS)   Occupational History   • Occupation: Retired June 2015--   Social Needs   • Financial resource strain: Not hard at all   • Food insecurity     Worry: Never true     Inability: Never true   • Transportation needs     Medical: No     Non-medical: No   Tobacco Use   • Smoking status: Never Smoker   • Smokeless tobacco: Never Used   Substance and Sexual Activity   • Alcohol use: No   • Drug use: Yes     Types: Marijuana   • Sexual activity: Yes     Partners: Female   Lifestyle   • Physical activity     Days  "per week: 5 days     Minutes per session: 30 min   • Stress: Only a little   Relationships   • Social connections     Talks on phone: More than three times a week     Gets together: More than three times a week     Attends Alevism service: Never     Active member of club or organization: No     Attends meetings of clubs or organizations: Never     Relationship status:          Vitals:    01/13/21 0902   BP: 120/72   BP Location: Right arm   Patient Position: Sitting   Cuff Size: Large Adult   Pulse: 55   SpO2: 98%   Weight: (!) 159 kg (351 lb)   Height: 188 cm (74.02\")        Body mass index is 45.04 kg/m².      Physical Exam:    General: Alert and oriented x 3.  No acute distress.  Normal affect. Morbidly obese. HEENT: Pupils equal, round, reactive to light; extraocular movements intact; sclerae nonicteric; pharynx, ear canals and TMs normal.  Neck: Without JVD, thyromegaly, bruit, or adenopathy.  Lungs: Clear to auscultation in all fields.  Heart: Regular rate and rhythm without murmur, rub, gallop, or click.  Abdomen: Soft, nontender, without hepatosplenomegaly or hernia.  Bowel sounds normal.  : Deferred.  Rectal: Deferred.  Extremities: Without clubbing, cyanosis, edema, or pulse deficit.  Neurologic: Intact without focal deficit.  Normal station and gait observed during ingress and egress from the examination room.  Skin: Without significant lesion.  Musculoskeletal: Patient is in obvious discomfort and is using a wheeled walker for ambulation.  Obvious pain with ambulation.  Straight leg raising positive on the right and are tender lumbar paraspinous muscles present.    Lab/other results:      Assessment/Plan:     Diagnosis Plan   1. Acute right-sided low back pain with right-sided sciatica  XR Spine Lumbar Complete 4+VW    MRI Lumbar Spine Without Contrast    predniSONE (DELTASONE) 10 MG tablet    oxyCODONE-acetaminophen (PERCOCET)  MG per tablet   2. Lumbar degenerative disc disease  XR " Spine Lumbar Complete 4+VW    MRI Lumbar Spine Without Contrast    predniSONE (DELTASONE) 10 MG tablet    oxyCODONE-acetaminophen (PERCOCET)  MG per tablet   3. Morbidly obese (CMS/HCC)       January 13, 2021--patient with a previous history of lumbar disc disease that was treated surgically several years ago with decompression.  He presents with acute onset about 2 weeks ago significant right-sided lower back pain with radiation into his right lower extremity posterior laterally down to the level of the right ankle.  The pain is so bad he is using a wheeled walker to assist him.  He took ibuprofen without any relief.  No known injury.  No change in urinary symptoms and no significant change in bowel habits.  The right leg is weak.  No other neurologic symptoms.  On exam patient is in obvious discomfort, particularly with ambulation using the assistance of a wheeled walker.  There are tight tender lumbar paraspinous muscles particularly on the right.  Right straight leg raising is positive.  No rash noted.  Patient is clearly having sciatica and could have a ruptured disc.      Plan is as follows: X-ray lumbar spine.  MRI lumbar spine.  Start prednisone 50 mg p.o. daily x 7 days taper and discontinue.  Patient does not tolerate hydrocodone but cannot tolerate oxycodone.  Prescription given.  Patient may need orthopedic spine/neurologic referral.  Patient can follow-up on the phone for the results of the radiographic studies.  Patient instructed to contact me for any significant change and certainly if his symptoms worsen.            Procedures

## 2021-02-06 ENCOUNTER — HOSPITAL ENCOUNTER (OUTPATIENT)
Dept: MRI IMAGING | Facility: HOSPITAL | Age: 71
Discharge: HOME | End: 2021-02-06
Admitting: INTERNAL MEDICINE

## 2021-02-06 DIAGNOSIS — M54.41 ACUTE RIGHT-SIDED LOW BACK PAIN WITH RIGHT-SIDED SCIATICA: ICD-10-CM

## 2021-02-06 DIAGNOSIS — M51.36 LUMBAR DEGENERATIVE DISC DISEASE: Chronic | ICD-10-CM

## 2021-02-06 PROCEDURE — 72148 MRI LUMBAR SPINE W/O DYE: CPT

## 2021-02-08 ENCOUNTER — APPOINTMENT (OUTPATIENT)
Dept: OTHER | Facility: HOSPITAL | Age: 71
End: 2021-02-08

## 2021-02-08 DIAGNOSIS — Z09 FOLLOW UP: ICD-10-CM

## 2021-02-11 ENCOUNTER — TELEPHONE (OUTPATIENT)
Dept: PEDIATRICS | Facility: OTHER | Age: 71
End: 2021-02-11

## 2021-02-11 NOTE — TELEPHONE ENCOUNTER
Patient called in and stated that he would like the results of the MRI that was done on him. No further info provided. Please advise @ [phone]

## 2021-02-12 NOTE — TELEPHONE ENCOUNTER
IMPRESSION:     There is a minimal degree of levoconvex scoliotic curvature of the  lumbar spine with its apex centered at L3.     At L2-3, there is a central disc extrusion which extends both cephalad  and caudad from the level of the L2-3 disc space and results in a  moderate to severe degree of central canal stenosis.     At L5-S1, there has been a previous left laminectomy procedure. There  are advanced degenerative disc changes seen at L5-S1 with adjacent  degenerative endplate marrow changes. A disc osteophyte complex at the  L5-S1 level results in a moderate degree of bilateral foraminal  compromise.     At the L4-5 level, there is a mild disc bulge with a posterior annular  fissure. A mild degree of canal stenosis is seen at L4-5 secondary to  predominantly ligamentum flavum thickening and hypertrophy of the  posterior elements. A mild degree of bilateral foraminal narrowing is  seen at the L4-5 level secondary to predominantly bulging disc material.    Tell patient that he has moderate to severe lumbar spinal stenosis, osteoarthritis, bulging disc that are pushing on nerves.  Patient needs to see a back surgeon.  We need to find out if he already has 1.  If not, I will place a referral.  It took a while to have the final report of the MRI because they asked for outside previous x-rays.

## 2021-02-12 NOTE — TELEPHONE ENCOUNTER
Caller: Durga Valenzuela    Relationship to patient: Self    Best call back number: [phone]    Patient is needing: PATIENT IS CALLING ONCE MORE IN REGARDS TO HIS MRI RESULTS. I ALSO LET PATIENT KNOW THAT MD FONG WAS SENT A MESSAGE TODAY IN REGARDS TO IT.      PLEASE ADVISE

## 2021-02-15 DIAGNOSIS — M54.41 ACUTE RIGHT-SIDED LOW BACK PAIN WITH RIGHT-SIDED SCIATICA: ICD-10-CM

## 2021-02-15 DIAGNOSIS — M51.36 LUMBAR DEGENERATIVE DISC DISEASE: Chronic | ICD-10-CM

## 2021-02-15 DIAGNOSIS — M48.062 SPINAL STENOSIS OF LUMBAR REGION WITH NEUROGENIC CLAUDICATION: Primary | ICD-10-CM

## 2021-02-17 ENCOUNTER — TELEPHONE (OUTPATIENT)
Dept: INTERNAL MEDICINE | Facility: CLINIC | Age: 71
End: 2021-02-17

## 2021-02-17 NOTE — TELEPHONE ENCOUNTER
Caller: Durga Valenzuela    Relationship to patient: Self    Best call back number: [phone]      Patient is needing: PATIENT IS REQUESTING A DISK OF THE MRI THAT HE HAD DONE ON 2/6/2021.  PLEASE CALL AND ADVISE WHEN HE CAN COME AND PICK THIS UP.

## 2021-02-22 RX ORDER — FLUOXETINE 20 MG/1
40 TABLET, FILM COATED ORAL DAILY
Qty: 60 TABLET | Refills: 5 | Status: SHIPPED | OUTPATIENT
Start: 2021-02-22 | End: 2021-09-08 | Stop reason: SDUPTHER

## 2021-03-09 DIAGNOSIS — Z23 IMMUNIZATION DUE: ICD-10-CM

## 2021-03-10 ENCOUNTER — LAB (OUTPATIENT)
Dept: LAB | Facility: HOSPITAL | Age: 71
End: 2021-03-10

## 2021-03-10 DIAGNOSIS — E03.9 HYPOTHYROIDISM, UNSPECIFIED TYPE: ICD-10-CM

## 2021-03-10 DIAGNOSIS — E55.9 VITAMIN D DEFICIENCY: ICD-10-CM

## 2021-03-10 DIAGNOSIS — N13.8 BENIGN PROSTATIC HYPERPLASIA WITH URINARY OBSTRUCTION: ICD-10-CM

## 2021-03-10 DIAGNOSIS — R73.01 IMPAIRED FASTING GLUCOSE: ICD-10-CM

## 2021-03-10 DIAGNOSIS — E78.5 HYPERLIPIDEMIA, UNSPECIFIED HYPERLIPIDEMIA TYPE: ICD-10-CM

## 2021-03-10 DIAGNOSIS — Z51.81 THERAPEUTIC DRUG MONITORING: ICD-10-CM

## 2021-03-10 DIAGNOSIS — N40.1 BENIGN PROSTATIC HYPERPLASIA WITH URINARY OBSTRUCTION: ICD-10-CM

## 2021-03-10 DIAGNOSIS — I10 ESSENTIAL (PRIMARY) HYPERTENSION: Primary | ICD-10-CM

## 2021-03-10 DIAGNOSIS — E78.2 MIXED HYPERLIPIDEMIA: ICD-10-CM

## 2021-03-10 DIAGNOSIS — I10 ESSENTIAL (PRIMARY) HYPERTENSION: ICD-10-CM

## 2021-03-10 LAB
25(OH)D3 SERPL-MCNC: 37 NG/ML
ALBUMIN SERPL-MCNC: 4.5 G/DL (ref 3.5–5.2)
ALBUMIN/GLOB SERPL: 2.1 G/DL
ALP SERPL-CCNC: 50 U/L (ref 39–117)
ALT SERPL W P-5'-P-CCNC: 31 U/L (ref 1–41)
ANION GAP SERPL CALCULATED.3IONS-SCNC: 10.1 MMOL/L (ref 5–15)
AST SERPL-CCNC: 28 U/L (ref 1–40)
BILIRUB SERPL-MCNC: 0.4 MG/DL (ref 0–1.2)
BUN SERPL-MCNC: 19 MG/DL (ref 8–23)
BUN/CREAT SERPL: 20.9 (ref 7–25)
CALCIUM SPEC-SCNC: 9 MG/DL (ref 8.6–10.5)
CHLORIDE SERPL-SCNC: 106 MMOL/L (ref 98–107)
CK SERPL-CCNC: 588 U/L (ref 20–200)
CO2 SERPL-SCNC: 25.9 MMOL/L (ref 22–29)
CREAT SERPL-MCNC: 0.91 MG/DL (ref 0.76–1.27)
DEPRECATED RDW RBC AUTO: 52.6 FL (ref 37–54)
ERYTHROCYTE [DISTWIDTH] IN BLOOD BY AUTOMATED COUNT: 15.7 % (ref 12.3–15.4)
GFR SERPL CREATININE-BSD FRML MDRD: 82 ML/MIN/1.73
GLOBULIN UR ELPH-MCNC: 2.1 GM/DL
GLUCOSE SERPL-MCNC: 130 MG/DL (ref 65–99)
HBA1C MFR BLD: 6.9 % (ref 4.8–5.6)
HCT VFR BLD AUTO: 38.9 % (ref 37.5–51)
HGB BLD-MCNC: 13 G/DL (ref 13–17.7)
MCH RBC QN AUTO: 30.7 PG (ref 26.6–33)
MCHC RBC AUTO-ENTMCNC: 33.4 G/DL (ref 31.5–35.7)
MCV RBC AUTO: 92 FL (ref 79–97)
PLATELET # BLD AUTO: 181 10*3/MM3 (ref 140–450)
PMV BLD AUTO: 11 FL (ref 6–12)
POTASSIUM SERPL-SCNC: 4.4 MMOL/L (ref 3.5–5.2)
PROT SERPL-MCNC: 6.6 G/DL (ref 6–8.5)
PSA SERPL-MCNC: 0.5 NG/ML (ref 0–4)
RBC # BLD AUTO: 4.23 10*6/MM3 (ref 4.14–5.8)
SODIUM SERPL-SCNC: 142 MMOL/L (ref 136–145)
T3FREE SERPL-MCNC: 2.69 PG/ML (ref 2–4.4)
T4 FREE SERPL-MCNC: 0.93 NG/DL (ref 0.93–1.7)
TSH SERPL DL<=0.05 MIU/L-ACNC: 40.9 UIU/ML (ref 0.27–4.2)
WBC # BLD AUTO: 5.16 10*3/MM3 (ref 3.4–10.8)

## 2021-03-10 PROCEDURE — 80061 LIPID PANEL: CPT | Performed by: INTERNAL MEDICINE

## 2021-03-10 PROCEDURE — 82550 ASSAY OF CK (CPK): CPT | Performed by: INTERNAL MEDICINE

## 2021-03-10 PROCEDURE — 80053 COMPREHEN METABOLIC PANEL: CPT | Performed by: INTERNAL MEDICINE

## 2021-03-10 PROCEDURE — 82306 VITAMIN D 25 HYDROXY: CPT | Performed by: INTERNAL MEDICINE

## 2021-03-10 PROCEDURE — 84443 ASSAY THYROID STIM HORMONE: CPT | Performed by: INTERNAL MEDICINE

## 2021-03-10 PROCEDURE — 84153 ASSAY OF PSA TOTAL: CPT | Performed by: INTERNAL MEDICINE

## 2021-03-10 PROCEDURE — 84439 ASSAY OF FREE THYROXINE: CPT | Performed by: INTERNAL MEDICINE

## 2021-03-10 PROCEDURE — 36415 COLL VENOUS BLD VENIPUNCTURE: CPT

## 2021-03-10 PROCEDURE — 85027 COMPLETE CBC AUTOMATED: CPT | Performed by: INTERNAL MEDICINE

## 2021-03-10 PROCEDURE — 84481 FREE ASSAY (FT-3): CPT | Performed by: INTERNAL MEDICINE

## 2021-03-10 PROCEDURE — 83704 LIPOPROTEIN BLD QUAN PART: CPT | Performed by: INTERNAL MEDICINE

## 2021-03-10 PROCEDURE — 83036 HEMOGLOBIN GLYCOSYLATED A1C: CPT | Performed by: INTERNAL MEDICINE

## 2021-03-12 LAB
CHOLEST SERPL-MCNC: 139 MG/DL (ref 100–199)
HDL SERPL-SCNC: 24.5 UMOL/L
HDLC SERPL-MCNC: 32 MG/DL
LDL SERPL QN: 20.6 NM
LDL SERPL-SCNC: 1012 NMOL/L
LDL SMALL SERPL-SCNC: 548 NMOL/L
LDLC SERPL CALC-MCNC: 83 MG/DL (ref 0–99)
TRIGL SERPL-MCNC: 134 MG/DL (ref 0–149)

## 2021-03-17 ENCOUNTER — OFFICE VISIT (OUTPATIENT)
Dept: INTERNAL MEDICINE | Facility: CLINIC | Age: 71
End: 2021-03-17

## 2021-03-17 VITALS
HEIGHT: 74 IN | HEART RATE: 51 BPM | OXYGEN SATURATION: 99 % | DIASTOLIC BLOOD PRESSURE: 70 MMHG | WEIGHT: 315 LBS | SYSTOLIC BLOOD PRESSURE: 118 MMHG | BODY MASS INDEX: 40.43 KG/M2

## 2021-03-17 DIAGNOSIS — Z91.09 MULTIPLE ENVIRONMENTAL ALLERGIES: Chronic | ICD-10-CM

## 2021-03-17 DIAGNOSIS — E55.9 VITAMIN D DEFICIENCY: Chronic | ICD-10-CM

## 2021-03-17 DIAGNOSIS — N40.1 BENIGN PROSTATIC HYPERPLASIA WITH URINARY OBSTRUCTION: Chronic | ICD-10-CM

## 2021-03-17 DIAGNOSIS — E78.2 MIXED HYPERLIPIDEMIA: Chronic | ICD-10-CM

## 2021-03-17 DIAGNOSIS — R60.0 BILATERAL LOWER EXTREMITY EDEMA: Chronic | ICD-10-CM

## 2021-03-17 DIAGNOSIS — S46.012D TRAUMATIC COMPLETE TEAR OF LEFT ROTATOR CUFF, SUBSEQUENT ENCOUNTER: ICD-10-CM

## 2021-03-17 DIAGNOSIS — I25.10 NON-OCCLUSIVE CORONARY ARTERY DISEASE: Chronic | ICD-10-CM

## 2021-03-17 DIAGNOSIS — N13.8 BENIGN PROSTATIC HYPERPLASIA WITH URINARY OBSTRUCTION: Chronic | ICD-10-CM

## 2021-03-17 DIAGNOSIS — N39.42 URINARY INCONTINENCE WITHOUT SENSORY AWARENESS: ICD-10-CM

## 2021-03-17 DIAGNOSIS — M15.9 PRIMARY OSTEOARTHRITIS INVOLVING MULTIPLE JOINTS: Chronic | ICD-10-CM

## 2021-03-17 DIAGNOSIS — F41.8 DEPRESSION WITH ANXIETY: Chronic | ICD-10-CM

## 2021-03-17 DIAGNOSIS — E03.9 PRIMARY HYPOTHYROIDISM: Chronic | ICD-10-CM

## 2021-03-17 DIAGNOSIS — R73.01 IMPAIRED FASTING GLUCOSE: Primary | Chronic | ICD-10-CM

## 2021-03-17 DIAGNOSIS — M54.41 ACUTE RIGHT-SIDED LOW BACK PAIN WITH RIGHT-SIDED SCIATICA: ICD-10-CM

## 2021-03-17 DIAGNOSIS — G47.33 OBSTRUCTIVE SLEEP APNEA: Chronic | ICD-10-CM

## 2021-03-17 DIAGNOSIS — Z86.711 HISTORY OF PULMONARY EMBOLISM: Chronic | ICD-10-CM

## 2021-03-17 DIAGNOSIS — I10 BENIGN ESSENTIAL HYPERTENSION: Chronic | ICD-10-CM

## 2021-03-17 DIAGNOSIS — Z51.81 THERAPEUTIC DRUG MONITORING: ICD-10-CM

## 2021-03-17 DIAGNOSIS — M48.062 SPINAL STENOSIS OF LUMBAR REGION WITH NEUROGENIC CLAUDICATION: ICD-10-CM

## 2021-03-17 DIAGNOSIS — E66.01 MORBIDLY OBESE (HCC): Chronic | ICD-10-CM

## 2021-03-17 PROBLEM — S46.012A TRAUMATIC COMPLETE TEAR OF LEFT ROTATOR CUFF: Status: ACTIVE | Noted: 2021-03-17

## 2021-03-17 PROCEDURE — 99214 OFFICE O/P EST MOD 30 MIN: CPT | Performed by: INTERNAL MEDICINE

## 2021-03-17 NOTE — PROGRESS NOTES
03/17/2021    Patient Information  Durga Valenzuela                                                                                          [address] [facility] [address]      1950  [unfilled]  There is no work phone number on file.    Chief Complaint:     Follow-up lab work in order to monitor chronic medical issues listed in history of present illness.  No new acute complaints.    History of Present Illness:    Patient with a history of multiple medical problems including impaired fasting glucose, hyperlipidemia, hypertension, BPH, hypothyroidism, sleep apnea, history of pulmonary embolism, nonocclusive coronary artery disease, vitamin D deficiency, depression with anxiety in remission, PTSD, multiple environmental allergies, bilateral lower extremity edema, morbid obesity, acute right-sided sciatica due to lumbar spinal stenosis, osteoarthritis of multiple joints.  He presents today for follow-up with lab prior in order to monitor his chronic medical issues.  Past medical history reviewed and updated were necessary including health maintenance parameters.  This reveals he is currently up-to-date or else accounted for.    Review of Systems   Constitutional: Negative.   HENT: Negative.    Eyes: Negative.    Cardiovascular: Negative.    Respiratory: Negative.    Endocrine: Negative.    Hematologic/Lymphatic: Negative.    Skin: Negative.    Musculoskeletal: Positive for back pain.   Gastrointestinal: Negative.    Genitourinary: Negative.    Neurological: Negative.    Psychiatric/Behavioral: Negative.    Allergic/Immunologic: Negative.        Active Problems:    Patient Active Problem List   Diagnosis   • Post traumatic stress disorder (PTSD)   • Benign prostatic hypertrophy   • Chronic insomnia   • Lumbar degenerative disc disease   • Impaired fasting glucose   • Benign essential hypertension   • Primary osteoarthritis involving multiple joints   • Hyperlipidemia   • Primary  hypothyroidism   • Obstructive sleep apnea, tolerates CPAP well.  Previously failed oral appliance.   • History of pulmonary embolism   • Non-occlusive coronary artery disease, 05/16/2017--normal LM; proximal LAD mild LI; 50% mid LAD.  Mild distal LAD LI; normal Cx.  Mild LI marginal branches; normal RCA, nondominant.  EF 60%.   • Therapeutic drug monitoring   • Vitamin D deficiency   • Psoriasis   • Allergic rhinitis   • Depression with anxiety   • Multiple environmental allergies   • Bilateral lower extremity edema   • Morbidly obese (CMS/HCC)   • Acute right-sided low back pain with right-sided sciatica   • Spinal stenosis of lumbar region with neurogenic claudication   • Traumatic complete tear of left rotator cuff   • Urinary incontinence without sensory awareness         Past Medical History:   Diagnosis Date   • Allergic rhinitis 8/21/2018    Patient has had allergy testing in the past which revealed allergies to molds and grass.  Immunotherapy for about 2 years in the 1980s.   • Benign essential hypertension 2/25/2016   • Benign prostatic hypertrophy 2/25/2016   • Bilateral lower extremity edema 8/21/2018   • Chronic insomnia 2/25/2016   • Closed traumatic lateral subluxation of patellofemoral joint, right, initial encounter 8/13/2018    08/10/2018--patient was evaluated by the orthopedist for right knee pain and found to have lateral subluxation of the right patella associated with presence of right artificial knee joint.  Physical therapy no help.  Surgery is scheduled 09/17/2018   • Depression with anxiety 8/21/2018   • History of deep venous thrombosis (DVT) of distal vein of right lower extremity 8/21/2018 04/26/2016--CTA of the chest performed for elevated d-dimer and progressive shortness of breath and chest pain for one month revealed bilateral occlusive in near occlusive segmental and subsegmental pulmonary emboli in all lobes of the right lung as well as within the left lower lobe.  No evidence  of pulmonary infarct.  Nonspecific multifocal groundglass attenuation in the right pulmonary apex, perhaps representing pneumonitis or submental atelectasis.  Questionable cholelithiasis.  Doppler venous study was positive for DVT in the right popliteal vein.  Hypercoagulable workup was normal.  He was treated with Eliquis for a total of 6 months and was subsequently discontinued.   • History of pneumonia    • History of pulmonary embolism 6/10/2016    04/26/2016--CTA of the chest performed for elevated d-dimer and progressive shortness of breath and chest pain for one month revealed bilateral occlusive in near occlusive segmental and subsegmental pulmonary emboli in all lobes of the right lung as well as within the left lower lobe.  No evidence of pulmonary infarct.  Nonspecific multifocal groundglass attenuation in the right pulmonary apex, perhaps representing pneumonitis or submental atelectasis.  Questionable cholelithiasis.  Doppler venous study was positive for DVT in the right popliteal vein.  Hypercoagulable workup was normal.  He was treated with Eliquis for a total of 6 months and was subsequently discontinued.   • Hyperlipidemia 2/25/2016   • Impaired fasting glucose 2/25/2016   • Lumbar degenerative disc disease 2/25/2016   • Morbidly obese (CMS/HCC) 10/19/2018   • Multiple environmental allergies 8/21/2018    Patient has had allergy testing in the past which revealed allergies to molds and grass.  Immunotherapy for about 2 years in the 1980s.   • Non-occlusive coronary artery disease, 05/16/2017--normal LM; proximal LAD mild LI; 50% mid LAD.  Mild distal LAD LI; normal Cx.  Mild LI marginal branches; normal RCA, nondominant.  EF 60%. 7/24/2017 05/16/2017--cardiac catheterization performed for abnormal stress test.  Normal LV with ejection fraction 60%.  Dilated aortic root.  No wall motion abnormalities.  Normal left main.  Ramus branch small caliber and normal.  Proximal LAD large caliber and mild  luminal irregularities.  50% mid LAD.  Mild luminal irregularities distal LAD.  3 diagonal branches of small caliber with mild luminal irregularities.  Normal septal branches.  Circumflex is large caliber and free of disease.  Marginal branches are medium caliber with mild luminal irregularities.  RCA is a medium caliber and free of disease.  It is nondominant.   • Obstructive sleep apnea, tolerates CPAP well.  Previously failed oral appliance. 2/25/2016   • Post traumatic stress disorder (PTSD) 2/25/2016   • Primary hypothyroidism 2/25/2016   • Primary osteoarthritis involving multiple joints 2/25/2016   • Psoriasis 9/17/2014   • Vitamin D deficiency 8/21/2018         Past Surgical History:   Procedure Laterality Date   • CARDIAC CATHETERIZATION N/A 5/16/2017    Procedure: Coronary angiography;  Surgeon: Malcolm Chen MD;  Location: [facility] CATH INVASIVE LOCATION;  Service:    • CARDIAC CATHETERIZATION N/A 5/16/2017    Procedure: Left Heart Cath;  Surgeon: Malcolm Chen MD;  [facility];  Service:    • CARDIAC CATHETERIZATION N/A 5/16/2017    Procedure: Left ventriculography;  Surgeon: Malcolm Chen MD;  Location: [facility] CATH INVASIVE LOCATION;  Service:    • COLONOSCOPY  2005 2005--colonoscopy reportedly normal.  Records not available.   • COLONOSCOPY N/A 6/13/2019    Procedure: COLONOSCOPY INTO CECUM WITH COLD BIOPSY POLYPECTOMY;  Surgeon: Ayaan Gallardo MD;  Location: [facility] ENDOSCOPY;  Service: General   • LUMBAR DECOMPRESSION  2007 2007--lumbar decompression without fusion or hardware.   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   • REVISION TOTAL KNEE ARTHROPLASTY Right 09/17/2018 09/17/2018--right total knee arthroplasty revision due to lateral subluxation of the patella due to multiple factors.   • TOTAL KNEE ARTHROPLASTY Left 11/03/2017     11/03/2017--right total knee replacement complicated by patellofemoral subluxation.   • TOTAL KNEE ARTHROPLASTY Left 06/2014 June 2014--left total knee replacement.         Allergies   Allergen Reactions   • Hydrocodone Other (See Comments)     Severe vomiting - but CAN tolerate oxycodone per patient   • Zocor  [Simvastatin]      MYALGIA           Current Outpatient Medications:   •  amLODIPine (NORVASC) 10 MG tablet, Take 1 tablet by mouth Daily., Disp: 90 tablet, Rfl: 3  •  amphetamine-dextroamphetamine (ADDERALL) 20 MG tablet, Take 20 mg by mouth Daily. prn, Disp: , Rfl:   •  aspirin 81 MG tablet, One by mouth daily, Disp: , Rfl:   •  Cholecalciferol (VITAMIN D3) 5000 units capsule capsule, 1 by mouth daily as directed, Disp: 30 capsule, Rfl:   •  clobetasol (TEMOVATE) 0.05 % ointment, Apply twice a day as directed, Disp: 60 g, Rfl: 6  •  FLUoxetine (PROzac) 20 MG tablet, Take 2 tablets by mouth Daily., Disp: 60 tablet, Rfl: 5  •  fluticasone (FLONASE) 50 MCG/ACT nasal spray, 2 sprays into the nostril(s) as directed by provider Daily., Disp: , Rfl:   •  hydrOXYzine (ATARAX) 25 MG tablet, Take 1 tablet by mouth 3 (Three) Times a Day As Needed for Itching., Disp: 60 tablet, Rfl: 1  •  levothyroxine (SYNTHROID, LEVOTHROID) 200 MCG tablet, Take 1 tablet by mouth Daily., Disp: 90 tablet, Rfl: 1  •  losartan (COZAAR) 50 MG tablet, Take 1 tablet by mouth Daily., Disp: 90 tablet, Rfl: 1  •  oxyCODONE-acetaminophen (PERCOCET)  MG per tablet, Take 1-2 p.o. every 6 hours for moderate to severe low back pain., Disp: 60 tablet, Rfl: 0  •  pravastatin (Pravachol) 20 MG tablet, Take 1 p.o. daily for high cholesterol, Disp: 90 tablet, Rfl: 3  •  tamsulosin (FLOMAX) 0.4 MG capsule 24 hr capsule, Take 1 capsule by mouth Daily., Disp: 90 capsule, Rfl: 0  •  zolpidem (AMBIEN) 10 MG tablet, Take 10 mg by mouth At Night As Needed., Disp: , Rfl:       Family History   Problem Relation Age of Onset   • Coronary artery disease  "Mother         Status post CABG   • Alzheimer's disease Mother    • Stroke Father    • Liver cancer Father         Father with biliary cancer including gallbladder   • No Known Problems Sister    • No Known Problems Brother          Social History     Socioeconomic History   • Marital status:      Spouse name: italo   • Number of children: Not on file   • Years of education: Not on file   • Highest education level: Bachelor's degree (e.g., BA, AB, BS)   Tobacco Use   • Smoking status: Never Smoker   • Smokeless tobacco: Never Used   Vaping Use   • Vaping Use: Never used   Substance and Sexual Activity   • Alcohol use: No   • Drug use: Yes     Types: Marijuana   • Sexual activity: Yes     Partners: Female         Vitals:    03/17/21 1012   BP: 118/70   BP Location: Left arm   Patient Position: Sitting   Cuff Size: Large Adult   Pulse: 51   SpO2: 99%   Weight: (!) 158 kg (349 lb 3.2 oz)   Height: 188 cm (74.02\")        Body mass index is 44.81 kg/m².      Physical Exam:    General: Alert and oriented x 3.  No acute distress.  Normal affect.  Morbidly obese.  HEENT: Pupils equal, round, reactive to light; extraocular movements intact; sclerae nonicteric; pharynx, ear canals and TMs normal.  Neck: Without JVD, thyromegaly, bruit, or adenopathy.  Lungs: Clear to auscultation in all fields.  Heart: Regular rate and rhythm without murmur, rub, gallop, or click.  Abdomen: Soft, nontender, without hepatosplenomegaly or hernia.  Bowel sounds normal.  : Deferred.  Rectal: Deferred.  Extremities: Without clubbing, cyanosis, edema, or pulse deficit.  Neurologic: Intact without focal deficit.  Normal station and gait observed during ingress and egress from the examination room.  Skin: Without significant lesion.  Musculoskeletal: Unremarkable.    Lab/other results:    NMR reveals a total cholesterol of 139.  Triglycerides 134.  LDL particle number slightly elevated 1012.  Small LDL particle number slightly elevated at 548. "  HDL particle number low at 24.5.  CMP normal except glucose 130.  Hemoglobin A1c 6.9.  CBC normal.  CPK elevated at 588.  TSH markedly elevated at 40.9.  Free T3 and free T4 are normal.  PSA normal at 0.496.  Vitamin D normal at 37.    Assessment/Plan:     Diagnosis Plan   1. Impaired fasting glucose  Comprehensive Metabolic Panel    Hemoglobin A1c   2. Hyperlipidemia  CK    Comprehensive Metabolic Panel    NMR LipoProfile   3. Benign essential hypertension     4. Benign prostatic hypertrophy  Ambulatory Referral to Urology    PSA DIAGNOSTIC   5. Primary hypothyroidism  TSH    T4, Free    T3, Free    TSH    T4, Free    T3, Free   6. Obstructive sleep apnea, tolerates CPAP well.  Previously failed oral appliance.     7. History of pulmonary embolism     8. Non-occlusive coronary artery disease, 05/16/2017--normal LM; proximal LAD mild LI; 50% mid LAD.  Mild distal LAD LI; normal Cx.  Mild LI marginal branches; normal RCA, nondominant.  EF 60%.     9. Vitamin D deficiency  Vitamin D 25 Hydroxy   10. Depression with anxiety     11. Multiple environmental allergies     12. Bilateral lower extremity edema     13. Morbidly obese (CMS/HCC)     14. Acute right-sided low back pain with right-sided sciatica     15. Spinal stenosis of lumbar region with neurogenic claudication     16. Primary osteoarthritis involving multiple joints     17. Therapeutic drug monitoring  CBC (No Diff)   18. Traumatic complete tear of left rotator cuff, subsequent encounter     19. Urinary incontinence without sensory awareness  Ambulatory Referral to Urology     It appears that patient's impaired fasting glucose has evolved into overt diabetes.  However, I am reluctant to give him this diagnosis currently because he has been on steroids for his lumbar radiculopathy and also he is markedly subtherapeutic on his thyroid supplementation.  TSH is elevated at 40.  Strongly suspected patient has not been taking his medication.  His blood pressure  appears to be controlled.  Hyperlipidemia is under excellent control.  BPH treated with Flomax.  Patient is having some problems with urinary incontinence.  Patient needs to see a urologist.  He has sleep apnea and tolerates CPAP well.  He has remote history of pulmonary embolism without recurrence.  Patient has nonocclusive coronary artery disease and we need to be aggressive with his risk factors.  Vitamin D is in the normal range.  His depression with anxiety seems to be controlled on the current medication.  He also has PTSD and he sees a therapist/psychiatrist.  Multiple environmental allergies controlled with Flonase currently.  Lower extremity edema seems to be controlled.  Patient has morbid obesity and he strongly needs to follow a low carbohydrate diet and lose weight.  Patient presented with acute right-sided sciatica and has lumbar spinal stenosis and herniated disc and this has been evaluated by the orthopedic spine service.  I have reviewed the documentation.    Plan is as follows: Strongly encourage patient to be compliant with his medications and in particular his thyroid supplementation.  Patient takes the thyroid supplementation in the morning and I have recommended that he start taking the thyroid supplementation at night when he does not have any additional medications.  Patient will obtain nonfasting thyroid function tests in about 6 weeks and can follow-up on the phone.  Strongly recommend low carbohydrate diet and weight loss.  We will schedule him for subsequent Medicare wellness visit with lab prior after September 17, 2021.    Addendum: Patient is having problems with urinary incontinence.  He leaks without presensory awareness.  Urology referral given.    Procedures

## 2021-04-19 RX ORDER — LOSARTAN POTASSIUM 50 MG/1
50 TABLET ORAL DAILY
Qty: 90 TABLET | Refills: 1 | Status: SHIPPED | OUTPATIENT
Start: 2021-04-19 | End: 2022-05-09

## 2021-04-26 ENCOUNTER — APPOINTMENT (OUTPATIENT)
Dept: CARDIOLOGY | Facility: HOSPITAL | Age: 71
End: 2021-04-26

## 2021-04-26 ENCOUNTER — APPOINTMENT (OUTPATIENT)
Dept: CT IMAGING | Facility: HOSPITAL | Age: 71
End: 2021-04-26

## 2021-04-26 ENCOUNTER — HOSPITAL ENCOUNTER (EMERGENCY)
Facility: HOSPITAL | Age: 71
Discharge: HOME | End: 2021-04-26
Attending: EMERGENCY MEDICINE | Admitting: EMERGENCY MEDICINE

## 2021-04-26 ENCOUNTER — LAB (OUTPATIENT)
Dept: LAB | Facility: HOSPITAL | Age: 71
End: 2021-04-26

## 2021-04-26 VITALS
TEMPERATURE: 98 F | HEART RATE: 65 BPM | HEIGHT: 73 IN | WEIGHT: 315 LBS | BODY MASS INDEX: 41.75 KG/M2 | SYSTOLIC BLOOD PRESSURE: 130 MMHG | OXYGEN SATURATION: 98 % | DIASTOLIC BLOOD PRESSURE: 93 MMHG | RESPIRATION RATE: 18 BRPM

## 2021-04-26 DIAGNOSIS — R60.9 PERIPHERAL EDEMA: Primary | ICD-10-CM

## 2021-04-26 DIAGNOSIS — R06.09 DYSPNEA ON EXERTION: ICD-10-CM

## 2021-04-26 DIAGNOSIS — E03.9 PRIMARY HYPOTHYROIDISM: Chronic | ICD-10-CM

## 2021-04-26 DIAGNOSIS — R91.1 PULMONARY NODULE: ICD-10-CM

## 2021-04-26 LAB
ALBUMIN SERPL-MCNC: 4.7 G/DL (ref 3.5–5.2)
ALBUMIN/GLOB SERPL: 2.2 G/DL
ALP SERPL-CCNC: 54 U/L (ref 39–117)
ALT SERPL W P-5'-P-CCNC: 24 U/L (ref 1–41)
ANION GAP SERPL CALCULATED.3IONS-SCNC: 11.1 MMOL/L (ref 5–15)
AST SERPL-CCNC: 19 U/L (ref 1–40)
BASOPHILS # BLD AUTO: 0.03 10*3/MM3 (ref 0–0.2)
BASOPHILS NFR BLD AUTO: 0.5 % (ref 0–1.5)
BH CV LOW VAS LEFT LESSER SAPH VESSEL: 1
BH CV LOW VAS RIGHT LESSER SAPH VESSEL: 1
BH CV LOWER VASCULAR LEFT COMMON FEMORAL AUGMENT: NORMAL
BH CV LOWER VASCULAR LEFT COMMON FEMORAL COMPETENT: NORMAL
BH CV LOWER VASCULAR LEFT COMMON FEMORAL COMPRESS: NORMAL
BH CV LOWER VASCULAR LEFT COMMON FEMORAL PHASIC: NORMAL
BH CV LOWER VASCULAR LEFT COMMON FEMORAL SPONT: NORMAL
BH CV LOWER VASCULAR LEFT DISTAL FEMORAL COMPRESS: NORMAL
BH CV LOWER VASCULAR LEFT GASTRONEMIUS COMPRESS: NORMAL
BH CV LOWER VASCULAR LEFT GREATER SAPH AK COMPRESS: NORMAL
BH CV LOWER VASCULAR LEFT GREATER SAPH BK COMPRESS: NORMAL
BH CV LOWER VASCULAR LEFT LESSER SAPH COMPRESS: NORMAL
BH CV LOWER VASCULAR LEFT LESSER SAPH THROMBUS: NORMAL
BH CV LOWER VASCULAR LEFT MID FEMORAL AUGMENT: NORMAL
BH CV LOWER VASCULAR LEFT MID FEMORAL COMPETENT: NORMAL
BH CV LOWER VASCULAR LEFT MID FEMORAL COMPRESS: NORMAL
BH CV LOWER VASCULAR LEFT MID FEMORAL PHASIC: NORMAL
BH CV LOWER VASCULAR LEFT MID FEMORAL SPONT: NORMAL
BH CV LOWER VASCULAR LEFT PERONEAL COMPRESS: NORMAL
BH CV LOWER VASCULAR LEFT POPLITEAL AUGMENT: NORMAL
BH CV LOWER VASCULAR LEFT POPLITEAL COMPETENT: NORMAL
BH CV LOWER VASCULAR LEFT POPLITEAL COMPRESS: NORMAL
BH CV LOWER VASCULAR LEFT POPLITEAL PHASIC: NORMAL
BH CV LOWER VASCULAR LEFT POPLITEAL SPONT: NORMAL
BH CV LOWER VASCULAR LEFT POSTERIOR TIBIAL COMPRESS: NORMAL
BH CV LOWER VASCULAR LEFT PROFUNDA FEMORAL COMPRESS: NORMAL
BH CV LOWER VASCULAR LEFT PROXIMAL FEMORAL COMPRESS: NORMAL
BH CV LOWER VASCULAR LEFT SAPHENOFEMORAL JUNCTION COMPRESS: NORMAL
BH CV LOWER VASCULAR RIGHT COMMON FEMORAL AUGMENT: NORMAL
BH CV LOWER VASCULAR RIGHT COMMON FEMORAL COMPETENT: NORMAL
BH CV LOWER VASCULAR RIGHT COMMON FEMORAL COMPRESS: NORMAL
BH CV LOWER VASCULAR RIGHT COMMON FEMORAL PHASIC: NORMAL
BH CV LOWER VASCULAR RIGHT COMMON FEMORAL SPONT: NORMAL
BH CV LOWER VASCULAR RIGHT DISTAL FEMORAL COMPRESS: NORMAL
BH CV LOWER VASCULAR RIGHT GASTRONEMIUS COMPRESS: NORMAL
BH CV LOWER VASCULAR RIGHT GREATER SAPH AK COMPRESS: NORMAL
BH CV LOWER VASCULAR RIGHT GREATER SAPH BK COMPRESS: NORMAL
BH CV LOWER VASCULAR RIGHT LESSER SAPH COMPRESS: NORMAL
BH CV LOWER VASCULAR RIGHT LESSER SAPH THROMBUS: NORMAL
BH CV LOWER VASCULAR RIGHT MID FEMORAL AUGMENT: NORMAL
BH CV LOWER VASCULAR RIGHT MID FEMORAL COMPETENT: NORMAL
BH CV LOWER VASCULAR RIGHT MID FEMORAL COMPRESS: NORMAL
BH CV LOWER VASCULAR RIGHT MID FEMORAL PHASIC: NORMAL
BH CV LOWER VASCULAR RIGHT MID FEMORAL SPONT: NORMAL
BH CV LOWER VASCULAR RIGHT PERONEAL COMPRESS: NORMAL
BH CV LOWER VASCULAR RIGHT POPLITEAL AUGMENT: NORMAL
BH CV LOWER VASCULAR RIGHT POPLITEAL COMPETENT: NORMAL
BH CV LOWER VASCULAR RIGHT POPLITEAL COMPRESS: NORMAL
BH CV LOWER VASCULAR RIGHT POPLITEAL PHASIC: NORMAL
BH CV LOWER VASCULAR RIGHT POPLITEAL SPONT: NORMAL
BH CV LOWER VASCULAR RIGHT POSTERIOR TIBIAL COMPRESS: NORMAL
BH CV LOWER VASCULAR RIGHT PROFUNDA FEMORAL COMPRESS: NORMAL
BH CV LOWER VASCULAR RIGHT PROXIMAL FEMORAL COMPRESS: NORMAL
BH CV LOWER VASCULAR RIGHT SAPHENOFEMORAL JUNCTION COMPRESS: NORMAL
BILIRUB SERPL-MCNC: 0.4 MG/DL (ref 0–1.2)
BUN SERPL-MCNC: 21 MG/DL (ref 8–23)
BUN/CREAT SERPL: 22.6 (ref 7–25)
CALCIUM SPEC-SCNC: 9.6 MG/DL (ref 8.6–10.5)
CHLORIDE SERPL-SCNC: 105 MMOL/L (ref 98–107)
CO2 SERPL-SCNC: 25.9 MMOL/L (ref 22–29)
CREAT SERPL-MCNC: 0.93 MG/DL (ref 0.76–1.27)
DEPRECATED RDW RBC AUTO: 44.3 FL (ref 37–54)
EOSINOPHIL # BLD AUTO: 0.19 10*3/MM3 (ref 0–0.4)
EOSINOPHIL NFR BLD AUTO: 3.3 % (ref 0.3–6.2)
ERYTHROCYTE [DISTWIDTH] IN BLOOD BY AUTOMATED COUNT: 13.2 % (ref 12.3–15.4)
GFR SERPL CREATININE-BSD FRML MDRD: 80 ML/MIN/1.73
GLOBULIN UR ELPH-MCNC: 2.1 GM/DL
GLUCOSE SERPL-MCNC: 120 MG/DL (ref 65–99)
HCT VFR BLD AUTO: 39.6 % (ref 37.5–51)
HGB BLD-MCNC: 13.3 G/DL (ref 13–17.7)
HOLD SPECIMEN: NORMAL
HOLD SPECIMEN: NORMAL
IMM GRANULOCYTES # BLD AUTO: 0.01 10*3/MM3 (ref 0–0.05)
IMM GRANULOCYTES NFR BLD AUTO: 0.2 % (ref 0–0.5)
LYMPHOCYTES # BLD AUTO: 0.93 10*3/MM3 (ref 0.7–3.1)
LYMPHOCYTES NFR BLD AUTO: 16.1 % (ref 19.6–45.3)
MCH RBC QN AUTO: 31 PG (ref 26.6–33)
MCHC RBC AUTO-ENTMCNC: 33.6 G/DL (ref 31.5–35.7)
MCV RBC AUTO: 92.3 FL (ref 79–97)
MONOCYTES # BLD AUTO: 0.62 10*3/MM3 (ref 0.1–0.9)
MONOCYTES NFR BLD AUTO: 10.8 % (ref 5–12)
NEUTROPHILS NFR BLD AUTO: 3.98 10*3/MM3 (ref 1.7–7)
NEUTROPHILS NFR BLD AUTO: 69.1 % (ref 42.7–76)
NRBC BLD AUTO-RTO: 0 /100 WBC (ref 0–0.2)
NT-PROBNP SERPL-MCNC: 52.7 PG/ML (ref 0–900)
PLATELET # BLD AUTO: 200 10*3/MM3 (ref 140–450)
PMV BLD AUTO: 9.7 FL (ref 6–12)
POTASSIUM SERPL-SCNC: 4.2 MMOL/L (ref 3.5–5.2)
PROT SERPL-MCNC: 6.8 G/DL (ref 6–8.5)
RBC # BLD AUTO: 4.29 10*6/MM3 (ref 4.14–5.8)
SODIUM SERPL-SCNC: 142 MMOL/L (ref 136–145)
T3FREE SERPL-MCNC: 3.56 PG/ML (ref 2–4.4)
T4 FREE SERPL-MCNC: 1.3 NG/DL (ref 0.93–1.7)
TROPONIN T SERPL-MCNC: <0.01 NG/ML (ref 0–0.03)
TSH SERPL DL<=0.05 MIU/L-ACNC: 1.93 UIU/ML (ref 0.27–4.2)
WBC # BLD AUTO: 5.76 10*3/MM3 (ref 3.4–10.8)
WHOLE BLOOD HOLD SPECIMEN: NORMAL
WHOLE BLOOD HOLD SPECIMEN: NORMAL

## 2021-04-26 PROCEDURE — 93970 EXTREMITY STUDY: CPT

## 2021-04-26 PROCEDURE — 93010 ELECTROCARDIOGRAM REPORT: CPT | Performed by: INTERNAL MEDICINE

## 2021-04-26 PROCEDURE — 84443 ASSAY THYROID STIM HORMONE: CPT | Performed by: INTERNAL MEDICINE

## 2021-04-26 PROCEDURE — 84481 FREE ASSAY (FT-3): CPT | Performed by: INTERNAL MEDICINE

## 2021-04-26 PROCEDURE — 99284 EMERGENCY DEPT VISIT MOD MDM: CPT

## 2021-04-26 PROCEDURE — 84439 ASSAY OF FREE THYROXINE: CPT | Performed by: INTERNAL MEDICINE

## 2021-04-26 PROCEDURE — 83880 ASSAY OF NATRIURETIC PEPTIDE: CPT | Performed by: EMERGENCY MEDICINE

## 2021-04-26 PROCEDURE — 36415 COLL VENOUS BLD VENIPUNCTURE: CPT | Performed by: INTERNAL MEDICINE

## 2021-04-26 PROCEDURE — 85025 COMPLETE CBC W/AUTO DIFF WBC: CPT

## 2021-04-26 PROCEDURE — 84484 ASSAY OF TROPONIN QUANT: CPT | Performed by: EMERGENCY MEDICINE

## 2021-04-26 PROCEDURE — 0 IOPAMIDOL PER 1 ML: Performed by: EMERGENCY MEDICINE

## 2021-04-26 PROCEDURE — 93005 ELECTROCARDIOGRAM TRACING: CPT | Performed by: EMERGENCY MEDICINE

## 2021-04-26 PROCEDURE — 80053 COMPREHEN METABOLIC PANEL: CPT

## 2021-04-26 PROCEDURE — 71275 CT ANGIOGRAPHY CHEST: CPT

## 2021-04-26 RX ADMIN — IOPAMIDOL 95 ML: 755 INJECTION, SOLUTION INTRAVENOUS at 19:17

## 2021-04-27 ENCOUNTER — TELEPHONE (OUTPATIENT)
Dept: INTERNAL MEDICINE | Facility: CLINIC | Age: 71
End: 2021-04-27

## 2021-04-27 LAB — QT INTERVAL: 474 MS

## 2021-04-27 NOTE — TELEPHONE ENCOUNTER
[facility] called and wants to know if you can give the OK for the patient to hold his Aspirin 7 days prior to surgery?

## 2021-04-30 ENCOUNTER — TELEPHONE (OUTPATIENT)
Dept: INTERNAL MEDICINE | Facility: CLINIC | Age: 71
End: 2021-04-30

## 2021-04-30 DIAGNOSIS — I10 ESSENTIAL (PRIMARY) HYPERTENSION: ICD-10-CM

## 2021-04-30 DIAGNOSIS — M51.36 LUMBAR DEGENERATIVE DISC DISEASE: Chronic | ICD-10-CM

## 2021-04-30 DIAGNOSIS — M54.41 ACUTE RIGHT-SIDED LOW BACK PAIN WITH RIGHT-SIDED SCIATICA: ICD-10-CM

## 2021-04-30 NOTE — TELEPHONE ENCOUNTER
Caller: Durga Valenzuela    Relationship: Self    Best call back number:[phone]     Caller requesting test results:     What test was performed: LABS    When was the test performed: 04/26/2021    Where was the test performed: IN OFFICE    Additional notes: PATIENT WANTS TO KNOW IF EVERYTHING LOOKED OKAY OR IS THERE A NEED TO CHANGE MEDICATIONS.

## 2021-04-30 NOTE — TELEPHONE ENCOUNTER
Lab work looks fairly good except his thyroid is extremely low and I think the patient is not compliant with regularly with his levothyroxine.  It could be that he is taking it with other medications that are interfering.  Make sure he takes this medication by itself.  He also needs to keep the previously scheduled lab and follow-up.

## 2021-05-03 RX ORDER — OXYCODONE AND ACETAMINOPHEN 10; 325 MG/1; MG/1
TABLET ORAL
Qty: 60 TABLET | Refills: 0 | Status: SHIPPED | OUTPATIENT
Start: 2021-05-03 | End: 2022-11-16

## 2021-05-03 RX ORDER — AMLODIPINE BESYLATE 10 MG/1
10 TABLET ORAL DAILY
Qty: 90 TABLET | Refills: 3 | Status: SHIPPED | OUTPATIENT
Start: 2021-05-03 | End: 2021-09-24 | Stop reason: SDUPTHER

## 2021-05-05 NOTE — TELEPHONE ENCOUNTER
Patient notified and expressed understanding.  He said that he does take the medication by itself and first thing in the morning.  Please advise.

## 2021-07-12 ENCOUNTER — TELEPHONE (OUTPATIENT)
Dept: INTERNAL MEDICINE | Facility: CLINIC | Age: 71
End: 2021-07-12

## 2021-08-23 RX ORDER — HYDROXYZINE HYDROCHLORIDE 25 MG/1
25 TABLET, FILM COATED ORAL 3 TIMES DAILY PRN
Qty: 60 TABLET | Refills: 3 | Status: SHIPPED | OUTPATIENT
Start: 2021-08-23 | End: 2023-03-10

## 2021-08-23 NOTE — TELEPHONE ENCOUNTER
Caller: Durga Valenzuela    Relationship: Self    Best call back number: [phone]    Medication needed:   Requested Prescriptions     Pending Prescriptions Disp Refills   • hydrOXYzine (ATARAX) 25 MG tablet 60 tablet 1     Sig: Take 1 tablet by mouth 3 (Three) Times a Day As Needed for Itching.       When do you need the refill by: ASAP     What additional details did the patient provide when requesting the medication: PATIENT HAS BEEN OUT FOR A WEEK     Does the patient have less than a 3 day supply:  [x] Yes  [] No    What [facility] the [facility] [address] [address] [address] [address] - [phone] [facility] [phone] FX

## 2021-08-29 DIAGNOSIS — L40.9 PSORIASIS: Chronic | ICD-10-CM

## 2021-08-30 RX ORDER — CLOBETASOL PROPIONATE 0.5 MG/G
OINTMENT TOPICAL
Qty: 60 G | Refills: 6 | Status: SHIPPED | OUTPATIENT
Start: 2021-08-30 | End: 2022-03-30 | Stop reason: SDUPTHER

## 2021-09-09 RX ORDER — FLUOXETINE 20 MG/1
40 TABLET, FILM COATED ORAL DAILY
Qty: 60 TABLET | Refills: 5 | Status: SHIPPED | OUTPATIENT
Start: 2021-09-09 | End: 2021-09-30 | Stop reason: ALTCHOICE

## 2021-09-24 DIAGNOSIS — I10 ESSENTIAL (PRIMARY) HYPERTENSION: ICD-10-CM

## 2021-09-25 DIAGNOSIS — E78.2 MIXED HYPERLIPIDEMIA: Chronic | ICD-10-CM

## 2021-09-27 ENCOUNTER — LAB (OUTPATIENT)
Dept: LAB | Facility: HOSPITAL | Age: 71
End: 2021-09-27

## 2021-09-27 DIAGNOSIS — E55.9 VITAMIN D DEFICIENCY: Chronic | ICD-10-CM

## 2021-09-27 DIAGNOSIS — Z51.81 THERAPEUTIC DRUG MONITORING: ICD-10-CM

## 2021-09-27 DIAGNOSIS — R73.01 IMPAIRED FASTING GLUCOSE: Chronic | ICD-10-CM

## 2021-09-27 DIAGNOSIS — E78.2 MIXED HYPERLIPIDEMIA: Chronic | ICD-10-CM

## 2021-09-27 DIAGNOSIS — N13.8 BENIGN PROSTATIC HYPERPLASIA WITH URINARY OBSTRUCTION: Chronic | ICD-10-CM

## 2021-09-27 DIAGNOSIS — N40.1 BENIGN PROSTATIC HYPERPLASIA WITH URINARY OBSTRUCTION: Chronic | ICD-10-CM

## 2021-09-27 DIAGNOSIS — E03.9 PRIMARY HYPOTHYROIDISM: Chronic | ICD-10-CM

## 2021-09-27 LAB
25(OH)D3 SERPL-MCNC: 26.3 NG/ML (ref 30–100)
ALBUMIN SERPL-MCNC: 4.6 G/DL (ref 3.5–5.2)
ALBUMIN/GLOB SERPL: 2.1 G/DL
ALP SERPL-CCNC: 59 U/L (ref 39–117)
ALT SERPL W P-5'-P-CCNC: 30 U/L (ref 1–41)
ANION GAP SERPL CALCULATED.3IONS-SCNC: 12.1 MMOL/L (ref 5–15)
AST SERPL-CCNC: 28 U/L (ref 1–40)
BILIRUB SERPL-MCNC: 0.5 MG/DL (ref 0–1.2)
BUN SERPL-MCNC: 23 MG/DL (ref 8–23)
BUN/CREAT SERPL: 24.5 (ref 7–25)
CALCIUM SPEC-SCNC: 9.7 MG/DL (ref 8.6–10.5)
CHLORIDE SERPL-SCNC: 106 MMOL/L (ref 98–107)
CK SERPL-CCNC: 470 U/L (ref 20–200)
CO2 SERPL-SCNC: 22.9 MMOL/L (ref 22–29)
CREAT SERPL-MCNC: 0.94 MG/DL (ref 0.76–1.27)
DEPRECATED RDW RBC AUTO: 46.4 FL (ref 37–54)
ERYTHROCYTE [DISTWIDTH] IN BLOOD BY AUTOMATED COUNT: 14.1 % (ref 12.3–15.4)
GFR SERPL CREATININE-BSD FRML MDRD: 79 ML/MIN/1.73
GLOBULIN UR ELPH-MCNC: 2.2 GM/DL
GLUCOSE SERPL-MCNC: 117 MG/DL (ref 65–99)
HBA1C MFR BLD: 6.23 % (ref 4.8–5.6)
HCT VFR BLD AUTO: 41.5 % (ref 37.5–51)
HGB BLD-MCNC: 13.9 G/DL (ref 13–17.7)
MCH RBC QN AUTO: 30.3 PG (ref 26.6–33)
MCHC RBC AUTO-ENTMCNC: 33.5 G/DL (ref 31.5–35.7)
MCV RBC AUTO: 90.6 FL (ref 79–97)
PLATELET # BLD AUTO: 197 10*3/MM3 (ref 140–450)
PMV BLD AUTO: 10.7 FL (ref 6–12)
POTASSIUM SERPL-SCNC: 4.2 MMOL/L (ref 3.5–5.2)
PROT SERPL-MCNC: 6.8 G/DL (ref 6–8.5)
PSA SERPL-MCNC: 0.64 NG/ML (ref 0–4)
RBC # BLD AUTO: 4.58 10*6/MM3 (ref 4.14–5.8)
SODIUM SERPL-SCNC: 141 MMOL/L (ref 136–145)
T3FREE SERPL-MCNC: 2.38 PG/ML (ref 2–4.4)
T4 FREE SERPL-MCNC: 0.47 NG/DL (ref 0.93–1.7)
TSH SERPL DL<=0.05 MIU/L-ACNC: 26.8 UIU/ML (ref 0.27–4.2)
WBC # BLD AUTO: 6.01 10*3/MM3 (ref 3.4–10.8)

## 2021-09-27 PROCEDURE — 84439 ASSAY OF FREE THYROXINE: CPT

## 2021-09-27 PROCEDURE — 84481 FREE ASSAY (FT-3): CPT

## 2021-09-27 PROCEDURE — 84153 ASSAY OF PSA TOTAL: CPT

## 2021-09-27 PROCEDURE — 83036 HEMOGLOBIN GLYCOSYLATED A1C: CPT

## 2021-09-27 PROCEDURE — 82306 VITAMIN D 25 HYDROXY: CPT

## 2021-09-27 PROCEDURE — 82550 ASSAY OF CK (CPK): CPT

## 2021-09-27 PROCEDURE — 85027 COMPLETE CBC AUTOMATED: CPT

## 2021-09-27 PROCEDURE — 83704 LIPOPROTEIN BLD QUAN PART: CPT

## 2021-09-27 PROCEDURE — 84443 ASSAY THYROID STIM HORMONE: CPT

## 2021-09-27 PROCEDURE — 36415 COLL VENOUS BLD VENIPUNCTURE: CPT

## 2021-09-27 PROCEDURE — 80053 COMPREHEN METABOLIC PANEL: CPT

## 2021-09-27 PROCEDURE — 80061 LIPID PANEL: CPT

## 2021-09-28 RX ORDER — AMLODIPINE BESYLATE 10 MG/1
10 TABLET ORAL DAILY
Qty: 90 TABLET | Refills: 1 | Status: SHIPPED | OUTPATIENT
Start: 2021-09-28 | End: 2022-01-11

## 2021-09-28 RX ORDER — PRAVASTATIN SODIUM 20 MG
TABLET ORAL
Qty: 90 TABLET | Refills: 1 | Status: SHIPPED | OUTPATIENT
Start: 2021-09-28 | End: 2022-05-25 | Stop reason: SDUPTHER

## 2021-09-30 ENCOUNTER — OFFICE VISIT (OUTPATIENT)
Dept: INTERNAL MEDICINE | Facility: CLINIC | Age: 71
End: 2021-09-30

## 2021-09-30 VITALS
DIASTOLIC BLOOD PRESSURE: 68 MMHG | HEIGHT: 73 IN | WEIGHT: 315 LBS | OXYGEN SATURATION: 98 % | HEART RATE: 60 BPM | SYSTOLIC BLOOD PRESSURE: 112 MMHG | BODY MASS INDEX: 41.75 KG/M2

## 2021-09-30 DIAGNOSIS — N39.42 URINARY INCONTINENCE WITHOUT SENSORY AWARENESS: Chronic | ICD-10-CM

## 2021-09-30 DIAGNOSIS — E55.9 VITAMIN D DEFICIENCY: Chronic | ICD-10-CM

## 2021-09-30 DIAGNOSIS — E78.2 MIXED HYPERLIPIDEMIA: Chronic | ICD-10-CM

## 2021-09-30 DIAGNOSIS — N40.1 BENIGN PROSTATIC HYPERPLASIA WITH URINARY OBSTRUCTION: Chronic | ICD-10-CM

## 2021-09-30 DIAGNOSIS — F41.8 DEPRESSION WITH ANXIETY: Chronic | ICD-10-CM

## 2021-09-30 DIAGNOSIS — M51.36 LUMBAR DEGENERATIVE DISC DISEASE: Chronic | ICD-10-CM

## 2021-09-30 DIAGNOSIS — Z86.711 HISTORY OF PULMONARY EMBOLISM: Chronic | ICD-10-CM

## 2021-09-30 DIAGNOSIS — N13.8 BENIGN PROSTATIC HYPERPLASIA WITH URINARY OBSTRUCTION: Chronic | ICD-10-CM

## 2021-09-30 DIAGNOSIS — F51.04 CHRONIC INSOMNIA: Chronic | ICD-10-CM

## 2021-09-30 DIAGNOSIS — I25.10 NON-OCCLUSIVE CORONARY ARTERY DISEASE: Chronic | ICD-10-CM

## 2021-09-30 DIAGNOSIS — R60.0 BILATERAL LOWER EXTREMITY EDEMA: Chronic | ICD-10-CM

## 2021-09-30 DIAGNOSIS — Z91.09 MULTIPLE ENVIRONMENTAL ALLERGIES: Chronic | ICD-10-CM

## 2021-09-30 DIAGNOSIS — M15.9 PRIMARY OSTEOARTHRITIS INVOLVING MULTIPLE JOINTS: Chronic | ICD-10-CM

## 2021-09-30 DIAGNOSIS — I10 BENIGN ESSENTIAL HYPERTENSION: Chronic | ICD-10-CM

## 2021-09-30 DIAGNOSIS — Z51.81 THERAPEUTIC DRUG MONITORING: ICD-10-CM

## 2021-09-30 DIAGNOSIS — F31.4 BIPOLAR DISORDER, CURRENT EPISODE DEPRESSED, SEVERE, WITHOUT PSYCHOTIC FEATURES (HCC): ICD-10-CM

## 2021-09-30 DIAGNOSIS — M48.062 SPINAL STENOSIS OF LUMBAR REGION WITH NEUROGENIC CLAUDICATION: Chronic | ICD-10-CM

## 2021-09-30 DIAGNOSIS — Z00.00 ENCOUNTER FOR SUBSEQUENT ANNUAL WELLNESS VISIT (AWV) IN MEDICARE PATIENT: Primary | ICD-10-CM

## 2021-09-30 DIAGNOSIS — R73.01 IMPAIRED FASTING GLUCOSE: Chronic | ICD-10-CM

## 2021-09-30 DIAGNOSIS — E03.9 PRIMARY HYPOTHYROIDISM: Chronic | ICD-10-CM

## 2021-09-30 DIAGNOSIS — E66.01 MORBIDLY OBESE (HCC): Chronic | ICD-10-CM

## 2021-09-30 DIAGNOSIS — G47.33 OBSTRUCTIVE SLEEP APNEA: Chronic | ICD-10-CM

## 2021-09-30 PROBLEM — M54.41 ACUTE RIGHT-SIDED LOW BACK PAIN WITH RIGHT-SIDED SCIATICA: Status: RESOLVED | Noted: 2021-01-13 | Resolved: 2021-09-30

## 2021-09-30 PROBLEM — F31.9 AFFECTIVE PSYCHOSIS, BIPOLAR (HCC): Status: ACTIVE | Noted: 2021-09-30

## 2021-09-30 PROBLEM — F31.9 AFFECTIVE PSYCHOSIS, BIPOLAR (HCC): Chronic | Status: ACTIVE | Noted: 2021-09-30

## 2021-09-30 LAB
CHOLEST SERPL-MCNC: 130 MG/DL (ref 100–199)
HDL SERPL-SCNC: 23 UMOL/L
HDLC SERPL-MCNC: 28 MG/DL
LDL SERPL QN: 21 NM
LDL SERPL-SCNC: 934 NMOL/L
LDL SMALL SERPL-SCNC: 429 NMOL/L
LDLC SERPL CALC-MCNC: 70 MG/DL (ref 0–99)
TRIGL SERPL-MCNC: 188 MG/DL (ref 0–149)

## 2021-09-30 PROCEDURE — G0439 PPPS, SUBSEQ VISIT: HCPCS | Performed by: INTERNAL MEDICINE

## 2021-09-30 PROCEDURE — 99214 OFFICE O/P EST MOD 30 MIN: CPT | Performed by: INTERNAL MEDICINE

## 2021-09-30 RX ORDER — MELOXICAM 15 MG/1
TABLET ORAL
COMMUNITY
Start: 2021-09-28 | End: 2022-06-15 | Stop reason: ALTCHOICE

## 2021-09-30 RX ORDER — FLUOXETINE HYDROCHLORIDE 60 MG/1
60 TABLET, FILM COATED ORAL; ORAL DAILY
COMMUNITY
Start: 2021-09-28

## 2021-09-30 RX ORDER — LEVOTHYROXINE SODIUM 0.2 MG/1
200 TABLET ORAL DAILY
Qty: 90 TABLET | Refills: 1 | Status: SHIPPED | OUTPATIENT
Start: 2021-09-30 | End: 2021-12-06

## 2021-09-30 NOTE — PROGRESS NOTES
The ABCs of the Annual Wellness Visit  Subsequent Medicare Wellness Visit    No chief complaint on file.     Subjective    History of Present Illness:  Durga Valenzuela is a 71 y.o. male who presents for a Subsequent Medicare Wellness Visit.    The following portions of the patient's history were reviewed and   updated as appropriate: allergies, current medications, past family history, past medical history, past social history, past surgical history and problem list.    Compared to one year ago, the patient feels his physical   health is better.    Compared to one year ago, the patient feels his mental   health is better.    Recent Hospitalizations:  He was admitted within the past 365 days at  hospital.       Current Medical Providers:  Patient Care Team:  Florian Burt MD as PCP - General (Internal Medicine)    Outpatient Medications Prior to Visit   Medication Sig Dispense Refill   • amLODIPine (NORVASC) 10 MG tablet Take 1 tablet by mouth Daily. 90 tablet 1   • amphetamine-dextroamphetamine (ADDERALL) 20 MG tablet Take 20 mg by mouth Daily. prn     • aspirin 81 MG tablet One by mouth daily     • Cholecalciferol (VITAMIN D3) 5000 units capsule capsule 1 by mouth daily as directed 30 capsule    • clobetasol (TEMOVATE) 0.05 % ointment Apply twice a day as directed 60 g 6   • FLUoxetine (PROzac) 60 MG tablet      • fluticasone (FLONASE) 50 MCG/ACT nasal spray 2 sprays into the nostril(s) as directed by provider Daily.     • hydrOXYzine (ATARAX) 25 MG tablet Take 1 tablet by mouth 3 (Three) Times a Day As Needed for Itching. 60 tablet 3   • losartan (COZAAR) 50 MG tablet Take 1 tablet by mouth Daily. 90 tablet 1   • meloxicam (MOBIC) 15 MG tablet      • oxyCODONE-acetaminophen (PERCOCET)  MG per tablet Take 1-2 p.o. every 6 hours for moderate to severe low back pain. 60 tablet 0   • pravastatin (PRAVACHOL) 20 MG tablet TAKE ONE TABLET BY MOUTH DAILY FOR HIGH CHOLESTEROL 90 tablet 1   • tamsulosin (FLOMAX)  0.4 MG capsule 24 hr capsule Take 1 capsule by mouth Daily. 90 capsule 0   • zolpidem (AMBIEN) 10 MG tablet Take 10 mg by mouth At Night As Needed.     • levothyroxine (SYNTHROID, LEVOTHROID) 200 MCG tablet Take 1 tablet by mouth Daily. 90 tablet 1   • FLUoxetine (PROzac) 20 MG tablet Take 2 tablets by mouth Daily. 60 tablet 5     No facility-administered medications prior to visit.       Opioid medication/s are on active medication list.  and I have evaluated his active treatment plan and pain score trends (see table).  There were no vitals filed for this visit.  I have reviewed the chart for potential of high risk medication and harmful drug interactions in the elderly.            Aspirin is on active medication list. Aspirin use is indicated based on review of current medical condition/s. Pros and cons of this therapy have been discussed today. Benefits of this medication outweigh potential harm.  Patient has been encouraged to continue taking this medication.  .      Patient Active Problem List   Diagnosis   • Post traumatic stress disorder (PTSD)   • Benign prostatic hypertrophy   • Chronic insomnia   • Lumbar degenerative disc disease   • Impaired fasting glucose   • Benign essential hypertension   • Primary osteoarthritis involving multiple joints   • Hyperlipidemia   • Primary hypothyroidism   • Obstructive sleep apnea, tolerates CPAP well.  Previously failed oral appliance.   • History of pulmonary embolism   • Non-occlusive coronary artery disease, 05/16/2017--normal LM; proximal LAD mild LI; 50% mid LAD.  Mild distal LAD LI; normal Cx.  Mild LI marginal branches; normal RCA, nondominant.  EF 60%.   • Therapeutic drug monitoring   • Vitamin D deficiency   • Psoriasis   • Allergic rhinitis   • Depression with anxiety   • Multiple environmental allergies   • Bilateral lower extremity edema   • Morbidly obese (CMS/HCC)   • Spinal stenosis of lumbar region with neurogenic claudication   • Traumatic complete  "tear of left rotator cuff   • Urinary incontinence without sensory awareness   • Affective psychosis, bipolar (HCC)     Advance Care Planning  Advance Directive is not on file.  ACP discussion was held with the patient during this visit. Patient has an advance directive (not in EMR), copy requested.    Review of Systems   Constitutional: Negative.    HENT: Negative.    Eyes: Negative.    Respiratory: Negative.    Cardiovascular: Negative.    Gastrointestinal: Negative.    Endocrine: Negative.    Genitourinary: Negative.    Musculoskeletal: Positive for arthralgias and back pain.   Skin: Negative.    Allergic/Immunologic: Negative.    Neurological: Negative.    Hematological: Negative.    Psychiatric/Behavioral: Negative.         Objective    Vitals:    09/30/21 1436   BP: 112/68   Pulse: 60   SpO2: 98%   Weight: (!) 145 kg (319 lb 11.2 oz)   Height: 185.4 cm (73\")     BMI Readings from Last 1 Encounters:   09/30/21 42.18 kg/m²   BMI is above normal parameters. Recommendations include: educational material and none (medical contraindication)    Does the patient have evidence of cognitive impairment? No    Physical Exam     General: Alert and oriented x 3.  No acute distress.  Morbidly obese.  Normal affect.  HEENT: Pupils equal, round, reactive to light; extraocular movements intact; sclerae nonicteric; pharynx, ear canals and TMs normal.  Neck: Without JVD, thyromegaly, bruit, or adenopathy.  Lungs: Clear to auscultation in all fields.  Heart: Regular rate and rhythm without murmur, rub, gallop, or click.  Abdomen: Soft, nontender, without hepatosplenomegaly or hernia.  Bowel sounds normal.  : Deferred.  Rectal: Deferred.  Extremities: Without clubbing, cyanosis, significant edema, or pulse deficit.  Neurologic: Intact without focal deficit.  Normal station and gait observed during ingress and egress from the examination room.  Skin: Without significant lesion.  Musculoskeletal: Unremarkable.    Lab Results "   Component Value Date    CHLPL 130 09/27/2021    TRIG 188 (H) 09/27/2021    HGBA1C 6.23 (H) 09/27/2021            HEALTH RISK ASSESSMENT    Smoking Status:  Social History     Tobacco Use   Smoking Status Never Smoker   Smokeless Tobacco Never Used     Alcohol Consumption:  Social History     Substance and Sexual Activity   Alcohol Use No     Fall Risk Screen:    MAKI Fall Risk Assessment was completed, and patient is at LOW risk for falls.Assessment completed on:3/17/2021    Depression Screening:  PHQ-2/PHQ-9 Depression Screening 3/17/2021   Little interest or pleasure in doing things 0   Feeling down, depressed, or hopeless 0   Total Score 0       Health Habits and Functional and Cognitive Screening:  Functional & Cognitive Status 9/30/2021   Do you have difficulty preparing food and eating? No   Do you have difficulty bathing yourself, getting dressed or grooming yourself? No   Do you have difficulty using the toilet? No   Do you have difficulty moving around from place to place? No   Do you have trouble with steps or getting out of a bed or a chair? No   Current Diet Limited Junk Food   Dental Exam Up to date   Eye Exam Up to date   Exercise (times per week) 5 times per week   Current Exercises Include Yard Work   Current Exercise Activities Include -   Do you need help using the phone?  No   Are you deaf or do you have serious difficulty hearing?  No   Do you need help with transportation? No   Do you need help shopping? No   Do you need help preparing meals?  No   Do you need help with housework?  No   Do you need help with laundry? No   Do you need help taking your medications? No   Do you need help managing money? No   Do you ever drive or ride in a car without wearing a seat belt? No   Have you felt unusual stress, anger or loneliness in the last month? Yes   Who do you live with? Spouse   If you need help, do you have trouble finding someone available to you? No   Have you been bothered in the last four  weeks by sexual problems? No   Do you have difficulty concentrating, remembering or making decisions? No       Age-appropriate Screening Schedule:  Refer to the list below for future screening recommendations based on patient's age, sex and/or medical conditions. Orders for these recommended tests are listed in the plan section. The patient has been provided with a written plan.    Health Maintenance   Topic Date Due   • INFLUENZA VACCINE  10/01/2021   • LIPID PANEL  09/27/2022   • TDAP/TD VACCINES  Discontinued   • ZOSTER VACCINE  Discontinued              Assessment/Plan   CMS Preventative Services Quick Reference  Risk Factors Identified During Encounter  Cardiovascular Disease  Chronic Pain   Depression/Dysphoria  Obesity/Overweight   The above risks/problems have been discussed with the patient.  Follow up actions/plans if indicated are seen below in the Assessment/Plan Section.  Pertinent information has been shared with the patient in the After Visit Summary.    Diagnoses and all orders for this visit:    1. Encounter for subsequent annual wellness visit (AWV) in Medicare patient (Primary)    2. Impaired fasting glucose  -     Comprehensive Metabolic Panel; Future  -     Hemoglobin A1c; Future    3. Hyperlipidemia  -     CK; Future  -     Comprehensive Metabolic Panel; Future  -     NMR LipoProfile; Future    4. Primary hypothyroidism  -     TSH; Future  -     T4, Free; Future  -     T3, Free; Future  -     levothyroxine (SYNTHROID, LEVOTHROID) 200 MCG tablet; Take 1 tablet by mouth Daily. Name brand.  Dispense: 90 tablet; Refill: 1    5. Obstructive sleep apnea, tolerates CPAP well.  Previously failed oral appliance.    6. Benign prostatic hypertrophy    7. Chronic insomnia    8. Lumbar degenerative disc disease    9. Spinal stenosis of lumbar region with neurogenic claudication    10. Primary osteoarthritis involving multiple joints    11. Benign essential hypertension    12. History of pulmonary  embolism    13. Non-occlusive coronary artery disease, 05/16/2017--normal LM; proximal LAD mild LI; 50% mid LAD.  Mild distal LAD LI; normal Cx.  Mild LI marginal branches; normal RCA, nondominant.  EF 60%.    14. Vitamin D deficiency  -     Vitamin D 25 Hydroxy; Future    15. Depression with anxiety    16. Multiple environmental allergies    17. Bilateral lower extremity edema    18. Morbidly obese (CMS/HCC)    19. Urinary incontinence without sensory awareness    20. Bipolar disorder, current episode depressed, severe, without psychotic features (CMS/HCC)    21. Therapeutic drug monitoring  -     CBC (No Diff); Future        Follow Up:   No follow-ups on file.     An After Visit Summary and PPPS were made available to the patient.

## 2021-09-30 NOTE — PROGRESS NOTES
09/30/2021    Patient Information  Durga Valenzuela                                                                                          [id number] [facility] [address]      1950  [unfilled]  There is no work phone number on file.    Chief Complaint:     Medicare wellness visit.  Follow-up lab work in order to monitor chronic medical issues listed in history of present illness.  No new acute complaints.    History of Present Illness:    Patient with impaired fasting glucose, hyperlipidemia, hypothyroidism, sleep apnea, BPH, chronic insomnia, lumbar spinal stenosis/degenerative lumbar disc disease, primary osteoarthritis of multiple sites, hypertension, history of pulmonary embolism, nonocclusive coronary artery disease, vitamin D deficiency, depression with anxiety and bipolar disorder treated by psychiatry, multiple environmental allergies, bilateral lower extremity edema, morbid obesity, urinary incontinence.  He presents today for his subsequent Medicare wellness visit.  Also patient was supposed to have lab work prior to this visit in order to monitor his chronic medical issues.  His past medical history reviewed and updated were necessary including health maintenance parameters.  This reveals he will be up-to-date or else accounted for after today's visit.    Review of Systems   Constitutional: Negative.   HENT: Negative.    Eyes: Negative.    Cardiovascular: Positive for leg swelling.   Respiratory: Negative.    Endocrine: Negative.    Hematologic/Lymphatic: Negative.    Skin: Negative.    Musculoskeletal: Positive for arthritis, back pain and joint pain.   Gastrointestinal: Negative.    Genitourinary: Negative.    Neurological: Negative.    Psychiatric/Behavioral: Negative.    Allergic/Immunologic: Negative.        Active Problems:    Patient Active Problem List   Diagnosis   • Post traumatic stress disorder (PTSD)   • Benign prostatic hypertrophy   • Chronic insomnia   • Lumbar  degenerative disc disease   • Impaired fasting glucose   • Benign essential hypertension   • Primary osteoarthritis involving multiple joints   • Hyperlipidemia   • Primary hypothyroidism   • Obstructive sleep apnea, tolerates CPAP well.  Previously failed oral appliance.   • History of pulmonary embolism   • Non-occlusive coronary artery disease, 05/16/2017--normal LM; proximal LAD mild LI; 50% mid LAD.  Mild distal LAD LI; normal Cx.  Mild LI marginal branches; normal RCA, nondominant.  EF 60%.   • Therapeutic drug monitoring   • Vitamin D deficiency   • Psoriasis   • Allergic rhinitis   • Depression with anxiety   • Multiple environmental allergies   • Bilateral lower extremity edema   • Morbidly obese (CMS/[facility])   • Spinal stenosis of lumbar region with neurogenic claudication   • Traumatic complete tear of left rotator cuff   • Urinary incontinence without sensory awareness   • Affective psychosis, bipolar ([facility])         Past Medical History:   Diagnosis Date   • Affective psychosis, bipolar (CMS/[facility]) 9/30/2021   • Allergic rhinitis 8/21/2018    Patient has had allergy testing in the past which revealed allergies to molds and grass.  Immunotherapy for about 2 years in the 1980s.   • Benign essential hypertension 2/25/2016   • Benign prostatic hypertrophy 2/25/2016   • Bilateral lower extremity edema 8/21/2018   • Chronic insomnia 2/25/2016   • Closed traumatic lateral subluxation of patellofemoral joint, right, initial encounter 8/13/2018    08/10/2018--patient was evaluated by the orthopedist for right knee pain and found to have lateral subluxation of the right patella associated with presence of right artificial knee joint.  Physical therapy no help.  Surgery is scheduled 09/17/2018   • Depression with anxiety 8/21/2018   • History of deep venous thrombosis (DVT) of distal vein of right lower extremity 8/21/2018 04/26/2016--CTA of the chest performed for elevated d-dimer and progressive shortness of breath  and chest pain for one month revealed bilateral occlusive in near occlusive segmental and subsegmental pulmonary emboli in all lobes of the right lung as well as within the left lower lobe.  No evidence of pulmonary infarct.  Nonspecific multifocal groundglass attenuation in the right pulmonary apex, perhaps representing pneumonitis or submental atelectasis.  Questionable cholelithiasis.  Doppler venous study was positive for DVT in the right popliteal vein.  Hypercoagulable workup was normal.  He was treated with Eliquis for a total of 6 months and was subsequently discontinued.   • History of pneumonia    • History of pulmonary embolism 6/10/2016    04/26/2016--CTA of the chest performed for elevated d-dimer and progressive shortness of breath and chest pain for one month revealed bilateral occlusive in near occlusive segmental and subsegmental pulmonary emboli in all lobes of the right lung as well as within the left lower lobe.  No evidence of pulmonary infarct.  Nonspecific multifocal groundglass attenuation in the right pulmonary apex, perhaps representing pneumonitis or submental atelectasis.  Questionable cholelithiasis.  Doppler venous study was positive for DVT in the right popliteal vein.  Hypercoagulable workup was normal.  He was treated with Eliquis for a total of 6 months and was subsequently discontinued.   • Hyperlipidemia 2/25/2016   • Impaired fasting glucose 2/25/2016   • Lumbar degenerative disc disease 2/25/2016   • Morbidly obese (CMS/HCC) 10/19/2018   • Multiple environmental allergies 8/21/2018    Patient has had allergy testing in the past which revealed allergies to molds and grass.  Immunotherapy for about 2 years in the 1980s.   • Non-occlusive coronary artery disease, 05/16/2017--normal LM; proximal LAD mild LI; 50% mid LAD.  Mild distal LAD LI; normal Cx.  Mild LI marginal branches; normal RCA, nondominant.  EF 60%. 7/24/2017 05/16/2017--cardiac catheterization performed for  abnormal stress test.  Normal LV with ejection fraction 60%.  Dilated aortic root.  No wall motion abnormalities.  Normal left main.  Ramus branch small caliber and normal.  Proximal LAD large caliber and mild luminal irregularities.  50% mid LAD.  Mild luminal irregularities distal LAD.  3 diagonal branches of small caliber with mild luminal irregularities.  Normal septal branches.  Circumflex is large caliber and free of disease.  Marginal branches are medium caliber with mild luminal irregularities.  RCA is a medium caliber and free of disease.  It is nondominant.   • Obstructive sleep apnea, tolerates CPAP well.  Previously failed oral appliance. 2/25/2016   • Post traumatic stress disorder (PTSD) 2/25/2016   • Primary hypothyroidism 2/25/2016   • Primary osteoarthritis involving multiple joints 2/25/2016   • Psoriasis 9/17/2014   • Spinal stenosis of lumbar region with neurogenic claudication 2/15/2021   • Traumatic complete tear of left rotator cuff 3/17/2021    March 14, 2021--patient was evaluated by the orthopedic surgeon after he slipped on ice and fell onto his left shoulder.  Work-up revealed complete rotator cuff tear involving 2 tendons.  Specifically, he has an acute left supraspinatus tear and acute left infraspinatus tear.  He also has left glenohumeral joint osteoarthritis.  Apparently this is not repairable and patient would need a shoulder r   • Urinary incontinence without sensory awareness 3/17/2021   • Vitamin D deficiency 8/21/2018         Past Surgical History:   Procedure Laterality Date   • CARDIAC CATHETERIZATION N/A 5/16/2017    Procedure: Coronary angiography;  Surgeon: Malcolm Chen MD;  Location: [facility] INVASIVE LOCATION;  Service:    • CARDIAC CATHETERIZATION N/A 5/16/2017    Procedure: Left Heart Cath;  Surgeon: Malcolm Chen MD;  Location: [facility] CATH INVASIVE LOCATION;  Service:    • CARDIAC CATHETERIZATION N/A 5/16/2017    Procedure: Left ventriculography;   Surgeon: Malcolm Chen MD;  Location: [facility] CATH INVASIVE LOCATION;  Service:    • COLONOSCOPY  2005 2005--colonoscopy reportedly normal.  Records not available.   • COLONOSCOPY N/A 6/13/2019    Procedure: COLONOSCOPY INTO CECUM WITH COLD BIOPSY POLYPECTOMY;  Surgeon: Ayaan Gallardo MD;  Location: [facility] ENDOSCOPY;  Service: General   • LUMBAR DECOMPRESSION  2007 2007--lumbar decompression without fusion or hardware.   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   • REVISION TOTAL KNEE ARTHROPLASTY Right 09/17/2018 09/17/2018--right total knee arthroplasty revision due to lateral subluxation of the patella due to multiple factors.   • TOTAL KNEE ARTHROPLASTY Left 11/03/2017 11/03/2017--right total knee replacement complicated by patellofemoral subluxation.   • TOTAL KNEE ARTHROPLASTY Left 06/2014 June 2014--left total knee replacement.         Allergies   Allergen Reactions   • Hydrocodone Other (See Comments)     Severe vomiting - but CAN tolerate oxycodone per patient   • Zocor  [Simvastatin]      MYALGIA           Current Outpatient Medications:   •  amLODIPine (NORVASC) 10 MG tablet, Take 1 tablet by mouth Daily., Disp: 90 tablet, Rfl: 1  •  amphetamine-dextroamphetamine (ADDERALL) 20 MG tablet, Take 20 mg by mouth Daily. prn, Disp: , Rfl:   •  aspirin 81 MG tablet, One by mouth daily, Disp: , Rfl:   •  Cholecalciferol (VITAMIN D3) 5000 units capsule capsule, 1 by mouth daily as directed, Disp: 30 capsule, Rfl:   •  clobetasol (TEMOVATE) 0.05 % ointment, Apply twice a day as directed, Disp: 60 g, Rfl: 6  •  FLUoxetine (PROzac) 60 MG tablet, , Disp: , Rfl:   •  fluticasone (FLONASE) 50 MCG/ACT nasal spray, 2 sprays into the nostril(s) as directed by provider Daily., Disp: , Rfl:   •  hydrOXYzine (ATARAX) 25 MG tablet, Take 1 tablet by mouth 3 (Three) Times a Day As Needed for Itching.,  "Disp: 60 tablet, Rfl: 3  •  levothyroxine (SYNTHROID, LEVOTHROID) 200 MCG tablet, Take 1 tablet by mouth Daily. Name brand., Disp: 90 tablet, Rfl: 1  •  losartan (COZAAR) 50 MG tablet, Take 1 tablet by mouth Daily., Disp: 90 tablet, Rfl: 1  •  meloxicam (MOBIC) 15 MG tablet, , Disp: , Rfl:   •  oxyCODONE-acetaminophen (PERCOCET)  MG per tablet, Take 1-2 p.o. every 6 hours for moderate to severe low back pain., Disp: 60 tablet, Rfl: 0  •  pravastatin (PRAVACHOL) 20 MG tablet, TAKE ONE TABLET BY MOUTH DAILY FOR HIGH CHOLESTEROL, Disp: 90 tablet, Rfl: 1  •  tamsulosin (FLOMAX) 0.4 MG capsule 24 hr capsule, Take 1 capsule by mouth Daily., Disp: 90 capsule, Rfl: 0  •  zolpidem (AMBIEN) 10 MG tablet, Take 10 mg by mouth At Night As Needed., Disp: , Rfl:       Family History   Problem Relation Age of Onset   • Coronary artery disease Mother         Status post CABG   • Alzheimer's disease Mother    • Stroke Father    • Liver cancer Father         Father with biliary cancer including gallbladder   • No Known Problems Sister    • No Known Problems Brother          Social History     Socioeconomic History   • Marital status:      Spouse name: italo   • Number of children: Not on file   • Years of education: Not on file   • Highest education level: Bachelor's degree (e.g., BA, AB, BS)   Tobacco Use   • Smoking status: Never Smoker   • Smokeless tobacco: Never Used   Vaping Use   • Vaping Use: Never used   Substance and Sexual Activity   • Alcohol use: No   • Drug use: Yes     Types: Marijuana   • Sexual activity: Yes     Partners: Female         Vitals:    09/30/21 1436   BP: 112/68   Pulse: 60   SpO2: 98%   Weight: (!) 145 kg (319 lb 11.2 oz)   Height: 185.4 cm (73\")        Body mass index is 42.18 kg/m².      Physical Exam:    General: Alert and oriented x 3.  No acute distress.  Morbidly obese.  Normal affect.  HEENT: Pupils equal, round, reactive to light; extraocular movements intact; sclerae nonicteric; " pharynx, ear canals and TMs normal.  Neck: Without JVD, thyromegaly, bruit, or adenopathy.  Lungs: Clear to auscultation in all fields.  Heart: Regular rate and rhythm without murmur, rub, gallop, or click.  Abdomen: Soft, nontender, without hepatosplenomegaly or hernia.  Bowel sounds normal.  : Deferred.  Rectal: Deferred.  Extremities: Without clubbing, cyanosis, significant edema, or pulse deficit.  Neurologic: Intact without focal deficit.  Normal station and gait observed during ingress and egress from the examination room.  Skin: Without significant lesion.  Musculoskeletal: Unremarkable.     Lab/other results:    NMR reveals total cholesterol 130.  Triglycerides elevated 188.  LDL particle number excellent at 934.  Small LDL particle number excellent 429.  HDL particle number low at 23.0.  CMP normal except glucose 117.  Hemoglobin A1c 6.23.  CPK elevated at 470.  Vitamin D is low at 26.3.  PSA normal at 0.641.  TSH elevated at 26.8.  Free T4 is low.  Free T3 is normal.    Assessment/Plan:     Diagnosis Plan   1. Encounter for subsequent annual wellness visit (AWV) in Medicare patient     2. Impaired fasting glucose  Comprehensive Metabolic Panel    Hemoglobin A1c   3. Hyperlipidemia  CK    Comprehensive Metabolic Panel    NMR LipoProfile   4. Primary hypothyroidism  TSH    T4, Free    T3, Free    levothyroxine (SYNTHROID, LEVOTHROID) 200 MCG tablet   5. Obstructive sleep apnea, tolerates CPAP well.  Previously failed oral appliance.     6. Benign prostatic hypertrophy  Ambulatory Referral to Urology   7. Chronic insomnia     8. Lumbar degenerative disc disease     9. Spinal stenosis of lumbar region with neurogenic claudication     10. Primary osteoarthritis involving multiple joints     11. Benign essential hypertension     12. History of pulmonary embolism     13. Non-occlusive coronary artery disease, 05/16/2017--normal LM; proximal LAD mild LI; 50% mid LAD.  Mild distal LAD LI; normal Cx.  Mild LI  marginal branches; normal RCA, nondominant.  EF 60%.     14. Vitamin D deficiency  Vitamin D 25 Hydroxy   15. Depression with anxiety     16. Multiple environmental allergies     17. Bilateral lower extremity edema     18. Morbidly obese (CMS/[facility])     19. Urinary incontinence without sensory awareness  Ambulatory Referral to Urology   20. Bipolar disorder, current episode depressed, severe, without psychotic features (CMS/[facility])     21. Therapeutic drug monitoring  CBC (No Diff)     The subsequent Medicare wellness visit is documented on separate note.    Patient has impaired fasting glucose as well as morbid obesity and he is at risk for developing overt diabetes.  He has hyperlipidemia which is under fairly reasonable control.  His thyroid is markedly subtherapeutic and this is because patient has not been compliant with his levothyroxine.  He has sleep apnea and reports that he tolerates CPAP well and reports he is compliant.  BPH symptoms are controlled fairly well with Flomax.  Patient does have urinary incontinence and he is being treated for this as well by the urologist.  He has lumbar spinal stenosis and lumbar disc disease and his symptoms are currently tolerable with oxycodone which is prescribed by another physician.  He also has osteoarthritis of multiple joints.  His blood pressure seems to be controlled on the current regimen.  His coronary artery disease is nonocclusive and stable.  Vitamin D is low and I encouraged him to take vitamin D supplementation.  His depression with anxiety and bipolar disorder is being evaluated and treated by psychiatrist.  His environmental allergies currently not an issue.  Lower extremity edema is not evident today.    Plan is as follows: Strongly recommend low carbohydrate diet, exercise, and weight loss.  Strongly stressed compliance with his medication, particularly his thyroid supplementation.  Patient reports he has been religiously compliant with his levothyroxine.   He is getting the generic preparation and I think he needs to be on name brand preparation.  Prescription changed.  Keep follow-up with urology and certainly with his psychiatrist.  Patient will follow-up in 6 months with lab prior or follow-up as needed.      Procedures

## 2021-11-24 ENCOUNTER — LAB (OUTPATIENT)
Dept: LAB | Facility: HOSPITAL | Age: 71
End: 2021-11-24

## 2021-11-24 LAB
T3FREE SERPL-MCNC: 3.26 PG/ML (ref 2–4.4)
T4 FREE SERPL-MCNC: 1.08 NG/DL (ref 0.93–1.7)
TSH SERPL DL<=0.05 MIU/L-ACNC: 0.16 UIU/ML (ref 0.27–4.2)

## 2021-11-24 PROCEDURE — 84443 ASSAY THYROID STIM HORMONE: CPT | Performed by: INTERNAL MEDICINE

## 2021-11-24 PROCEDURE — 84481 FREE ASSAY (FT-3): CPT | Performed by: INTERNAL MEDICINE

## 2021-11-24 PROCEDURE — 84439 ASSAY OF FREE THYROXINE: CPT | Performed by: INTERNAL MEDICINE

## 2021-11-24 PROCEDURE — 36415 COLL VENOUS BLD VENIPUNCTURE: CPT | Performed by: INTERNAL MEDICINE

## 2021-12-06 ENCOUNTER — TELEPHONE (OUTPATIENT)
Dept: INTERNAL MEDICINE | Facility: CLINIC | Age: 71
End: 2021-12-06

## 2021-12-06 DIAGNOSIS — E03.9 PRIMARY HYPOTHYROIDISM: Primary | Chronic | ICD-10-CM

## 2021-12-06 RX ORDER — LEVOTHYROXINE SODIUM 175 MCG
TABLET ORAL
Qty: 90 TABLET | Refills: 1 | Status: SHIPPED | OUTPATIENT
Start: 2021-12-06 | End: 2022-07-05

## 2021-12-06 NOTE — TELEPHONE ENCOUNTER
Caller: Durga Valenzuela    Relationship: Self  Best call back number: [phone]    Caller requesting test results: SELF    What test was performed: TSH, THE PATIENT NOTICED HIS LEVELS WERE LOW    When was the test performed: 11/24/2021    Where was the test performed: IN-OFFICE    Additional notes: YES, PLEASE

## 2021-12-06 NOTE — TELEPHONE ENCOUNTER
The decreased TSH indicates that he is now getting too much thyroid supplementation.  I think switching to the name brand preparation is going to make things a lot better.  We now need to decrease the dose down to 175 mcg/day and I sent a new prescription to his pharmacy of the name brand Synthroid.  Patient has a lab and follow-up in March which I will have him keep and I feel like that his thyroid function test will be normal at that time and also on a reasonable dose.

## 2022-01-10 DIAGNOSIS — I10 ESSENTIAL (PRIMARY) HYPERTENSION: ICD-10-CM

## 2022-01-11 RX ORDER — AMLODIPINE BESYLATE 10 MG/1
TABLET ORAL
Qty: 90 TABLET | Refills: 2 | Status: SHIPPED | OUTPATIENT
Start: 2022-01-11 | End: 2022-05-25 | Stop reason: SDUPTHER

## 2022-02-14 NOTE — PLAN OF CARE
Problem: Fall Risk (Adult)  Goal: Absence of Fall  Outcome: Ongoing (interventions implemented as appropriate)      Problem: Patient Care Overview  Goal: Plan of Care Review   05/09/18 2919   Plan of Care Review   Progress no change   Coping/Psychosocial   Plan of Care Reviewed With patient   OTHER   Outcome Summary pt direct admit from home. hx of fall at home weeks ago then went to PT. complains of neck, awaiting for hard collar.          
Problem: Patient Care Overview  Goal: Plan of Care Review  Outcome: Ongoing (interventions implemented as appropriate)   05/10/18 0242   Plan of Care Review   Progress improving   Coping/Psychosocial   Plan of Care Reviewed With patient   OTHER   Outcome Summary pain cotrolled during shift with PO pain meds; NPO at midnight for possible surgery; NS @ 100; vss; hard collar in place; educated pt about importance of BP monitoring and meds r/t hx of HTN; will continue to monitor       Problem: Pain, Acute (Adult)  Goal: Identify Related Risk Factors and Signs and Symptoms  Outcome: Outcome(s) achieved Date Met: 05/10/18    Goal: Acceptable Pain Control/Comfort Level  Outcome: Ongoing (interventions implemented as appropriate)      Problem: Fracture Orthopaedic (Adult)  Goal: Signs and Symptoms of Listed Potential Problems Will be Absent, Minimized or Managed (Fracture Orthopaedic)  Outcome: Ongoing (interventions implemented as appropriate)        
English

## 2022-03-25 DIAGNOSIS — R73.01 IMPAIRED FASTING GLUCOSE: Chronic | ICD-10-CM

## 2022-03-25 DIAGNOSIS — E78.2 MIXED HYPERLIPIDEMIA: Chronic | ICD-10-CM

## 2022-03-25 DIAGNOSIS — Z51.81 THERAPEUTIC DRUG MONITORING: ICD-10-CM

## 2022-03-25 DIAGNOSIS — E55.9 VITAMIN D DEFICIENCY: Chronic | ICD-10-CM

## 2022-03-25 DIAGNOSIS — E03.9 PRIMARY HYPOTHYROIDISM: Chronic | ICD-10-CM

## 2022-03-28 ENCOUNTER — LAB (OUTPATIENT)
Dept: LAB | Facility: HOSPITAL | Age: 72
End: 2022-03-28

## 2022-03-28 LAB
25(OH)D3 SERPL-MCNC: 37.8 NG/ML (ref 30–100)
ALBUMIN SERPL-MCNC: 4.6 G/DL (ref 3.5–5.2)
ALBUMIN/GLOB SERPL: 1.9 G/DL
ALP SERPL-CCNC: 65 U/L (ref 39–117)
ALT SERPL W P-5'-P-CCNC: 27 U/L (ref 1–41)
ANION GAP SERPL CALCULATED.3IONS-SCNC: 9 MMOL/L (ref 5–15)
AST SERPL-CCNC: 22 U/L (ref 1–40)
BILIRUB SERPL-MCNC: 0.4 MG/DL (ref 0–1.2)
BUN SERPL-MCNC: 18 MG/DL (ref 8–23)
BUN/CREAT SERPL: 20.2 (ref 7–25)
CALCIUM SPEC-SCNC: 9.9 MG/DL (ref 8.6–10.5)
CHLORIDE SERPL-SCNC: 104 MMOL/L (ref 98–107)
CK SERPL-CCNC: 314 U/L (ref 20–200)
CO2 SERPL-SCNC: 29 MMOL/L (ref 22–29)
CREAT SERPL-MCNC: 0.89 MG/DL (ref 0.76–1.27)
DEPRECATED RDW RBC AUTO: 46 FL (ref 37–54)
EGFRCR SERPLBLD CKD-EPI 2021: 91.1 ML/MIN/1.73
ERYTHROCYTE [DISTWIDTH] IN BLOOD BY AUTOMATED COUNT: 13.7 % (ref 12.3–15.4)
GLOBULIN UR ELPH-MCNC: 2.4 GM/DL
GLUCOSE SERPL-MCNC: 138 MG/DL (ref 65–99)
HBA1C MFR BLD: 6.6 % (ref 4.8–5.6)
HCT VFR BLD AUTO: 43.2 % (ref 37.5–51)
HGB BLD-MCNC: 14.7 G/DL (ref 13–17.7)
MCH RBC QN AUTO: 31.1 PG (ref 26.6–33)
MCHC RBC AUTO-ENTMCNC: 34 G/DL (ref 31.5–35.7)
MCV RBC AUTO: 91.3 FL (ref 79–97)
PLATELET # BLD AUTO: 205 10*3/MM3 (ref 140–450)
PMV BLD AUTO: 10.4 FL (ref 6–12)
POTASSIUM SERPL-SCNC: 4.4 MMOL/L (ref 3.5–5.2)
PROT SERPL-MCNC: 7 G/DL (ref 6–8.5)
RBC # BLD AUTO: 4.73 10*6/MM3 (ref 4.14–5.8)
SODIUM SERPL-SCNC: 142 MMOL/L (ref 136–145)
T3FREE SERPL-MCNC: 3.49 PG/ML (ref 2–4.4)
T4 FREE SERPL-MCNC: 0.89 NG/DL (ref 0.93–1.7)
TSH SERPL DL<=0.05 MIU/L-ACNC: 4.55 UIU/ML (ref 0.27–4.2)
WBC NRBC COR # BLD: 5.78 10*3/MM3 (ref 3.4–10.8)

## 2022-03-28 PROCEDURE — 82306 VITAMIN D 25 HYDROXY: CPT | Performed by: INTERNAL MEDICINE

## 2022-03-28 PROCEDURE — 85027 COMPLETE CBC AUTOMATED: CPT | Performed by: INTERNAL MEDICINE

## 2022-03-28 PROCEDURE — 84481 FREE ASSAY (FT-3): CPT | Performed by: INTERNAL MEDICINE

## 2022-03-28 PROCEDURE — 83036 HEMOGLOBIN GLYCOSYLATED A1C: CPT | Performed by: INTERNAL MEDICINE

## 2022-03-28 PROCEDURE — 80053 COMPREHEN METABOLIC PANEL: CPT | Performed by: INTERNAL MEDICINE

## 2022-03-28 PROCEDURE — 82550 ASSAY OF CK (CPK): CPT | Performed by: INTERNAL MEDICINE

## 2022-03-28 PROCEDURE — 36415 COLL VENOUS BLD VENIPUNCTURE: CPT

## 2022-03-28 PROCEDURE — 84439 ASSAY OF FREE THYROXINE: CPT | Performed by: INTERNAL MEDICINE

## 2022-03-28 PROCEDURE — 83704 LIPOPROTEIN BLD QUAN PART: CPT | Performed by: INTERNAL MEDICINE

## 2022-03-28 PROCEDURE — 80061 LIPID PANEL: CPT | Performed by: INTERNAL MEDICINE

## 2022-03-28 PROCEDURE — 84443 ASSAY THYROID STIM HORMONE: CPT | Performed by: INTERNAL MEDICINE

## 2022-03-30 DIAGNOSIS — L40.9 PSORIASIS: Chronic | ICD-10-CM

## 2022-03-30 LAB
CHOLEST SERPL-MCNC: 130 MG/DL (ref 100–199)
HDL SERPL-SCNC: 28.9 UMOL/L
HDLC SERPL-MCNC: 32 MG/DL
LDL SERPL QN: 20.6 NM
LDL SERPL-SCNC: 716 NMOL/L
LDL SMALL SERPL-SCNC: 216 NMOL/L
LDLC SERPL CALC-MCNC: 73 MG/DL (ref 0–99)
TRIGL SERPL-MCNC: 138 MG/DL (ref 0–149)

## 2022-03-31 RX ORDER — CLOBETASOL PROPIONATE 0.5 MG/G
OINTMENT TOPICAL
Qty: 60 G | Refills: 6 | Status: SHIPPED | OUTPATIENT
Start: 2022-03-31

## 2022-04-01 ENCOUNTER — OFFICE VISIT (OUTPATIENT)
Dept: INTERNAL MEDICINE | Facility: CLINIC | Age: 72
End: 2022-04-01

## 2022-04-01 VITALS
DIASTOLIC BLOOD PRESSURE: 72 MMHG | WEIGHT: 310.6 LBS | RESPIRATION RATE: 18 BRPM | BODY MASS INDEX: 41.17 KG/M2 | SYSTOLIC BLOOD PRESSURE: 130 MMHG | OXYGEN SATURATION: 99 % | HEIGHT: 73 IN | HEART RATE: 60 BPM

## 2022-04-01 DIAGNOSIS — N40.1 BENIGN PROSTATIC HYPERPLASIA WITH URINARY OBSTRUCTION: Chronic | ICD-10-CM

## 2022-04-01 DIAGNOSIS — E03.9 PRIMARY HYPOTHYROIDISM: Chronic | ICD-10-CM

## 2022-04-01 DIAGNOSIS — G47.33 OBSTRUCTIVE SLEEP APNEA: Chronic | ICD-10-CM

## 2022-04-01 DIAGNOSIS — E66.01 MORBID OBESITY WITH BODY MASS INDEX (BMI) OF 40.0 OR HIGHER: Chronic | ICD-10-CM

## 2022-04-01 DIAGNOSIS — E78.2 MIXED HYPERLIPIDEMIA: Chronic | ICD-10-CM

## 2022-04-01 DIAGNOSIS — R39.15 URINARY URGENCY: ICD-10-CM

## 2022-04-01 DIAGNOSIS — E55.9 VITAMIN D DEFICIENCY: Chronic | ICD-10-CM

## 2022-04-01 DIAGNOSIS — R60.0 BILATERAL LOWER EXTREMITY EDEMA: Chronic | ICD-10-CM

## 2022-04-01 DIAGNOSIS — I25.10 NON-OCCLUSIVE CORONARY ARTERY DISEASE: Chronic | ICD-10-CM

## 2022-04-01 DIAGNOSIS — I10 BENIGN ESSENTIAL HYPERTENSION: Chronic | ICD-10-CM

## 2022-04-01 DIAGNOSIS — J30.1 CHRONIC SEASONAL ALLERGIC RHINITIS DUE TO POLLEN: Chronic | ICD-10-CM

## 2022-04-01 DIAGNOSIS — F31.78 BIPOLAR DISORDER, IN FULL REMISSION, MOST RECENT EPISODE MIXED: Chronic | ICD-10-CM

## 2022-04-01 DIAGNOSIS — N13.8 BENIGN PROSTATIC HYPERPLASIA WITH URINARY OBSTRUCTION: Chronic | ICD-10-CM

## 2022-04-01 DIAGNOSIS — F41.8 DEPRESSION WITH ANXIETY: Chronic | ICD-10-CM

## 2022-04-01 DIAGNOSIS — R73.01 IMPAIRED FASTING GLUCOSE: Primary | Chronic | ICD-10-CM

## 2022-04-01 DIAGNOSIS — Z86.711 HISTORY OF PULMONARY EMBOLISM: Chronic | ICD-10-CM

## 2022-04-01 DIAGNOSIS — R35.0 URINARY FREQUENCY: ICD-10-CM

## 2022-04-01 DIAGNOSIS — Z91.09 MULTIPLE ENVIRONMENTAL ALLERGIES: Chronic | ICD-10-CM

## 2022-04-01 DIAGNOSIS — N39.42 URINARY INCONTINENCE WITHOUT SENSORY AWARENESS: Chronic | ICD-10-CM

## 2022-04-01 DIAGNOSIS — Z51.81 THERAPEUTIC DRUG MONITORING: ICD-10-CM

## 2022-04-01 PROCEDURE — 99214 OFFICE O/P EST MOD 30 MIN: CPT | Performed by: INTERNAL MEDICINE

## 2022-04-01 NOTE — PROGRESS NOTES
04/01/2022    Patient Information  Durga Valenzuela                                                                                          [id number] [facility] [address]      1950  [unfilled]  There is no work phone number on file.    Chief Complaint:     Follow-up blood work in order to monitor chronic medical issues listed in history of present illness.  Complaining of urinary frequency and urgency.    History of Present Illness:    Patient with impaired fasting glucose, hyperlipidemia, hypothyroidism, BPH, hypertension, sleep apnea, history of pulmonary embolism, nonocclusive coronary artery disease, vitamin D deficiency, depression with anxiety and bipolar disorder, lower extremity edema, environmental allergies, morbid obesity, urinary incontinence without sensory awareness.  He presents today for follow-up with lab prior in order to monitor his chronic medical issues.  His past medical history reviewed and updated were necessary including health maintenance parameters.  This reveals he is up-to-date or else accounted for.    Review of Systems   Constitutional: Negative. Negative for chills.   HENT: Negative.    Eyes: Negative.    Cardiovascular: Negative.    Respiratory: Negative.    Endocrine: Negative.    Hematologic/Lymphatic: Negative.    Skin: Negative.    Musculoskeletal: Positive for arthritis, back pain and joint pain.   Gastrointestinal: Negative.  Negative for nausea.   Genitourinary: Positive for dysuria, flank pain, frequency, hesitancy and urgency.   Neurological: Negative.    Psychiatric/Behavioral: Negative.    Allergic/Immunologic: Negative.        Active Problems:    Patient Active Problem List   Diagnosis   • Post traumatic stress disorder (PTSD)   • Benign prostatic hyperplasia with urinary frequency   • Chronic insomnia   • Lumbar degenerative disc disease   • Impaired fasting glucose   • Benign essential hypertension   • Primary osteoarthritis involving  multiple joints   • Hyperlipidemia   • Primary hypothyroidism   • Obstructive sleep apnea, tolerates CPAP well.  Previously failed oral appliance.   • History of pulmonary embolism   • Non-occlusive coronary artery disease, 05/16/2017--normal LM; proximal LAD mild LI; 50% mid LAD.  Mild distal LAD LI; normal Cx.  Mild LI marginal branches; normal RCA, nondominant.  EF 60%.   • Therapeutic drug monitoring   • Vitamin D deficiency   • Psoriasis   • Allergic rhinitis   • Depression with anxiety   • Multiple environmental allergies   • Bilateral lower extremity edema   • Morbid obesity with body mass index (BMI) of 40.0 or higher ([facility])   • Spinal stenosis of lumbar region with neurogenic claudication   • Traumatic complete tear of left rotator cuff   • Urinary incontinence without sensory awareness   • Affective psychosis, bipolar ([facility])         Past Medical History:   Diagnosis Date   • Affective psychosis, bipolar ([facility]) 9/30/2021   • Allergic rhinitis 8/21/2018    Patient has had allergy testing in the past which revealed allergies to molds and grass.  Immunotherapy for about 2 years in the 1980s.   • Benign essential hypertension 2/25/2016   • Benign prostatic hyperplasia with urinary frequency 2/25/2016   • Benign prostatic hypertrophy 2/25/2016   • Bilateral lower extremity edema 8/21/2018   • Chronic insomnia 2/25/2016   • Closed traumatic lateral subluxation of patellofemoral joint, right, initial encounter 8/13/2018    08/10/2018--patient was evaluated by the orthopedist for right knee pain and found to have lateral subluxation of the right patella associated with presence of right artificial knee joint.  Physical therapy no help.  Surgery is scheduled 09/17/2018   • Depression with anxiety 8/21/2018   • History of deep venous thrombosis (DVT) of distal vein of right lower extremity 8/21/2018 04/26/2016--CTA of the chest performed for elevated d-dimer and progressive shortness of breath and chest pain for one  month revealed bilateral occlusive in near occlusive segmental and subsegmental pulmonary emboli in all lobes of the right lung as well as within the left lower lobe.  No evidence of pulmonary infarct.  Nonspecific multifocal groundglass attenuation in the right pulmonary apex, perhaps representing pneumonitis or submental atelectasis.  Questionable cholelithiasis.  Doppler venous study was positive for DVT in the right popliteal vein.  Hypercoagulable workup was normal.  He was treated with Eliquis for a total of 6 months and was subsequently discontinued.   • History of pneumonia    • History of pulmonary embolism 6/10/2016    04/26/2016--CTA of the chest performed for elevated d-dimer and progressive shortness of breath and chest pain for one month revealed bilateral occlusive in near occlusive segmental and subsegmental pulmonary emboli in all lobes of the right lung as well as within the left lower lobe.  No evidence of pulmonary infarct.  Nonspecific multifocal groundglass attenuation in the right pulmonary apex, perhaps representing pneumonitis or submental atelectasis.  Questionable cholelithiasis.  Doppler venous study was positive for DVT in the right popliteal vein.  Hypercoagulable workup was normal.  He was treated with Eliquis for a total of 6 months and was subsequently discontinued.   • Hyperlipidemia 2/25/2016   • Impaired fasting glucose 2/25/2016   • Lumbar degenerative disc disease 2/25/2016   • Morbid obesity with body mass index (BMI) of 40.0 or higher ([facility]) 10/19/2018   • Morbidly obese ([facility]) 10/19/2018   • Multiple environmental allergies 8/21/2018    Patient has had allergy testing in the past which revealed allergies to molds and grass.  Immunotherapy for about 2 years in the 1980s.   • Non-occlusive coronary artery disease, 05/16/2017--normal LM; proximal LAD mild LI; 50% mid LAD.  Mild distal LAD LI; normal Cx.  Mild LI marginal branches; normal RCA, nondominant.  EF 60%. 7/24/2017     05/16/2017--cardiac catheterization performed for abnormal stress test.  Normal LV with ejection fraction 60%.  Dilated aortic root.  No wall motion abnormalities.  Normal left main.  Ramus branch small caliber and normal.  Proximal LAD large caliber and mild luminal irregularities.  50% mid LAD.  Mild luminal irregularities distal LAD.  3 diagonal branches of small caliber with mild luminal irregularities.  Normal septal branches.  Circumflex is large caliber and free of disease.  Marginal branches are medium caliber with mild luminal irregularities.  RCA is a medium caliber and free of disease.  It is nondominant.   • Obstructive sleep apnea, tolerates CPAP well.  Previously failed oral appliance. 2/25/2016   • Post traumatic stress disorder (PTSD) 2/25/2016   • Primary hypothyroidism 2/25/2016   • Primary osteoarthritis involving multiple joints 2/25/2016   • Psoriasis 9/17/2014   • Spinal stenosis of lumbar region with neurogenic claudication 2/15/2021   • Traumatic complete tear of left rotator cuff 3/17/2021    March 14, 2021--patient was evaluated by the orthopedic surgeon after he slipped on ice and fell onto his left shoulder.  Work-up revealed complete rotator cuff tear involving 2 tendons.  Specifically, he has an acute left supraspinatus tear and acute left infraspinatus tear.  He also has left glenohumeral joint osteoarthritis.  Apparently this is not repairable and patient would need a shoulder r   • Urinary incontinence without sensory awareness 3/17/2021   • Vitamin D deficiency 8/21/2018         Past Surgical History:   Procedure Laterality Date   • CARDIAC CATHETERIZATION N/A 5/16/2017    Procedure: Coronary angiography;  Surgeon: Malcolm Chen MD;  Location: [facility] CATH INVASIVE LOCATION;  Service:    • CARDIAC CATHETERIZATION N/A 5/16/2017    Procedure: Left Heart Cath;  Surgeon: Malcolm Chen MD;  Location: [facility] CATH INVASIVE LOCATION;  Service:    • CARDIAC CATHETERIZATION  N/A 5/16/2017    Procedure: Left ventriculography;  Surgeon: Malcolm Chen MD;  Location: [facility] CATH INVASIVE LOCATION;  Service:    • COLONOSCOPY  2005 2005--colonoscopy reportedly normal.  Records not available.   • COLONOSCOPY N/A 6/13/2019    Procedure: COLONOSCOPY INTO CECUM WITH COLD BIOPSY POLYPECTOMY;  Surgeon: Ayaan Gallardo MD;  Location: [facility] ENDOSCOPY;  Service: General   • LUMBAR DECOMPRESSION  2007 2007--lumbar decompression without fusion or hardware.   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   • REVISION TOTAL KNEE ARTHROPLASTY Right 09/17/2018 09/17/2018--right total knee arthroplasty revision due to lateral subluxation of the patella due to multiple factors.   • TOTAL KNEE ARTHROPLASTY Left 11/03/2017 11/03/2017--right total knee replacement complicated by patellofemoral subluxation.   • TOTAL KNEE ARTHROPLASTY Left 06/2014 June 2014--left total knee replacement.         Allergies   Allergen Reactions   • Hydrocodone Other (See Comments)     Severe vomiting - but CAN tolerate oxycodone per patient   • Zocor  [Simvastatin]      MYALGIA           Current Outpatient Medications:   •  amLODIPine (NORVASC) 10 MG tablet, TAKE ONE TABLET BY MOUTH DAILY, Disp: 90 tablet, Rfl: 2  •  amphetamine-dextroamphetamine (ADDERALL) 20 MG tablet, Take 20 mg by mouth Daily. prn, Disp: , Rfl:   •  aspirin 81 MG tablet, One by mouth daily, Disp: , Rfl:   •  Cholecalciferol (VITAMIN D3) 5000 units capsule capsule, 1 by mouth daily as directed, Disp: 30 capsule, Rfl:   •  clobetasol (TEMOVATE) 0.05 % ointment, Apply twice a day as directed, Disp: 60 g, Rfl: 6  •  FLUoxetine (PROzac) 60 MG tablet, , Disp: , Rfl:   •  hydrOXYzine (ATARAX) 25 MG tablet, Take 1 tablet by mouth 3 (Three) Times a Day As Needed for Itching., Disp: 60 tablet, Rfl: 3  •  losartan (COZAAR) 50 MG tablet, Take 1 tablet  "by mouth Daily., Disp: 90 tablet, Rfl: 1  •  meloxicam (MOBIC) 15 MG tablet, , Disp: , Rfl:   •  oxyCODONE-acetaminophen (PERCOCET)  MG per tablet, Take 1-2 p.o. every 6 hours for moderate to severe low back pain., Disp: 60 tablet, Rfl: 0  •  pravastatin (PRAVACHOL) 20 MG tablet, TAKE ONE TABLET BY MOUTH DAILY FOR HIGH CHOLESTEROL, Disp: 90 tablet, Rfl: 1  •  Synthroid 175 MCG tablet, Take 1 p.o. daily for low thyroid, Disp: 90 tablet, Rfl: 1  •  tamsulosin (FLOMAX) 0.4 MG capsule 24 hr capsule, Take 1 capsule by mouth Daily., Disp: 90 capsule, Rfl: 0  •  zolpidem (AMBIEN) 10 MG tablet, Take 10 mg by mouth At Night As Needed., Disp: , Rfl:   •  budesonide (RINOCORT AQUA) 32 MCG/ACT nasal spray, Apply 2 sprays each nostril once daily as directed for nasal drainage/allergic rhinitis, Disp: 8.43 mL, Rfl: 6      Family History   Problem Relation Age of Onset   • Coronary artery disease Mother         Status post CABG   • Alzheimer's disease Mother    • Stroke Father    • Liver cancer Father         Father with biliary cancer including gallbladder   • No Known Problems Sister    • No Known Problems Brother          Social History     Socioeconomic History   • Marital status:      Spouse name: italo   • Highest education level: Bachelor's degree (e.g., BA, AB, BS)   Tobacco Use   • Smoking status: Never Smoker   • Smokeless tobacco: Never Used   Vaping Use   • Vaping Use: Never used   Substance and Sexual Activity   • Alcohol use: No   • Drug use: Yes     Types: Marijuana   • Sexual activity: Yes     Partners: Female         Vitals:    04/01/22 0825   BP: 130/72   Pulse: 60   Resp: 18   SpO2: 99%   Weight: (!) 141 kg (310 lb 9.6 oz)   Height: 185.4 cm (73\")        Body mass index is 40.98 kg/m².      Physical Exam:    General: Alert and oriented x 3.  No acute distress.  Morbidly obese.  Normal affect.  HEENT: Pupils equal, round, reactive to light; extraocular movements intact; sclerae nonicteric; pharynx, ear " canals and TMs normal.  Neck: Without JVD, thyromegaly, bruit, or adenopathy.  Lungs: Clear to auscultation in all fields.  Heart: Regular rate and rhythm without murmur, rub, gallop, or click.  Abdomen: Soft, nontender, without hepatosplenomegaly or hernia.  Bowel sounds normal.  : Deferred.  Rectal: Deferred.  Extremities: Without clubbing, cyanosis, edema, or pulse deficit.  Neurologic: Intact without focal deficit.  Normal station and gait observed during ingress and egress from the examination room.  Skin: Without significant lesion.  Musculoskeletal: Unremarkable.    Lab/other results:    CBC is normal.  CPK elevated at 314.  CMP normal except glucose 138.  Hemoglobin A1c 6.6.  NMR reveals a total cholesterol of 130.  Triglycerides 138.  LDL particle number excellent 716.  HDL particle number is low at 28.9.  TSH is elevated at 4.55.  Free T3 is normal at 3.49.  Free T4 is low at 0.89.  Vitamin D normal at 37.8.    Assessment/Plan:     Diagnosis Plan   1. Impaired fasting glucose  Comprehensive Metabolic Panel    Hemoglobin A1c   2. Hyperlipidemia  CK    Comprehensive Metabolic Panel    NMR LipoProfile   3. Primary hypothyroidism  TSH    T4, Free    T3, Free   4. Benign prostatic hypertrophy  PSA DIAGNOSTIC   5. Benign essential hypertension     6. Obstructive sleep apnea, tolerates CPAP well.  Previously failed oral appliance.     7. History of pulmonary embolism     8. Non-occlusive coronary artery disease, 05/16/2017--normal LM; proximal LAD mild LI; 50% mid LAD.  Mild distal LAD LI; normal Cx.  Mild LI marginal branches; normal RCA, nondominant.  EF 60%.     9. Vitamin D deficiency  Vitamin D 25 Hydroxy   10. Depression with anxiety     11. Bipolar disorder, in full remission, most recent episode mixed ([facility])     12. Bilateral lower extremity edema     13. Multiple environmental allergies     14. Morbid obesity with body mass index (BMI) of 40.0 or higher ([facility])     15. Urinary incontinence without sensory  awareness  Urinalysis With Microscopic If Indicated (No Culture) - Urine, Clean Catch   16. Therapeutic drug monitoring  CBC (No Diff)   17. Urinary frequency  Urinalysis With Microscopic If Indicated (No Culture) - Urine, Clean Catch   18. Urinary urgency     19. Allergic rhinitis  budesonide (RINOCORT AQUA) 32 MCG/ACT nasal spray     It appears that patient's impaired fasting glucose may have evolved into mild overt diabetes.  I am reluctant to give him this diagnosis based on one single hemoglobin A1c reading.  He has nonmorbid obesity and once again I strongly encouraged him to follow a low carbohydrate diet, exercise, and lose weight.  Hyperlipidemia is under fairly good control.  His blood pressure seems to be controlled.  It appears he is a little subtherapeutic on his thyroid and medication adjustment is indicated.  Patient has sleep apnea and reports he is compliant with CPAP and tolerates it well.  He has a history of remote pulmonary embolism with no recurrence.  He has nonocclusive coronary artery disease and is followed by the cardiology service.  Vitamin D is in the normal range.  His depression with anxiety as well as bipolar disorder and other psychiatric issues are managed by the psychiatrist.  Lower extremity edema adequately controlled.  His environmental allergies are treated with Flonase and Atarax which seems to be helpful.  He has new complaints of urinary frequency and urgency and also has a history of urinary incontinence without sensory awareness.  Need to evaluate for UTI/prostatitis.  Patient should be evaluated by the urologist.    Plan is as follows: I have decided against changing his thyroid supplementation as he is already on 175 mcg/day.  Strongly recommend low carbohydrate diet, exercise, and weight loss.  Check urinalysis, urine culture, and PSA.  Patient will follow up on phone for the results and further instructions.  Urology referral.  Patient will follow-up after September  30, 2022 with lab prior and this will be subsequent Medicare wellness visit.  Otherwise he will follow-up as needed.    Addendum: Patient just saw the urologist with the above urinary complaints about 3 weeks ago.  He was started on a medication that patient indicates starts with an H.  Is not sure of the name.  I do not have any documentation from the urologist.  I am not going to send him for a urinalysis/urine culture/PSA but instead I recommended that he contact his urologist and let him know that he is not having any improvement with the new medication.  I will obtain the records from the urologist.    Procedures        Answers for HPI/ROS submitted by the patient on 3/25/2022  What is the primary reason for your visit?: Painful Urination  Chronicity: recurrent  Onset: more than 1 month ago  Frequency: every urination  Progression since onset: unchanged  Pain quality: burning  Pain - numeric: 6/10  Sexually active?: Yes  History of pyelonephritis?: No  discharge: No

## 2022-04-06 ENCOUNTER — TELEPHONE (OUTPATIENT)
Dept: INTERNAL MEDICINE | Facility: CLINIC | Age: 72
End: 2022-04-06

## 2022-04-06 NOTE — TELEPHONE ENCOUNTER
Tell patient that I really do not like to treat over the phone particularly given the fact that the Covid pandemic is still going on.  Is not good medicine to treat over the phone.  I can work him in tomorrow somewhere.

## 2022-04-06 NOTE — TELEPHONE ENCOUNTER
Caller: Durga Valenzuela    Relationship: Self    Best call back number: [phone]    What medication are you requesting: ANTIBIOTICS    What are your current symptoms: COUGH, GREEN PHLEGM    How long have you been experiencing symptoms: ABOUT 2 DAYS    Have you had these symptoms before:    [] Yes  [x] No    Have you been treated for these symptoms before:   [] Yes  [x] No    If a prescription is needed, what is your preferred pharmacy and phone number: [facility] [address] [address] [address] [phone] [facility] [phone] [facility]     Additional notes: PATIENT WOULD LIKE DR. FONG TO CALL HIM IN SOME MEDICATION, DOESN'T WANT TO COME IN TO BE SEEN IF HE DOESN'T HAVE TO. PREFERS TO SEE DR. FONG OVER OTHER PROVIDERS

## 2022-04-07 ENCOUNTER — OFFICE VISIT (OUTPATIENT)
Dept: INTERNAL MEDICINE | Facility: CLINIC | Age: 72
End: 2022-04-07

## 2022-04-07 VITALS
RESPIRATION RATE: 16 BRPM | WEIGHT: 315 LBS | HEIGHT: 73 IN | OXYGEN SATURATION: 98 % | HEART RATE: 58 BPM | BODY MASS INDEX: 41.75 KG/M2 | SYSTOLIC BLOOD PRESSURE: 130 MMHG | DIASTOLIC BLOOD PRESSURE: 70 MMHG

## 2022-04-07 DIAGNOSIS — G47.33 OBSTRUCTIVE SLEEP APNEA: Chronic | ICD-10-CM

## 2022-04-07 DIAGNOSIS — J20.9 ACUTE BRONCHITIS WITH BRONCHOSPASM: Primary | ICD-10-CM

## 2022-04-07 DIAGNOSIS — J30.1 CHRONIC SEASONAL ALLERGIC RHINITIS DUE TO POLLEN: Chronic | ICD-10-CM

## 2022-04-07 DIAGNOSIS — Z91.09 MULTIPLE ENVIRONMENTAL ALLERGIES: Chronic | ICD-10-CM

## 2022-04-07 DIAGNOSIS — R73.01 IMPAIRED FASTING GLUCOSE: Chronic | ICD-10-CM

## 2022-04-07 PROCEDURE — 99214 OFFICE O/P EST MOD 30 MIN: CPT | Performed by: INTERNAL MEDICINE

## 2022-04-07 RX ORDER — CEFDINIR 300 MG/1
CAPSULE ORAL
Qty: 20 CAPSULE | Refills: 0 | Status: SHIPPED | OUTPATIENT
Start: 2022-04-07 | End: 2022-05-18

## 2022-04-07 RX ORDER — PREDNISONE 10 MG/1
TABLET ORAL
Qty: 46 TABLET | Refills: 0 | Status: SHIPPED | OUTPATIENT
Start: 2022-04-07 | End: 2022-05-18

## 2022-04-07 NOTE — PROGRESS NOTES
04/07/2022    Patient Information  Durga Valenzuela                                                                                          [id number] [facility] [id number]      1950  [unfilled]  There is no work phone number on file.    Chief Complaint:     Complaining of chest congestion and cough.    History of Present Illness:    Patient with multiple medical problems including BPH, hypertension, hyperlipidemia, hypothyroidism, coronary artery disease, history of pulmonary embolism, sleep apnea, environmental allergies/allergic rhinitis, lower extremity edema, morbid obesity, lumbar spinal stenosis.  He presents today with complaints of chest congestion and cough as described below.  His past medical history reviewed and updated were necessary including health maintenance parameters.  This reveals he is currently up-to-date or else accounted for.    The history regarding today's problem was documented today on the problem list under the diagnosis of acute bronchitis with bronchospasm and transferred to this note today with appropriate date is as follows:    April 7, 2022--patient presents with least a 2-week history of chest congestion and cough which is productive of thick green phlegm.  Patient has noted some wheezing particularly at night.  He is short of breath when he exerts himself.  No documented fever, chills, or systemic signs and symptoms.  Patient has environmental allergies and those symptoms seem to be worse which is not unusual given the time of year.  On exam patient has an obvious cough but is in no acute acute distress.  HEENT exam is normal.  Lung exam reveals bilateral scattered rhonchi and end expiratory wheezes.  No definite signs of consolidation.  Assessment is acute bronchitis with bronchospasm.  I do think environmental allergy is playing a role.  Plan is as follows: Cefdinir 300 mg p.o. twice daily x10 days.  Prednisone 50 mg p.o. daily x5 days, taper and  discontinue.  Patient should contact me if his symptoms do not resolve after treatment.  He should also contact me if his symptoms worsen such as fevers and shaking chills or worsening shortness of breath.    Review of Systems   Constitutional: Negative. Negative for chills and fever.   HENT: Negative.    Eyes: Negative.    Cardiovascular: Positive for dyspnea on exertion. Negative for chest pain.   Respiratory: Positive for shortness of breath, sputum production and wheezing. Negative for hemoptysis and sleep disturbances due to breathing.    Endocrine: Negative.    Hematologic/Lymphatic: Negative.    Skin: Negative.    Musculoskeletal: Negative.    Gastrointestinal: Negative.    Genitourinary: Negative.    Neurological: Negative.    Psychiatric/Behavioral: Negative.    Allergic/Immunologic: Negative.        Active Problems:    Patient Active Problem List   Diagnosis   • Post traumatic stress disorder (PTSD)   • Benign prostatic hyperplasia with urinary frequency   • Chronic insomnia   • Lumbar degenerative disc disease   • Impaired fasting glucose   • Benign essential hypertension   • Primary osteoarthritis involving multiple joints   • Hyperlipidemia   • Primary hypothyroidism   • Obstructive sleep apnea, tolerates CPAP well.  Previously failed oral appliance.   • History of pulmonary embolism   • Non-occlusive coronary artery disease, 05/16/2017--normal LM; proximal LAD mild LI; 50% mid LAD.  Mild distal LAD LI; normal Cx.  Mild LI marginal branches; normal RCA, nondominant.  EF 60%.   • Therapeutic drug monitoring   • Vitamin D deficiency   • Psoriasis   • Allergic rhinitis   • Depression with anxiety   • Multiple environmental allergies   • Bilateral lower extremity edema   • Morbid obesity with body mass index (BMI) of 40.0 or higher ([facility])   • Spinal stenosis of lumbar region with neurogenic claudication   • Traumatic complete tear of left rotator cuff   • Urinary incontinence without sensory awareness   •  Affective psychosis, bipolar (HCC)   • Acute bronchitis with bronchospasm         Past Medical History:   Diagnosis Date   • Affective psychosis, bipolar (HCC) 9/30/2021   • Allergic rhinitis 8/21/2018    Patient has had allergy testing in the past which revealed allergies to molds and grass.  Immunotherapy for about 2 years in the 1980s.   • Benign essential hypertension 2/25/2016   • Benign prostatic hyperplasia with urinary frequency 2/25/2016   • Benign prostatic hypertrophy 2/25/2016   • Bilateral lower extremity edema 8/21/2018   • Chronic insomnia 2/25/2016   • Closed traumatic lateral subluxation of patellofemoral joint, right, initial encounter 8/13/2018    08/10/2018--patient was evaluated by the orthopedist for right knee pain and found to have lateral subluxation of the right patella associated with presence of right artificial knee joint.  Physical therapy no help.  Surgery is scheduled 09/17/2018   • Depression with anxiety 8/21/2018   • History of deep venous thrombosis (DVT) of distal vein of right lower extremity 8/21/2018 04/26/2016--CTA of the chest performed for elevated d-dimer and progressive shortness of breath and chest pain for one month revealed bilateral occlusive in near occlusive segmental and subsegmental pulmonary emboli in all lobes of the right lung as well as within the left lower lobe.  No evidence of pulmonary infarct.  Nonspecific multifocal groundglass attenuation in the right pulmonary apex, perhaps representing pneumonitis or submental atelectasis.  Questionable cholelithiasis.  Doppler venous study was positive for DVT in the right popliteal vein.  Hypercoagulable workup was normal.  He was treated with Eliquis for a total of 6 months and was subsequently discontinued.   • History of pneumonia    • History of pulmonary embolism 6/10/2016    04/26/2016--CTA of the chest performed for elevated d-dimer and progressive shortness of breath and chest pain for one month revealed  bilateral occlusive in near occlusive segmental and subsegmental pulmonary emboli in all lobes of the right lung as well as within the left lower lobe.  No evidence of pulmonary infarct.  Nonspecific multifocal groundglass attenuation in the right pulmonary apex, perhaps representing pneumonitis or submental atelectasis.  Questionable cholelithiasis.  Doppler venous study was positive for DVT in the right popliteal vein.  Hypercoagulable workup was normal.  He was treated with Eliquis for a total of 6 months and was subsequently discontinued.   • Hyperlipidemia 2/25/2016   • Impaired fasting glucose 2/25/2016   • Lumbar degenerative disc disease 2/25/2016   • Morbid obesity with body mass index (BMI) of 40.0 or higher ([facility]) 10/19/2018   • Morbidly obese ([facility]) 10/19/2018   • Multiple environmental allergies 8/21/2018    Patient has had allergy testing in the past which revealed allergies to molds and grass.  Immunotherapy for about 2 years in the 1980s.   • Non-occlusive coronary artery disease, 05/16/2017--normal LM; proximal LAD mild LI; 50% mid LAD.  Mild distal LAD LI; normal Cx.  Mild LI marginal branches; normal RCA, nondominant.  EF 60%. 7/24/2017 05/16/2017--cardiac catheterization performed for abnormal stress test.  Normal LV with ejection fraction 60%.  Dilated aortic root.  No wall motion abnormalities.  Normal left main.  Ramus branch small caliber and normal.  Proximal LAD large caliber and mild luminal irregularities.  50% mid LAD.  Mild luminal irregularities distal LAD.  3 diagonal branches of small caliber with mild luminal irregularities.  Normal septal branches.  Circumflex is large caliber and free of disease.  Marginal branches are medium caliber with mild luminal irregularities.  RCA is a medium caliber and free of disease.  It is nondominant.   • Obstructive sleep apnea, tolerates CPAP well.  Previously failed oral appliance. 2/25/2016   • Post traumatic stress disorder (PTSD) 2/25/2016   •  Primary hypothyroidism 2/25/2016   • Primary osteoarthritis involving multiple joints 2/25/2016   • Psoriasis 9/17/2014   • Spinal stenosis of lumbar region with neurogenic claudication 2/15/2021   • Traumatic complete tear of left rotator cuff 3/17/2021    March 14, 2021--patient was evaluated by the orthopedic surgeon after he slipped on ice and fell onto his left shoulder.  Work-up revealed complete rotator cuff tear involving 2 tendons.  Specifically, he has an acute left supraspinatus tear and acute left infraspinatus tear.  He also has left glenohumeral joint osteoarthritis.  Apparently this is not repairable and patient would need a shoulder r   • Urinary incontinence without sensory awareness 3/17/2021   • Vitamin D deficiency 8/21/2018         Past Surgical History:   Procedure Laterality Date   • CARDIAC CATHETERIZATION N/A 5/16/2017    Procedure: Coronary angiography;  Surgeon: Malcolm Chen MD;  Location: [facility] CATH INVASIVE LOCATION;  Service:    • CARDIAC CATHETERIZATION N/A 5/16/2017    Procedure: Left Heart Cath;  Surgeon: Malcolm Chen MD;  Location: [facility] CATH INVASIVE LOCATION;  Service:    • CARDIAC CATHETERIZATION N/A 5/16/2017    Procedure: Left ventriculography;  Surgeon: Malcolm Chen MD;  Location: [facility] CATH INVASIVE LOCATION;  Service:    • COLONOSCOPY  2005 2005--colonoscopy reportedly normal.  Records not available.   • COLONOSCOPY N/A 6/13/2019    Procedure: COLONOSCOPY INTO CECUM WITH COLD BIOPSY POLYPECTOMY;  Surgeon: Ayaan Gallardo MD;  Location: [facility] ENDOSCOPY;  Service: General   • LUMBAR DECOMPRESSION  2007 2007--lumbar decompression without fusion or hardware.   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   • REVISION TOTAL KNEE ARTHROPLASTY Right 09/17/2018 09/17/2018--right total knee arthroplasty revision due to lateral subluxation of  the patella due to multiple factors.   • TOTAL KNEE ARTHROPLASTY Left 11/03/2017 11/03/2017--right total knee replacement complicated by patellofemoral subluxation.   • TOTAL KNEE ARTHROPLASTY Left 06/2014 June 2014--left total knee replacement.         Allergies   Allergen Reactions   • Hydrocodone Other (See Comments)     Severe vomiting - but CAN tolerate oxycodone per patient   • Zocor  [Simvastatin]      MYALGIA           Current Outpatient Medications:   •  amLODIPine (NORVASC) 10 MG tablet, TAKE ONE TABLET BY MOUTH DAILY, Disp: 90 tablet, Rfl: 2  •  amphetamine-dextroamphetamine (ADDERALL) 20 MG tablet, Take 20 mg by mouth Daily. prn, Disp: , Rfl:   •  aspirin 81 MG tablet, One by mouth daily, Disp: , Rfl:   •  budesonide (RINOCORT AQUA) 32 MCG/ACT nasal spray, Apply 2 sprays each nostril once daily as directed for nasal drainage/allergic rhinitis, Disp: 8.43 mL, Rfl: 6  •  Cholecalciferol (VITAMIN D3) 5000 units capsule capsule, 1 by mouth daily as directed, Disp: 30 capsule, Rfl:   •  clobetasol (TEMOVATE) 0.05 % ointment, Apply twice a day as directed, Disp: 60 g, Rfl: 6  •  FLUoxetine (PROzac) 60 MG tablet, , Disp: , Rfl:   •  hydrOXYzine (ATARAX) 25 MG tablet, Take 1 tablet by mouth 3 (Three) Times a Day As Needed for Itching., Disp: 60 tablet, Rfl: 3  •  losartan (COZAAR) 50 MG tablet, Take 1 tablet by mouth Daily., Disp: 90 tablet, Rfl: 1  •  meloxicam (MOBIC) 15 MG tablet, , Disp: , Rfl:   •  oxyCODONE-acetaminophen (PERCOCET)  MG per tablet, Take 1-2 p.o. every 6 hours for moderate to severe low back pain., Disp: 60 tablet, Rfl: 0  •  pravastatin (PRAVACHOL) 20 MG tablet, TAKE ONE TABLET BY MOUTH DAILY FOR HIGH CHOLESTEROL, Disp: 90 tablet, Rfl: 1  •  Synthroid 175 MCG tablet, Take 1 p.o. daily for low thyroid, Disp: 90 tablet, Rfl: 1  •  tamsulosin (FLOMAX) 0.4 MG capsule 24 hr capsule, Take 1 capsule by mouth Daily., Disp: 90 capsule, Rfl: 0  •  zolpidem (AMBIEN) 10 MG tablet, Take 10  "mg by mouth At Night As Needed., Disp: , Rfl:   •  cefdinir (OMNICEF) 300 MG capsule, Take 1 p.o. twice daily until gone for bronchitis, Disp: 20 capsule, Rfl: 0  •  predniSONE (DELTASONE) 10 MG tablet, 5 by mouth daily 5 days, then 4 daily 2 days, 3 daily 2 days, 2 daily 2 days, 1 daily 2 days, one half daily 2 days and then discontinue., Disp: 46 tablet, Rfl: 0      Family History   Problem Relation Age of Onset   • Coronary artery disease Mother         Status post CABG   • Alzheimer's disease Mother    • Stroke Father    • Liver cancer Father         Father with biliary cancer including gallbladder   • No Known Problems Sister    • No Known Problems Brother          Social History     Socioeconomic History   • Marital status:      Spouse name: italo   • Highest education level: Bachelor's degree (e.g., BA, AB, BS)   Tobacco Use   • Smoking status: Never Smoker   • Smokeless tobacco: Never Used   Vaping Use   • Vaping Use: Never used   Substance and Sexual Activity   • Alcohol use: No   • Drug use: Yes     Types: Marijuana   • Sexual activity: Yes     Partners: Female         Vitals:    04/07/22 1106   BP: 130/70   BP Location: Right arm   Patient Position: Sitting   Cuff Size: Large Adult   Pulse: 58   Resp: 16   SpO2: 98%   Weight: (!) 144 kg (318 lb 3.2 oz)   Height: 185.4 cm (73\")        Body mass index is 41.98 kg/m².      Physical Exam:    General: Alert and oriented x 3.  No acute distress.  Normal affect.  HEENT: Pupils equal, round, reactive to light; extraocular movements intact; sclerae nonicteric; pharynx, ear canals and TMs normal.  Neck: Without JVD, thyromegaly, bruit, or adenopathy.  Lungs: Occasional cough noted.  Bilateral scattered rhonchi and end expiratory wheezes noted.  No evidence of consolidation.  Heart: Regular rate and rhythm without murmur, rub, gallop, or click.  Abdomen: Soft, nontender, without hepatosplenomegaly or hernia.  Bowel sounds normal.  : Deferred.  Rectal: " Deferred.  Extremities: Without clubbing, cyanosis, edema, or pulse deficit.  Neurologic: Intact without focal deficit.  Normal station and gait observed during ingress and egress from the examination room.  Skin: Without significant lesion.  Musculoskeletal: Unremarkable.    Lab/other results:      Assessment/Plan:     Diagnosis Plan   1. Acute bronchitis with bronchospasm  cefdinir (OMNICEF) 300 MG capsule    predniSONE (DELTASONE) 10 MG tablet   2. Multiple environmental allergies  predniSONE (DELTASONE) 10 MG tablet   3. Allergic rhinitis  predniSONE (DELTASONE) 10 MG tablet   4. Impaired fasting glucose     5. Obstructive sleep apnea, tolerates CPAP well.  Previously failed oral appliance.       April 7, 2022--patient presents with least a 2-week history of chest congestion and cough which is productive of thick green phlegm.  Patient has noted some wheezing particularly at night.  He is short of breath when he exerts himself.  No documented fever, chills, or systemic signs and symptoms.  Patient has environmental allergies and those symptoms seem to be worse which is not unusual given the time of year.  On exam patient has an obvious cough but is in no acute acute distress.  HEENT exam is normal.  Lung exam reveals bilateral scattered rhonchi and end expiratory wheezes.  No definite signs of consolidation.  Assessment is acute bronchitis with bronchospasm.  I do think environmental allergy is playing a role.  Plan is as follows: Cefdinir 300 mg p.o. twice daily x10 days.  Prednisone 50 mg p.o. daily x5 days, taper and discontinue.  Patient should contact me if his symptoms do not resolve after treatment.  He should also contact me if his symptoms worsen such as fevers and shaking chills or worsening shortness of breath.    Addendum: Patient has impaired fasting glucose and I explained to him that the prednisone could raise his blood sugars but this is generally transient.  Also patient has sleep apnea and  having difficulty using his CPAP device.  I explained to him he may have to stop using it temporarily until his symptoms get better.        Procedures

## 2022-05-09 DIAGNOSIS — J30.1 CHRONIC SEASONAL ALLERGIC RHINITIS DUE TO POLLEN: Chronic | ICD-10-CM

## 2022-05-09 RX ORDER — LOSARTAN POTASSIUM 50 MG/1
TABLET ORAL
Qty: 90 TABLET | Refills: 3 | Status: SHIPPED | OUTPATIENT
Start: 2022-05-09

## 2022-05-18 ENCOUNTER — HOSPITAL ENCOUNTER (OUTPATIENT)
Dept: GENERAL RADIOLOGY | Facility: HOSPITAL | Age: 72
Discharge: HOME | End: 2022-05-18
Admitting: INTERNAL MEDICINE

## 2022-05-18 ENCOUNTER — OFFICE VISIT (OUTPATIENT)
Dept: INTERNAL MEDICINE | Facility: CLINIC | Age: 72
End: 2022-05-18

## 2022-05-18 VITALS
SYSTOLIC BLOOD PRESSURE: 130 MMHG | DIASTOLIC BLOOD PRESSURE: 72 MMHG | BODY MASS INDEX: 40.5 KG/M2 | RESPIRATION RATE: 18 BRPM | OXYGEN SATURATION: 99 % | HEIGHT: 73 IN | WEIGHT: 305.6 LBS | HEART RATE: 69 BPM

## 2022-05-18 DIAGNOSIS — I25.10 NON-OCCLUSIVE CORONARY ARTERY DISEASE: Chronic | ICD-10-CM

## 2022-05-18 DIAGNOSIS — J30.1 CHRONIC SEASONAL ALLERGIC RHINITIS DUE TO POLLEN: Chronic | ICD-10-CM

## 2022-05-18 DIAGNOSIS — J20.9 ACUTE BRONCHITIS WITH BRONCHOSPASM: Primary | ICD-10-CM

## 2022-05-18 DIAGNOSIS — R05.3 PERSISTENT COUGH FOR 3 WEEKS OR LONGER: ICD-10-CM

## 2022-05-18 DIAGNOSIS — Z91.09 MULTIPLE ENVIRONMENTAL ALLERGIES: Chronic | ICD-10-CM

## 2022-05-18 PROCEDURE — 99214 OFFICE O/P EST MOD 30 MIN: CPT | Performed by: INTERNAL MEDICINE

## 2022-05-18 PROCEDURE — 71046 X-RAY EXAM CHEST 2 VIEWS: CPT

## 2022-05-18 RX ORDER — MONTELUKAST SODIUM 10 MG/1
TABLET ORAL
Qty: 30 TABLET | Refills: 3 | Status: SHIPPED | OUTPATIENT
Start: 2022-05-18 | End: 2022-11-16

## 2022-05-18 RX ORDER — OXYBUTYNIN CHLORIDE 5 MG/1
TABLET ORAL
COMMUNITY
Start: 2022-04-15 | End: 2022-11-21

## 2022-05-18 NOTE — PROGRESS NOTES
05/18/2022    Patient Information  Durga Valenzuela                                                                                          [id number] [facility] [address]      1950  [unfilled]  There is no work phone number on file.    Chief Complaint:     Complaining of continued cough and allergy symptoms.  Wants to discuss heart catheterization results from 2017.    History of Present Illness:    Patient with a history of symptomatic BPH, impaired fasting glucose, hypertension, hyperlipidemia, hypothyroidism, remote history of pulmonary embolism, nonocclusive coronary artery disease, recent diagnosis and treatment of acute bronchitis.  He presents today for complaints of continued cough.  The history regarding this will be described below.  Also patient wants to review and discuss his heart catheterization results from 2017.  He is not having any chest pain.  Past medical history reviewed and updated were necessary including health maintenance parameters and this reveals he is up-to-date or else accounted for.    The history regarding continued cough and acute bronchitis is documented under the appropriate diagnosis today by Dr. Burt on the problem list and subsequently transferred to this note as follows:    May 18, 2022--patient reports he still has a cough which is now dry nonproductive.  There is no green phlegm as it was previously.  No fever, chills, or other systemic signs or symptoms.  He still short of breath with exertion.  He really did not feel a lot better when he was on the prednisone and the Cefdinir although as mentioned the phlegm did clear.  Not having any indigestion or obvious reflux symptoms.  On exam today his lungs appear clear bilaterally.  Plan is as follows: Chest x-ray PA lateral ordered.  Trial of montelukast 10 mg/day.  If after a week or 10 days patient is not seeing any improvement then he needs to contact me.    April 7, 2022--patient presents with  least a 2-week history of chest congestion and cough which is productive of thick green phlegm.  Patient has noted some wheezing particularly at night.  He is short of breath when he exerts himself.  No documented fever, chills, or systemic signs and symptoms.  Patient has environmental allergies and those symptoms seem to be worse which is not unusual given the time of year.  On exam patient has an obvious cough but is in no acute acute distress.  HEENT exam is normal.  Lung exam reveals bilateral scattered rhonchi and end expiratory wheezes.  No definite signs of consolidation.  Assessment is acute bronchitis with bronchospasm.  I do think environmental allergy is playing a role.  Plan is as follows: Cefdinir 300 mg p.o. twice daily x10 days.  Prednisone 50 mg p.o. daily x5 days, taper and discontinue.  Patient should contact me if his symptoms do not resolve after treatment.  He should also contact me if his symptoms worsen such as fevers and shaking chills or worsening shortness of breath.    Review of Systems   Constitutional: Negative. Negative for chills and fever.   HENT: Negative.    Eyes: Negative.    Cardiovascular: Positive for dyspnea on exertion.   Respiratory: Positive for cough and shortness of breath. Negative for sputum production and wheezing.    Endocrine: Negative.    Hematologic/Lymphatic: Negative.    Skin: Negative.    Musculoskeletal: Negative.    Gastrointestinal: Negative.    Genitourinary: Negative.    Neurological: Negative.    Psychiatric/Behavioral: Negative.    Allergic/Immunologic: Negative.        Active Problems:    Patient Active Problem List   Diagnosis   • Post traumatic stress disorder (PTSD)   • Benign prostatic hyperplasia with urinary frequency   • Chronic insomnia   • Lumbar degenerative disc disease   • Impaired fasting glucose   • Benign essential hypertension   • Primary osteoarthritis involving multiple joints   • Hyperlipidemia   • Primary hypothyroidism   •  Obstructive sleep apnea, tolerates CPAP well.  Previously failed oral appliance.   • History of pulmonary embolism   • Non-occlusive coronary artery disease, 05/16/2017--normal LM; proximal LAD mild LI; 50% mid LAD.  Mild distal LAD LI; normal Cx.  Mild LI marginal branches; normal RCA, nondominant.  EF 60%.   • Therapeutic drug monitoring   • Vitamin D deficiency   • Psoriasis   • Allergic rhinitis   • Depression with anxiety   • Multiple environmental allergies   • Bilateral lower extremity edema   • Morbid obesity with body mass index (BMI) of 40.0 or higher (Formerly [facility])   • Spinal stenosis of lumbar region with neurogenic claudication   • Traumatic complete tear of left rotator cuff   • Urinary incontinence without sensory awareness   • Affective psychosis, bipolar ([facility])   • Acute bronchitis with bronchospasm         Past Medical History:   Diagnosis Date   • Affective psychosis, bipolar ([facility]) 9/30/2021   • Allergic rhinitis 8/21/2018    Patient has had allergy testing in the past which revealed allergies to molds and grass.  Immunotherapy for about 2 years in the 1980s.   • Benign essential hypertension 2/25/2016   • Benign prostatic hyperplasia with urinary frequency 2/25/2016   • Benign prostatic hypertrophy 2/25/2016   • Bilateral lower extremity edema 8/21/2018   • Chronic insomnia 2/25/2016   • Closed traumatic lateral subluxation of patellofemoral joint, right, initial encounter 8/13/2018    08/10/2018--patient was evaluated by the orthopedist for right knee pain and found to have lateral subluxation of the right patella associated with presence of right artificial knee joint.  Physical therapy no help.  Surgery is scheduled 09/17/2018   • Depression with anxiety 8/21/2018   • History of deep venous thrombosis (DVT) of distal vein of right lower extremity 8/21/2018 04/26/2016--CTA of the chest performed for elevated d-dimer and progressive shortness of breath and chest pain for one month revealed bilateral  occlusive in near occlusive segmental and subsegmental pulmonary emboli in all lobes of the right lung as well as within the left lower lobe.  No evidence of pulmonary infarct.  Nonspecific multifocal groundglass attenuation in the right pulmonary apex, perhaps representing pneumonitis or submental atelectasis.  Questionable cholelithiasis.  Doppler venous study was positive for DVT in the right popliteal vein.  Hypercoagulable workup was normal.  He was treated with Eliquis for a total of 6 months and was subsequently discontinued.   • History of pneumonia    • History of pulmonary embolism 6/10/2016    04/26/2016--CTA of the chest performed for elevated d-dimer and progressive shortness of breath and chest pain for one month revealed bilateral occlusive in near occlusive segmental and subsegmental pulmonary emboli in all lobes of the right lung as well as within the left lower lobe.  No evidence of pulmonary infarct.  Nonspecific multifocal groundglass attenuation in the right pulmonary apex, perhaps representing pneumonitis or submental atelectasis.  Questionable cholelithiasis.  Doppler venous study was positive for DVT in the right popliteal vein.  Hypercoagulable workup was normal.  He was treated with Eliquis for a total of 6 months and was subsequently discontinued.   • Hyperlipidemia 2/25/2016   • Impaired fasting glucose 2/25/2016   • Lumbar degenerative disc disease 2/25/2016   • Morbid obesity with body mass index (BMI) of 40.0 or higher (Formerly [facility]) 10/19/2018   • Morbidly obese (Formerly [facility]) 10/19/2018   • Multiple environmental allergies 8/21/2018    Patient has had allergy testing in the past which revealed allergies to molds and grass.  Immunotherapy for about 2 years in the 1980s.   • Non-occlusive coronary artery disease, 05/16/2017--normal LM; proximal LAD mild LI; 50% mid LAD.  Mild distal LAD LI; normal Cx.  Mild LI marginal branches; normal RCA, nondominant.  EF 60%. 7/24/2017 05/16/2017--cardiac  catheterization performed for abnormal stress test.  Normal LV with ejection fraction 60%.  Dilated aortic root.  No wall motion abnormalities.  Normal left main.  Ramus branch small caliber and normal.  Proximal LAD large caliber and mild luminal irregularities.  50% mid LAD.  Mild luminal irregularities distal LAD.  3 diagonal branches of small caliber with mild luminal irregularities.  Normal septal branches.  Circumflex is large caliber and free of disease.  Marginal branches are medium caliber with mild luminal irregularities.  RCA is a medium caliber and free of disease.  It is nondominant.   • Obstructive sleep apnea, tolerates CPAP well.  Previously failed oral appliance. 2/25/2016   • Post traumatic stress disorder (PTSD) 2/25/2016   • Primary hypothyroidism 2/25/2016   • Primary osteoarthritis involving multiple joints 2/25/2016   • Psoriasis 9/17/2014   • Spinal stenosis of lumbar region with neurogenic claudication 2/15/2021   • Traumatic complete tear of left rotator cuff 3/17/2021    March 14, 2021--patient was evaluated by the orthopedic surgeon after he slipped on ice and fell onto his left shoulder.  Work-up revealed complete rotator cuff tear involving 2 tendons.  Specifically, he has an acute left supraspinatus tear and acute left infraspinatus tear.  He also has left glenohumeral joint osteoarthritis.  Apparently this is not repairable and patient would need a shoulder r   • Urinary incontinence without sensory awareness 3/17/2021   • Vitamin D deficiency 8/21/2018         Past Surgical History:   Procedure Laterality Date   • CARDIAC CATHETERIZATION N/A 5/16/2017    Procedure: Coronary angiography;  Surgeon: Malcolm Chen MD;  Location: [facility] INVASIVE LOCATION;  Service:    • CARDIAC CATHETERIZATION N/A 5/16/2017    Procedure: Left Heart Cath;  Surgeon: Malcolm Chen MD;  Location: [facility] CATH INVASIVE LOCATION;  Service:    • CARDIAC CATHETERIZATION N/A 5/16/2017     Procedure: Left ventriculography;  Surgeon: Malcolm Chen MD;  Location: [facility] CATH INVASIVE LOCATION;  Service:    • COLONOSCOPY  2005 2005--colonoscopy reportedly normal.  Records not available.   • COLONOSCOPY N/A 6/13/2019    Procedure: COLONOSCOPY INTO CECUM WITH COLD BIOPSY POLYPECTOMY;  Surgeon: Ayaan Gallardo MD;  Location: [facility] ENDOSCOPY;  Service: General   • LUMBAR DECOMPRESSION  2007 2007--lumbar decompression without fusion or hardware.   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   • REVISION TOTAL KNEE ARTHROPLASTY Right 09/17/2018 09/17/2018--right total knee arthroplasty revision due to lateral subluxation of the patella due to multiple factors.   • TOTAL KNEE ARTHROPLASTY Left 11/03/2017 11/03/2017--right total knee replacement complicated by patellofemoral subluxation.   • TOTAL KNEE ARTHROPLASTY Left 06/2014 June 2014--left total knee replacement.         Allergies   Allergen Reactions   • Hydrocodone Other (See Comments)     Severe vomiting - but CAN tolerate oxycodone per patient   • Zocor  [Simvastatin]      MYALGIA           Current Outpatient Medications:   •  amLODIPine (NORVASC) 10 MG tablet, TAKE ONE TABLET BY MOUTH DAILY, Disp: 90 tablet, Rfl: 2  •  amphetamine-dextroamphetamine (ADDERALL) 20 MG tablet, Take 20 mg by mouth Daily. prn, Disp: , Rfl:   •  aspirin 81 MG tablet, One by mouth daily, Disp: , Rfl:   •  budesonide (RINOCORT AQUA) 32 MCG/ACT nasal spray, Apply 2 sprays each nostril once daily as directed for nasal drainage/allergic rhinitis, Disp: 8.43 mL, Rfl: 6  •  Cholecalciferol (VITAMIN D3) 5000 units capsule capsule, 1 by mouth daily as directed, Disp: 30 capsule, Rfl:   •  clobetasol (TEMOVATE) 0.05 % ointment, Apply twice a day as directed, Disp: 60 g, Rfl: 6  •  FLUoxetine (PROzac) 60 MG tablet, , Disp: , Rfl:   •  hydrOXYzine (ATARAX) 25 MG  "tablet, Take 1 tablet by mouth 3 (Three) Times a Day As Needed for Itching., Disp: 60 tablet, Rfl: 3  •  losartan (COZAAR) 50 MG tablet, TAKE ONE TABLET BY MOUTH DAILY, Disp: 90 tablet, Rfl: 3  •  meloxicam (MOBIC) 15 MG tablet, , Disp: , Rfl:   •  oxyCODONE-acetaminophen (PERCOCET)  MG per tablet, Take 1-2 p.o. every 6 hours for moderate to severe low back pain., Disp: 60 tablet, Rfl: 0  •  pravastatin (PRAVACHOL) 20 MG tablet, TAKE ONE TABLET BY MOUTH DAILY FOR HIGH CHOLESTEROL, Disp: 90 tablet, Rfl: 1  •  Synthroid 175 MCG tablet, Take 1 p.o. daily for low thyroid, Disp: 90 tablet, Rfl: 1  •  tamsulosin (FLOMAX) 0.4 MG capsule 24 hr capsule, Take 1 capsule by mouth Daily., Disp: 90 capsule, Rfl: 0  •  zolpidem (AMBIEN) 10 MG tablet, Take 10 mg by mouth At Night As Needed., Disp: , Rfl:   •  montelukast (Singulair) 10 MG tablet, Take 1 p.o. daily for environmental allergies, Disp: 30 tablet, Rfl: 3  •  oxybutynin (DITROPAN) 5 MG tablet, , Disp: , Rfl:       Family History   Problem Relation Age of Onset   • Coronary artery disease Mother         Status post CABG   • Alzheimer's disease Mother    • Stroke Father    • Liver cancer Father         Father with biliary cancer including gallbladder   • No Known Problems Sister    • No Known Problems Brother          Social History     Socioeconomic History   • Marital status:      Spouse name: italo   • Highest education level: Bachelor's degree (e.g., BA, AB, BS)   Tobacco Use   • Smoking status: Never Smoker   • Smokeless tobacco: Never Used   Vaping Use   • Vaping Use: Never used   Substance and Sexual Activity   • Alcohol use: No   • Drug use: Yes     Types: Marijuana   • Sexual activity: Yes     Partners: Female         Vitals:    05/18/22 1052   BP: 130/72   Pulse: 69   Resp: 18   SpO2: 99%   Weight: (!) 139 kg (305 lb 9.6 oz)   Height: 185.4 cm (73\")        Body mass index is 40.32 kg/m².      Physical Exam:    General: Alert and oriented x 3.  No acute " distress.  Normal affect. Obese. HEENT: Pupils equal, round, reactive to light; extraocular movements intact; sclerae nonicteric; pharynx, ear canals and TMs normal.  Neck: Without JVD, thyromegaly, bruit, or adenopathy.  Lungs: Clear to auscultation in all fields.  Heart: Regular rate and rhythm without murmur, rub, gallop, or click.  Abdomen: Soft, nontender, without hepatosplenomegaly or hernia.  Bowel sounds normal.  : Deferred.  Rectal: Deferred.  Extremities: Without clubbing, cyanosis, edema, or pulse deficit.  Neurologic: Intact without focal deficit.  Normal station and gait observed during ingress and egress from the examination room.  Skin: Without significant lesion.  Musculoskeletal: Unremarkable.    Lab/other results:    The results of the heart catheterization from 2017:    Hemodynamics : Ascending aortic BP :114/70                                  LV pressure: 118/10  ;  LVEDP: 10                                Aortic valve gradient:  No significant gradient to suggest AS     LV Angiography :The left ventricle is normal in size, with an estimated EF of 60 %. Aortic root is dilated in size.  No significant Mitral regurgitation                               No wall motion abnormalities in the MCCALLUM projection     Coronary Angiography:                  The left  Main coronary artery is a large caliber vessel. The ostium is normal  Distal vessel bifurcates into LAD/LCX in the standard fashion and  is normal                The Ramus branch is smal caliber and is normal                   The proximal LAD is alarge caliber vessel and has mild luminal irregularities/                The mid LAD is a medium/ caliber vessel and  has 50% stenosis               The distal LAD is a medium  caliber vessel and has mild luminal irregularities/               There diagonal branches are small  caliber with/mild luminal irregularities. Septal branches are normal                  The LCX is a large caliber dominant  vessel and is free of disease/ .                The marginal branches are medium caliber with mild luminal irregularities/ has no stenosis                   The RCA is a  medium caliber vessel is free of disease/.  It is the non-dominant vessel.     Assessment/Plan:     Diagnosis Plan   1. Acute bronchitis with bronchospasm  XR Chest PA & Lateral   2. Allergic rhinitis  montelukast (Singulair) 10 MG tablet   3. Multiple environmental allergies  montelukast (Singulair) 10 MG tablet   4. Non-occlusive coronary artery disease, 05/16/2017--normal LM; proximal LAD mild LI; 50% mid LAD.  Mild distal LAD LI; normal Cx.  Mild LI marginal branches; normal RCA, nondominant.  EF 60%.  Ambulatory Referral to Cardiology   5. Persistent cough for 3 weeks or longer  XR Chest PA & Lateral    montelukast (Singulair) 10 MG tablet       May 18, 2022--patient reports he still has a cough which is now dry nonproductive.  There is no green phlegm as it was previously.  No fever, chills, or other systemic signs or symptoms.  He still short of breath with exertion.  He really did not feel a lot better when he was on the prednisone and the Cefdinir although as mentioned the phlegm did clear.  Not having any indigestion or obvious reflux symptoms.  On exam today his lungs appear clear bilaterally.  Plan is as follows: Chest x-ray PA lateral ordered.  Trial of montelukast 10 mg/day.  If after a week or 10 days patient is not seeing any improvement then he needs to contact me.  In regards to patient's heart catheterization.  This reveals he has a 50% mid LAD lesion.  This does put significant area of his heart at some risk.  I think it would be reasonable for patient to get established with a cardiologist to make a decision regarding monitoring of this area.    April 7, 2022--patient presents with least a 2-week history of chest congestion and cough which is productive of thick green phlegm.  Patient has noted some wheezing particularly at  night.  He is short of breath when he exerts himself.  No documented fever, chills, or systemic signs and symptoms.  Patient has environmental allergies and those symptoms seem to be worse which is not unusual given the time of year.  On exam patient has an obvious cough but is in no acute acute distress.  HEENT exam is normal.  Lung exam reveals bilateral scattered rhonchi and end expiratory wheezes.  No definite signs of consolidation.  Assessment is acute bronchitis with bronchospasm.  I do think environmental allergy is playing a role.  Plan is as follows: Cefdinir 300 mg p.o. twice daily x10 days.  Prednisone 50 mg p.o. daily x5 days, taper and discontinue.  Patient should contact me if his symptoms do not resolve after treatment.  He should also contact me if his symptoms worsen such as fevers and shaking chills or worsening shortness of breath.        Procedures        Answers for HPI/ROS submitted by the patient on 5/18/2022  What is the primary reason for your visit?: Cough  Chronicity: recurrent  Onset: more than 1 month ago  Progression since onset: unchanged  Frequency: every few hours  Cough characteristics: non-productive

## 2022-05-25 DIAGNOSIS — E78.2 MIXED HYPERLIPIDEMIA: Chronic | ICD-10-CM

## 2022-05-25 DIAGNOSIS — I10 ESSENTIAL (PRIMARY) HYPERTENSION: ICD-10-CM

## 2022-05-26 RX ORDER — PRAVASTATIN SODIUM 20 MG
TABLET ORAL
Qty: 90 TABLET | Refills: 2 | Status: SHIPPED | OUTPATIENT
Start: 2022-05-26 | End: 2022-10-04 | Stop reason: SDUPTHER

## 2022-05-26 RX ORDER — AMLODIPINE BESYLATE 10 MG/1
10 TABLET ORAL DAILY
Qty: 90 TABLET | Refills: 2 | Status: SHIPPED | OUTPATIENT
Start: 2022-05-26 | End: 2022-11-16 | Stop reason: SDUPTHER

## 2022-06-15 ENCOUNTER — OFFICE VISIT (OUTPATIENT)
Dept: CARDIOLOGY | Facility: CLINIC | Age: 72
End: 2022-06-15

## 2022-06-15 VITALS
HEIGHT: 73 IN | SYSTOLIC BLOOD PRESSURE: 138 MMHG | DIASTOLIC BLOOD PRESSURE: 80 MMHG | HEART RATE: 54 BPM | WEIGHT: 311 LBS | BODY MASS INDEX: 41.22 KG/M2

## 2022-06-15 DIAGNOSIS — I71.20 THORACIC AORTIC ANEURYSM WITHOUT RUPTURE: Primary | ICD-10-CM

## 2022-06-15 PROCEDURE — 99204 OFFICE O/P NEW MOD 45 MIN: CPT | Performed by: INTERNAL MEDICINE

## 2022-06-19 NOTE — PROGRESS NOTES
[facility]      Patient Name: Durga Valenzuela  :1950  Age: 71 y.o.  Encounter Provider:  Shaka Arnett Jr, MD      Chief Complaint:   Chief Complaint   Patient presents with   • Coronary Artery Disease         HPI  Durga Valenzuela is a 71 y.o. male past medical history of nonobstructive coronary artery disease, hypertension, dyslipidemia who presents for initial evaluation.  Patient was evaluated by Dr. Deepthi Cline in 2017 and work-up for chest pain resulted in cardiac catheterization being performed.  He had a 50% mid LAD lesion with no other significant disease to speak of at the time.  He is fairly active gentleman with no chest pain or shortness of air.  No orthopnea, PND or edema.  No palpitations, dizziness or syncope.  Echocardiogram at the time showed normal left ventricular ejection fraction with grade 1 diastolic dysfunction and without significant valvular heart disease.  Aortic root was mildly dilated at 4.3 cm at the time.  Blood pressure and heart rate are well controlled today in clinic.  Patient is a non-smoker who denies alcohol or illicit drug use.  Family history was reviewed and is not pertinent to this clinic visit.      The following portions of the patient's history were reviewed and updated as appropriate: allergies, current medications, past family history, past medical history, past social history, past surgical history and problem list.      Review of Systems   Constitutional: Negative for chills and fever.   HENT: Negative for hoarse voice and sore throat.    Eyes: Negative for double vision and photophobia.   Cardiovascular: Negative for chest pain, leg swelling, near-syncope, orthopnea, palpitations, paroxysmal nocturnal dyspnea and syncope.   Respiratory: Negative for cough and wheezing.    Skin: Negative for poor wound healing and rash.   Musculoskeletal: Negative for arthritis and joint swelling.   Gastrointestinal: Negative for bloating, abdominal  "pain, hematemesis and hematochezia.   Neurological: Negative for dizziness and focal weakness.   Psychiatric/Behavioral: Negative for depression and suicidal ideas.       OBJECTIVE:   Vital Signs  Vitals:    06/15/22 1044   BP: 138/80   Pulse: 54     Estimated body mass index is 41.03 kg/m² as calculated from the following:    Height as of this encounter: 185.4 cm (73\").    Weight as of this encounter: 141 kg (311 lb).    Vitals reviewed.   Constitutional:       Appearance: Healthy appearance. Not in distress.   Neck:      Vascular: No JVR. JVD normal.   Pulmonary:      Effort: Pulmonary effort is normal.      Breath sounds: Normal breath sounds. No wheezing. No rhonchi. No rales.   Chest:      Chest wall: Not tender to palpatation.   Cardiovascular:      PMI at left midclavicular line. Normal rate. Regular rhythm. Normal S1. Normal S2.      Murmurs: There is no murmur.      No gallop. No click. No rub.   Pulses:     Intact distal pulses.   Edema:     Peripheral edema absent.   Abdominal:      General: Bowel sounds are normal.      Palpations: Abdomen is soft.      Tenderness: There is no abdominal tenderness.   Musculoskeletal: Normal range of motion.         General: No tenderness. Skin:     General: Skin is warm and dry.   Neurological:      General: No focal deficit present.      Mental Status: Alert and oriented to person, place and time.         Procedures          ASSESSMENT:     Nonobstructive coronary artery disease  Dyslipidemia  Hypertension  Dilated aortic root    PLAN OF CARE:     1. Nonobstructive coronary artery disease -50% mid LAD on cath in 2017.  No angina.  Continue aspirin, calcium channel blocker and statin.  LDL is at goal on recent lab surveillance.  2. Dyslipidemia  3. Hypertension  4. Dilated aortic root -repeat echocardiogram    Return to clinic 6 months             Discharge Medications          Accurate as of Delisa 15, 2022 11:59 PM. If you have any questions, ask your nurse or doctor.    "         Continue These Medications      Instructions Start Date   amLODIPine 10 MG tablet  Commonly known as: NORVASC   10 mg, Oral, Daily      amphetamine-dextroamphetamine 20 MG tablet  Commonly known as: ADDERALL   20 mg, Oral, Daily, prn      aspirin 81 MG tablet   One by mouth daily      budesonide 32 MCG/ACT nasal spray  Commonly known as: RINOCORT AQUA   Apply 2 sprays each nostril once daily as directed for nasal drainage/allergic rhinitis      clobetasol 0.05 % ointment  Commonly known as: TEMOVATE   Apply twice a day as directed      FLUoxetine 60 MG tablet  Commonly known as: PROzac   No dose, route, or frequency recorded.      hydrOXYzine 25 MG tablet  Commonly known as: ATARAX   25 mg, Oral, 3 Times Daily PRN      losartan 50 MG tablet  Commonly known as: COZAAR   TAKE ONE TABLET BY MOUTH DAILY      montelukast 10 MG tablet  Commonly known as: Singulair   Take 1 p.o. daily for environmental allergies      oxybutynin 5 MG tablet  Commonly known as: DITROPAN   No dose, route, or frequency recorded.      oxyCODONE-acetaminophen  MG per tablet  Commonly known as: PERCOCET   Take 1-2 p.o. every 6 hours for moderate to severe low back pain.      pravastatin 20 MG tablet  Commonly known as: PRAVACHOL   TAKE ONE TABLET BY MOUTH DAILY FOR HIGH CHOLESTEROL      Synthroid 175 MCG tablet  Generic drug: levothyroxine   Take 1 p.o. daily for low thyroid      tamsulosin 0.4 MG capsule 24 hr capsule  Commonly known as: FLOMAX   0.4 mg, Oral, Daily      vitamin D3 125 MCG (5000 UT) capsule capsule   1 by mouth daily as directed      zolpidem 10 MG tablet  Commonly known as: AMBIEN   10 mg, Oral, Nightly PRN             Thank you for allowing me to participate in the care of your patient,      Sincerely,   Shaka Arnett Jr, MD  [facility]  06/19/22  16:38 EDT

## 2022-06-21 RX ORDER — TAMSULOSIN HYDROCHLORIDE 0.4 MG/1
1 CAPSULE ORAL DAILY
Qty: 90 CAPSULE | Refills: 2 | Status: SHIPPED | OUTPATIENT
Start: 2022-06-21 | End: 2022-11-21

## 2022-07-05 DIAGNOSIS — E03.9 PRIMARY HYPOTHYROIDISM: Chronic | ICD-10-CM

## 2022-07-05 RX ORDER — LEVOTHYROXINE SODIUM 175 MCG
TABLET ORAL
Qty: 90 TABLET | Refills: 2 | Status: SHIPPED | OUTPATIENT
Start: 2022-07-05 | End: 2022-07-11

## 2022-07-08 DIAGNOSIS — E03.9 PRIMARY HYPOTHYROIDISM: Chronic | ICD-10-CM

## 2022-07-09 DIAGNOSIS — J30.1 CHRONIC SEASONAL ALLERGIC RHINITIS DUE TO POLLEN: Chronic | ICD-10-CM

## 2022-07-09 DIAGNOSIS — E55.9 VITAMIN D DEFICIENCY: Chronic | ICD-10-CM

## 2022-07-11 RX ORDER — LEVOTHYROXINE SODIUM 175 MCG
TABLET ORAL
Qty: 90 TABLET | Refills: 2 | Status: SHIPPED | OUTPATIENT
Start: 2022-07-11

## 2022-08-02 ENCOUNTER — HOSPITAL ENCOUNTER (OUTPATIENT)
Dept: CARDIOLOGY | Facility: HOSPITAL | Age: 72
Discharge: HOME | End: 2022-08-02
Admitting: INTERNAL MEDICINE

## 2022-08-02 ENCOUNTER — TELEPHONE (OUTPATIENT)
Dept: CARDIOLOGY | Facility: CLINIC | Age: 72
End: 2022-08-02

## 2022-08-02 VITALS
WEIGHT: 311 LBS | HEART RATE: 54 BPM | SYSTOLIC BLOOD PRESSURE: 126 MMHG | BODY MASS INDEX: 41.22 KG/M2 | HEIGHT: 73 IN | DIASTOLIC BLOOD PRESSURE: 82 MMHG

## 2022-08-02 DIAGNOSIS — I71.20 THORACIC AORTIC ANEURYSM WITHOUT RUPTURE: ICD-10-CM

## 2022-08-02 DIAGNOSIS — I71.20 THORACIC AORTIC ANEURYSM WITHOUT RUPTURE: Primary | ICD-10-CM

## 2022-08-02 LAB
AORTIC ARCH: 2.6 CM
ASCENDING AORTA: 4.6 CM
BH CV ECHO LEFT VENTRICLE GLOBAL LONGITUDINAL STRAIN: -23.4 %
BH CV ECHO MEAS - ACS: 2.42 CM
BH CV ECHO MEAS - AI P1/2T: 931.3 MSEC
BH CV ECHO MEAS - AO MAX PG: 14.3 MMHG
BH CV ECHO MEAS - AO MEAN PG: 7.8 MMHG
BH CV ECHO MEAS - AO ROOT DIAM: 4.7 CM
BH CV ECHO MEAS - AO V2 MAX: 188.9 CM/SEC
BH CV ECHO MEAS - AO V2 VTI: 45.2 CM
BH CV ECHO MEAS - AVA(I,D): 2.06 CM2
BH CV ECHO MEAS - EDV(CUBED): 212.3 ML
BH CV ECHO MEAS - EDV(MOD-SP2): 131 ML
BH CV ECHO MEAS - EDV(MOD-SP4): 134 ML
BH CV ECHO MEAS - EF(MOD-BP): 65 %
BH CV ECHO MEAS - EF(MOD-SP2): 64.1 %
BH CV ECHO MEAS - EF(MOD-SP4): 64.9 %
BH CV ECHO MEAS - ESV(CUBED): 45.1 ML
BH CV ECHO MEAS - ESV(MOD-SP2): 47 ML
BH CV ECHO MEAS - ESV(MOD-SP4): 47 ML
BH CV ECHO MEAS - FS: 40.4 %
BH CV ECHO MEAS - IVS/LVPW: 1.02 CM
BH CV ECHO MEAS - IVSD: 1.71 CM
BH CV ECHO MEAS - LAT PEAK E' VEL: 10.3 CM/SEC
BH CV ECHO MEAS - LV DIASTOLIC VOL/BSA (35-75): 51.6 CM2
BH CV ECHO MEAS - LV MASS(C)D: 504.4 GRAMS
BH CV ECHO MEAS - LV MAX PG: 3.6 MMHG
BH CV ECHO MEAS - LV MEAN PG: 2.08 MMHG
BH CV ECHO MEAS - LV SYSTOLIC VOL/BSA (12-30): 18.1 CM2
BH CV ECHO MEAS - LV V1 MAX: 95.1 CM/SEC
BH CV ECHO MEAS - LV V1 VTI: 23.6 CM
BH CV ECHO MEAS - LVIDD: 6 CM
BH CV ECHO MEAS - LVIDS: 3.6 CM
BH CV ECHO MEAS - LVOT AREA: 3.9 CM2
BH CV ECHO MEAS - LVOT DIAM: 2.24 CM
BH CV ECHO MEAS - LVPWD: 1.68 CM
BH CV ECHO MEAS - MED PEAK E' VEL: 7.8 CM/SEC
BH CV ECHO MEAS - MR MAX PG: 80.7 MMHG
BH CV ECHO MEAS - MR MAX VEL: 449.3 CM/SEC
BH CV ECHO MEAS - MV A DUR: 0.12 SEC
BH CV ECHO MEAS - MV A MAX VEL: 62.6 CM/SEC
BH CV ECHO MEAS - MV DEC SLOPE: 199.9 CM/SEC2
BH CV ECHO MEAS - MV DEC TIME: 0.3 MSEC
BH CV ECHO MEAS - MV E MAX VEL: 74.6 CM/SEC
BH CV ECHO MEAS - MV E/A: 1.19
BH CV ECHO MEAS - MV MAX PG: 4.3 MMHG
BH CV ECHO MEAS - MV MEAN PG: 1.63 MMHG
BH CV ECHO MEAS - MV P1/2T: 149.2 MSEC
BH CV ECHO MEAS - MV V2 VTI: 44.7 CM
BH CV ECHO MEAS - MVA(P1/2T): 1.47 CM2
BH CV ECHO MEAS - MVA(VTI): 2.08 CM2
BH CV ECHO MEAS - PA ACC TIME: 0.15 SEC
BH CV ECHO MEAS - PA PR(ACCEL): 10.1 MMHG
BH CV ECHO MEAS - PA V2 MAX: 124.9 CM/SEC
BH CV ECHO MEAS - PI END-D VEL: 86.9 CM/SEC
BH CV ECHO MEAS - PULM A REVS DUR: 0.12 SEC
BH CV ECHO MEAS - PULM A REVS VEL: 23.3 CM/SEC
BH CV ECHO MEAS - PULM DIAS VEL: 41.4 CM/SEC
BH CV ECHO MEAS - PULM S/D: 1.07
BH CV ECHO MEAS - PULM SYS VEL: 44.2 CM/SEC
BH CV ECHO MEAS - QP/QS: 0.63
BH CV ECHO MEAS - RAP SYSTOLE: 3 MMHG
BH CV ECHO MEAS - RV MAX PG: 1.76 MMHG
BH CV ECHO MEAS - RV V1 MAX: 66.4 CM/SEC
BH CV ECHO MEAS - RV V1 VTI: 15.9 CM
BH CV ECHO MEAS - RVOT DIAM: 2.17 CM
BH CV ECHO MEAS - RVSP: 20.8 MMHG
BH CV ECHO MEAS - SI(MOD-SP2): 32.4 ML/M2
BH CV ECHO MEAS - SI(MOD-SP4): 33.5 ML/M2
BH CV ECHO MEAS - SUP REN AO DIAM: 3.2 CM
BH CV ECHO MEAS - SV(LVOT): 92.9 ML
BH CV ECHO MEAS - SV(MOD-SP2): 84 ML
BH CV ECHO MEAS - SV(MOD-SP4): 87 ML
BH CV ECHO MEAS - SV(RVOT): 58.7 ML
BH CV ECHO MEAS - TAPSE (>1.6): 2.5 CM
BH CV ECHO MEAS - TR MAX PG: 17.8 MMHG
BH CV ECHO MEAS - TR MAX VEL: 210.7 CM/SEC
BH CV ECHO MEASUREMENTS AVERAGE E/E' RATIO: 8.24
BH CV XLRA - RV BASE: 3.5 CM
BH CV XLRA - RV LENGTH: 8.5 CM
BH CV XLRA - RV MID: 2.8 CM
BH CV XLRA - TDI S': 15 CM/SEC
LEFT ATRIUM VOLUME INDEX: 28.1 ML/M2
LV EF 2D ECHO EST: 65 %
MAXIMAL PREDICTED HEART RATE: 148 BPM
SINUS: 4.6 CM
STJ: 3.9 CM
STRESS TARGET HR: 126 BPM

## 2022-08-02 PROCEDURE — 93306 TTE W/DOPPLER COMPLETE: CPT

## 2022-08-02 PROCEDURE — 93356 MYOCRD STRAIN IMG SPCKL TRCK: CPT | Performed by: INTERNAL MEDICINE

## 2022-08-02 PROCEDURE — 93356 MYOCRD STRAIN IMG SPCKL TRCK: CPT

## 2022-08-02 PROCEDURE — 93306 TTE W/DOPPLER COMPLETE: CPT | Performed by: INTERNAL MEDICINE

## 2022-08-02 NOTE — TELEPHONE ENCOUNTER
Thoracic aorta reported to be larger in size than on 2017 echo.  Interestingly there is a CTA chest from 2021 that says no thoracic aortic aneurysm.  We will check a CT chest without contrast to confirm or deny the presence of thoracic aortic aneurysm prior to consideration for CT surgical evaluation.

## 2022-08-04 ENCOUNTER — HOSPITAL ENCOUNTER (OUTPATIENT)
Dept: CT IMAGING | Facility: HOSPITAL | Age: 72
Discharge: HOME | End: 2022-08-04
Admitting: INTERNAL MEDICINE

## 2022-08-04 DIAGNOSIS — I71.20 THORACIC AORTIC ANEURYSM WITHOUT RUPTURE: ICD-10-CM

## 2022-08-04 PROCEDURE — 71250 CT THORAX DX C-: CPT

## 2022-10-04 ENCOUNTER — OFFICE VISIT (OUTPATIENT)
Dept: INTERNAL MEDICINE | Facility: CLINIC | Age: 72
End: 2022-10-04

## 2022-10-04 VITALS
SYSTOLIC BLOOD PRESSURE: 126 MMHG | BODY MASS INDEX: 41.08 KG/M2 | OXYGEN SATURATION: 98 % | WEIGHT: 310 LBS | RESPIRATION RATE: 18 BRPM | HEART RATE: 55 BPM | DIASTOLIC BLOOD PRESSURE: 60 MMHG | HEIGHT: 73 IN

## 2022-10-04 DIAGNOSIS — I10 BENIGN ESSENTIAL HYPERTENSION: Chronic | ICD-10-CM

## 2022-10-04 DIAGNOSIS — M48.062 SPINAL STENOSIS OF LUMBAR REGION WITH NEUROGENIC CLAUDICATION: Chronic | ICD-10-CM

## 2022-10-04 DIAGNOSIS — N40.1 BENIGN PROSTATIC HYPERPLASIA WITH URINARY FREQUENCY: Chronic | ICD-10-CM

## 2022-10-04 DIAGNOSIS — E78.2 MIXED HYPERLIPIDEMIA: Chronic | ICD-10-CM

## 2022-10-04 DIAGNOSIS — Z86.711 HISTORY OF PULMONARY EMBOLISM: Chronic | ICD-10-CM

## 2022-10-04 DIAGNOSIS — R35.0 BENIGN PROSTATIC HYPERPLASIA WITH URINARY FREQUENCY: Chronic | ICD-10-CM

## 2022-10-04 DIAGNOSIS — R73.01 IMPAIRED FASTING GLUCOSE: Primary | Chronic | ICD-10-CM

## 2022-10-04 DIAGNOSIS — F43.10 POST TRAUMATIC STRESS DISORDER (PTSD): Chronic | ICD-10-CM

## 2022-10-04 DIAGNOSIS — M15.9 PRIMARY OSTEOARTHRITIS INVOLVING MULTIPLE JOINTS: Chronic | ICD-10-CM

## 2022-10-04 DIAGNOSIS — N39.42 URINARY INCONTINENCE WITHOUT SENSORY AWARENESS: Chronic | ICD-10-CM

## 2022-10-04 DIAGNOSIS — R29.898 WEAKNESS OF BOTH LOWER EXTREMITIES: ICD-10-CM

## 2022-10-04 DIAGNOSIS — M51.36 LUMBAR DEGENERATIVE DISC DISEASE: Chronic | ICD-10-CM

## 2022-10-04 DIAGNOSIS — F31.78 BIPOLAR DISORDER, IN FULL REMISSION, MOST RECENT EPISODE MIXED: ICD-10-CM

## 2022-10-04 DIAGNOSIS — E55.9 VITAMIN D DEFICIENCY: Chronic | ICD-10-CM

## 2022-10-04 DIAGNOSIS — E66.01 MORBID OBESITY: Chronic | ICD-10-CM

## 2022-10-04 DIAGNOSIS — Z51.81 THERAPEUTIC DRUG MONITORING: ICD-10-CM

## 2022-10-04 DIAGNOSIS — Z91.09 MULTIPLE ENVIRONMENTAL ALLERGIES: Chronic | ICD-10-CM

## 2022-10-04 DIAGNOSIS — I25.10 NON-OCCLUSIVE CORONARY ARTERY DISEASE: Chronic | ICD-10-CM

## 2022-10-04 DIAGNOSIS — R60.0 BILATERAL LOWER EXTREMITY EDEMA: Chronic | ICD-10-CM

## 2022-10-04 DIAGNOSIS — F41.8 DEPRESSION WITH ANXIETY: Chronic | ICD-10-CM

## 2022-10-04 DIAGNOSIS — E03.9 PRIMARY HYPOTHYROIDISM: Chronic | ICD-10-CM

## 2022-10-04 DIAGNOSIS — J30.1 CHRONIC SEASONAL ALLERGIC RHINITIS DUE TO POLLEN: Chronic | ICD-10-CM

## 2022-10-04 DIAGNOSIS — G47.33 OBSTRUCTIVE SLEEP APNEA: Chronic | ICD-10-CM

## 2022-10-04 DIAGNOSIS — F51.04 CHRONIC INSOMNIA: Chronic | ICD-10-CM

## 2022-10-04 PROBLEM — J20.9 ACUTE BRONCHITIS WITH BRONCHOSPASM: Status: RESOLVED | Noted: 2022-04-07 | Resolved: 2022-10-04

## 2022-10-04 PROBLEM — S46.012A TRAUMATIC COMPLETE TEAR OF LEFT ROTATOR CUFF: Chronic | Status: ACTIVE | Noted: 2021-03-17

## 2022-10-04 PROCEDURE — 99214 OFFICE O/P EST MOD 30 MIN: CPT | Performed by: INTERNAL MEDICINE

## 2022-10-04 RX ORDER — PRAVASTATIN SODIUM 20 MG
TABLET ORAL
Qty: 90 TABLET | Refills: 3 | Status: SHIPPED | OUTPATIENT
Start: 2022-10-04 | End: 2022-11-16

## 2022-10-04 RX ORDER — EZETIMIBE 10 MG/1
TABLET ORAL
Qty: 90 TABLET | Refills: 3 | Status: SHIPPED | OUTPATIENT
Start: 2022-10-04 | End: 2023-02-07 | Stop reason: SDUPTHER

## 2022-10-04 RX ORDER — QUINIDINE SULFATE 200 MG
TABLET ORAL
Qty: 30 CAPSULE
Start: 2022-10-04

## 2022-10-04 NOTE — PROGRESS NOTES
10/04/2022    Patient Information  Durga Valenzuela                                                                                          [id number] [facility] [address]      1950  [unfilled]  There is no work phone number on file.    Chief Complaint:     Follow-up blood work in order to monitor chronic medical issues listed in history of present illness.  No new acute complaints.    History of Present Illness:    Patient with multiple chronic medical problems including impaired fasting glucose, hypertension, hyperlipidemia, hypothyroidism, symptomatic BPH, urinary incontinence without sensory awareness, vitamin D deficiency, morbid obesity, nonocclusive coronary artery disease, lower extremity edema, sleep apnea, history of pulmonary embolism, depression with anxiety, bipolar disorder in remission, PTSD, chronic insomnia, osteoarthritis multiple joints, lumbar spinal stenosis and degenerative disc disease of the lumbar spine, multiple environmental allergies/allergic rhinitis.  He presents today for follow-up with lab prior in order to monitor his chronic medical issues.  His past medical history reviewed and updated were necessary including health maintenance parameters.  This reveals he needs influenza vaccine.    Review of Systems   Constitutional: Negative.   HENT: Negative.    Eyes: Negative.    Cardiovascular: Positive for leg swelling.   Respiratory: Negative.    Endocrine: Negative.    Hematologic/Lymphatic: Negative.    Skin: Negative.    Musculoskeletal: Positive for arthritis and back pain.   Gastrointestinal: Negative.    Genitourinary: Negative.    Neurological: Negative.    Psychiatric/Behavioral: Negative.    Allergic/Immunologic: Negative.        Active Problems:    Patient Active Problem List   Diagnosis   • Post traumatic stress disorder (PTSD)   • Benign prostatic hyperplasia with urinary frequency   • Chronic insomnia   • Lumbar degenerative disc disease   •  Impaired fasting glucose   • Benign essential hypertension   • Primary osteoarthritis involving multiple joints   • Hyperlipidemia   • Primary hypothyroidism   • Obstructive sleep apnea, tolerates CPAP well.  Previously failed oral appliance.   • History of pulmonary embolism   • Non-occlusive coronary artery disease, 05/16/2017--normal LM; proximal LAD mild LI; 50% mid LAD.  Mild distal LAD LI; normal Cx.  Mild LI marginal branches; normal RCA, nondominant.  EF 60%.   • Therapeutic drug monitoring   • Vitamin D deficiency   • Psoriasis   • Allergic rhinitis   • Depression with anxiety   • Multiple environmental allergies   • Bilateral lower extremity edema   • Morbid obesity ([facility])   • Spinal stenosis of lumbar region with neurogenic claudication   • Traumatic complete tear of left rotator cuff   • Urinary incontinence without sensory awareness   • Bipolar disorder, in full remission, most recent episode mixed ([facility])   • Weakness of both lower extremities         Past Medical History:   Diagnosis Date   • Affective psychosis, bipolar ([facility]) 09/30/2021   • Allergic rhinitis 08/21/2018    Patient has had allergy testing in the past which revealed allergies to molds and grass.  Immunotherapy for about 2 years in the 1980s.   • Benign essential hypertension 02/25/2016   • Benign prostatic hyperplasia with urinary frequency 02/25/2016   • Bilateral lower extremity edema 08/21/2018   • Bipolar disorder, in full remission, most recent episode mixed ([facility]) 9/30/2021   • Chronic insomnia 02/25/2016   • Closed traumatic lateral subluxation of patellofemoral joint, right, initial encounter 08/13/2018    08/10/2018--patient was evaluated by the orthopedist for right knee pain and found to have lateral subluxation of the right patella associated with presence of right artificial knee joint.  Physical therapy no help.  Surgery is scheduled 09/17/2018   • Depression with anxiety 08/21/2018   • History of deep venous thrombosis (DVT)  of distal vein of right lower extremity 08/21/2018 04/26/2016--CTA of the chest performed for elevated d-dimer and progressive shortness of breath and chest pain for one month revealed bilateral occlusive in near occlusive segmental and subsegmental pulmonary emboli in all lobes of the right lung as well as within the left lower lobe.  No evidence of pulmonary infarct.  Nonspecific multifocal groundglass attenuation in the right pulmonary apex, perhaps representing pneumonitis or submental atelectasis.  Questionable cholelithiasis.  Doppler venous study was positive for DVT in the right popliteal vein.  Hypercoagulable workup was normal.  He was treated with Eliquis for a total of 6 months and was subsequently discontinued.   • History of pneumonia    • History of pulmonary embolism 06/10/2016    04/26/2016--CTA of the chest performed for elevated d-dimer and progressive shortness of breath and chest pain for one month revealed bilateral occlusive in near occlusive segmental and subsegmental pulmonary emboli in all lobes of the right lung as well as within the left lower lobe.  No evidence of pulmonary infarct.  Nonspecific multifocal groundglass attenuation in the right pulmonary apex, perhaps representing pneumonitis or submental atelectasis.  Questionable cholelithiasis.  Doppler venous study was positive for DVT in the right popliteal vein.  Hypercoagulable workup was normal.  He was treated with Eliquis for a total of 6 months and was subsequently discontinued.   • Hyperlipidemia 02/25/2016   • Impaired fasting glucose 02/25/2016   • Lumbar degenerative disc disease 02/25/2016   • Morbidly obese (HCC) 10/19/2018   • Multiple environmental allergies 08/21/2018    Patient has had allergy testing in the past which revealed allergies to molds and grass.  Immunotherapy for about 2 years in the 1980s.   • Non-occlusive coronary artery disease, 05/16/2017--normal LM; proximal LAD mild LI; 50% mid LAD.  Mild distal  LAD LI; normal Cx.  Mild LI marginal branches; normal RCA, nondominant.  EF 60%. 07/24/2017 05/16/2017--cardiac catheterization performed for abnormal stress test.  Normal LV with ejection fraction 60%.  Dilated aortic root.  No wall motion abnormalities.  Normal left main.  Ramus branch small caliber and normal.  Proximal LAD large caliber and mild luminal irregularities.  50% mid LAD.  Mild luminal irregularities distal LAD.  3 diagonal branches of small caliber with mild luminal irregularities.  Normal septal branches.  Circumflex is large caliber and free of disease.  Marginal branches are medium caliber with mild luminal irregularities.  RCA is a medium caliber and free of disease.  It is nondominant.   • Obstructive sleep apnea, tolerates CPAP well.  Previously failed oral appliance. 02/25/2016   • Post traumatic stress disorder (PTSD) 02/25/2016   • Primary hypothyroidism 02/25/2016   • Primary osteoarthritis involving multiple joints 02/25/2016   • Psoriasis 09/17/2014   • Spinal stenosis of lumbar region with neurogenic claudication 02/15/2021   • Traumatic complete tear of left rotator cuff 03/17/2021 March 14, 2021--patient was evaluated by the orthopedic surgeon after he slipped on ice and fell onto his left shoulder.  Work-up revealed complete rotator cuff tear involving 2 tendons.  Specifically, he has an acute left supraspinatus tear and acute left infraspinatus tear.  He also has left glenohumeral joint osteoarthritis.  Apparently this is not repairable and patient would need a shoulder r   • Traumatic complete tear of left rotator cuff 03/17/2021 March 14, 2021--patient was evaluated by the orthopedic surgeon after he slipped on ice and fell onto his left shoulder.  Work-up revealed complete rotator cuff tear involving 2 tendons.  Specifically, he has an acute left supraspinatus tear and acute left infraspinatus tear.  He also has left glenohumeral joint osteoarthritis.  Apparently this is  not repairable and patient would need a shoulder r   • Urinary incontinence without sensory awareness 03/17/2021   • Vitamin D deficiency 08/21/2018         Past Surgical History:   Procedure Laterality Date   • CARDIAC CATHETERIZATION N/A 5/16/2017    Procedure: Coronary angiography;  Surgeon: Malcolm Chen MD;  Location:  [facility];  Service:    • CARDIAC CATHETERIZATION N/A 5/16/2017    Procedure: Left Heart Cath;  Surgeon: Malcolm Chen MD;  Location:  [facility];  Service:    • CARDIAC CATHETERIZATION N/A 5/16/2017    Procedure: Left ventriculography;  Surgeon: Malcolm Chen MD;  Location:  [facility];  Service:    • COLONOSCOPY  2005 2005--colonoscopy reportedly normal.  Records not available.   • COLONOSCOPY N/A 6/13/2019    Procedure: COLONOSCOPY INTO CECUM WITH COLD BIOPSY POLYPECTOMY;  Surgeon: Ayaan Gallardo MD;  [facility] ENDOSCOPY;  Service: General   • LUMBAR DECOMPRESSION  2007 2007--lumbar decompression without fusion or hardware.   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   • REVISION TOTAL KNEE ARTHROPLASTY Right 09/17/2018 09/17/2018--right total knee arthroplasty revision due to lateral subluxation of the patella due to multiple factors.   • TOTAL KNEE ARTHROPLASTY Left 11/03/2017 11/03/2017--right total knee replacement complicated by patellofemoral subluxation.   • TOTAL KNEE ARTHROPLASTY Left 06/2014 June 2014--left total knee replacement.         Allergies   Allergen Reactions   • Hydrocodone Other (See Comments)     Severe vomiting - but CAN tolerate oxycodone per patient   • Zocor  [Simvastatin]      MYALGIA           Current Outpatient Medications:   •  amLODIPine (NORVASC) 10 MG tablet, Take 1 tablet by mouth Daily., Disp: 90 tablet, Rfl: 2  •  amphetamine-dextroamphetamine  (ADDERALL) 20 MG tablet, Take 20 mg by mouth Daily. prn, Disp: , Rfl:   •  aspirin 81 MG tablet, One by mouth daily, Disp: , Rfl:   •  budesonide (RINOCORT AQUA) 32 MCG/ACT nasal spray, Apply 2 sprays each nostril once daily as directed for nasal drainage/allergic rhinitis, Disp: 8.43 mL, Rfl: 6  •  clobetasol (TEMOVATE) 0.05 % ointment, Apply twice a day as directed, Disp: 60 g, Rfl: 6  •  FLUoxetine (PROzac) 60 MG tablet, , Disp: , Rfl:   •  hydrOXYzine (ATARAX) 25 MG tablet, Take 1 tablet by mouth 3 (Three) Times a Day As Needed for Itching., Disp: 60 tablet, Rfl: 3  •  losartan (COZAAR) 50 MG tablet, TAKE ONE TABLET BY MOUTH DAILY, Disp: 90 tablet, Rfl: 3  •  montelukast (Singulair) 10 MG tablet, Take 1 p.o. daily for environmental allergies, Disp: 30 tablet, Rfl: 3  •  oxybutynin (DITROPAN) 5 MG tablet, , Disp: , Rfl:   •  oxyCODONE-acetaminophen (PERCOCET)  MG per tablet, Take 1-2 p.o. every 6 hours for moderate to severe low back pain., Disp: 60 tablet, Rfl: 0  •  pravastatin (PRAVACHOL) 20 MG tablet, Take 1 p.o. daily for high cholesterol, Disp: 90 tablet, Rfl: 3  •  Synthroid 175 MCG tablet, TAKE ONE TABLET BY MOUTH DAILY FOR LOW THYROID, Disp: 90 tablet, Rfl: 2  •  tamsulosin (FLOMAX) 0.4 MG capsule 24 hr capsule, Take 1 capsule by mouth Daily., Disp: 90 capsule, Rfl: 2  •  vitamin D3 125 MCG (5000 UT) capsule capsule, 1 by mouth daily as directed, Disp: 30 capsule, Rfl: 6  •  zolpidem (AMBIEN) 10 MG tablet, Take 10 mg by mouth At Night As Needed., Disp: , Rfl:   •  Coenzyme Q10 (Co Q-10) 400 MG capsule, Take 1 p.o. daily with food, Disp: 30 capsule, Rfl:   •  ezetimibe (Zetia) 10 MG tablet, Take 1 p.o. daily for high cholesterol, Disp: 90 tablet, Rfl: 3      Family History   Problem Relation Age of Onset   • Coronary artery disease Mother         Status post CABG   • Alzheimer's disease Mother    • Stroke Father    • Liver cancer Father         Father with biliary cancer including gallbladder   • No  "Known Problems Sister    • No Known Problems Brother          Social History     Socioeconomic History   • Marital status:      Spouse name: italo   • Highest education level: Bachelor's degree (e.g., BA, AB, BS)   Tobacco Use   • Smoking status: Never Smoker   • Smokeless tobacco: Never Used   • Tobacco comment: CAFFEINE USE 2 CUPS COFFEE AND 1 GLASS TEA DAILY   Vaping Use   • Vaping Use: Never used   Substance and Sexual Activity   • Alcohol use: No   • Drug use: Yes     Types: Marijuana   • Sexual activity: Yes     Partners: Female         Vitals:    10/04/22 0906   BP: 126/60   Pulse: 55   Resp: 18   SpO2: 98%   Weight: (!) 141 kg (310 lb)   Height: 185.4 cm (73\")        Body mass index is 40.9 kg/m².      Physical Exam:    General: Alert and oriented x 3.  No acute distress.  Obese.  Normal affect.  HEENT: Pupils equal, round, reactive to light; extraocular movements intact; sclerae nonicteric; pharynx, ear canals and TMs normal.  Neck: Without JVD, thyromegaly, bruit, or adenopathy.  Lungs: Clear to auscultation in all fields.  Heart: Regular rate and rhythm without murmur, rub, gallop, or click.  Abdomen: Soft, nontender, without hepatosplenomegaly or hernia.  Bowel sounds normal.  : Deferred.  Rectal: Deferred.  Extremities: Without clubbing, cyanosis, edema, or pulse deficit.  Neurologic: Intact without focal deficit.  Normal station and gait observed during ingress and egress from the examination room.  Skin: Without significant lesion.  Musculoskeletal: Unremarkable.    Lab/other results:    PSA normal at 0.562.  Thyroid function tests are normal.  Urinalysis normal.  Vitamin D normal at 54.9.  Total cholesterol 145, triglyceride 169, LDL particle #1095, small LDL particle #636, HDL particle #21.7.  Hemoglobin A1c 6.4.  CMP normal except glucose 138.  CPK normal.  CBC normal.    Assessment/Plan:     Diagnosis Plan   1. Impaired fasting glucose     2. Benign essential hypertension     3. " Hyperlipidemia  ezetimibe (Zetia) 10 MG tablet    pravastatin (PRAVACHOL) 20 MG tablet    CK    Comprehensive Metabolic Panel    NMR LipoProfile    Coenzyme Q10 (Co Q-10) 400 MG capsule   4. Primary hypothyroidism     5. Benign prostatic hyperplasia with urinary frequency     6. Urinary incontinence without sensory awareness     7. Vitamin D deficiency     8. Morbid obesity (HCC)     9. Non-occlusive coronary artery disease, 05/16/2017--normal LM; proximal LAD mild LI; 50% mid LAD.  Mild distal LAD LI; normal Cx.  Mild LI marginal branches; normal RCA, nondominant.  EF 60%.     10. Bilateral lower extremity edema     11. Obstructive sleep apnea, tolerates CPAP well.  Previously failed oral appliance.     12. History of pulmonary embolism     13. Depression with anxiety     14. Bipolar disorder, in full remission, most recent episode mixed (HCC)     15. Post traumatic stress disorder (PTSD)     16. Chronic insomnia     17. Primary osteoarthritis involving multiple joints     18. Spinal stenosis of lumbar region with neurogenic claudication     19. Lumbar degenerative disc disease     20. Multiple environmental allergies     21. Allergic rhinitis     22. Therapeutic drug monitoring     23. Weakness of both lower extremities       Patient has impaired fasting glucose which right on the border of being mild overt diabetes.  I have strongly recommended low carbohydrate diet, exercise, and weight loss.  Blood pressure appears to be under good control on the current regimen.  Hyperlipidemia is under reasonable control although his HDL is lower than I would like.  He is taking pravastatin and given his risk factors I think we should make a change.  His thyroid is therapeutic on the current dose of levothyroxine.  Patient has symptomatic BPH as well as urinary incontinence and this is being evaluated and treated by the urologist.  His coronary artery disease is stable.  Lower extremity edema also is stable on the current  regimen.  Patient has sleep apnea and tolerates CPAP well.  He has a history of pulmonary embolism remotely but no recurrence.  His depression with anxiety as well as bipolar disorder and PTSD is being managed by his psychiatrist.  This seems to be in remission.  He has lumbar spinal stenosis due to degenerative disc disease and has back pain that tends to come and go.  Currently tolerable.  His environmental allergies/allergic rhinitis seems to be controlled with Singulair at the present time.    Plan is as follows: Strongly recommend low carbohydrate diet, exercise, and weight loss.  Start generic Zetia 10 mg/day and continue pravastatin 20 mg/day.  Patient will obtain fasting lab work and follow-up in 6 weeks.    Addendum: Patient reports he had influenza vaccine and COVID booster a couple of weeks ago at a local drugstore.  It should come across the computer soon.    Addendum #2: Patient is having some discomfort and weakness in his legs and he questions whether or not the pravastatin is causing this.  I explained to him is certainly possible.  The Zetia should not make this any worse.  Patient is not taking coenzyme Q 10.  I have recommended co-Q10 400 mg every day with food for better absorption.  If this makes his leg symptoms better then we do have the answer to the problem he is having with the lower extremity weakness.    Procedures

## 2022-10-27 ENCOUNTER — TELEPHONE (OUTPATIENT)
Dept: INTERNAL MEDICINE | Facility: CLINIC | Age: 72
End: 2022-10-27

## 2022-10-27 NOTE — TELEPHONE ENCOUNTER
Caller: Durga Valenzuela    Relationship: Self    Best call back number: [phone]    What is the best time to reach you: ANY TIME    Who are you requesting to speak with (clinical staff, provider,  specific staff member): CLINICAL STAFF    What was the call regarding: PATIENT IS TRYING TO GET HIS [facility]S BIPAP MACHINE REPLACED DUE TO A RECALL AND THEY ARE NEEDING AN UP TO DATE PRESCRIPTION FROM DR. FONG FOR THE MACHINE. LAST PRESCRIPTION ON FILE AT [facility] WAS FROM 2019 AND IT IS . THEY NEED A PRESCRIPTION WRITTEN WITH THE REQUIRED BIPAP SETTINGS FOR PATIENT.    [facility] FAX: [phone]    PLEASE ADVISE    Do you require a callback: YES

## 2022-11-16 ENCOUNTER — OFFICE VISIT (OUTPATIENT)
Dept: INTERNAL MEDICINE | Facility: CLINIC | Age: 72
End: 2022-11-16

## 2022-11-16 VITALS
OXYGEN SATURATION: 99 % | BODY MASS INDEX: 40.42 KG/M2 | DIASTOLIC BLOOD PRESSURE: 66 MMHG | WEIGHT: 305 LBS | RESPIRATION RATE: 18 BRPM | HEART RATE: 77 BPM | HEIGHT: 73 IN | SYSTOLIC BLOOD PRESSURE: 130 MMHG

## 2022-11-16 DIAGNOSIS — N39.42 URINARY INCONTINENCE WITHOUT SENSORY AWARENESS: Chronic | ICD-10-CM

## 2022-11-16 DIAGNOSIS — Z23 NEED FOR IMMUNIZATION AGAINST INFLUENZA: ICD-10-CM

## 2022-11-16 DIAGNOSIS — J20.9 ACUTE BRONCHITIS, UNSPECIFIED ORGANISM: ICD-10-CM

## 2022-11-16 DIAGNOSIS — L40.9 PSORIASIS: Chronic | ICD-10-CM

## 2022-11-16 DIAGNOSIS — F51.04 CHRONIC INSOMNIA: Chronic | ICD-10-CM

## 2022-11-16 DIAGNOSIS — R60.0 BILATERAL LOWER EXTREMITY EDEMA: Chronic | ICD-10-CM

## 2022-11-16 DIAGNOSIS — E66.01 MORBID OBESITY: Chronic | ICD-10-CM

## 2022-11-16 DIAGNOSIS — E78.2 MIXED HYPERLIPIDEMIA: Chronic | ICD-10-CM

## 2022-11-16 DIAGNOSIS — R35.0 BENIGN PROSTATIC HYPERPLASIA WITH URINARY FREQUENCY: Chronic | ICD-10-CM

## 2022-11-16 DIAGNOSIS — R73.01 IMPAIRED FASTING GLUCOSE: Chronic | ICD-10-CM

## 2022-11-16 DIAGNOSIS — I10 BENIGN ESSENTIAL HYPERTENSION: Chronic | ICD-10-CM

## 2022-11-16 DIAGNOSIS — Z91.09 MULTIPLE ENVIRONMENTAL ALLERGIES: Chronic | ICD-10-CM

## 2022-11-16 DIAGNOSIS — E03.9 PRIMARY HYPOTHYROIDISM: Chronic | ICD-10-CM

## 2022-11-16 DIAGNOSIS — Z00.00 ENCOUNTER FOR SUBSEQUENT ANNUAL WELLNESS VISIT (AWV) IN MEDICARE PATIENT: Primary | ICD-10-CM

## 2022-11-16 DIAGNOSIS — F41.8 DEPRESSION WITH ANXIETY: Chronic | ICD-10-CM

## 2022-11-16 DIAGNOSIS — M48.062 SPINAL STENOSIS OF LUMBAR REGION WITH NEUROGENIC CLAUDICATION: Chronic | ICD-10-CM

## 2022-11-16 DIAGNOSIS — F43.10 POST TRAUMATIC STRESS DISORDER (PTSD): Chronic | ICD-10-CM

## 2022-11-16 DIAGNOSIS — E55.9 VITAMIN D DEFICIENCY: Chronic | ICD-10-CM

## 2022-11-16 DIAGNOSIS — J30.1 CHRONIC SEASONAL ALLERGIC RHINITIS DUE TO POLLEN: Chronic | ICD-10-CM

## 2022-11-16 DIAGNOSIS — N40.1 BENIGN PROSTATIC HYPERPLASIA WITH URINARY FREQUENCY: Chronic | ICD-10-CM

## 2022-11-16 DIAGNOSIS — F31.78 BIPOLAR DISORDER, IN FULL REMISSION, MOST RECENT EPISODE MIXED: Chronic | ICD-10-CM

## 2022-11-16 DIAGNOSIS — Z51.81 THERAPEUTIC DRUG MONITORING: ICD-10-CM

## 2022-11-16 DIAGNOSIS — S46.012D TRAUMATIC COMPLETE TEAR OF LEFT ROTATOR CUFF, SUBSEQUENT ENCOUNTER: Chronic | ICD-10-CM

## 2022-11-16 DIAGNOSIS — M51.36 LUMBAR DEGENERATIVE DISC DISEASE: Chronic | ICD-10-CM

## 2022-11-16 DIAGNOSIS — Z86.711 HISTORY OF PULMONARY EMBOLISM: Chronic | ICD-10-CM

## 2022-11-16 DIAGNOSIS — M15.9 PRIMARY OSTEOARTHRITIS INVOLVING MULTIPLE JOINTS: Chronic | ICD-10-CM

## 2022-11-16 DIAGNOSIS — I25.10 NON-OCCLUSIVE CORONARY ARTERY DISEASE: Chronic | ICD-10-CM

## 2022-11-16 DIAGNOSIS — G47.33 OBSTRUCTIVE SLEEP APNEA: Chronic | ICD-10-CM

## 2022-11-16 DIAGNOSIS — R29.898 WEAKNESS OF BOTH LOWER EXTREMITIES: ICD-10-CM

## 2022-11-16 PROCEDURE — 1170F FXNL STATUS ASSESSED: CPT | Performed by: INTERNAL MEDICINE

## 2022-11-16 PROCEDURE — 1159F MED LIST DOCD IN RCRD: CPT | Performed by: INTERNAL MEDICINE

## 2022-11-16 PROCEDURE — G0439 PPPS, SUBSEQ VISIT: HCPCS | Performed by: INTERNAL MEDICINE

## 2022-11-16 PROCEDURE — 1125F AMNT PAIN NOTED PAIN PRSNT: CPT | Performed by: INTERNAL MEDICINE

## 2022-11-16 PROCEDURE — 99214 OFFICE O/P EST MOD 30 MIN: CPT | Performed by: INTERNAL MEDICINE

## 2022-11-16 RX ORDER — CEFDINIR 300 MG/1
CAPSULE ORAL
Qty: 20 CAPSULE | Refills: 0 | Status: ON HOLD | OUTPATIENT
Start: 2022-11-16 | End: 2022-12-05

## 2022-11-16 RX ORDER — HYDROCODONE BITARTRATE AND ACETAMINOPHEN 5; 325 MG/1; MG/1
TABLET ORAL EVERY 8 HOURS
COMMUNITY
End: 2022-11-21

## 2022-11-16 RX ORDER — ZOLPIDEM TARTRATE 10 MG/1
TABLET ORAL
Qty: 30 TABLET | Refills: 5 | Status: SHIPPED | OUTPATIENT
Start: 2022-11-16

## 2022-11-16 RX ORDER — PRAVASTATIN SODIUM 20 MG
TABLET ORAL
Qty: 90 TABLET | Refills: 3
Start: 2022-11-16

## 2022-11-16 RX ORDER — AMLODIPINE BESYLATE 10 MG/1
TABLET ORAL
Qty: 90 TABLET | Refills: 3
Start: 2022-11-16 | End: 2023-04-05

## 2022-11-16 NOTE — PROGRESS NOTES
11/16/2022    Patient Information  Durga Valenzuela                                                                                          [id number] [facility] [id number]      1950  [unfilled]  There is no work phone number on file.    Chief Complaint:     Subsequent Medicare wellness visit.  Follow-up poorly controlled hyperlipidemia and recent medication change.  Complaining of a new cough.    History of Present Illness:    Patient with a history of multiple medical problems as outlined in the problem list as well as documented below presents today for follow-up lab work after we made a change to his medications.  Specifically we added Zetia 10 mg/day to the pravastatin.  Unfortunately, I did not make self clear and patient is only been taking Zetia and not the pravastatin.    Patient has a new complaint of approximately 5-day history of chest congestion and cough productive of greenish phlegm.  No fever or chills.  No shortness of breath.  He started taking Mucinex which has not really helped.    Past medical history reviewed and updated were necessary including health maintenance parameters.  This reveals he is up-to-date or else accounted for after today's visit.    Review of Systems   Constitutional: Negative.   HENT: Negative.    Eyes: Negative.    Cardiovascular: Negative.    Respiratory: Positive for cough and sputum production. Negative for shortness of breath and wheezing.    Endocrine: Negative.    Hematologic/Lymphatic: Negative.    Skin: Negative.    Musculoskeletal: Positive for arthritis and joint pain.   Gastrointestinal: Negative.    Genitourinary: Negative.    Neurological: Negative.    Psychiatric/Behavioral: Negative.    Allergic/Immunologic: Negative.        Active Problems:    Patient Active Problem List   Diagnosis   • Post traumatic stress disorder (PTSD)   • Benign prostatic hyperplasia with urinary frequency   • Chronic insomnia   • Lumbar degenerative disc  disease   • Impaired fasting glucose   • Benign essential hypertension   • Primary osteoarthritis involving multiple joints   • Hyperlipidemia   • Primary hypothyroidism   • Obstructive sleep apnea, tolerates CPAP well.  Previously failed oral appliance.   • History of pulmonary embolism   • Non-occlusive coronary artery disease, 05/16/2017--normal LM; proximal LAD mild LI; 50% mid LAD.  Mild distal LAD LI; normal Cx.  Mild LI marginal branches; normal RCA, nondominant.  EF 60%.   • Therapeutic drug monitoring   • Vitamin D deficiency   • Psoriasis   • Allergic rhinitis   • Depression with anxiety   • Multiple environmental allergies   • Bilateral lower extremity edema   • Morbid obesity (formerly [facility])   • Spinal stenosis of lumbar region with neurogenic claudication   • Traumatic complete tear of left rotator cuff   • Urinary incontinence without sensory awareness   • Bipolar disorder, in full remission, most recent episode mixed (formerly [facility])   • Weakness of both lower extremities         Past Medical History:   Diagnosis Date   • Affective psychosis, bipolar (formerly [facility]) 09/30/2021   • Allergic rhinitis 08/21/2018    Patient has had allergy testing in the past which revealed allergies to molds and grass.  Immunotherapy for about 2 years in the 1980s.   • Benign essential hypertension 02/25/2016   • Benign prostatic hyperplasia with urinary frequency 02/25/2016   • Bilateral lower extremity edema 08/21/2018   • Bipolar disorder, in full remission, most recent episode mixed (formerly [facility]) 9/30/2021   • Chronic insomnia 02/25/2016   • Closed traumatic lateral subluxation of patellofemoral joint, right, initial encounter 08/13/2018    08/10/2018--patient was evaluated by the orthopedist for right knee pain and found to have lateral subluxation of the right patella associated with presence of right artificial knee joint.  Physical therapy no help.  Surgery is scheduled 09/17/2018   • Depression with anxiety 08/21/2018   • History of deep venous  thrombosis (DVT) of distal vein of right lower extremity 08/21/2018 04/26/2016--CTA of the chest performed for elevated d-dimer and progressive shortness of breath and chest pain for one month revealed bilateral occlusive in near occlusive segmental and subsegmental pulmonary emboli in all lobes of the right lung as well as within the left lower lobe.  No evidence of pulmonary infarct.  Nonspecific multifocal groundglass attenuation in the right pulmonary apex, perhaps representing pneumonitis or submental atelectasis.  Questionable cholelithiasis.  Doppler venous study was positive for DVT in the right popliteal vein.  Hypercoagulable workup was normal.  He was treated with Eliquis for a total of 6 months and was subsequently discontinued.   • History of pneumonia    • History of pulmonary embolism 06/10/2016    04/26/2016--CTA of the chest performed for elevated d-dimer and progressive shortness of breath and chest pain for one month revealed bilateral occlusive in near occlusive segmental and subsegmental pulmonary emboli in all lobes of the right lung as well as within the left lower lobe.  No evidence of pulmonary infarct.  Nonspecific multifocal groundglass attenuation in the right pulmonary apex, perhaps representing pneumonitis or submental atelectasis.  Questionable cholelithiasis.  Doppler venous study was positive for DVT in the right popliteal vein.  Hypercoagulable workup was normal.  He was treated with Eliquis for a total of 6 months and was subsequently discontinued.   • Hyperlipidemia 02/25/2016   • Impaired fasting glucose 02/25/2016   • Lumbar degenerative disc disease 02/25/2016   • Morbidly obese (HCC) 10/19/2018   • Multiple environmental allergies 08/21/2018    Patient has had allergy testing in the past which revealed allergies to molds and grass.  Immunotherapy for about 2 years in the 1980s.   • Non-occlusive coronary artery disease, 05/16/2017--normal LM; proximal LAD mild LI; 50% mid  LAD.  Mild distal LAD LI; normal Cx.  Mild LI marginal branches; normal RCA, nondominant.  EF 60%. 07/24/2017 05/16/2017--cardiac catheterization performed for abnormal stress test.  Normal LV with ejection fraction 60%.  Dilated aortic root.  No wall motion abnormalities.  Normal left main.  Ramus branch small caliber and normal.  Proximal LAD large caliber and mild luminal irregularities.  50% mid LAD.  Mild luminal irregularities distal LAD.  3 diagonal branches of small caliber with mild luminal irregularities.  Normal septal branches.  Circumflex is large caliber and free of disease.  Marginal branches are medium caliber with mild luminal irregularities.  RCA is a medium caliber and free of disease.  It is nondominant.   • Obstructive sleep apnea, tolerates CPAP well.  Previously failed oral appliance. 02/25/2016   • Post traumatic stress disorder (PTSD) 02/25/2016   • Primary hypothyroidism 02/25/2016   • Primary osteoarthritis involving multiple joints 02/25/2016   • Psoriasis 09/17/2014   • Spinal stenosis of lumbar region with neurogenic claudication 02/15/2021   • Traumatic complete tear of left rotator cuff 03/17/2021 March 14, 2021--patient was evaluated by the orthopedic surgeon after he slipped on ice and fell onto his left shoulder.  Work-up revealed complete rotator cuff tear involving 2 tendons.  Specifically, he has an acute left supraspinatus tear and acute left infraspinatus tear.  He also has left glenohumeral joint osteoarthritis.  Apparently this is not repairable and patient would need a shoulder r   • Traumatic complete tear of left rotator cuff 03/17/2021 March 14, 2021--patient was evaluated by the orthopedic surgeon after he slipped on ice and fell onto his left shoulder.  Work-up revealed complete rotator cuff tear involving 2 tendons.  Specifically, he has an acute left supraspinatus tear and acute left infraspinatus tear.  He also has left glenohumeral joint osteoarthritis.   Apparently this is not repairable and patient would need a shoulder r   • Urinary incontinence without sensory awareness 03/17/2021   • Vitamin D deficiency 08/21/2018         Past Surgical History:   Procedure Laterality Date   • CARDIAC CATHETERIZATION N/A 5/16/2017    Procedure: Coronary angiography;  Surgeon: Malcolm Chen MD;  Location:  [facility];  Service:    • CARDIAC CATHETERIZATION N/A 5/16/2017    Procedure: Left Heart Cath;  Surgeon: Malcolm Chen MD;  Location:  [facility];  Service:    • CARDIAC CATHETERIZATION N/A 5/16/2017    Procedure: Left ventriculography;  Surgeon: Malcolm Chen MD;  Location:  [facility];  Service:    • COLONOSCOPY  2005 2005--colonoscopy reportedly normal.  Records not available.   • COLONOSCOPY N/A 6/13/2019    Procedure: COLONOSCOPY INTO CECUM WITH COLD BIOPSY POLYPECTOMY;  Surgeon: Ayaan Gallardo MD;  Location: [facility] ENDOSCOPY;  Service: General   • LUMBAR DECOMPRESSION  2007 2007--lumbar decompression without fusion or hardware.   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   • REVISION TOTAL KNEE ARTHROPLASTY Right 09/17/2018 09/17/2018--right total knee arthroplasty revision due to lateral subluxation of the patella due to multiple factors.   • TOTAL KNEE ARTHROPLASTY Left 11/03/2017 11/03/2017--right total knee replacement complicated by patellofemoral subluxation.   • TOTAL KNEE ARTHROPLASTY Left 06/2014 June 2014--left total knee replacement.         Allergies   Allergen Reactions   • Hydrocodone Other (See Comments)     Severe vomiting - but CAN tolerate oxycodone per patient   • Zocor  [Simvastatin]      MYALGIA           Current Outpatient Medications:   •  amLODIPine (NORVASC) 10 MG tablet, Take 1 p.o. every morning for high blood pressure, Disp: 90 tablet, Rfl: 3  •   pravastatin (PRAVACHOL) 20 MG tablet, Take 1 p.o. daily for high cholesterol, Disp: 90 tablet, Rfl: 3  •  zolpidem (AMBIEN) 10 MG tablet, Take 1 p.o. nightly as needed for insomnia, Disp: 30 tablet, Rfl: 5  •  amphetamine-dextroamphetamine (ADDERALL) 20 MG tablet, Take 20 mg by mouth Daily. prn, Disp: , Rfl:   •  aspirin 81 MG tablet, One by mouth daily, Disp: , Rfl:   •  cefdinir (OMNICEF) 300 MG capsule, Take 1 p.o. twice daily until gone for acute bronchitis, Disp: 20 capsule, Rfl: 0  •  clobetasol (TEMOVATE) 0.05 % ointment, Apply twice a day as directed, Disp: 60 g, Rfl: 6  •  Coenzyme Q10 (Co Q-10) 400 MG capsule, Take 1 p.o. daily with food, Disp: 30 capsule, Rfl:   •  ezetimibe (Zetia) 10 MG tablet, Take 1 p.o. daily for high cholesterol, Disp: 90 tablet, Rfl: 3  •  FLUoxetine (PROzac) 60 MG tablet, , Disp: , Rfl:   •  HYDROcodone-acetaminophen (NORCO) 5-325 MG per tablet, Every 8 (Eight) Hours., Disp: , Rfl:   •  hydrOXYzine (ATARAX) 25 MG tablet, Take 1 tablet by mouth 3 (Three) Times a Day As Needed for Itching., Disp: 60 tablet, Rfl: 3  •  losartan (COZAAR) 50 MG tablet, TAKE ONE TABLET BY MOUTH DAILY, Disp: 90 tablet, Rfl: 3  •  oxybutynin (DITROPAN) 5 MG tablet, , Disp: , Rfl:   •  Synthroid 175 MCG tablet, TAKE ONE TABLET BY MOUTH DAILY FOR LOW THYROID, Disp: 90 tablet, Rfl: 2  •  tamsulosin (FLOMAX) 0.4 MG capsule 24 hr capsule, Take 1 capsule by mouth Daily., Disp: 90 capsule, Rfl: 2  •  vitamin D3 125 MCG (5000 UT) capsule capsule, 1 by mouth daily as directed, Disp: 30 capsule, Rfl: 6      Family History   Problem Relation Age of Onset   • Coronary artery disease Mother         Status post CABG   • Alzheimer's disease Mother    • Stroke Father    • Liver cancer Father         Father with biliary cancer including gallbladder   • No Known Problems Sister    • No Known Problems Brother          Social History     Socioeconomic History   • Marital status:      Spouse name: italo   • Highest  "education level: Bachelor's degree (e.g., BA, AB, BS)   Tobacco Use   • Smoking status: Never   • Smokeless tobacco: Never   • Tobacco comments:     CAFFEINE USE 2 CUPS COFFEE AND 1 GLASS TEA DAILY   Vaping Use   • Vaping Use: Never used   Substance and Sexual Activity   • Alcohol use: No   • Drug use: Yes     Types: Marijuana   • Sexual activity: Yes     Partners: Female         Vitals:    11/16/22 1029   BP: 130/66   Pulse: 77   Resp: 18   SpO2: 99%   Weight: (!) 138 kg (305 lb)   Height: 185.4 cm (73\")        Body mass index is 40.24 kg/m².      Physical Exam:    General: Alert and oriented x 3.  No acute distress.  Normal affect.  HEENT: Pupils equal, round, reactive to light; extraocular movements intact; sclerae nonicteric; pharynx, ear canals and TMs normal.  Neck: Without JVD, thyromegaly, bruit, or adenopathy.  Lungs: Clear to auscultation in all fields, except for a few scattered rhonchi.  No wheezes or signs of consolidation.  Heart: Regular rate and rhythm without murmur, rub, gallop, or click.  Abdomen: Soft, nontender, without hepatosplenomegaly or hernia.  Bowel sounds normal.  : Deferred.  Rectal: Deferred.  Extremities: Without clubbing, cyanosis, edema, or pulse deficit.  Neurologic: Intact without focal deficit.  Normal station and gait observed during ingress and egress from the examination room.  Skin: Without significant lesion.  Musculoskeletal: Unremarkable.    Lab/other results:    .  CMP normal except glucose 134.  Total cholesterol 142, triglyceride 140, LDL particle #991, HDL particle number low at 22.7    Assessment/Plan:         Diagnosis Plan   1. Encounter for subsequent annual wellness visit (AWV) in Medicare patient        2. Post traumatic stress disorder (PTSD)        3. Bipolar disorder, in full remission, most recent episode mixed (HCC)        4. Benign prostatic hyperplasia with urinary frequency  PSA DIAGNOSTIC      5. Chronic insomnia  zolpidem (AMBIEN) 10 MG tablet "      6. Urinary incontinence without sensory awareness  Urinalysis With Microscopic If Indicated (No Culture) - Urine, Clean Catch      7. Lumbar degenerative disc disease        8. Impaired fasting glucose  Hemoglobin A1c      9. Benign essential hypertension  amLODIPine (NORVASC) 10 MG tablet      10. Primary osteoarthritis involving multiple joints        11. Hyperlipidemia  pravastatin (PRAVACHOL) 20 MG tablet    CK    Comprehensive Metabolic Panel    NMR LipoProfile    TSH    T4, Free    T3, Free      12. Weakness of both lower extremities        13. Primary hypothyroidism  TSH    T4, Free    T3, Free      14. Obstructive sleep apnea, tolerates CPAP well.  Previously failed oral appliance.        15. History of pulmonary embolism        16. Non-occlusive coronary artery disease, 05/16/2017--normal LM; proximal LAD mild LI; 50% mid LAD.  Mild distal LAD LI; normal Cx.  Mild LI marginal branches; normal RCA, nondominant.  EF 60%.        17. Vitamin D deficiency  Vitamin D,25-Hydroxy      18. Psoriasis        19. Allergic rhinitis        20. Depression with anxiety        21. Multiple environmental allergies        22. Bilateral lower extremity edema        23. Morbid obesity (HCC)        24. Spinal stenosis of lumbar region with neurogenic claudication        25. Traumatic complete tear of left rotator cuff, subsequent encounter        26. Therapeutic drug monitoring  CBC (No Diff)      27. Need for immunization against influenza        28. Acute bronchitis, unspecified organism  cefdinir (OMNICEF) 300 MG capsule        The subsequent Medicare wellness visit is documented on separate note    Patient has hyperlipidemia which is under fairly reasonable control with Zetia alone with the exception of an extremely low HDL cholesterol.  Patient is only been taking the Zetia and not the pravastatin.  He has known coronary artery disease and we need to be very aggressive in treating his risk factors.    Patient has a new  problem consistent with acute bronchitis that needs to be treated given the purulent phlegm.    Plan is as follows: Patient will start back on the pravastatin 20 mg/day and stay on the Zetia.  Cefdinir 300 mg p.o. twice daily x10 days for the bronchitis.    Procedures

## 2022-11-16 NOTE — PROGRESS NOTES
The ABCs of the Annual Wellness Visit  Subsequent Medicare Wellness Visit    No chief complaint on file.     Subjective    History of Present Illness:  Durga Valenzuela is a 72 y.o. male who presents for a Subsequent Medicare Wellness Visit.    The following portions of the patient's history were reviewed and   updated as appropriate: allergies, current medications, past family history, past medical history, past social history, past surgical history and problem list.    Compared to one year ago, the patient feels his physical   health is the same.    Compared to one year ago, the patient feels his mental   health is the same.    Recent Hospitalizations:  He was not admitted to the hospital during the last year.       Current Medical Providers:  Patient Care Team:  Florian Burt MD as PCP - General (Internal Medicine)    Outpatient Medications Prior to Visit   Medication Sig Dispense Refill   • amLODIPine (NORVASC) 10 MG tablet Take 1 tablet by mouth Daily. 90 tablet 2   • amphetamine-dextroamphetamine (ADDERALL) 20 MG tablet Take 20 mg by mouth Daily. prn     • aspirin 81 MG tablet One by mouth daily     • clobetasol (TEMOVATE) 0.05 % ointment Apply twice a day as directed 60 g 6   • Coenzyme Q10 (Co Q-10) 400 MG capsule Take 1 p.o. daily with food 30 capsule    • ezetimibe (Zetia) 10 MG tablet Take 1 p.o. daily for high cholesterol 90 tablet 3   • FLUoxetine (PROzac) 60 MG tablet      • HYDROcodone-acetaminophen (NORCO) 5-325 MG per tablet Every 8 (Eight) Hours.     • hydrOXYzine (ATARAX) 25 MG tablet Take 1 tablet by mouth 3 (Three) Times a Day As Needed for Itching. 60 tablet 3   • losartan (COZAAR) 50 MG tablet TAKE ONE TABLET BY MOUTH DAILY 90 tablet 3   • oxybutynin (DITROPAN) 5 MG tablet      • oxyCODONE-acetaminophen (PERCOCET)  MG per tablet Take 1-2 p.o. every 6 hours for moderate to severe low back pain. 60 tablet 0   • Synthroid 175 MCG tablet TAKE ONE TABLET BY MOUTH DAILY FOR LOW THYROID 90  tablet 2   • tamsulosin (FLOMAX) 0.4 MG capsule 24 hr capsule Take 1 capsule by mouth Daily. 90 capsule 2   • vitamin D3 125 MCG (5000 UT) capsule capsule 1 by mouth daily as directed 30 capsule 6   • zolpidem (AMBIEN) 10 MG tablet Take 10 mg by mouth At Night As Needed.     • budesonide (RINOCORT AQUA) 32 MCG/ACT nasal spray Apply 2 sprays each nostril once daily as directed for nasal drainage/allergic rhinitis 8.43 mL 6   • montelukast (Singulair) 10 MG tablet Take 1 p.o. daily for environmental allergies 30 tablet 3   • pravastatin (PRAVACHOL) 20 MG tablet Take 1 p.o. daily for high cholesterol 90 tablet 3     No facility-administered medications prior to visit.       Opioid medication/s are on active medication list.  and I have evaluated his active treatment plan and pain score trends (see table).  Vitals:    11/16/22 1029   PainSc:   4     I have reviewed the chart for potential of high risk medication and harmful drug interactions in the elderly.            Aspirin is on active medication list. Aspirin use is indicated based on review of current medical condition/s. Pros and cons of this therapy have been discussed today. Benefits of this medication outweigh potential harm.  Patient has been encouraged to continue taking this medication.  .      Patient Active Problem List   Diagnosis   • Post traumatic stress disorder (PTSD)   • Benign prostatic hyperplasia with urinary frequency   • Chronic insomnia   • Lumbar degenerative disc disease   • Impaired fasting glucose   • Benign essential hypertension   • Primary osteoarthritis involving multiple joints   • Hyperlipidemia   • Primary hypothyroidism   • Obstructive sleep apnea, tolerates CPAP well.  Previously failed oral appliance.   • History of pulmonary embolism   • Non-occlusive coronary artery disease, 05/16/2017--normal LM; proximal LAD mild LI; 50% mid LAD.  Mild distal LAD LI; normal Cx.  Mild LI marginal branches; normal RCA, nondominant.  EF 60%.   •  "Therapeutic drug monitoring   • Vitamin D deficiency   • Psoriasis   • Allergic rhinitis   • Depression with anxiety   • Multiple environmental allergies   • Bilateral lower extremity edema   • Morbid obesity (HCC)   • Spinal stenosis of lumbar region with neurogenic claudication   • Traumatic complete tear of left rotator cuff   • Urinary incontinence without sensory awareness   • Bipolar disorder, in full remission, most recent episode mixed (HCC)   • Weakness of both lower extremities     Advance Care Planning  Advance Directive is not on file.  ACP discussion was held with the patient during this visit. Patient has an advance directive (not in EMR), copy requested.    Review of Systems   Constitutional: Negative.    HENT: Negative.    Eyes: Negative.    Respiratory: Negative.    Cardiovascular: Negative.    Gastrointestinal: Negative.    Genitourinary:        Urinary incontenence   Musculoskeletal: Positive for arthralgias.   Skin: Negative.    Allergic/Immunologic: Negative.    Neurological: Negative.    Hematological: Negative.    Psychiatric/Behavioral: The patient is nervous/anxious.         Objective    Vitals:    11/16/22 1029   BP: 130/66   Pulse: 77   Resp: 18   SpO2: 99%   Weight: (!) 138 kg (305 lb)   Height: 185.4 cm (73\")   PainSc:   4     Estimated body mass index is 40.24 kg/m² as calculated from the following:    Height as of this encounter: 185.4 cm (73\").    Weight as of this encounter: 138 kg (305 lb).    Class 3 Severe Obesity (BMI >=40). Obesity-related health conditions include the following: obstructive sleep apnea, hypertension, coronary heart disease, diabetes mellitus, dyslipidemias, GERD, osteoarthritis and urinary stress incontinence. Obesity is improving with treatment. BMI is is above average; BMI management plan is completed. We discussed low calorie, low carb based diet program, portion control and increasing exercise.      Does the patient have evidence of cognitive impairment? " No    Physical Exam     General: Alert and oriented x 3.  No acute distress.  Obese.  Normal affect.  HEENT: Pupils equal, round, reactive to light; extraocular movements intact; sclerae nonicteric; pharynx, ear canals and TMs normal.  Neck: Without JVD, thyromegaly, bruit, or adenopathy.  Lungs: Clear to auscultation in all fields.  Heart: Regular rate and rhythm without murmur, rub, gallop, or click.  Abdomen: Soft, nontender, without hepatosplenomegaly or hernia.  Bowel sounds normal.  : Deferred.  Rectal: Deferred.  Extremities: Without clubbing, cyanosis, edema, or pulse deficit.  Neurologic: Intact without focal deficit.  Normal station and gait observed during ingress and egress from the examination room.  Skin: Without significant lesion.  Musculoskeletal: Unremarkable.    Lab Results   Component Value Date    CHLPL 142 11/08/2022    TRIG 140 11/08/2022    HGBA1C 6.40 (H) 09/27/2022            HEALTH RISK ASSESSMENT    Smoking Status:  Social History     Tobacco Use   Smoking Status Never   Smokeless Tobacco Never   Tobacco Comments    CAFFEINE USE 2 CUPS COFFEE AND 1 GLASS TEA DAILY     Alcohol Consumption:  Social History     Substance and Sexual Activity   Alcohol Use No     Fall Risk Screen:    [facility], and patient is at LOW risk for falls.Assessment completed on:4/1/2022    Depression Screening:  PHQ-2/PHQ-9 Depression Screening 4/1/2022   Retired Total Score -   Little Interest or Pleasure in Doing Things 0-->not at all   Feeling Down, Depressed or Hopeless 0-->not at all   PHQ-9: Brief Depression Severity Measure Score 0       Health Habits and Functional and Cognitive Screening:  Functional & Cognitive Status 11/16/2022   Do you have difficulty preparing food and eating? No   Do you have difficulty bathing yourself, getting dressed or grooming yourself? No   Do you have difficulty using the toilet? No   Do you have difficulty moving around from place to place? No    Do you have trouble with steps or getting out of a bed or a chair? No   Current Diet -   Dental Exam Up to date   Eye Exam Up to date   Exercise (times per week) -   Current Exercises Include -   Current Exercise Activities Include -   Do you need help using the phone?  No   Are you deaf or do you have serious difficulty hearing?  No   Do you need help with transportation? No   Do you need help shopping? No   Do you need help preparing meals?  No   Do you need help with housework?  No   Do you need help with laundry? No   Do you need help taking your medications? No   Do you need help managing money? No   Do you ever drive or ride in a car without wearing a seat belt? No   Have you felt unusual stress, anger or loneliness in the last month? No   Who do you live with? Spouse   If you need help, do you have trouble finding someone available to you? No   Have you been bothered in the last four weeks by sexual problems? No   Do you have difficulty concentrating, remembering or making decisions? No       Age-appropriate Screening Schedule:  Refer to the list below for future screening recommendations based on patient's age, sex and/or medical conditions. Orders for these recommended tests are listed in the plan section. The patient has been provided with a written plan.    Health Maintenance   Topic Date Due   • INFLUENZA VACCINE  08/01/2022   • LIPID PANEL  11/08/2023   • TDAP/TD VACCINES  Discontinued   • ZOSTER VACCINE  Discontinued              Assessment & Plan   CMS Preventative Services Quick Reference  Risk Factors Identified During Encounter  Cardiovascular Disease  Depression/Dysphoria  Immunizations Discussed/Encouraged (specific Immunizations; COVID19  Obesity/Overweight   Urinary Incontinence  The above risks/problems have been discussed with the patient.  Follow up actions/plans if indicated are seen below in the Assessment/Plan Section.  Pertinent information has been shared with the patient in the After  Visit Summary.    Diagnoses and all orders for this visit:    1. Encounter for subsequent annual wellness visit (AWV) in Medicare patient (Primary)    2. Post traumatic stress disorder (PTSD)    3. Bipolar disorder, in full remission, most recent episode mixed (HCC)    4. Benign prostatic hyperplasia with urinary frequency    5. Chronic insomnia    6. Urinary incontinence without sensory awareness    7. Lumbar degenerative disc disease    8. Impaired fasting glucose    9. Benign essential hypertension    10. Primary osteoarthritis involving multiple joints    11. Hyperlipidemia    12. Weakness of both lower extremities    13. Primary hypothyroidism    14. Obstructive sleep apnea, tolerates CPAP well.  Previously failed oral appliance.    15. History of pulmonary embolism    16. Non-occlusive coronary artery disease, 05/16/2017--normal LM; proximal LAD mild LI; 50% mid LAD.  Mild distal LAD LI; normal Cx.  Mild LI marginal branches; normal RCA, nondominant.  EF 60%.    17. Vitamin D deficiency    18. Psoriasis    19. Allergic rhinitis    20. Depression with anxiety    21. Multiple environmental allergies    22. Bilateral lower extremity edema    23. Morbid obesity (HCC)    24. Spinal stenosis of lumbar region with neurogenic claudication    25. Traumatic complete tear of left rotator cuff, subsequent encounter    26. Therapeutic drug monitoring    27. Need for immunization against influenza        Follow Up:   No follow-ups on file.     An After Visit Summary and PPPS were made available to the patient.

## 2022-11-21 ENCOUNTER — PRE-ADMISSION TESTING (OUTPATIENT)
Dept: PREADMISSION TESTING | Facility: HOSPITAL | Age: 72
End: 2022-11-21

## 2022-11-21 VITALS
DIASTOLIC BLOOD PRESSURE: 78 MMHG | HEART RATE: 64 BPM | OXYGEN SATURATION: 97 % | HEIGHT: 73 IN | WEIGHT: 309 LBS | SYSTOLIC BLOOD PRESSURE: 138 MMHG | BODY MASS INDEX: 40.95 KG/M2 | RESPIRATION RATE: 18 BRPM

## 2022-11-21 LAB
ANION GAP SERPL CALCULATED.3IONS-SCNC: 11 MMOL/L (ref 5–15)
BUN SERPL-MCNC: 23 MG/DL (ref 8–23)
BUN/CREAT SERPL: 25.6 (ref 7–25)
CALCIUM SPEC-SCNC: 9.3 MG/DL (ref 8.6–10.5)
CHLORIDE SERPL-SCNC: 104 MMOL/L (ref 98–107)
CO2 SERPL-SCNC: 25 MMOL/L (ref 22–29)
CREAT SERPL-MCNC: 0.9 MG/DL (ref 0.76–1.27)
DEPRECATED RDW RBC AUTO: 47.6 FL (ref 37–54)
EGFRCR SERPLBLD CKD-EPI 2021: 90.2 ML/MIN/1.73
ERYTHROCYTE [DISTWIDTH] IN BLOOD BY AUTOMATED COUNT: 14 % (ref 12.3–15.4)
GLUCOSE SERPL-MCNC: 135 MG/DL (ref 65–99)
HBA1C MFR BLD: 6.2 % (ref 4.8–5.6)
HCT VFR BLD AUTO: 43.1 % (ref 37.5–51)
HGB BLD-MCNC: 14.5 G/DL (ref 13–17.7)
MCH RBC QN AUTO: 31.4 PG (ref 26.6–33)
MCHC RBC AUTO-ENTMCNC: 33.6 G/DL (ref 31.5–35.7)
MCV RBC AUTO: 93.3 FL (ref 79–97)
PLATELET # BLD AUTO: 237 10*3/MM3 (ref 140–450)
PMV BLD AUTO: 10 FL (ref 6–12)
POTASSIUM SERPL-SCNC: 4.2 MMOL/L (ref 3.5–5.2)
RBC # BLD AUTO: 4.62 10*6/MM3 (ref 4.14–5.8)
SODIUM SERPL-SCNC: 140 MMOL/L (ref 136–145)
WBC NRBC COR # BLD: 6.87 10*3/MM3 (ref 3.4–10.8)

## 2022-11-21 PROCEDURE — 80048 BASIC METABOLIC PNL TOTAL CA: CPT | Performed by: ORTHOPAEDIC SURGERY

## 2022-11-21 PROCEDURE — 85027 COMPLETE CBC AUTOMATED: CPT | Performed by: ORTHOPAEDIC SURGERY

## 2022-11-21 PROCEDURE — 36415 COLL VENOUS BLD VENIPUNCTURE: CPT

## 2022-11-21 PROCEDURE — 83036 HEMOGLOBIN GLYCOSYLATED A1C: CPT | Performed by: ORTHOPAEDIC SURGERY

## 2022-11-21 NOTE — PAT
Pt here for PAT visit.  Pre-op tests completed, chg soap given, and instructions reviewed.  Instructed clears until 2 hrs prior to arrival time, voiced understanding. Pt denies any reaction to any metals or zippers. ERAS protocol discussed and handouts given. Pt denies any MRSA.

## 2022-11-21 NOTE — DISCHARGE INSTRUCTIONS
PRE-ADMISSION TESTING INSTRUCTIONS FOR ADULTS    Take these medications the morning of surgery with a small sip of water:Synthroid, Amlodipine, Losartan      Do not take any insulin or diabetes medications the morning of surgery.      No aspirin, advil, aleve, ibuprofen, naproxen, diet pills, decongestants, or herbal/vitamins for a week prior to surgery.   Tylenol/Acetaminophen is okay to take if needed.    General Instructions:    DO NOT EAT SOLID FOOD AFTER MIDNIGHT THE NIGHT BEFORE SURGERY. No gum, mints, or hard candy after midnight the night before surgery.  You may drink clear liquids the day of surgery up until 2 hours before your arrival time.  Clear liquids are liquids you can see through. Nothing RED in color.    Plain water    Sports drinks  Sodas     Gelatin (Jell-O)  Fruit juices without pulp such as white grape juice and apple juice  Popsicles that contain no fruit or yogurt  Tea or coffee (no cream or milk added)    It is beneficial for you to have a clear drink that contains carbohydrates just before you leave your house and before your fasting time begins.  We suggest a 20 ounce bottle of Gatorade or Powerade for non-diabetic patients or a 20 ounce bottle of G2 or Powerade Zero for diabetic patients.     Patients who avoid smoking, chewing tobacco and alcohol for 4 weeks prior to surgery have a reduced risk of post-operative complications.  If at all possible, quit smoking as many days before surgery as you can.    Do not smoke, use chewing tobacco or drink alcohol the day of surgery    Bring your C-PAP/ BI-PAP machine if you use one.  Wear clean comfortable clothes and socks.  Do not wear contact lenses, lotion, deodorant, or make-up.  Bring a case for your glasses if applicable. You may brush your teeth the morning of surgery.  You may wear dentures/partials, do not put adhesive/glue on them.  Leave all other jewelry and valuables at home.      Preventing a Surgical Site Infection:    Shower the  night before and on the morning of surgery using the chlorhexidine soap you were given.  Use a clean washcloth with the soap.  Place clean sheets on your bed after showering the night before surgery. Do not use the CHG soap on your hair, face, or private areas. Wash your body gently for five (5) minutes. Do not scrub your skin.  Dry with a clean towel and dress in clean clothing.  Do not shave the surgical area for 10 days-2 weeks prior to surgery  because the razor can irritate skin and make it easier to develop an infection.  Make sure you, your family, and all healthcare providers clean their hands with soap and water or an alcohol based hand  before caring for you or your wound.      Day of surgery:    Your surgeon’s office will advise you of your arrival time for the day of surgery.    Upon arrival, a Pre-op nurse and Anesthesia provider will review your health history, obtain vital signs, and answer questions you may have.  The only belongings needed at this time will be your home medications and if applicable your C-PAP/BI-PAP machine.  If you are staying overnight your family can leave the rest of your belongings in the car and bring them to your room later.  A Pre-op nurse will start an IV and you may receive medication in preparation for surgery, including something to help you relax.  Your family will be able to see you in the Pre-op area.  While you are in surgery your family should notify the waiting room  if they leave the waiting room area and provide a contact phone number.    IF you have any questions, you can call the Pre-Admission Department at [phone] or your surgeon's office.  Notify your surgeon if  you become sick, have a fever, productive cough, or cannot be here the day of surgery    Please be aware that surgery does come with discomfort.  We want to make every effort to control your discomfort so please discuss any uncontrolled symptoms with your nurse.   Your  doctor will most likely have prescribed pain medications.      If you are going home after surgery, you will receive individualized written care instructions before being discharged.  A responsible adult (over the age of 18) must drive you to and from the hospital on the day of your surgery and stay with you for 24 hours after anesthesia.    If you are staying overnight following surgery, you will be transported to your hospital room following the recovery period.  [facility] has all private rooms.    You may receive a survey regarding the care you received. Your feedback is very important and will be used to collect the necessary data to help us to continue to provide excellent care.     Deductibles and co-payments are collected on the day of service. Please be prepared to pay the required co-pay, deductible or deposit on the day of service as defined by your plan.

## 2022-12-02 ENCOUNTER — ANESTHESIA EVENT (OUTPATIENT)
Dept: PERIOP | Facility: HOSPITAL | Age: 72
End: 2022-12-02

## 2022-12-05 ENCOUNTER — APPOINTMENT (OUTPATIENT)
Dept: GENERAL RADIOLOGY | Facility: HOSPITAL | Age: 72
End: 2022-12-05

## 2022-12-05 ENCOUNTER — HOSPITAL ENCOUNTER (INPATIENT)
Facility: HOSPITAL | Age: 72
LOS: 1 days | Discharge: HOME HEALTH | End: 2022-12-06
Attending: ORTHOPAEDIC SURGERY | Admitting: ORTHOPAEDIC SURGERY

## 2022-12-05 ENCOUNTER — ANESTHESIA (OUTPATIENT)
Dept: PERIOP | Facility: HOSPITAL | Age: 72
End: 2022-12-05

## 2022-12-05 DIAGNOSIS — Z96.652 HISTORY OF LEFT KNEE REPLACEMENT: ICD-10-CM

## 2022-12-05 DIAGNOSIS — T84.84XA PAIN DUE TO HIP JOINT PROSTHESIS: ICD-10-CM

## 2022-12-05 DIAGNOSIS — Z96.649 PAIN DUE TO HIP JOINT PROSTHESIS: ICD-10-CM

## 2022-12-05 DIAGNOSIS — T84.093S FAILED TOTAL KNEE, LEFT, SEQUELA: Primary | ICD-10-CM

## 2022-12-05 PROCEDURE — 0SRD0J9 REPLACEMENT OF LEFT KNEE JOINT WITH SYNTHETIC SUBSTITUTE, CEMENTED, OPEN APPROACH: ICD-10-PCS | Performed by: ORTHOPAEDIC SURGERY

## 2022-12-05 PROCEDURE — 94761 N-INVAS EAR/PLS OXIMETRY MLT: CPT

## 2022-12-05 PROCEDURE — C1776 JOINT DEVICE (IMPLANTABLE): HCPCS | Performed by: ORTHOPAEDIC SURGERY

## 2022-12-05 PROCEDURE — 87075 CULTR BACTERIA EXCEPT BLOOD: CPT | Performed by: ORTHOPAEDIC SURGERY

## 2022-12-05 PROCEDURE — 25010000002 CEFAZOLIN PER 500 MG: Performed by: ORTHOPAEDIC SURGERY

## 2022-12-05 PROCEDURE — 25010000002 MIDAZOLAM PER 1MG: Performed by: NURSE ANESTHETIST, CERTIFIED REGISTERED

## 2022-12-05 PROCEDURE — 25010000002 ONDANSETRON PER 1 MG: Performed by: NURSE ANESTHETIST, CERTIFIED REGISTERED

## 2022-12-05 PROCEDURE — 76942 ECHO GUIDE FOR BIOPSY: CPT | Performed by: ORTHOPAEDIC SURGERY

## 2022-12-05 PROCEDURE — 25010000002 EPINEPHRINE (ANAPHYLAXIS) 1 MG/ML SOLUTION: Performed by: NURSE ANESTHETIST, CERTIFIED REGISTERED

## 2022-12-05 PROCEDURE — L1830 KO IMMOB CANVAS LONG PRE OTS: HCPCS | Performed by: ORTHOPAEDIC SURGERY

## 2022-12-05 PROCEDURE — 73560 X-RAY EXAM OF KNEE 1 OR 2: CPT

## 2022-12-05 PROCEDURE — 87176 TISSUE HOMOGENIZATION CULTR: CPT | Performed by: ORTHOPAEDIC SURGERY

## 2022-12-05 PROCEDURE — 25010000002 ROPIVACAINE PER 1 MG: Performed by: ORTHOPAEDIC SURGERY

## 2022-12-05 PROCEDURE — 72170 X-RAY EXAM OF PELVIS: CPT

## 2022-12-05 PROCEDURE — 94799 UNLISTED PULMONARY SVC/PX: CPT

## 2022-12-05 PROCEDURE — 87205 SMEAR GRAM STAIN: CPT | Performed by: ORTHOPAEDIC SURGERY

## 2022-12-05 PROCEDURE — 25010000002 KETOROLAC TROMETHAMINE PER 15 MG: Performed by: ORTHOPAEDIC SURGERY

## 2022-12-05 PROCEDURE — 25010000002 EPINEPHRINE PER 0.1 MG: Performed by: ORTHOPAEDIC SURGERY

## 2022-12-05 PROCEDURE — 0LUR0JZ SUPPLEMENT LEFT KNEE TENDON WITH SYNTHETIC SUBSTITUTE, OPEN APPROACH: ICD-10-PCS | Performed by: ORTHOPAEDIC SURGERY

## 2022-12-05 PROCEDURE — 0SPD0JZ REMOVAL OF SYNTHETIC SUBSTITUTE FROM LEFT KNEE JOINT, OPEN APPROACH: ICD-10-PCS | Performed by: ORTHOPAEDIC SURGERY

## 2022-12-05 PROCEDURE — C1713 ANCHOR/SCREW BN/BN,TIS/BN: HCPCS | Performed by: ORTHOPAEDIC SURGERY

## 2022-12-05 PROCEDURE — 25010000002 PROPOFOL 200 MG/20ML EMULSION: Performed by: NURSE ANESTHETIST, CERTIFIED REGISTERED

## 2022-12-05 PROCEDURE — 99221 1ST HOSP IP/OBS SF/LOW 40: CPT | Performed by: NURSE PRACTITIONER

## 2022-12-05 PROCEDURE — C1781 MESH (IMPLANTABLE): HCPCS | Performed by: ORTHOPAEDIC SURGERY

## 2022-12-05 PROCEDURE — 87070 CULTURE OTHR SPECIMN AEROBIC: CPT | Performed by: ORTHOPAEDIC SURGERY

## 2022-12-05 PROCEDURE — 25010000002 DEXAMETHASONE PER 1 MG: Performed by: NURSE ANESTHETIST, CERTIFIED REGISTERED

## 2022-12-05 PROCEDURE — 97161 PT EVAL LOW COMPLEX 20 MIN: CPT

## 2022-12-05 DEVICE — DEV CONTRL TISS STRATAFIX SPIRAL MNCRYL UD 3/0 PLS 30CM: Type: IMPLANTABLE DEVICE | Site: KNEE | Status: FUNCTIONAL

## 2022-12-05 DEVICE — CEMENTED EXTENSION STEM Ø16MM, 105MM
Type: IMPLANTABLE DEVICE | Site: KNEE | Status: FUNCTIONAL
Brand: GMK REVISION TOTAL KNEE SYSTEM

## 2022-12-05 DEVICE — CMT BONE PALACOS RPLSG W/GENT HI/VISC 1X40: Type: IMPLANTABLE DEVICE | Site: KNEE | Status: FUNCTIONAL

## 2022-12-05 DEVICE — CENTRED S H20
Type: IMPLANTABLE DEVICE | Site: KNEE | Status: FUNCTIONAL
Brand: 3DMETAL TIBIAL CONES

## 2022-12-05 DEVICE — DEV CONTRL TISS STRATAFIX SPIRAL PDS PLS CT1 2-0 45CM: Type: IMPLANTABLE DEVICE | Site: KNEE | Status: FUNCTIONAL

## 2022-12-05 DEVICE — BONE PREPARATION KIT
Type: IMPLANTABLE DEVICE | Site: KNEE | Status: FUNCTIONAL
Brand: BIOPREP

## 2022-12-05 DEVICE — RESURFACING PATELLA SIZE 3
Type: IMPLANTABLE DEVICE | Site: KNEE | Status: FUNCTIONAL
Brand: GMK PRIMARY TOTAL KNEE SYSTEM

## 2022-12-05 DEVICE — FEMORAL COMPONENT LEFT, SIZE 5
Type: IMPLANTABLE DEVICE | Site: KNEE | Status: FUNCTIONAL
Brand: GMK HINGE TOTAL KNEE SYSTEM

## 2022-12-05 DEVICE — TIBIAL AUGMENTATION # 5/5MM
Type: IMPLANTABLE DEVICE | Site: KNEE | Status: FUNCTIONAL
Brand: GMK HINGE TOTAL KNEE SYSTEM

## 2022-12-05 DEVICE — DEV CONTRL TISS STRATAFIX SPIRAL PDO BIDIR 1 36X36CM: Type: IMPLANTABLE DEVICE | Site: KNEE | Status: FUNCTIONAL

## 2022-12-05 DEVICE — BARD SOFT MESH
Type: IMPLANTABLE DEVICE | Site: KNEE | Status: FUNCTIONAL
Brand: BARD SOFT MESH

## 2022-12-05 DEVICE — FIXED TIBIAL INSERT # 5/17MM
Type: IMPLANTABLE DEVICE | Site: KNEE | Status: FUNCTIONAL
Brand: GMK HINGE TOTAL KNEE SYSTEM

## 2022-12-05 DEVICE — FEMORAL WEDGE POSTERIOR 5MM, SIZE 5
Type: IMPLANTABLE DEVICE | Site: KNEE | Status: FUNCTIONAL
Brand: GMK REVISION TOTAL KNEE SYSTEM

## 2022-12-05 DEVICE — OFFSET CONNECTOR 5MM
Type: IMPLANTABLE DEVICE | Site: KNEE | Status: FUNCTIONAL
Brand: GMK REVISION TOTAL KNEE SYSTEM

## 2022-12-05 DEVICE — FIXED TIBIAL TRAY LEFT, SIZE 5
Type: IMPLANTABLE DEVICE | Site: KNEE | Status: FUNCTIONAL
Brand: GMK HINGE TOTAL KNEE SYSTEM

## 2022-12-05 RX ORDER — SODIUM CHLORIDE 0.9 % (FLUSH) 0.9 %
10 SYRINGE (ML) INJECTION AS NEEDED
Status: DISCONTINUED | OUTPATIENT
Start: 2022-12-05 | End: 2022-12-05 | Stop reason: HOSPADM

## 2022-12-05 RX ORDER — OXYCODONE AND ACETAMINOPHEN 10; 325 MG/1; MG/1
1 TABLET ORAL EVERY 4 HOURS PRN
Status: DISCONTINUED | OUTPATIENT
Start: 2022-12-05 | End: 2022-12-06 | Stop reason: HOSPADM

## 2022-12-05 RX ORDER — OXYCODONE HYDROCHLORIDE AND ACETAMINOPHEN 5; 325 MG/1; MG/1
1 TABLET ORAL ONCE AS NEEDED
Status: DISCONTINUED | OUTPATIENT
Start: 2022-12-05 | End: 2022-12-05 | Stop reason: HOSPADM

## 2022-12-05 RX ORDER — CEFAZOLIN SODIUM IN 0.9 % NACL 3 G/100 ML
3 INTRAVENOUS SOLUTION, PIGGYBACK (ML) INTRAVENOUS EVERY 8 HOURS
Status: COMPLETED | OUTPATIENT
Start: 2022-12-05 | End: 2022-12-06

## 2022-12-05 RX ORDER — SODIUM CHLORIDE, SODIUM LACTATE, POTASSIUM CHLORIDE, CALCIUM CHLORIDE 600; 310; 30; 20 MG/100ML; MG/100ML; MG/100ML; MG/100ML
9 INJECTION, SOLUTION INTRAVENOUS CONTINUOUS PRN
Status: DISCONTINUED | OUTPATIENT
Start: 2022-12-05 | End: 2022-12-05 | Stop reason: HOSPADM

## 2022-12-05 RX ORDER — CLOBETASOL PROPIONATE 0.5 MG/G
CREAM TOPICAL EVERY 12 HOURS SCHEDULED
Status: DISCONTINUED | OUTPATIENT
Start: 2022-12-05 | End: 2022-12-06 | Stop reason: HOSPADM

## 2022-12-05 RX ORDER — MELOXICAM 7.5 MG/1
7.5 TABLET ORAL ONCE
Status: COMPLETED | OUTPATIENT
Start: 2022-12-05 | End: 2022-12-05

## 2022-12-05 RX ORDER — HYDROXYZINE HYDROCHLORIDE 25 MG/1
25 TABLET, FILM COATED ORAL 3 TIMES DAILY PRN
Status: DISCONTINUED | OUTPATIENT
Start: 2022-12-05 | End: 2022-12-06 | Stop reason: HOSPADM

## 2022-12-05 RX ORDER — TRANEXAMIC ACID 10 MG/ML
INJECTION, SOLUTION INTRAVENOUS AS NEEDED
Status: DISCONTINUED | OUTPATIENT
Start: 2022-12-05 | End: 2022-12-05 | Stop reason: SURG

## 2022-12-05 RX ORDER — NALOXONE HCL 0.4 MG/ML
0.4 VIAL (ML) INJECTION
Status: DISCONTINUED | OUTPATIENT
Start: 2022-12-05 | End: 2022-12-06 | Stop reason: HOSPADM

## 2022-12-05 RX ORDER — EPHEDRINE SULFATE 50 MG/ML
INJECTION INTRAVENOUS AS NEEDED
Status: DISCONTINUED | OUTPATIENT
Start: 2022-12-05 | End: 2022-12-05 | Stop reason: SURG

## 2022-12-05 RX ORDER — ASPIRIN 81 MG/1
81 TABLET ORAL 2 TIMES DAILY
Qty: 60 TABLET | Refills: 0 | Status: SHIPPED | OUTPATIENT
Start: 2022-12-05

## 2022-12-05 RX ORDER — BUPIVACAINE HYDROCHLORIDE 7.5 MG/ML
INJECTION, SOLUTION EPIDURAL; RETROBULBAR
Status: COMPLETED | OUTPATIENT
Start: 2022-12-05 | End: 2022-12-05

## 2022-12-05 RX ORDER — MIDAZOLAM HYDROCHLORIDE 2 MG/2ML
0.5 INJECTION, SOLUTION INTRAMUSCULAR; INTRAVENOUS
Status: COMPLETED | OUTPATIENT
Start: 2022-12-05 | End: 2022-12-05

## 2022-12-05 RX ORDER — GABAPENTIN 300 MG/1
300 CAPSULE ORAL ONCE
Status: COMPLETED | OUTPATIENT
Start: 2022-12-05 | End: 2022-12-05

## 2022-12-05 RX ORDER — PROPOFOL 10 MG/ML
INJECTION, EMULSION INTRAVENOUS CONTINUOUS PRN
Status: DISCONTINUED | OUTPATIENT
Start: 2022-12-05 | End: 2022-12-05 | Stop reason: SURG

## 2022-12-05 RX ORDER — AMLODIPINE BESYLATE 5 MG/1
10 TABLET ORAL
Status: DISCONTINUED | OUTPATIENT
Start: 2022-12-05 | End: 2022-12-06 | Stop reason: HOSPADM

## 2022-12-05 RX ORDER — SODIUM CHLORIDE, SODIUM LACTATE, POTASSIUM CHLORIDE, CALCIUM CHLORIDE 600; 310; 30; 20 MG/100ML; MG/100ML; MG/100ML; MG/100ML
100 INJECTION, SOLUTION INTRAVENOUS CONTINUOUS
Status: DISCONTINUED | OUTPATIENT
Start: 2022-12-05 | End: 2022-12-05

## 2022-12-05 RX ORDER — OXYCODONE AND ACETAMINOPHEN 10; 325 MG/1; MG/1
1 TABLET ORAL EVERY 4 HOURS PRN
Qty: 40 TABLET | Refills: 0 | Status: SHIPPED | OUTPATIENT
Start: 2022-12-05

## 2022-12-05 RX ORDER — LIDOCAINE HYDROCHLORIDE 10 MG/ML
0.5 INJECTION, SOLUTION EPIDURAL; INFILTRATION; INTRACAUDAL; PERINEURAL ONCE AS NEEDED
Status: COMPLETED | OUTPATIENT
Start: 2022-12-05 | End: 2022-12-05

## 2022-12-05 RX ORDER — PRAVASTATIN SODIUM 20 MG
20 TABLET ORAL DAILY
Status: DISCONTINUED | OUTPATIENT
Start: 2022-12-05 | End: 2022-12-06 | Stop reason: HOSPADM

## 2022-12-05 RX ORDER — BUPIVACAINE HYDROCHLORIDE 2.5 MG/ML
INJECTION, SOLUTION EPIDURAL; INFILTRATION; INTRACAUDAL
Status: COMPLETED | OUTPATIENT
Start: 2022-12-05 | End: 2022-12-05

## 2022-12-05 RX ORDER — FAMOTIDINE 10 MG/ML
20 INJECTION, SOLUTION INTRAVENOUS
Status: COMPLETED | OUTPATIENT
Start: 2022-12-05 | End: 2022-12-05

## 2022-12-05 RX ORDER — TRAMADOL HYDROCHLORIDE 50 MG/1
50 TABLET ORAL EVERY 8 HOURS PRN
Status: DISCONTINUED | OUTPATIENT
Start: 2022-12-05 | End: 2022-12-06 | Stop reason: HOSPADM

## 2022-12-05 RX ORDER — MELATONIN
5000 DAILY
Status: DISCONTINUED | OUTPATIENT
Start: 2022-12-05 | End: 2022-12-06 | Stop reason: HOSPADM

## 2022-12-05 RX ORDER — DIPHENHYDRAMINE HYDROCHLORIDE 50 MG/ML
12.5 INJECTION INTRAMUSCULAR; INTRAVENOUS
Status: DISCONTINUED | OUTPATIENT
Start: 2022-12-05 | End: 2022-12-05 | Stop reason: HOSPADM

## 2022-12-05 RX ORDER — LOSARTAN POTASSIUM 50 MG/1
50 TABLET ORAL DAILY
Status: DISCONTINUED | OUTPATIENT
Start: 2022-12-05 | End: 2022-12-06 | Stop reason: HOSPADM

## 2022-12-05 RX ORDER — ONDANSETRON 4 MG/1
4 TABLET, FILM COATED ORAL EVERY 6 HOURS PRN
Status: DISCONTINUED | OUTPATIENT
Start: 2022-12-05 | End: 2022-12-06 | Stop reason: HOSPADM

## 2022-12-05 RX ORDER — FLUOXETINE HYDROCHLORIDE 20 MG/1
60 CAPSULE ORAL DAILY
Status: DISCONTINUED | OUTPATIENT
Start: 2022-12-05 | End: 2022-12-06 | Stop reason: HOSPADM

## 2022-12-05 RX ORDER — ONDANSETRON 2 MG/ML
4 INJECTION INTRAMUSCULAR; INTRAVENOUS ONCE AS NEEDED
Status: COMPLETED | OUTPATIENT
Start: 2022-12-05 | End: 2022-12-05

## 2022-12-05 RX ORDER — ONDANSETRON 4 MG/1
4 TABLET, FILM COATED ORAL EVERY 8 HOURS PRN
Qty: 30 TABLET | Refills: 0 | Status: SHIPPED | OUTPATIENT
Start: 2022-12-05

## 2022-12-05 RX ORDER — ONDANSETRON 2 MG/ML
4 INJECTION INTRAMUSCULAR; INTRAVENOUS EVERY 6 HOURS PRN
Status: DISCONTINUED | OUTPATIENT
Start: 2022-12-05 | End: 2022-12-06 | Stop reason: HOSPADM

## 2022-12-05 RX ORDER — ACETAMINOPHEN 500 MG
1000 TABLET ORAL ONCE
Status: COMPLETED | OUTPATIENT
Start: 2022-12-05 | End: 2022-12-05

## 2022-12-05 RX ORDER — DOCUSATE SODIUM 100 MG/1
100 CAPSULE, LIQUID FILLED ORAL 2 TIMES DAILY
Status: DISCONTINUED | OUTPATIENT
Start: 2022-12-05 | End: 2022-12-06 | Stop reason: HOSPADM

## 2022-12-05 RX ORDER — SODIUM CHLORIDE 0.9 % (FLUSH) 0.9 %
10 SYRINGE (ML) INJECTION EVERY 12 HOURS SCHEDULED
Status: DISCONTINUED | OUTPATIENT
Start: 2022-12-05 | End: 2022-12-05 | Stop reason: HOSPADM

## 2022-12-05 RX ORDER — DEXMEDETOMIDINE HYDROCHLORIDE 100 UG/ML
INJECTION, SOLUTION INTRAVENOUS AS NEEDED
Status: DISCONTINUED | OUTPATIENT
Start: 2022-12-05 | End: 2022-12-05 | Stop reason: SURG

## 2022-12-05 RX ORDER — FENTANYL CITRATE 50 UG/ML
25 INJECTION, SOLUTION INTRAMUSCULAR; INTRAVENOUS
Status: DISCONTINUED | OUTPATIENT
Start: 2022-12-05 | End: 2022-12-05 | Stop reason: HOSPADM

## 2022-12-05 RX ORDER — ACETAMINOPHEN 325 MG/1
325 TABLET ORAL EVERY 4 HOURS PRN
Status: DISCONTINUED | OUTPATIENT
Start: 2022-12-05 | End: 2022-12-06 | Stop reason: HOSPADM

## 2022-12-05 RX ORDER — MAGNESIUM HYDROXIDE 1200 MG/15ML
LIQUID ORAL AS NEEDED
Status: DISCONTINUED | OUTPATIENT
Start: 2022-12-05 | End: 2022-12-05 | Stop reason: HOSPADM

## 2022-12-05 RX ORDER — DEXAMETHASONE SODIUM PHOSPHATE 4 MG/ML
4 INJECTION, SOLUTION INTRA-ARTICULAR; INTRALESIONAL; INTRAMUSCULAR; INTRAVENOUS; SOFT TISSUE ONCE AS NEEDED
Status: COMPLETED | OUTPATIENT
Start: 2022-12-05 | End: 2022-12-05

## 2022-12-05 RX ORDER — FAMOTIDINE 20 MG/1
40 TABLET, FILM COATED ORAL DAILY
Status: DISCONTINUED | OUTPATIENT
Start: 2022-12-05 | End: 2022-12-06 | Stop reason: HOSPADM

## 2022-12-05 RX ORDER — CEPHALEXIN 500 MG/1
500 CAPSULE ORAL EVERY 12 HOURS
Qty: 20 CAPSULE | Refills: 0 | Status: SHIPPED | OUTPATIENT
Start: 2022-12-05

## 2022-12-05 RX ORDER — NALOXONE HCL 0.4 MG/ML
0.1 VIAL (ML) INJECTION
Status: DISCONTINUED | OUTPATIENT
Start: 2022-12-05 | End: 2022-12-06 | Stop reason: HOSPADM

## 2022-12-05 RX ORDER — ASPIRIN 81 MG/1
81 TABLET ORAL DAILY
Status: DISCONTINUED | OUTPATIENT
Start: 2022-12-05 | End: 2022-12-06 | Stop reason: HOSPADM

## 2022-12-05 RX ORDER — SODIUM CHLORIDE 9 MG/ML
40 INJECTION, SOLUTION INTRAVENOUS AS NEEDED
Status: DISCONTINUED | OUTPATIENT
Start: 2022-12-05 | End: 2022-12-05 | Stop reason: HOSPADM

## 2022-12-05 RX ORDER — HYDROMORPHONE HCL 110MG/55ML
0.5 PATIENT CONTROLLED ANALGESIA SYRINGE INTRAVENOUS
Status: DISCONTINUED | OUTPATIENT
Start: 2022-12-05 | End: 2022-12-06 | Stop reason: HOSPADM

## 2022-12-05 RX ORDER — ZOLPIDEM TARTRATE 5 MG/1
10 TABLET ORAL NIGHTLY PRN
Status: DISCONTINUED | OUTPATIENT
Start: 2022-12-05 | End: 2022-12-06 | Stop reason: HOSPADM

## 2022-12-05 RX ORDER — ONDANSETRON 2 MG/ML
4 INJECTION INTRAMUSCULAR; INTRAVENOUS ONCE AS NEEDED
Status: DISCONTINUED | OUTPATIENT
Start: 2022-12-05 | End: 2022-12-05 | Stop reason: HOSPADM

## 2022-12-05 RX ORDER — KETAMINE HYDROCHLORIDE 10 MG/ML
INJECTION INTRAMUSCULAR; INTRAVENOUS AS NEEDED
Status: DISCONTINUED | OUTPATIENT
Start: 2022-12-05 | End: 2022-12-05 | Stop reason: SURG

## 2022-12-05 RX ORDER — EPINEPHRINE 1 MG/ML
INJECTION, SOLUTION INTRAMUSCULAR; SUBCUTANEOUS AS NEEDED
Status: DISCONTINUED | OUTPATIENT
Start: 2022-12-05 | End: 2022-12-05 | Stop reason: SURG

## 2022-12-05 RX ADMIN — MIDAZOLAM HYDROCHLORIDE 0.5 MG: 1 INJECTION, SOLUTION INTRAMUSCULAR; INTRAVENOUS at 07:44

## 2022-12-05 RX ADMIN — KETAMINE HYDROCHLORIDE 10 MG: 10 INJECTION INTRAMUSCULAR; INTRAVENOUS at 08:55

## 2022-12-05 RX ADMIN — MIDAZOLAM HYDROCHLORIDE 1 MG: 1 INJECTION, SOLUTION INTRAMUSCULAR; INTRAVENOUS at 08:55

## 2022-12-05 RX ADMIN — BUPIVACAINE HYDROCHLORIDE 2 ML: 7.5 INJECTION, SOLUTION EPIDURAL; RETROBULBAR at 08:25

## 2022-12-05 RX ADMIN — OXYCODONE HYDROCHLORIDE AND ACETAMINOPHEN 1 TABLET: 10; 325 TABLET ORAL at 15:22

## 2022-12-05 RX ADMIN — DOCUSATE SODIUM 100 MG: 100 CAPSULE, LIQUID FILLED ORAL at 21:39

## 2022-12-05 RX ADMIN — FAMOTIDINE 40 MG: 20 TABLET ORAL at 15:21

## 2022-12-05 RX ADMIN — BUPIVACAINE HYDROCHLORIDE 20 ML: 2.5 INJECTION, SOLUTION EPIDURAL; INFILTRATION; INTRACAUDAL; PERINEURAL at 07:49

## 2022-12-05 RX ADMIN — FLUOXETINE 60 MG: 20 CAPSULE ORAL at 15:21

## 2022-12-05 RX ADMIN — DEXMEDETOMIDINE HCL 20 MCG: 100 INJECTION INTRAVENOUS at 07:49

## 2022-12-05 RX ADMIN — LOSARTAN POTASSIUM 50 MG: 50 TABLET, FILM COATED ORAL at 15:21

## 2022-12-05 RX ADMIN — KETAMINE HYDROCHLORIDE 10 MG: 10 INJECTION INTRAMUSCULAR; INTRAVENOUS at 10:03

## 2022-12-05 RX ADMIN — GABAPENTIN 300 MG: 300 CAPSULE ORAL at 06:45

## 2022-12-05 RX ADMIN — PRAVASTATIN SODIUM 20 MG: 20 TABLET ORAL at 15:21

## 2022-12-05 RX ADMIN — BUPIVACAINE HYDROCHLORIDE 20 ML: 2.5 INJECTION, SOLUTION EPIDURAL; INFILTRATION; INTRACAUDAL at 07:51

## 2022-12-05 RX ADMIN — OXYCODONE HYDROCHLORIDE AND ACETAMINOPHEN 1 TABLET: 10; 325 TABLET ORAL at 21:39

## 2022-12-05 RX ADMIN — CEFAZOLIN 3 G: 1 INJECTION, POWDER, FOR SOLUTION INTRAMUSCULAR; INTRAVENOUS at 17:44

## 2022-12-05 RX ADMIN — EPHEDRINE SULFATE 5 MG: 50 INJECTION INTRAVENOUS at 08:50

## 2022-12-05 RX ADMIN — MIDAZOLAM HYDROCHLORIDE 1 MG: 1 INJECTION, SOLUTION INTRAMUSCULAR; INTRAVENOUS at 08:50

## 2022-12-05 RX ADMIN — EPINEPHRINE 5 MCG: 1 INJECTION INTRAMUSCULAR; INTRAVENOUS; SUBCUTANEOUS at 08:25

## 2022-12-05 RX ADMIN — CHOLECALCIFEROL TAB 25 MCG (1000 UNIT) 5000 UNITS: 25 TAB at 17:44

## 2022-12-05 RX ADMIN — FAMOTIDINE 20 MG: 10 INJECTION, SOLUTION INTRAVENOUS at 06:47

## 2022-12-05 RX ADMIN — TRANEXAMIC ACID 1000 MG: 10 INJECTION, SOLUTION INTRAVENOUS at 09:00

## 2022-12-05 RX ADMIN — CLOBETASOL PROPIONATE: 0.5 CREAM TOPICAL at 21:40

## 2022-12-05 RX ADMIN — SODIUM CHLORIDE 3 G: 9 INJECTION, SOLUTION INTRAVENOUS at 08:53

## 2022-12-05 RX ADMIN — DEXMEDETOMIDINE HCL 20 MCG: 100 INJECTION INTRAVENOUS at 07:51

## 2022-12-05 RX ADMIN — ACETAMINOPHEN 1000 MG: 500 TABLET, FILM COATED ORAL at 06:44

## 2022-12-05 RX ADMIN — ZOLPIDEM TARTRATE 10 MG: 5 TABLET ORAL at 23:10

## 2022-12-05 RX ADMIN — ONDANSETRON 4 MG: 2 INJECTION INTRAMUSCULAR; INTRAVENOUS at 06:47

## 2022-12-05 RX ADMIN — AMLODIPINE BESYLATE 10 MG: 5 TABLET ORAL at 15:21

## 2022-12-05 RX ADMIN — MELOXICAM 7.5 MG: 7.5 TABLET ORAL at 06:45

## 2022-12-05 RX ADMIN — EPHEDRINE SULFATE 5 MG: 50 INJECTION INTRAVENOUS at 08:35

## 2022-12-05 RX ADMIN — LIDOCAINE HYDROCHLORIDE 0.2 ML: 10 INJECTION, SOLUTION EPIDURAL; INFILTRATION; INTRACAUDAL; PERINEURAL at 06:47

## 2022-12-05 RX ADMIN — KETAMINE HYDROCHLORIDE 20 MG: 10 INJECTION INTRAMUSCULAR; INTRAVENOUS at 10:55

## 2022-12-05 RX ADMIN — SODIUM CHLORIDE, POTASSIUM CHLORIDE, SODIUM LACTATE AND CALCIUM CHLORIDE 9 ML/HR: 600; 310; 30; 20 INJECTION, SOLUTION INTRAVENOUS at 06:48

## 2022-12-05 RX ADMIN — ASPIRIN 81 MG: 81 TABLET, COATED ORAL at 15:21

## 2022-12-05 RX ADMIN — DEXAMETHASONE SODIUM PHOSPHATE 4 MG: 4 INJECTION, SOLUTION INTRAMUSCULAR; INTRAVENOUS at 06:47

## 2022-12-05 RX ADMIN — PROPOFOL INJECTABLE EMULSION 25 MCG/KG/MIN: 10 INJECTION, EMULSION INTRAVENOUS at 08:50

## 2022-12-05 RX ADMIN — MIDAZOLAM HYDROCHLORIDE 2 MG: 1 INJECTION, SOLUTION INTRAMUSCULAR; INTRAVENOUS at 11:30

## 2022-12-05 NOTE — OP NOTE
Op Note Revision TKA - [facility]  OPERATIVE REPORT    Facility: [facility]  Patient name: Durga Valenzuela  : 1950        Medical record: [id number]  Date of procedure: 2022  Pre-operative diagnosis:  1. Failed Left total knee arthroplasty  2. Morbid Obesity  Post-operative diagnosis:   1. Failed Left total knee arthroplasty  2. Morbid Obesity  3. Patella tendon injury (2/3rds ruptured)    Procedure performed:  1. Revision Left total knee arthroplasty (CPT 30046 - 2 component revision - 22 modifier due morbid obesity)  2. Left patella tendon reconstruction (CPT 78142)    Implants: Medacta Hinge Revision GMK TKA   Femur: 5GMK with 5 mm posterolateral augment, 16 X 105 mm stem   Tibial tray  5 GMK with 5 mm medial and lateral augments, 16 X 105 mm stem, 5 mm offset  Tibial cone small 20 mm    Patella - 3   Bearing - 17 mm size 5 hinge    Patella tendon - tubularized monofilament mesh     Surgeon: Sid Simon M.D.   Assistant(s):  1. JADEN Mccoy    Anesthesia: Spinal/Adductor Femoral Nerve/IPAC Block  Anesthesiologist: Dio  Estimated blood loss: 50 milliliters   Drains: 15 Pashto Hemovac  Specimens: 3 tissue cultures knee fluid, tibia, femur.    Complications: Unexpected near patella tendon rupture due to dislodged patella button     Findings: Unstable knee replacement,     INDICATIONS: Durga Valenzuela is a pleasant  who presented to my office with a long history of knee pain following a primary TKA.  Their pain was mostly with starting to walk and upon trying to rise from a chair. The pain had become debilitating for them and they failed conservative therapy.  We discussed surgical treatment options including revision total knee arthroplasty.  The patient had a negative infectious work-up including labs and an aspiration. Prior to surgery we discussed the risks, benefits, alternatives to surgery, and surgical convalescence. Surgical consent was obtained both in the office,  as well as the day of surgery. Risks of the procedure include, but are not limited to, infection, DVT, PE, damage to nerves or blood vessels at the time of surgery, bleeding and blood loss, stiffness/arthrofibrosis, and risk of loosening or failure of the components requiring revision surgery. We also discussed the possibility of an occult infection and that multiple specimens would be taken in the operating room. Should these demonstrate anything concerning then he may require prolonged antibiotics and even an eventual two-staged procedure. The patient expressed understanding and wished to proceed with the surgery. An informed consent form was signed and placed on the chart prior to coming to the Operating Room.     PROCEDURE: The patient was brought to the operating room after lower extremity nerve blocks had been performed. Once obtaining satisfactory anesthesia was placed in the supine position. The knee was prepped and draped in the usual sterile fashion for a total knee replacement. Pre-operative antibiotics were given following SCIP guidelines. The leg was exsanguinated with an Esmarch bandage and the pneumatic tourniquet inflated to 250 mm mercury. The previous longitudinal incision was opened over the anterior aspect of the knee exposing the extensor mechanism. An arthrotomy was performed and the patella displaced laterally.     During exposure, a loose patella button was noted over the medial parapatellar athrotomy.  This was inferior to the patella.  Upon opening the knee, the button appeared to have a chronic appearing soft tissue envelope around it.  While carefully exploring the patella component, it was noted that the button had actually been eroding through the patella tendon.  There was almost a near patella tendon rupture due to the chronically dislodged patella button.  Only the lateral 1/3rd of the patella tendon appeared healthy and contiguous.  The decision was made to reconstruct the patella  tendon with a well-described technique of tubularized mesh while leaving the lateral 1/3rd of the tendon intact.  A hinged-revision construct was also utilized in an effort to protect and aid the patella tendon healing.      The gross pathologic findings revealed moderate synovitis and some osteolysis especially beneath the femoral component. Subperiosteal dissection was continued around the proximal medial tibia. The necessary soft tissue release was performed to correct the fixed angular deformity. Care was taken to protect and preserve the medial collateral ligament. The tibial polyethylene was removed and demonstrated some mild backside wear but did not demonstrated catastrophic failure. A culture was taken of the synovium. A saw and osteotomes was then inserted between the femoral component and the cement interface. The component was removed with minimal bone loss. The cement was then removed from the femur and a tissue culture was sent from the femoral surface. Moderate osteolysis was seen both on the posterior condyles and distally. The soft tissue membrane was then removed and all cement and debris was removed from the femur. All areas of inflamed synovium were removed. There tibial component was removed in similar fashion. The cement was then removed from the proximal tibia utilizing osteotomes.     An area of osteolysis was seen beneath the tibia tray and was large enough to warrant a tibial cone. A culture was then sent from the tibial surface. Extensive scar tissue was removed from both the medial and lateral gutters, as well as the anterior interval.     The tibia and femoral canals were then sequentially reamed until cortical contact was achieved. The femur achieved a diameter of 16 mm and tibia achieved a diameter of 16 mm. Using the intramedullary stems the proximal tibia was then recut to achieve a flush surface. A trial tibial tray size 5 achieved a good fit and was then reamed and broached for the  janay. A tibial trial prosthesis was then assembled and a good fit was observed.     The epicondylar axis of the femur was marked and then a trial femur was placed. Using this as a guide, augments were then determined and cuts were made to allow a more flush apposition of the components with the bone. The intercondylar notch was then resected and the remnants of the PCL were removed. A trial prosthesis was then assembled with augments and then impacted into position. A good fit and rotation was observed. A trial polyethylene was then inserted.   The trial components were then placed and the knee was found to have proper alignment and was stable through a full range of flexion and extension.     Scar tissue was then removed from around the patella.  The residual patella measured 17 mm in thickness.  Approximately, 2 mm of patella was resected to healthy tissue. Fibrous tissue was removed from the patella to aid cement attachment.  The patella was drilled and a trial was placed.     The knee was then thoroughly washed using pulsatile irrigation. The components were then assembled on the back table prior to implantation.    For the patella tendon reconstruction, a large piece of monofilament mesh was tubularized by folding it 9 times upon itself.  Ethibond sutures were placed to maintain the shape.  This was placed into the tibial canal approximately 4 cm distally.  The tibial cone was then placed over the mesh and impacted into position.  The graft was stable.      Cement was mixed and a full cement technique was utilized for fixation. The trial components were removed and the bony surfaces were cleaned with pulsatile lavage and an antibiotic solution. The bone was then dried and the permanent components were cemented in a sequential fashion. Cement was first placed on the proximal tibia and on the tibia and stem interface. The tibial component was then set in the proper position and rotation. The tibial component was  impacted into place, it was fully seated and excess cemented was removed. The femoral and patellar components were then cemented in place. Excess cement was removed.     A mixture of 30cc of 0.5% ropivicaine, 10mg Morphine, 30mg toradol, 0.2mg of epinephrine were injected into the knee joint capsule posteriorly, in the medial and lateral gutters, and at the capsulotomy incision sites prior to closure for local anesthesia.     Once the cement was hardened a trial tibial articular surface was implanted. The knee was then reduced and found to have good flexion, full extension with good stability and proper alignment. The patella tracked central. The trial was removed and the final surface was implanted.     The patella tendon reconstruction was then performed. The mesh graft was sewn to the posterior aspect of the remaining patella tendon.  A small superior lateral parapatellar arthrotomy was made and the graft was passed through this.  The superior aspect of the graft was then placed over the quadriceps tendon.   Tight tension was held on the graft and the quadriceps tendon and interrupted locking ethibond sutures were placed.   The vastus medialis was mobilized and this was then placed over the the graft so that the mesh was fully covered.  Interrupted sutures were also placed over the lateral arthrotomy and graft so that all of the mesh was covered with native tissue.      A dilute (0.35%) betadine lavage was placed in the arthrotomy site to soak the components for 3 minutes prior to wound closure. The solution was made by adding 17.5 ml of 10% povidone-iodine in 500 cc of normal saline, resulting in a 0.35% dilution. This solution was then removed from the knee and the knee was copiously irrigated.  The tourniquet was then released and hemostasis was obtained with electrocautery. The knee irrigated with pulsatile lavage and antibiotic solution followed by normal saline. A hemovac drain was inserted and brought  through a separate incision on the anterolateral thigh.     The arthrotomy was closed with # 0 Ethibond interrupted sutures. The subcutaneous tissue was closed in layers with # 0 and # 3-0 Vicryl interrupted sutures. The skin was closed with staples. A sterile compressive dressing was applied and the drain activated with suction. The patient tolerated the procedure well, without complication, and was transferred to the recovery room in a stable condition. The sponge and instrument count was correct.   A surgical first assistant was required and necessary for the completion of this case to aid in manipulation and positioning of the extremity while the surgeon operated.  Their assistance was vital to the safety and success of the procedure.     Deep venous thrombosis prophylaxis will consist of xarelto for 2 weeks followed by aspirin twice daily.

## 2022-12-05 NOTE — H&P
Patient ID: Durga Valenzuela     Chief Complaint:    Painful left TKA     HPI:    Durga Valenzuela is a 72 y.o. year old patient with failed arthroplasty of the left knee.  Conservative treatment has failed.  Here today for revision left total knee arthroplasty.    Past Medical History:   Diagnosis Date   • Affective psychosis, bipolar ([facility]) 09/30/2021   • Allergic rhinitis 08/21/2018    Patient has had allergy testing in the past which revealed allergies to molds and grass.  Immunotherapy for about 2 years in the 1980s.   • Benign essential hypertension 02/25/2016   • Benign prostatic hyperplasia with urinary frequency 02/25/2016   • Bilateral lower extremity edema 08/21/2018   • Bipolar disorder, in full remission, most recent episode mixed ([facility]) 09/30/2021   • Chronic insomnia 02/25/2016   • Closed traumatic lateral subluxation of patellofemoral joint, right, initial encounter 08/13/2018    08/10/2018--patient was evaluated by the orthopedist for right knee pain and found to have lateral subluxation of the right patella associated with presence of right artificial knee joint.  Physical therapy no help.  Surgery is scheduled 09/17/2018   • Depression with anxiety 08/21/2018   • Elevated cholesterol    • History of deep venous thrombosis (DVT) of distal vein of right lower extremity 08/21/2018 04/26/2016--CTA of the chest performed for elevated d-dimer and progressive shortness of breath and chest pain for one month revealed bilateral occlusive in near occlusive segmental and subsegmental pulmonary emboli in all lobes of the right lung as well as within the left lower lobe.  No evidence of pulmonary infarct.  Nonspecific multifocal groundglass attenuation in the right pulmonary apex, perhaps representing pneumonitis or submental atelectasis.  Questionable cholelithiasis.  Doppler venous study was positive for DVT in the right popliteal vein.  Hypercoagulable workup was normal.  He was treated with Eliquis for a  total of 6 months and was subsequently discontinued.   • History of pneumonia    • History of pulmonary embolism 06/10/2016    04/26/2016--CTA of the chest performed for elevated d-dimer and progressive shortness of breath and chest pain for one month revealed bilateral occlusive in near occlusive segmental and subsegmental pulmonary emboli in all lobes of the right lung as well as within the left lower lobe.  No evidence of pulmonary infarct.  Nonspecific multifocal groundglass attenuation in the right pulmonary apex, perhaps representing pneumonitis or submental atelectasis.  Questionable cholelithiasis.  Doppler venous study was positive for DVT in the right popliteal vein.  Hypercoagulable workup was normal.  He was treated with Eliquis for a total of 6 months and was subsequently discontinued.   • Hyperlipidemia 02/25/2016   • Impaired fasting glucose 02/25/2016   • Lumbar degenerative disc disease 02/25/2016   • Morbidly obese (HCC) 10/19/2018   • Multiple environmental allergies 08/21/2018    Patient has had allergy testing in the past which revealed allergies to molds and grass.  Immunotherapy for about 2 years in the 1980s.   • Non-occlusive coronary artery disease, 05/16/2017--normal LM; proximal LAD mild LI; 50% mid LAD.  Mild distal LAD LI; normal Cx.  Mild LI marginal branches; normal RCA, nondominant.  EF 60%. 07/24/2017 05/16/2017--cardiac catheterization performed for abnormal stress test.  Normal LV with ejection fraction 60%.  Dilated aortic root.  No wall motion abnormalities.  Normal left main.  Ramus branch small caliber and normal.  Proximal LAD large caliber and mild luminal irregularities.  50% mid LAD.  Mild luminal irregularities distal LAD.  3 diagonal branches of small caliber with mild luminal irregularities.  Normal septal branches.  Circumflex is large caliber and free of disease.  Marginal branches are medium caliber with mild luminal irregularities.  RCA is a medium caliber and  free of disease.  It is nondominant.   • Obstructive sleep apnea, tolerates CPAP well.  Previously failed oral appliance. 02/25/2016   • Post traumatic stress disorder (PTSD) 02/25/2016   • Primary hypothyroidism 02/25/2016   • Primary osteoarthritis involving multiple joints 02/25/2016   • Psoriasis 09/17/2014   • Spinal stenosis of lumbar region with neurogenic claudication 02/15/2021   • Traumatic complete tear of left rotator cuff 03/17/2021 March 14, 2021--patient was evaluated by the orthopedic surgeon after he slipped on ice and fell onto his left shoulder.  Work-up revealed complete rotator cuff tear involving 2 tendons.  Specifically, he has an acute left supraspinatus tear and acute left infraspinatus tear.  He also has left glenohumeral joint osteoarthritis.  Apparently this is not repairable and patient would need a shoulder r   • Traumatic complete tear of left rotator cuff 03/17/2021 March 14, 2021--patient was evaluated by the orthopedic surgeon after he slipped on ice and fell onto his left shoulder.  Work-up revealed complete rotator cuff tear involving 2 tendons.  Specifically, he has an acute left supraspinatus tear and acute left infraspinatus tear.  He also has left glenohumeral joint osteoarthritis.  Apparently this is not repairable and patient would need a shoulder r   • Urinary incontinence without sensory awareness 03/17/2021   • Vitamin D deficiency 08/21/2018     Past Surgical History:   Procedure Laterality Date   • CARDIAC CATHETERIZATION N/A 5/16/2017    Procedure: Coronary angiography;  Surgeon: Malcolm Chen MD;  Location: [facility] CATH INVASIVE LOCATION;  Service:    • CARDIAC CATHETERIZATION N/A 5/16/2017    Procedure: Left Heart Cath;  Surgeon: Malcolm Chen MD;  Location: [facility] CATH INVASIVE LOCATION;  Service:    • CARDIAC CATHETERIZATION N/A 5/16/2017    Procedure: Left ventriculography;  Surgeon: Malcolm Chen MD;  Location: [facility] INVASIVE  LOCATION;  Service:    • COLONOSCOPY  2005 2005--colonoscopy reportedly normal.  Records not available.   • COLONOSCOPY N/A 6/13/2019    Procedure: COLONOSCOPY INTO CECUM WITH COLD BIOPSY POLYPECTOMY;  Surgeon: Ayaan Gallardo MD;  Location: [facility] ENDOSCOPY;  Service: General   • LUMBAR DECOMPRESSION  2007 2007--lumbar decompression without fusion or hardware.   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   • REVISION TOTAL KNEE ARTHROPLASTY Right 09/17/2018 09/17/2018--right total knee arthroplasty revision due to lateral subluxation of the patella due to multiple factors.   • TOTAL KNEE ARTHROPLASTY Left 11/03/2017 11/03/2017--right total knee replacement complicated by patellofemoral subluxation.   • TOTAL KNEE ARTHROPLASTY Left 06/2014 June 2014--left total knee replacement.     Current Facility-Administered Medications   Medication Dose Route Frequency Provider Last Rate Last Admin   • ceFAZolin (ANCEF) 3 g in sodium chloride 0.9 % 100 mL IVPB  3 g Intravenous Once Sid Simon MD       • lactated ringers infusion  9 mL/hr Intravenous Continuous PRN Demetrio Wharton CRNA 9 mL/hr at 12/05/22 0648 9 mL/hr at 12/05/22 0648   • Midazolam HCl (PF) (VERSED) injection 0.5 mg  0.5 mg Intravenous Q10 Min PRN Demetrio Wharton CRNA       • ropivacaine 0.5 % 30 mL, ketorolac 30 mg, EPINEPHrine 0.2 mg solution   Intra-articular Once Sid Simon MD       • sodium chloride 0.9 % flush 10 mL  10 mL Intravenous PRN Demetrio Wharton CRNA       • sodium chloride 0.9 % flush 10 mL  10 mL Intravenous Q12H Demetrio Wharton CRNA       • sodium chloride 0.9 % infusion 40 mL  40 mL Intravenous PRN Demetrio Wharton CRNA         Social History     Tobacco Use   • Smoking status: Never   • Smokeless tobacco: Never   • Tobacco comments:     CAFFEINE USE 2 CUPS COFFEE AND 1 GLASS TEA  DAILY   Substance Use Topics   • Alcohol use: No     Family History   Problem Relation Age of Onset   • Coronary artery disease Mother         Status post CABG   • Alzheimer's disease Mother    • Stroke Father    • Liver cancer Father         Father with biliary cancer including gallbladder   • No Known Problems Sister    • No Known Problems Brother        Allergies   Allergen Reactions   • Hydrocodone Other (See Comments)     Severe vomiting - but CAN tolerate oxycodone per patient   • Zocor  [Simvastatin]      MYALGIA      Immunization History   Administered Date(s) Administered   • COVID-19 (PFIZER) PURPLE CAP 02/18/2021, 03/11/2021, 04/12/2021, 10/05/2021   • Fluad Quad 65+ 09/17/2020   • Fluzone High Dose =>65 Years ([employer] ONLY) 11/20/2016, 09/05/2018, 09/06/2019   • Pneumococcal Conjugate 13-Valent (PCV13) 11/20/2016, 12/24/2018   • Pneumococcal Polysaccharide (PPSV23) 12/18/2017   • Tdap 04/27/2017, 06/06/2022      Vitals:    12/05/22 0616   BP: 138/87   Pulse: 69   Resp: 16   Temp: 98.7 °F (37.1 °C)   SpO2: 95%        ROS:    All other pertinent positives and negatives as noted above in HPI.    Physical Exam:     Alert at time of exam  Emotional Behavior - stable and appropriate  Gen- healthy appearing, in no acute distress  HEENT- Normocephalic, atraumatic  Extremity- no gross deformity, see office notes, no changes  Neuro- motor and sensory grossly intact, moving all extremities  Pulses- Present bilateral lower extremities, toes pink, BCR    Painful and restricted rom - left knee      Diagnostic Studies:     Left knee arthroplasty components in place      Assessment:     Failed Left Knee TKA    Plan:      Revision Left TKA today      CONSENT TO PROCEDURE/SEDATION  *  Information provided regarding procedure: Yes   *  Risk/Benefits Discussed: Yes   *  Alternative /Recovery Discussed: Yes   *  Need for Blood Discussed: Yes   *  Patient /Parent Consents to Planned Procedure/Sedation and accurately recounts  discussion: Yes     The spectrum of treatment options were discussed with the patient in detail including both the nonoperative and operative treatment modalities and their respective risks and benefits.  After thorough discussion, the patient has elected to undergo surgical treatment.  The details of the surgical procedure were explained including the location of probable incisions and a description of the likely implants to be used.  Models and diagrams were used as educational resources. The patient understands the likely convalescence after surgery, as well as the rehabilitation required.  We thoroughly discussed the risks, benefits, and alternatives to surgery.  The risks include but are not limited to the risk of infection, joint stiffness, blood clots (including DVT and/or pulmonary embolus along with the risk of death), neurologic and/or vascular injury, fracture, dislocation, nonunion, malunion, need for further surgery including hardware failure requiring revision, and continued pain.  It was explained that if tissue has been repaired or reconstructed, there is also a chance of failure which may require further management.  In addition, we have discussed the risks, including the risk of disease transmission, associated with any allograft tissue which may be utilized.  Following the completion of the discussion, the patient expressed understanding of this planned course of care, all their questions were answered and consent will be obtained preoperatively.

## 2022-12-05 NOTE — ANESTHESIA PROCEDURE NOTES
Peripheral Block    Pre-sedation assessment completed: 12/5/2022 7:44 AM    Patient reassessed immediately prior to procedure    Patient location during procedure: pre-op  Start time: 12/5/2022 7:47 AM  Stop time: 12/5/2022 7:49 AM  Reason for block: at surgeon's request and post-op pain management  Performed by  CRNA/CAA: Demetrio Wharton, CRNA  Preanesthetic Checklist  Completed: patient identified, IV checked, site marked, risks and benefits discussed, surgical consent, monitors and equipment checked, pre-op evaluation and timeout performed  Prep:  Pt Position: supine  Sterile barriers:cap, gloves, mask and washed/disinfected hands  Prep: ChloraPrep  Patient monitoring: blood pressure monitoring, continuous pulse oximetry and EKG  Procedure    Sedation: yes  Performed under: local infiltration  Guidance:ultrasound guided    ULTRASOUND INTERPRETATION.  Using ultrasound guidance a 21 G gauge needle was placed in close proximity to the nerve, at which point, under ultrasound guidance anesthetic was injected in the area of the nerve and spread of the anesthesia was seen on ultrasound in close proximity thereto.  There were no abnormalities seen on ultrasound; a digital image was taken; and the patient tolerated the procedure with no complications. Images:still images obtained, printed/placed on chart    Laterality:left  Block Type:adductor canal block  Injection Technique:single-shot  Needle Type:echogenic  Needle Gauge:21 G  Resistance on Injection: none    Medications Used: bupivacaine PF (MARCAINE) injection 0.25% - Injection, Leg Upper Left   20 mL - 12/5/2022 7:49:00 AM      Post Assessment  Injection Assessment: negative aspiration for heme, no paresthesia on injection and incremental injection  Patient Tolerance:comfortable throughout block  Complications:no

## 2022-12-05 NOTE — ANESTHESIA POSTPROCEDURE EVALUATION
Patient: Durga Valenzuela    Procedure Summary     Date: 12/05/22 Room / Location: [facility] 2 / [facility]    Anesthesia Start: 0846 Anesthesia Stop: 1237    Procedures:       LEFT TOTAL KNEE REVISION Complex with Patella Tendon Reconstruction (Left: Knee)      PATELLAR Tendon RECONSTRUCTION (Left: Knee) Diagnosis: (T84.84XA)    Surgeons: Sid Simon MD Provider: Demetrio Wharton CRNA    Anesthesia Type: regional, spinal ASA Status: 3          Anesthesia Type: regional, spinal    Vitals  Vitals Value Taken Time   /80 12/05/22 1345   Temp 97.7 °F (36.5 °C) 12/05/22 1235   Pulse 67 12/05/22 1347   Resp 14 12/05/22 1340   SpO2 98 % 12/05/22 1340   Vitals shown include unvalidated device data.        Post Anesthesia Care and Evaluation    Patient location during evaluation: bedside  Patient participation: complete - patient participated  Level of consciousness: awake and alert  Pain score: 0  Pain management: adequate    Airway patency: patent  Anesthetic complications: No anesthetic complications  PONV Status: none  Cardiovascular status: acceptable  Respiratory status: acceptable  Hydration status: acceptable  No anesthesia care post op

## 2022-12-05 NOTE — PLAN OF CARE
Goal Outcome Evaluation:  Plan of Care Reviewed With: patient, spouse           Outcome Evaluation: Physical therapy evaluation complete.  Per operative note from ortho MD, pt with complicated involvement of left patellar tendon, requiring reconstruction in addition to knee replacement revision.  Patient with left knee immobilizer in place upon arrival.  Patient performs supine to sit with CGA and sit to stand with CGA.  Patient performs gait x20 feet with rolling walker, CGA with noted unsteadiness during direction changes however does not require additional assistance.  Patient requires min assist for sit to supine transfer.  Patient will benefit from physical therapy to address deficits in functional mobility and activity tolerance.  Will await further ROM/exercise restrictions per ortho MD, discussed with nursing staff who reports call has been placed to ortho MD office.

## 2022-12-05 NOTE — CONSULTS
[facility] HOSPITALIST     Florian Burt MD    CHIEF COMPLAINT: consult for HTN and multiple diagnoses    HISTORY OF PRESENT ILLNESS:  The patient is a 72-year-old male who presented postoperatively to [facility] unit after undergoing revision LTKA/left patella tendon reconstruction secondary to failed previous TKA.  Hospitalists are consulted for hypertension and medical management.  At the time of this exam he notes he is having no pain whatsoever but does have good sensation.  He reports planning to return home tomorrow if all goes well.  Noted to have Hemovac draining small to moderate amount of sanguinous material.  The patient reports no current acute concerns    He has a H/O bipolar disorder, DVT/PE, HTN, CAD/HLD, BPH, hypothyroidism, CONNIE on CPAP, insomnia    He otherwise denies f/c/headache/rhinorrhea/nasal congestion/lightheadedness/syncopal sensation/cough/soa/n/v/d/chest pain/abdominal pain/recent illness/sick exposures/change in bowel or bladder habits/no weight change/bloody emesis or bloody stools/change in medications or any other new concerns.    Past Medical History:   Diagnosis Date   • Affective psychosis, bipolar ([facility]) 09/30/2021   • Allergic rhinitis 08/21/2018    Patient has had allergy testing in the past which revealed allergies to molds and grass.  Immunotherapy for about 2 years in the 1980s.   • Benign essential hypertension 02/25/2016   • Benign prostatic hyperplasia with urinary frequency 02/25/2016   • Bilateral lower extremity edema 08/21/2018   • Bipolar disorder, in full remission, most recent episode mixed ([facility]) 09/30/2021   • Chronic insomnia 02/25/2016   • Closed traumatic lateral subluxation of patellofemoral joint, right, initial encounter 08/13/2018    08/10/2018--patient was evaluated by the orthopedist for right knee pain and found to have lateral subluxation of the right patella associated with presence of right artificial knee joint.  Physical therapy no  help.  Surgery is scheduled 09/17/2018   • Depression with anxiety 08/21/2018   • Elevated cholesterol    • History of deep venous thrombosis (DVT) of distal vein of right lower extremity 08/21/2018 04/26/2016--CTA of the chest performed for elevated d-dimer and progressive shortness of breath and chest pain for one month revealed bilateral occlusive in near occlusive segmental and subsegmental pulmonary emboli in all lobes of the right lung as well as within the left lower lobe.  No evidence of pulmonary infarct.  Nonspecific multifocal groundglass attenuation in the right pulmonary apex, perhaps representing pneumonitis or submental atelectasis.  Questionable cholelithiasis.  Doppler venous study was positive for DVT in the right popliteal vein.  Hypercoagulable workup was normal.  He was treated with Eliquis for a total of 6 months and was subsequently discontinued.   • History of pneumonia    • History of pulmonary embolism 06/10/2016    04/26/2016--CTA of the chest performed for elevated d-dimer and progressive shortness of breath and chest pain for one month revealed bilateral occlusive in near occlusive segmental and subsegmental pulmonary emboli in all lobes of the right lung as well as within the left lower lobe.  No evidence of pulmonary infarct.  Nonspecific multifocal groundglass attenuation in the right pulmonary apex, perhaps representing pneumonitis or submental atelectasis.  Questionable cholelithiasis.  Doppler venous study was positive for DVT in the right popliteal vein.  Hypercoagulable workup was normal.  He was treated with Eliquis for a total of 6 months and was subsequently discontinued.   • Hyperlipidemia 02/25/2016   • Impaired fasting glucose 02/25/2016   • Lumbar degenerative disc disease 02/25/2016   • Morbidly obese (HCC) 10/19/2018   • Multiple environmental allergies 08/21/2018    Patient has had allergy testing in the past which revealed allergies to molds and grass.   Immunotherapy for about 2 years in the 1980s.   • Non-occlusive coronary artery disease, 05/16/2017--normal LM; proximal LAD mild LI; 50% mid LAD.  Mild distal LAD LI; normal Cx.  Mild LI marginal branches; normal RCA, nondominant.  EF 60%. 07/24/2017 05/16/2017--cardiac catheterization performed for abnormal stress test.  Normal LV with ejection fraction 60%.  Dilated aortic root.  No wall motion abnormalities.  Normal left main.  Ramus branch small caliber and normal.  Proximal LAD large caliber and mild luminal irregularities.  50% mid LAD.  Mild luminal irregularities distal LAD.  3 diagonal branches of small caliber with mild luminal irregularities.  Normal septal branches.  Circumflex is large caliber and free of disease.  Marginal branches are medium caliber with mild luminal irregularities.  RCA is a medium caliber and free of disease.  It is nondominant.   • Obstructive sleep apnea, tolerates CPAP well.  Previously failed oral appliance. 02/25/2016   • Post traumatic stress disorder (PTSD) 02/25/2016   • Primary hypothyroidism 02/25/2016   • Primary osteoarthritis involving multiple joints 02/25/2016   • Psoriasis 09/17/2014   • Spinal stenosis of lumbar region with neurogenic claudication 02/15/2021   • Traumatic complete tear of left rotator cuff 03/17/2021 March 14, 2021--patient was evaluated by the orthopedic surgeon after he slipped on ice and fell onto his left shoulder.  Work-up revealed complete rotator cuff tear involving 2 tendons.  Specifically, he has an acute left supraspinatus tear and acute left infraspinatus tear.  He also has left glenohumeral joint osteoarthritis.  Apparently this is not repairable and patient would need a shoulder r   • Traumatic complete tear of left rotator cuff 03/17/2021 March 14, 2021--patient was evaluated by the orthopedic surgeon after he slipped on ice and fell onto his left shoulder.  Work-up revealed complete rotator cuff tear involving 2 tendons.   Specifically, he has an acute left supraspinatus tear and acute left infraspinatus tear.  He also has left glenohumeral joint osteoarthritis.  Apparently this is not repairable and patient would need a shoulder r   • Urinary incontinence without sensory awareness 03/17/2021   • Vitamin D deficiency 08/21/2018     Past Surgical History:   Procedure Laterality Date   • CARDIAC CATHETERIZATION N/A 5/16/2017    Procedure: Coronary angiography;  Surgeon: Malcolm Chen MD;  Location: [facility] CATH INVASIVE LOCATION;  Service:    • CARDIAC CATHETERIZATION N/A 5/16/2017    Procedure: Left Heart Cath;  Surgeon: Malcolm Chen MD;  Location: [facility] CATH INVASIVE LOCATION;  Service:    • CARDIAC CATHETERIZATION N/A 5/16/2017    Procedure: Left ventriculography;  Surgeon: Malcolm Chen MD;  Location: [facility] CATH INVASIVE LOCATION;  Service:    • COLONOSCOPY  2005 2005--colonoscopy reportedly normal.  Records not available.   • COLONOSCOPY N/A 6/13/2019    Procedure: COLONOSCOPY INTO CECUM WITH COLD BIOPSY POLYPECTOMY;  Surgeon: Ayaan Gallardo MD;  Location: [facility] ENDOSCOPY;  Service: General   • LUMBAR DECOMPRESSION  2007 2007--lumbar decompression without fusion or hardware.   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   • REVISION TOTAL KNEE ARTHROPLASTY Right 09/17/2018 09/17/2018--right total knee arthroplasty revision due to lateral subluxation of the patella due to multiple factors.   • TOTAL KNEE ARTHROPLASTY Left 11/03/2017 11/03/2017--right total knee replacement complicated by patellofemoral subluxation.   • TOTAL KNEE ARTHROPLASTY Left 06/2014 June 2014--left total knee replacement.     Family History   Problem Relation Age of Onset   • Coronary artery disease Mother         Status post CABG   • Alzheimer's disease Mother    • Stroke Father    • Liver cancer Father          Father with biliary cancer including gallbladder   • No Known Problems Sister    • No Known Problems Brother      Social History     Tobacco Use   • Smoking status: Never   • Smokeless tobacco: Never   • Tobacco comments:     CAFFEINE USE 2 CUPS COFFEE AND 1 GLASS TEA DAILY   Vaping Use   • Vaping Use: Never used   Substance Use Topics   • Alcohol use: No   • Drug use: Yes     Types: Marijuana     Comment: weekly     Medications Prior to Admission   Medication Sig Dispense Refill Last Dose   • amLODIPine (NORVASC) 10 MG tablet Take 1 p.o. every morning for high blood pressure (Patient taking differently: Take 10 mg by mouth Daily. Take 1 p.o. every morning for high blood pressure) 90 tablet 3 Past Week   • aspirin 81 MG tablet One by mouth daily (Patient taking differently: Take 81 mg by mouth Daily. One by mouth daily)   Past Month   • clobetasol (TEMOVATE) 0.05 % ointment Apply twice a day as directed (Patient taking differently: Apply 1 application topically to the appropriate area as directed 2 (Two) Times a Day. Apply twice a day as directed) 60 g 6 Past Week   • Coenzyme Q10 (Co Q-10) 400 MG capsule Take 1 p.o. daily with food (Patient taking differently: Take 400 mg by mouth Daily. Take 1 p.o. daily with food) 30 capsule  Past Week   • ezetimibe (Zetia) 10 MG tablet Take 1 p.o. daily for high cholesterol (Patient taking differently: Take 10 mg by mouth Daily. Take 1 p.o. daily for high cholesterol) 90 tablet 3 Past Week   • FLUoxetine (PROzac) 60 MG tablet Take 60 mg by mouth Daily.   Past Week   • losartan (COZAAR) 50 MG tablet TAKE ONE TABLET BY MOUTH DAILY (Patient taking differently: Take 50 mg by mouth Daily.) 90 tablet 3 Past Week   • pravastatin (PRAVACHOL) 20 MG tablet Take 1 p.o. daily for high cholesterol (Patient taking differently: Take 20 mg by mouth Daily. Take 1 p.o. daily for high cholesterol) 90 tablet 3 Past Week   • Synthroid 175 MCG tablet TAKE ONE TABLET BY MOUTH DAILY FOR LOW THYROID  "(Patient taking differently: Take 175 mcg by mouth Daily. TAKE ONE TABLET BY MOUTH DAILY FOR LOW THYROID) 90 tablet 2 12/5/2022   • vitamin D3 125 MCG (5000 UT) capsule capsule 1 by mouth daily as directed (Patient taking differently: Take 5,000 Units by mouth Daily. 1 by mouth daily as directed) 30 capsule 6 Past Week   • zolpidem (AMBIEN) 10 MG tablet Take 1 p.o. nightly as needed for insomnia (Patient taking differently: 10 mg At Night As Needed. Take 1 p.o. nightly as needed for insomnia) 30 tablet 5 12/4/2022   • hydrOXYzine (ATARAX) 25 MG tablet Take 1 tablet by mouth 3 (Three) Times a Day As Needed for Itching. (Patient taking differently: Take 25 mg by mouth As Needed for Itching.) 60 tablet 3 More than a month     Allergies:  Hydrocodone and Zocor  [simvastatin]    Immunization History   Administered Date(s) Administered   • COVID-19 (PFIZER) PURPLE CAP 02/18/2021, 03/11/2021, 04/12/2021, 10/05/2021   • Fluad Quad 65+ 09/17/2020   • Fluzone High Dose =>65 Years ([employer] ONLY) 11/20/2016, 09/05/2018, 09/06/2019   • Pneumococcal Conjugate 13-Valent (PCV13) 11/20/2016, 12/24/2018   • Pneumococcal Polysaccharide (PPSV23) 12/18/2017   • Tdap 04/27/2017, 06/06/2022       REVIEW OF SYSTEMS:  Please see the above history of present illness for pertinent positives and negatives.  The remainder of the patient's systems have been reviewed and are negative.     Vital Signs  Temp:  [96.3 °F (35.7 °C)-98.7 °F (37.1 °C)] 96.3 °F (35.7 °C)  Heart Rate:  [54-76] 70  Resp:  [12-20] 18  BP: ()/(70-90) 128/74   Body mass index is 41.48 kg/m².    Flowsheet Rows    Flowsheet Row First Filed Value   Admission Height 185.4 cm (73\") Documented at 12/05/2022 0616   Admission Weight 143 kg (314 lb 6.4 oz) Documented at 12/05/2022 0616             Physical Exam  Vitals reviewed.   Constitutional:       General: He is not in acute distress.     Appearance: He is obese. He is not ill-appearing.   HENT:      Head: Normocephalic and " atraumatic.      Mouth/Throat:      Mouth: Mucous membranes are moist.   Eyes:      Extraocular Movements: Extraocular movements intact.      Pupils: Pupils are equal, round, and reactive to light.   Cardiovascular:      Rate and Rhythm: Normal rate and regular rhythm.   Pulmonary:      Effort: Pulmonary effort is normal. No respiratory distress.      Breath sounds: Normal breath sounds. No wheezing or rales.   Abdominal:      General: Abdomen is flat. Bowel sounds are normal. There is no distension.      Palpations: Abdomen is soft.      Tenderness: There is no abdominal tenderness. There is no guarding.   Musculoskeletal:         General: No swelling.      Comments: L LE in knee immobilizer, Ace, ice with Hemovac draining small to moderate amount sanguinous appearing material   Skin:     General: Skin is warm and dry.      Capillary Refill: Capillary refill takes less than 2 seconds.      Findings: No erythema.   Neurological:      General: No focal deficit present.      Mental Status: He is alert and oriented to person, place, and time.   Psychiatric:         Mood and Affect: Mood normal.         Behavior: Behavior normal.       Emotional Behavior:    Judgement and Insight: Good   Mental Status:  Alertness alert   Memory: Good   Mood and Affect:         Depression none               Anxiety none    Debilities:   Physical Weakness secondary to surgery   Handicaps unknown   Disabilities unknown   Agitation none     Results Review:    I reviewed the patient's new clinical results.  Lab Results (most recent)     Procedure Component Value Units Date/Time    Anaerobic Culture - Tissue, Knee, Left [[id number]] Collected: 12/05/22 1006    Specimen: Tissue from Knee, Left Updated: 12/05/22 1137    Tissue / Bone Culture - Tissue, Knee, Left [[id number]] Collected: 12/05/22 1006    Specimen: Tissue from Knee, Left Updated: 12/05/22 1137    Anaerobic Culture - Tissue, Knee, Left [[id number]] Collected: 12/05/22 0955     Specimen: Tissue from Knee, Left Updated: 12/05/22 1137    Tissue / Bone Culture - Tissue, Knee, Left [[id number]] Collected: 12/05/22 0955    Specimen: Tissue from Knee, Left Updated: 12/05/22 1137          Imaging Results (Most Recent)     Procedure Component Value Units Date/Time    XR Knee 1 or 2 View Left [[id number]] Collected: 12/05/22 1330     Updated: 12/05/22 1333    Narrative:      XR KNEE 1 OR 2 VW LEFT-: 12/5/2022 12:50 PM     INDICATION:   Status post total left knee arthroplasty.     COMPARISON:   None available.     FINDINGS:  2 view(s) of the left knee.  Postop changes total left knee arthroplasty  surgical hardware appears intact. Expected postop changes include soft  tissue swelling, joint effusion subcutaneous gas and radiopaque drain..  No bone erosion or destruction.        Impression:         1. Postop changes total left knee arthroplasty..     This report was finalized on 12/5/2022 1:31 PM by Dr. Aries Sotomayor MD.       XR Pelvis 1 or 2 View [[id number]] Resulted: 12/05/22 1305     Updated: 12/05/22 1306    US Sonosite Portable [[id number]] Resulted: 12/05/22 0617     Updated: 12/05/22 0617    Narrative:      This procedure was auto-finalized with no dictation required.        N/A    ECG/EMG Results (most recent)     None        N/A    Assessment & Plan    HTN:  Blood pressure slightly elevated on exam, discontinue IV fluid and continue home amlodipine/losartan  Monitor    CAD/HLD: Continue home pravachol, resume home Zetia after discharge, not available at this facility    Bipolar disorder  No current acute issues on home prozac    Hypothyroidism: No current acute issues on levothyroxine 175 mcg daily    H/O DVT/PE:  Noted on xarelto for DVT prophy post op    BPH: No current acute issues    CONNIE on CPAP: Allow home unit    Obesity:  Body mass index is 41.48 kg/m².    Chronic insomnia: No current acute issues on home ambien    S/P revision LTKA/left patella tendon reconstruction secondary to  "failed previous TKA, POD 0:  Management per primary team    Thank you for allowing us to participate in the care of your patient, we will follow along with you.    I discussed the patient's findings and my recommendations with patient, family, staff.     Beatrice Trujillo, APRN  12/05/22  15:34 EST    \"Dictated utilizing Dragon dictation\"    Note disclaimer: At [facility], we believe that sharing information builds trust and better relationships. You are receiving this note because you recently visited [facility]. It is possible you will see health information before a provider has talked with you about it. This kind of information can be easy to misunderstand. To help you fully understand what it means for your health, we urge you to discuss this note with your provider.        "

## 2022-12-05 NOTE — ANESTHESIA PROCEDURE NOTES
Spinal Block    Pre-sedation assessment completed: 12/5/2022 8:22 AM    Patient reassessed immediately prior to procedure    Start Time: 12/5/2022 8:24 AM  Stop Time: 12/5/2022 8:25 AM  Indication:at surgeon's request and post-op pain management  Performed By  CRNA/CAA: Demetrio Wharton CRNA  Preanesthetic Checklist  Completed: patient identified, IV checked, site marked, risks and benefits discussed, surgical consent, monitors and equipment checked, pre-op evaluation and timeout performed  Spinal Block Prep:  Patient Position:sitting  Sterile Tech:cap, gloves, mask and sterile barriers  Prep:Chloraprep  Patient Monitoring:blood pressure monitoring, continuous pulse oximetry and EKG    Spinal Block Procedure  Approach:midline  Guidance:landmark technique and palpation technique  Location:L3-L4  Needle Type:Sprotte  Needle Gauge:25 G  Placement of Spinal needle event:cerebrospinal fluid aspirated  Paresthesia: no  Fluid Appearance:clear  Medications: bupivacaine PF (MARCAINE) injection 0.75% - Epidural   2 mL - 12/5/2022 8:25:00 AM   Post Assessment  Patient Tolerance:patient tolerated the procedure well with no apparent complications  Complications no

## 2022-12-05 NOTE — ANESTHESIA PROCEDURE NOTES
Peripheral Block    Pre-sedation assessment completed: 12/5/2022 7:44 AM    Patient reassessed immediately prior to procedure    Patient location during procedure: pre-op  Start time: 12/5/2022 7:50 AM  Stop time: 12/5/2022 7:51 AM  Reason for block: at surgeon's request and post-op pain management  Performed by  CRNA/CAA: Demetrio Wharton, CRNA  Preanesthetic Checklist  Completed: patient identified, IV checked, site marked, risks and benefits discussed, surgical consent, monitors and equipment checked, pre-op evaluation and timeout performed  Prep:  Pt Position: supine  Sterile barriers:cap, gloves, mask and washed/disinfected hands  Prep: ChloraPrep  Patient monitoring: blood pressure monitoring, continuous pulse oximetry and EKG  Procedure    Sedation: yes  Performed under: local infiltration  Guidance:ultrasound guided    ULTRASOUND INTERPRETATION.  Using ultrasound guidance a 21 G gauge needle was placed in close proximity to the nerve, at which point, under ultrasound guidance anesthetic was injected in the area of the nerve and spread of the anesthesia was seen on ultrasound in close proximity thereto.  There were no abnormalities seen on ultrasound; a digital image was taken; and the patient tolerated the procedure with no complications. Images:still images obtained, printed/placed on chart    Laterality:left  Block Type:iPack  Injection Technique:single-shot  Needle Type:echogenic  Needle Gauge:21 G  Resistance on Injection: none    Medications Used: bupivacaine PF (MARCAINE) injection 0.25% - Injection   20 mL - 12/5/2022 7:51:00 AM      Post Assessment  Injection Assessment: negative aspiration for heme, no paresthesia on injection and incremental injection  Patient Tolerance:comfortable throughout block  Complications:no

## 2022-12-05 NOTE — ANESTHESIA PREPROCEDURE EVALUATION
Anesthesia Evaluation     Patient summary reviewed and Nursing notes reviewed   history of anesthetic complications:  NPO Solid Status: > 8 hours  NPO Liquid Status: > 8 hours           Airway   Mallampati: III  TM distance: >3 FB  Neck ROM: full  No difficulty expected  Dental - normal exam     Pulmonary - normal exam    breath sounds clear to auscultation  (+) sleep apnea on CPAP,   (-) not a smoker  Cardiovascular - normal exam  Exercise tolerance: good (4-7 METS)    Rhythm: regular  Rate: normal    (+) hypertension well controlled 2 medications or greater, hyperlipidemia,   (-) CAD      Neuro/Psych  (+) psychiatric history Anxiety and Depression,    GI/Hepatic/Renal/Endo    (+) obesity, morbid obesity,  thyroid problem hypothyroidism    Musculoskeletal     (+) chronic pain,   Abdominal   (+) obese,    Substance History   (+) alcohol use,      OB/GYN negative ob/gyn ROS         Other   arthritis,                      Anesthesia Plan    ASA 3     regional and spinal     intravenous induction     Anesthetic plan, risks, benefits, and alternatives have been provided, discussed and informed consent has been obtained with: patient.    Use of blood products discussed with patient  Consented to blood products.       CODE STATUS:

## 2022-12-06 ENCOUNTER — READMISSION MANAGEMENT (OUTPATIENT)
Dept: CALL CENTER | Facility: HOSPITAL | Age: 72
End: 2022-12-06

## 2022-12-06 VITALS
SYSTOLIC BLOOD PRESSURE: 132 MMHG | WEIGHT: 314.4 LBS | RESPIRATION RATE: 18 BRPM | HEART RATE: 77 BPM | DIASTOLIC BLOOD PRESSURE: 79 MMHG | BODY MASS INDEX: 41.67 KG/M2 | HEIGHT: 73 IN | OXYGEN SATURATION: 95 % | TEMPERATURE: 98.2 F

## 2022-12-06 LAB
ALBUMIN SERPL-MCNC: 3.7 G/DL (ref 3.5–5.2)
ALBUMIN/GLOB SERPL: 1.5 G/DL
ALP SERPL-CCNC: 61 U/L (ref 39–117)
ALT SERPL W P-5'-P-CCNC: 13 U/L (ref 1–41)
ANION GAP SERPL CALCULATED.3IONS-SCNC: 8.3 MMOL/L (ref 5–15)
AST SERPL-CCNC: 18 U/L (ref 1–40)
BASOPHILS # BLD AUTO: 0.01 10*3/MM3 (ref 0–0.2)
BASOPHILS NFR BLD AUTO: 0.1 % (ref 0–1.5)
BILIRUB SERPL-MCNC: 0.4 MG/DL (ref 0–1.2)
BUN SERPL-MCNC: 29 MG/DL (ref 8–23)
BUN/CREAT SERPL: 29.6 (ref 7–25)
CALCIUM SPEC-SCNC: 9 MG/DL (ref 8.6–10.5)
CHLORIDE SERPL-SCNC: 101 MMOL/L (ref 98–107)
CO2 SERPL-SCNC: 26.7 MMOL/L (ref 22–29)
CREAT SERPL-MCNC: 0.98 MG/DL (ref 0.76–1.27)
DEPRECATED RDW RBC AUTO: 52.5 FL (ref 37–54)
EGFRCR SERPLBLD CKD-EPI 2021: 81.4 ML/MIN/1.73
EOSINOPHIL # BLD AUTO: 0.01 10*3/MM3 (ref 0–0.4)
EOSINOPHIL NFR BLD AUTO: 0.1 % (ref 0.3–6.2)
ERYTHROCYTE [DISTWIDTH] IN BLOOD BY AUTOMATED COUNT: 14.4 % (ref 12.3–15.4)
GLOBULIN UR ELPH-MCNC: 2.4 GM/DL
GLUCOSE SERPL-MCNC: 168 MG/DL (ref 65–99)
HCT VFR BLD AUTO: 35.6 % (ref 37.5–51)
HGB BLD-MCNC: 11.5 G/DL (ref 13–17.7)
IMM GRANULOCYTES # BLD AUTO: 0.02 10*3/MM3 (ref 0–0.05)
IMM GRANULOCYTES NFR BLD AUTO: 0.2 % (ref 0–0.5)
LYMPHOCYTES # BLD AUTO: 0.61 10*3/MM3 (ref 0.7–3.1)
LYMPHOCYTES NFR BLD AUTO: 5.5 % (ref 19.6–45.3)
MCH RBC QN AUTO: 31.2 PG (ref 26.6–33)
MCHC RBC AUTO-ENTMCNC: 32.3 G/DL (ref 31.5–35.7)
MCV RBC AUTO: 96.5 FL (ref 79–97)
MONOCYTES # BLD AUTO: 0.91 10*3/MM3 (ref 0.1–0.9)
MONOCYTES NFR BLD AUTO: 8.2 % (ref 5–12)
NEUTROPHILS NFR BLD AUTO: 85.9 % (ref 42.7–76)
NEUTROPHILS NFR BLD AUTO: 9.59 10*3/MM3 (ref 1.7–7)
PLATELET # BLD AUTO: 195 10*3/MM3 (ref 140–450)
PMV BLD AUTO: 9.7 FL (ref 6–12)
POTASSIUM SERPL-SCNC: 4.9 MMOL/L (ref 3.5–5.2)
PROT SERPL-MCNC: 6.1 G/DL (ref 6–8.5)
RBC # BLD AUTO: 3.69 10*6/MM3 (ref 4.14–5.8)
SODIUM SERPL-SCNC: 136 MMOL/L (ref 136–145)
WBC NRBC COR # BLD: 11.15 10*3/MM3 (ref 3.4–10.8)

## 2022-12-06 PROCEDURE — 80053 COMPREHEN METABOLIC PANEL: CPT | Performed by: NURSE PRACTITIONER

## 2022-12-06 PROCEDURE — 97116 GAIT TRAINING THERAPY: CPT

## 2022-12-06 PROCEDURE — 25010000002 CEFAZOLIN PER 500 MG: Performed by: ORTHOPAEDIC SURGERY

## 2022-12-06 PROCEDURE — 85025 COMPLETE CBC W/AUTO DIFF WBC: CPT | Performed by: NURSE PRACTITIONER

## 2022-12-06 RX ORDER — OXYCODONE AND ACETAMINOPHEN 10; 325 MG/1; MG/1
1 TABLET ORAL EVERY 4 HOURS PRN
Qty: 42 TABLET | Refills: 0 | OUTPATIENT
Start: 2022-12-06 | End: 2022-12-12

## 2022-12-06 RX ORDER — TRAMADOL HYDROCHLORIDE 50 MG/1
50 TABLET ORAL EVERY 8 HOURS PRN
Qty: 40 TABLET | Refills: 0 | OUTPATIENT
Start: 2022-12-06 | End: 2022-12-12

## 2022-12-06 RX ORDER — ONDANSETRON 4 MG/1
4 TABLET, FILM COATED ORAL EVERY 6 HOURS PRN
Qty: 30 TABLET | Refills: 1 | OUTPATIENT
Start: 2022-12-06

## 2022-12-06 RX ORDER — PSEUDOEPHEDRINE HCL 30 MG
100 TABLET ORAL 2 TIMES DAILY
Qty: 60 CAPSULE | Refills: 1 | OUTPATIENT
Start: 2022-12-06

## 2022-12-06 RX ADMIN — PRAVASTATIN SODIUM 20 MG: 20 TABLET ORAL at 08:49

## 2022-12-06 RX ADMIN — LOSARTAN POTASSIUM 50 MG: 50 TABLET, FILM COATED ORAL at 08:50

## 2022-12-06 RX ADMIN — AMLODIPINE BESYLATE 10 MG: 5 TABLET ORAL at 08:50

## 2022-12-06 RX ADMIN — LEVOTHYROXINE SODIUM 175 MCG: 150 TABLET ORAL at 08:49

## 2022-12-06 RX ADMIN — CEFAZOLIN 3 G: 1 INJECTION, POWDER, FOR SOLUTION INTRAMUSCULAR; INTRAVENOUS at 00:46

## 2022-12-06 RX ADMIN — RIVAROXABAN 10 MG: 10 TABLET, FILM COATED ORAL at 08:49

## 2022-12-06 RX ADMIN — FAMOTIDINE 40 MG: 20 TABLET ORAL at 08:49

## 2022-12-06 RX ADMIN — FLUOXETINE 60 MG: 20 CAPSULE ORAL at 08:49

## 2022-12-06 RX ADMIN — CHOLECALCIFEROL TAB 25 MCG (1000 UNIT) 5000 UNITS: 25 TAB at 08:49

## 2022-12-06 RX ADMIN — DOCUSATE SODIUM 100 MG: 100 CAPSULE, LIQUID FILLED ORAL at 08:50

## 2022-12-06 RX ADMIN — OXYCODONE HYDROCHLORIDE AND ACETAMINOPHEN 1 TABLET: 10; 325 TABLET ORAL at 12:20

## 2022-12-06 NOTE — OUTREACH NOTE
Prep Survey    Flowsheet Row Responses   [facility] patient discharged from? [place]   Is LACE score < 7 ? Yes   Emergency Room discharge w/ pulse ox? No   Eligibility [facility]   Date of Admission 12/05/22   Date of Discharge 12/06/22   Discharge Disposition Home or Self Care   Discharge diagnosis Revision Left Total Knee Arthroplasty   Does the patient have one of the following disease processes/diagnoses(primary or secondary)? Total Joint Replacement   Does the patient have Home health ordered? Yes   What is the Home health agency?  [facility]    Is there a DME ordered? Yes   What DME was ordered?  rolling walker and BOWEN - [employer]   Prep survey completed? Yes          FARHAD FLOOD - Registered Nurse

## 2022-12-06 NOTE — DISCHARGE SUMMARY
Outpatient Surgery Discharge Summary    Patient: Durga Valenzuela  Medical Record #: [id number]  Age: 72 y.o.  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  Underwent appropriate preoperative testing.  Underwent regional anesthesia and did well in the operating room and throughout the recovery period.   Discharged home in good condition with PT to start soon.     Admission condition: good    Discharge condition: good    Disposition: home    Meds: medicines reconciled and prescriptions signed on chart    Activity: see discharge instruction sheet  Diet: see discharge instruction sheet  Wound Care: see discharge instruction sheet    Follow up: Dr. Simon in 2-3 weeks    Signed:  JADEN Garzon  2022  12:51 EST

## 2022-12-06 NOTE — PLAN OF CARE
Goal Outcome Evaluation:  Plan of Care Reviewed With: patient           Outcome Evaluation: PT: Patient performs supine to sit transfer with modified independence, sit to/from stand transfers with supervision, and gait x 256 feet with supervision with use of FWW. No loss of balance noted, patient manages device safely. Disucssed stair navigation, patient able to recall proper sequencing-denies need for training. Per MD, no ROM or ther ex to L LE. Patient has no concerns for return home. Anticipate discharge home today.  No further inpatient skilled PT needs at this time.

## 2022-12-06 NOTE — PLAN OF CARE
Goal Outcome Evaluation:              Outcome Evaluation: Pt ambulated up and down hallway with walker and stand by assist with immobilizer on-per ortho. MIMI dressing still in place, dressing is dry and in place. Used CPAP during sleep and vitals remained stable throughout the night.

## 2022-12-06 NOTE — CASE MANAGEMENT/SOCIAL WORK
Discharge Planning Assessment  ALECIA Ashford     Patient Name: Durga Valeznuela  MRN: [id number]  Today's Date: 12/6/2022    Admit Date: 12/5/2022    Plan: Home with wife and [facility]   Discharge Needs Assessment     Row Name 12/06/22 1319       Living Environment    People in Home spouse    Name(s) of People in Home jeremiah Mccoy    Current Living Arrangements home    Duration at Residence 3 Y    Potentially Unsafe Housing Conditions --  none    Primary Care Provided by self    Provides Primary Care For no one    Caregiving Concerns no care giving concerns voiced by patient at this time.    Family Caregiver if Needed spouse    Family Caregiver Names jeremiah Trejo    Quality of Family Relationships unable to assess    Able to Return to Prior Arrangements yes    Living Arrangement Comments Patient states he lives with his wife in a single story house with a basement and one step to gain entry       Resource/Environmental Concerns    Resource/Environmental Concerns none    Transportation Concerns none       Transition Planning    Patient/Family Anticipates Transition to home with family    Patient/Family Anticipated Services at Transition ;durable medical equipment;home health care    Transportation Anticipated family or friend will provide  pt states that his wife will be able to provide ride home at discharge       Discharge Needs Assessment    Readmission Within the Last 30 Days no previous admission in last 30 days    Current Outpatient/Agency/Support Group --  none    Equipment Currently Used at Home cane, straight    Concerns to be Addressed adjustment to diagnosis/illness;denies needs/concerns at this time;discharge planning    Concerns Comments patient will need HH and BSC/RW at discharge    Anticipated Changes Related to Illness none    Equipment Needed After Discharge commode;betty elizabeth    Outpatient/Agency/Support Group Needs homecare agency    Discharge Facility/Level of Care Needs home  with home health    Provided Post Acute Provider List? Refused    Refused Provider List Comment pt declined    Patient's Choice of Community Agency(s) [facility]     Current Discharge Risk --  none    Discharge Coordination/Progress Patient states he plans on retuning home at discharge with his wife to help as needed and [facility]  for PT.               Discharge Plan     Row Name 12/06/22 4765       Plan    Plan Home with wife and [facility]     Patient/Family in Agreement with Plan yes    Plan Comments Into room and introduced self and role of CM. Discussed discharge disposition with patient at bedside. Patient is currently sitting up in the chair with no complaints. Patient confirms that the info on his face sheet is correct and that he see's Dr. Florian Burt as PCP. He states that he uses [facility] off [address] and currently has no problem picking up or paying for his meds. He also states that he does have a living will and it is on file here. Patient states he lives with his wife in a single story house with one step to gain entry and has no issues entering the home or maneuvering inside. He states that he is independent with his ADL's and drives and that his wife will be able to provide ride home at discharge. He also states that he currently has a straight cane at home and will need a rolling walker and BSC at discharge and this equipment was delivered to patient prior to discharge. [facility] at discharged and this service has already been arranged for patient. CM offered other community resources such as STR and LTC but patient declines the need for them at this time. Patient states he plans on returning home at discharge with his wife to help as needed and [facility]  for PT. Patient had no other questions or concerns regarding discharge plans. Name and number placed on white board in room. CM will follow.              Continued Care and Services - Admitted Since 12/5/2022      Durable Medical Equipment     Service Provider Request Status Selected Services Address Phone Fax Patient Preferred    [facility]  Selected Durable Medical Equipment [address] [place] [address] [phone] --          Home Medical Care     Service Provider Request Status Selected Services Address Phone Fax Patient Preferred    [facility] Accepted N/A [address] [facility] [address] [phone] --       Internal Comment last updated by Germania Hinojosa, RN 11/29/2022 1305    Pt is having LTK revision and will need home PT.                            Demographic Summary     Row Name 12/06/22 1319       General Information    Admission Type inpatient    Arrived From home    Referral Source admission list    Reason for Consult discharge planning    Preferred Language English       Contact Information    Permission Granted to Share Info With                Functional Status    No documentation.                Psychosocial    No documentation.                Abuse/Neglect    No documentation.                Legal    No documentation.                Substance Abuse    No documentation.                Patient Forms    No documentation.                   Germania Hinojosa, RN

## 2022-12-06 NOTE — PROGRESS NOTES
Overnight vital signs reviewed, blood pressure is at or near goal on home medications, per staff patient is discharging today.  All chronic medical conditions appear stable for discharge.    We will sign off, thank you for allowing us to participate in the care of your patient, please feel free to call for any questions.

## 2022-12-06 NOTE — PROGRESS NOTES
Patient: Durga Valenzuela    @A@    Anesthesia Type: regional, spinal  Patient location: [facility]  Last vitals  /79 (12/06/22 0850)    Temp      Pulse 77 (12/06/22 0850)   Resp      SpO2        Post vital signs: stable  Level of consciousness: awake, alert and oriented    Post-anesthesia pain: adequate analgesia  Airway patency: patent  Respiratory: unassisted  Cardiovascular: stable and blood pressure at baseline  Hydration: euvolemic    Difficult Airway: no  Anesthetic complications: no

## 2022-12-06 NOTE — PROGRESS NOTES
"Progress Note    Patient: Durga Valenzuela  Medical record #: [id number]    Durga Valenzuela is a 72 y.o.  male who is POD #1 left TKA.  The patient's main complaint today is surgical pain at the operative site. He was able to ambulate with PT.     Hospital Problem List:    Failed total knee, left, sequela      Current Medications:  Scheduled Meds:amLODIPine, 10 mg, Oral, Q24H  aspirin, 81 mg, Oral, Daily  cholecalciferol, 5,000 Units, Oral, Daily  clobetasol, , Topical, Q12H  docusate sodium, 100 mg, Oral, BID  famotidine, 40 mg, Oral, Daily  FLUoxetine, 60 mg, Oral, Daily  levothyroxine, 175 mcg, Oral, Daily  losartan, 50 mg, Oral, Daily  pravastatin, 20 mg, Oral, Daily  rivaroxaban, 10 mg, Oral, Daily      Continuous Infusions:   PRN Meds:.•  acetaminophen  •  HYDROmorphone **AND** naloxone  •  hydrOXYzine  •  Morphine **AND** naloxone  •  ondansetron **OR** ondansetron  •  oxyCODONE-acetaminophen  •  traMADol  •  zolpidem    Input/Output:    Intake/Output Summary (Last 24 hours) at 12/6/2022 1201  Last data filed at 12/6/2022 1143  Gross per 24 hour   Intake 3320 ml   Output 2425 ml   Net 895 ml       Vital signs:  Blood pressure 132/79, pulse 77, temperature 98.2 °F (36.8 °C), temperature source Oral, resp. rate 18, height 185.4 cm (73\"), weight (!) 143 kg (314 lb 6.4 oz), SpO2 95 %.    Physical Exam:     Left Knee:  Dressing: clean and dry  Able to dorsiflex ankle and move toes  Sensation grossly intact to light touch throughout  Distal pulses intact  No calf swelling  Caitlin's sign negative  Compartments soft  No signs or symptoms of DVT or compartment syndrome    Labs:       Assessment: 72 y.o.  male POD #1 left TKA    Plan:  Hgb  - stable  drain out before discharge if less than 150 ml output  mobilize with PT, WBATwith brace on and locked in extension on operative knee with walker   pain controlled   ASA 81 mg BID for 6 weeks according to CHEST guidelines    Disposition: plan discharge home this morning if " safe with PT and stable medically  Leave dressing in place until follow up   Pateint has MIMI dressing to be removed in office in 2 weeks  Ok to shower with dressing in place.   Appreciate medical team's expertise and help in managing the patient's medical co-morbidities    [facility] include   Failed total knee, left, sequela        Signed:  JADEN Garzon  12/6/2022  12:01 EST

## 2022-12-06 NOTE — THERAPY DISCHARGE NOTE
Acute Care - Physical Therapy Treatment Note/Discharge  ALECIA Ashford     Patient Name: Durga Valenzuela  : 1950  MRN: [id number]  Today's Date: 2022                Admit Date: 2022    Visit Dx:    ICD-10-CM ICD-9-CM   1. Failed total knee, left, sequela  T84.093S 909.3   2. History of left knee replacement  Z96.652 V43.65   3. Pain due to hip joint prosthesis (HCC)  T84.84XA 996.77    Z96.649 V43.64     Patient Active Problem List   Diagnosis   • Post traumatic stress disorder (PTSD)   • Benign prostatic hyperplasia with urinary frequency   • Chronic insomnia   • Lumbar degenerative disc disease   • Impaired fasting glucose   • Benign essential hypertension   • Primary osteoarthritis involving multiple joints   • Hyperlipidemia   • Primary hypothyroidism   • Obstructive sleep apnea, tolerates CPAP well.  Previously failed oral appliance.   • History of pulmonary embolism   • Non-occlusive coronary artery disease, 2017--normal LM; proximal LAD mild LI; 50% mid LAD.  Mild distal LAD LI; normal Cx.  Mild LI marginal branches; normal RCA, nondominant.  EF 60%.   • Therapeutic drug monitoring   • Vitamin D deficiency   • Psoriasis   • Allergic rhinitis   • Depression with anxiety   • Multiple environmental allergies   • Bilateral lower extremity edema   • Morbid obesity ([facility])   • Spinal stenosis of lumbar region with neurogenic claudication   • Traumatic complete tear of left rotator cuff   • Urinary incontinence without sensory awareness   • Bipolar disorder, in full remission, most recent episode mixed ([facility])   • Weakness of both lower extremities   • Failed total knee, left, sequela     Past Medical History:   Diagnosis Date   • Affective psychosis, bipolar ([facility]) 2021   • Allergic rhinitis 2018    Patient has had allergy testing in the past which revealed allergies to molds and grass.  Immunotherapy for about 2 years in the 1980s.   • Benign essential hypertension 2016   • Benign  prostatic hyperplasia with urinary frequency 02/25/2016   • Bilateral lower extremity edema 08/21/2018   • Bipolar disorder, in full remission, most recent episode mixed (HCC) 09/30/2021   • Chronic insomnia 02/25/2016   • Closed traumatic lateral subluxation of patellofemoral joint, right, initial encounter 08/13/2018    08/10/2018--patient was evaluated by the orthopedist for right knee pain and found to have lateral subluxation of the right patella associated with presence of right artificial knee joint.  Physical therapy no help.  Surgery is scheduled 09/17/2018   • Depression with anxiety 08/21/2018   • Elevated cholesterol    • History of deep venous thrombosis (DVT) of distal vein of right lower extremity 08/21/2018 04/26/2016--CTA of the chest performed for elevated d-dimer and progressive shortness of breath and chest pain for one month revealed bilateral occlusive in near occlusive segmental and subsegmental pulmonary emboli in all lobes of the right lung as well as within the left lower lobe.  No evidence of pulmonary infarct.  Nonspecific multifocal groundglass attenuation in the right pulmonary apex, perhaps representing pneumonitis or submental atelectasis.  Questionable cholelithiasis.  Doppler venous study was positive for DVT in the right popliteal vein.  Hypercoagulable workup was normal.  He was treated with Eliquis for a total of 6 months and was subsequently discontinued.   • History of pneumonia    • History of pulmonary embolism 06/10/2016    04/26/2016--CTA of the chest performed for elevated d-dimer and progressive shortness of breath and chest pain for one month revealed bilateral occlusive in near occlusive segmental and subsegmental pulmonary emboli in all lobes of the right lung as well as within the left lower lobe.  No evidence of pulmonary infarct.  Nonspecific multifocal groundglass attenuation in the right pulmonary apex, perhaps representing pneumonitis or submental atelectasis.   Questionable cholelithiasis.  Doppler venous study was positive for DVT in the right popliteal vein.  Hypercoagulable workup was normal.  He was treated with Eliquis for a total of 6 months and was subsequently discontinued.   • Hyperlipidemia 02/25/2016   • Impaired fasting glucose 02/25/2016   • Lumbar degenerative disc disease 02/25/2016   • Morbidly obese (HCC) 10/19/2018   • Multiple environmental allergies 08/21/2018    Patient has had allergy testing in the past which revealed allergies to molds and grass.  Immunotherapy for about 2 years in the 1980s.   • Non-occlusive coronary artery disease, 05/16/2017--normal LM; proximal LAD mild LI; 50% mid LAD.  Mild distal LAD LI; normal Cx.  Mild LI marginal branches; normal RCA, nondominant.  EF 60%. 07/24/2017 05/16/2017--cardiac catheterization performed for abnormal stress test.  Normal LV with ejection fraction 60%.  Dilated aortic root.  No wall motion abnormalities.  Normal left main.  Ramus branch small caliber and normal.  Proximal LAD large caliber and mild luminal irregularities.  50% mid LAD.  Mild luminal irregularities distal LAD.  3 diagonal branches of small caliber with mild luminal irregularities.  Normal septal branches.  Circumflex is large caliber and free of disease.  Marginal branches are medium caliber with mild luminal irregularities.  RCA is a medium caliber and free of disease.  It is nondominant.   • Obstructive sleep apnea, tolerates CPAP well.  Previously failed oral appliance. 02/25/2016   • Post traumatic stress disorder (PTSD) 02/25/2016   • Primary hypothyroidism 02/25/2016   • Primary osteoarthritis involving multiple joints 02/25/2016   • Psoriasis 09/17/2014   • Spinal stenosis of lumbar region with neurogenic claudication 02/15/2021   • Traumatic complete tear of left rotator cuff 03/17/2021 March 14, 2021--patient was evaluated by the orthopedic surgeon after he slipped on ice and fell onto his left shoulder.  Work-up  revealed complete rotator cuff tear involving 2 tendons.  Specifically, he has an acute left supraspinatus tear and acute left infraspinatus tear.  He also has left glenohumeral joint osteoarthritis.  Apparently this is not repairable and patient would need a shoulder r   • Traumatic complete tear of left rotator cuff 03/17/2021 March 14, 2021--patient was evaluated by the orthopedic surgeon after he slipped on ice and fell onto his left shoulder.  Work-up revealed complete rotator cuff tear involving 2 tendons.  Specifically, he has an acute left supraspinatus tear and acute left infraspinatus tear.  He also has left glenohumeral joint osteoarthritis.  Apparently this is not repairable and patient would need a shoulder r   • Urinary incontinence without sensory awareness 03/17/2021   • Vitamin D deficiency 08/21/2018     Past Surgical History:   Procedure Laterality Date   • CARDIAC CATHETERIZATION N/A 5/16/2017    Procedure: Coronary angiography;  Surgeon: Malcolm Chen MD;  Location: [facility] CATH INVASIVE LOCATION;  Service:    • CARDIAC CATHETERIZATION N/A 5/16/2017    Procedure: Left Heart Cath;  Surgeon: Malcolm Chen MD;  Location: [facility] CATH INVASIVE LOCATION;  Service:    • CARDIAC CATHETERIZATION N/A 5/16/2017    Procedure: Left ventriculography;  Surgeon: Malcolm Chen MD;  Location: [facility] CATH INVASIVE LOCATION;  Service:    • COLONOSCOPY  2005 2005--colonoscopy reportedly normal.  Records not available.   • COLONOSCOPY N/A 6/13/2019    Procedure: COLONOSCOPY INTO CECUM WITH COLD BIOPSY POLYPECTOMY;  Surgeon: Ayaan Gallardo MD;  Location: [facility] ENDOSCOPY;  Service: General   • LUMBAR DECOMPRESSION  2007 2007--lumbar decompression without fusion or hardware.   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   • REVISION TOTAL KNEE ARTHROPLASTY Right 09/17/2018     09/17/2018--right total knee arthroplasty revision due to lateral subluxation of the patella due to multiple factors.   • TOTAL KNEE ARTHROPLASTY Left 11/03/2017 11/03/2017--right total knee replacement complicated by patellofemoral subluxation.   • TOTAL KNEE ARTHROPLASTY Left 06/2014 June 2014--left total knee replacement.       PT Assessment (last 12 hours)     PT Evaluation and Treatment     Row Name 12/06/22 0925          Physical Therapy Time and Intention    Subjective Information no complaints  -BP     Document Type therapy note (daily note)  -BP     Mode of Treatment physical therapy  -BP     Patient Effort good  -BP     Symptoms Noted During/After Treatment fatigue  -BP     Row Name 12/06/22 0925          General Information    Patient/Family/Caregiver Comments/Observations Patient supine in bed with HOB. Knee immobilizer in place. Hemovac noted. Patient agreeable to PT treatment  -BP     Existing Precautions/Restrictions fall;brace on at all times  No ROM or exercise to L LE.  -BP     Risks Reviewed patient:;LOB;increased discomfort  -BP     Benefits Reviewed patient:;improve function;increase independence;increase strength  -BP     Barriers to Rehab none identified  -BP     Row Name 12/06/22 0925          Pain    Pre/Posttreatment Pain Comment Patient does not complain of pain  -BP     Row Name 12/06/22 0925          Cognition    Personal Safety Interventions gait belt;nonskid shoes/slippers when out of bed  -BP     Row Name 12/06/22 0925          Bed Mobility    Supine-Sit [place] (Bed Mobility) modified independence  -BP     Assistive Device (Bed Mobility) bed rails;head of bed elevated  -BP     Row Name 12/06/22 0925          Transfers    Transfers sit-stand transfer;stand-sit transfer  -BP     Comment, (Transfers) patient demonstrates proper hand placement  -BP     Row Name 12/06/22 0925          Sit-Stand Transfer    Sit-Stand [place] (Transfers) supervision  -BP     Assistive Device  (Sit-Stand Transfers) walker, front-wheeled  -BP     Row Name 12/06/22 0925          Stand-Sit Transfer    Stand-Sit [place] (Transfers) supervision  -     Assistive Device (Stand-Sit Transfers) walker, front-wheeled  -BP     Row Name 12/06/22 0925          Gait/Stairs (Locomotion)    [place] Level (Gait) supervision  -     Assistive Device (Gait) walker, front-wheeled  -BP     Distance in Feet (Gait) 256  -BP     Pattern (Gait) swing-through  -BP     Deviations/Abnormal Patterns (Gait) base of support, wide  -BP     Bilateral Gait Deviations forward flexed posture  -BP     Comment, (Gait/Stairs) No loss of balance noted. Patient demonstrates slow gait speed. Patient manages device safely. Discussed navigating threshold step at home, patient able to recall proper sequencing to navigate stairs. Patient denies need for stair training.  -BP     Row Name 12/06/22 0925          Safety Issues, Functional Mobility    Comment, Safety Issues/Impairments (Mobility) WFL  -BP     Row Name 12/06/22 0925          Balance    Comment, Balance Sitting balance-independent. Standing balance with device-supervision  -BP     Row Name 12/06/22 0925          Motor Skills    Therapeutic Exercise --  discussed ankle pumps. Per MD, no ROM/exercise at this time.  -BP     Row Name             Wound 12/05/22 0912 Left anterior knee Incision    Wound - Properties Group Placement Date: 12/05/22  - Placement Time: 0912 -JM Present on Hospital Admission: N  -JM Side: Left  -JM Orientation: anterior  -JM Location: knee  -JM Primary Wound Type: Incision  -JM    Retired Wound - Properties Group Placement Date: 12/05/22  - Placement Time: 0912 -JM Present on Hospital Admission: N  -JM Side: Left  -JM Orientation: anterior  -JM Location: knee  -JM Primary Wound Type: Incision  -JM    Retired Wound - Properties Group Date first assessed: 12/05/22  - Time first assessed: 0912 -JM Present on Hospital Admission: N  -JM Side: Left  -JM  Location: knee  -JM Primary Wound Type: Incision  -JM    Row Name 12/06/22 0925          Plan of Care Review    Plan of Care Reviewed With patient  -BP     Outcome Evaluation PT: Patient performs supine to sit transfer with modified independence, sit to/from stand transfers with supervision, and gait x 256 feet with supervision with use of FWW. No loss of balance noted, patient manages device safely. Disucssed stair navigation, patient able to recall proper sequencing-denies need for training. Per MD, no ROM or ther ex to L LE. Patient has no concerns for return home. Anticipate discharge home today.  No further inpatient skilled PT needs at this time.  -BP     Row Name 12/06/22 0925          Positioning and Restraints    Pre-Treatment Position in bed  -BP     Post Treatment Position chair  -BP     In Chair reclined;call light within reach;encouraged to call for assist;notified nsg  -BP     Row Name 12/06/22 0925          Progress Summary (PT)    Progress Toward Functional Goals (PT) prepare for discharge  -BP     Row Name 12/06/22 0925          Therapy Plan Review/Discharge Plan (PT)    Therapy Plan Review (PT) patient;risks/benefits reviewed;participants included;care plan/treatment goals reviewed  -BP     Row Name 12/06/22 0925          Bed Mobility Goal 1 (PT)    Activity/Assistive Device (Bed Mobility Goal 1, PT) bed mobility activities, all  -BP     [place] Level/Cues Needed (Bed Mobility Goal 1, PT) supervision required  -BP     Time Frame (Bed Mobility Goal 1, PT) 1 day  -BP     Progress/Outcomes (Bed Mobility Goal 1, PT) goal met  -BP     Row Name 12/06/22 0925          Transfer Goal 1 (PT)    Activity/Assistive Device (Transfer Goal 1, PT) transfers, all  -BP     [place] Level/Cues Needed (Transfer Goal 1, PT) supervision required  -BP     Time Frame (Transfer Goal 1, PT) 1 day  -BP     Progress/Outcome (Transfer Goal 1, PT) goal met  -BP     Row Name 12/06/22 0925          Gait Training Goal 1  (PT)    Activity/Assistive Device (Gait Training Goal 1, PT) gait (walking locomotion);assistive device use  -BP     [place] Level (Gait Training Goal 1, PT) supervision required  -BP     Distance (Gait Training Goal 1, PT) 100  -BP     Time Frame (Gait Training Goal 1, PT) 1 day  -BP     Progress/Outcome (Gait Training Goal 1, PT) goal met  -BP           User Key  (r) = Recorded By, (t) = Taken By, (c) = Cosigned By    Initials Name Provider Type    BP Kameron Osuna, PT Physical Therapist    Maay Navas RN Registered Nurse                  Physical Therapy Education     Title: PT OT SLP Therapies (In Progress)     Topic: Physical Therapy (In Progress)     Point: Mobility training (Done)     Learning Progress Summary           Patient Acceptance, E,TB, VU by  at 12/6/2022 1132    Acceptance, E,TB, VU by  at 12/5/2022 1530                   Point: Home exercise program (Not Started)     Learner Progress:  Not documented in this visit.          Point: Precautions (Done)     Learning Progress Summary           Patient Acceptance, E,TB, VU by  at 12/6/2022 1132                               User Key     Initials Effective Dates Name Provider Type Discipline     06/16/21 -  Kameron Osuna, PT Physical Therapist PT    GINA 06/16/21 -  Mulu Ordoñez PT Physical Therapist PT                PT Recommendation and Plan  Anticipated Discharge Disposition (PT): home with home health  Progress Summary (PT)  Progress Toward Functional Goals (PT): prepare for discharge  Plan of Care Reviewed With: patient  Outcome Evaluation: PT: Patient performs supine to sit transfer with modified independence, sit to/from stand transfers with supervision, and gait x 256 feet with supervision with use of FWW. No loss of balance noted, patient manages device safely. Disucssed stair navigation, patient able to recall proper sequencing-denies need for training. Per MD, no ROM or ther ex to L LE. Patient has no concerns for  return home. Anticipate discharge home today.  No further inpatient skilled PT needs at this time.     Outcome Measures     Row Name 12/06/22 1100             How much help from another person do you currently need...    Turning from your back to your side while in flat bed without using bedrails? 3  -BP      Moving from lying on back to sitting on the side of a flat bed without bedrails? 3  -BP      Moving to and from a bed to a chair (including a wheelchair)? 3  -BP      Standing up from a chair using your arms (e.g., wheelchair, bedside chair)? 3  -BP      Climbing 3-5 steps with a railing? 3  -BP      To walk in hospital room? 3  -BP      AM-PAC 6 Clicks Score (PT) 18  -BP         Functional Assessment    Outcome Measure Options AM-PAC 6 Clicks Basic Mobility (PT)  -BP            User Key  (r) = Recorded By, (t) = Taken By, (c) = Cosigned By    Initials Name Provider Type    Kameron Knapp, PT Physical Therapist                 Time Calculation:    PT Charges     Row Name 12/06/22 1136             Time Calculation    Start Time 0925  -BP      Stop Time 0950  -BP      Time Calculation (min) 25 min  -BP      PT Received On 12/06/22  -BP         Timed Charges    54175 - Gait Training Minutes  25  -BP         Total Minutes    Timed Charges Total Minutes 25  -BP       Total Minutes 25  -BP            User Key  (r) = Recorded By, (t) = Taken By, (c) = Cosigned By    Initials Name Provider Type    Kameron Knapp, PT Physical Therapist              Therapy Charges for Today     Code Description Service Date Service Provider Modifiers Qty    [id number] HC GAIT TRAINING EA 15 MIN 12/6/2022 Kameron Osuna, PT GP 2          PT G-Codes  Outcome Measure Options: AM-PAC 6 Clicks Basic Mobility (PT)  AM-PAC 6 Clicks Score (PT): 18    PT Discharge Summary  Anticipated Discharge Disposition (PT): home with home health    Kameron Osuna PT  12/6/2022

## 2022-12-07 ENCOUNTER — TRANSITIONAL CARE MANAGEMENT TELEPHONE ENCOUNTER (OUTPATIENT)
Dept: CALL CENTER | Facility: HOSPITAL | Age: 72
End: 2022-12-07

## 2022-12-07 NOTE — OUTREACH NOTE
Call Center TCM Note    Flowsheet Row Responses   [facility] patient discharged from? [place]   Does the patient have one of the following disease processes/diagnoses(primary or secondary)? Total Joint Replacement   Joint surgery performed? Knee   TCM attempt successful? Yes   Call start time 1600   Call end time 1602   Discharge diagnosis Revision Left Total Knee Arthroplasty   Person spoke with today (if not patient) and relationship patient   Does the patient have all medications related to this admission filled (includes all antibiotics, pain medications, etc.) Yes   Is the patient taking all medications as directed (includes completed medication regime)? Yes   Is the patient able to teach back alternate methods of pain control? Ice, Knee-elevation/no pillow under knee, Shoulder-elevate above heart/ keep in sling as advised, Reposition, Short, frequent activity   Comments Declines scheduling a PCP followup at this time. He reports he will followup with surgeon.   Does the patient have an appointment with their PCP within 7 days of discharge? No   Nursing Interventions Patient declined scheduling/rescheduling appointment at this time   What is the Home health agency?  [facility]    Has home health visited the patient within 72 hours of discharge? Yes   What DME was ordered?  rolling walker and [facility] - [employer]   Has all DME been delivered? Yes   Psychosocial issues? No   Has the patient began therapy sessions (either in the home or as an out patient)? Yes   Did the patient receive a copy of their discharge instructions? Yes   Nursing interventions Reviewed instructions with patient   What is the patient's perception of their functional status since discharge? Improving   Is the patient able to teach back signs and symptoms of infection? Temp >100.4 for 24h or longer, Incisional drainage, Severe discomfort or pain   Is the patient able to teach back how to prevent infection? Check incision daily   Is the patient  able to teach back signs and symptoms of DVT? Redness in calf   Is the patient able to teach back home safety measures? Ability to shower   Did the patient implement home safety suggestions from pre-surgery classes if attended? N/A   If the patient is a current smoker, are they able to teach back resources for cessation? Not a smoker   Is the patient/caregiver able to teach back the hierarchy of who to call/visit for symptoms/problems? PCP, Specialist, Home health nurse, Urgent Care, ED, 911 Yes   TCM call completed? Yes   Wrap up additional comments He is doing well. No questions or concerns.   Call end time 1602   Would this patient benefit from a Referral to [facility] Social Work? No   Is the patient interested in additional calls from an ambulatory ?  NOTE:  applies to high risk patients requiring additional follow-up. No          Bebeto Polk RN    12/7/2022, 16:03 EST

## 2022-12-07 NOTE — CASE MANAGEMENT/SOCIAL WORK
Continued Stay Note  ALECIA Ashford     Patient Name: Durga Valenzuela  MRN: [id number]  Today's Date: 12/7/2022    Admit Date: 12/5/2022    [facility]   Discharge Plan     Row Name 12/07/22 0724       Plan    Final Discharge Disposition Code 06 - home with home health care    Final Note Discharged home with [facility]                Discharge Codes    No documentation.               Expected Discharge Date and Time     Expected Discharge Date Expected Discharge Time    Dec 6, 2022             Germania Hinojosa RN

## 2022-12-08 LAB
BACTERIA SPEC AEROBE CULT: NORMAL
GRAM STN SPEC: NORMAL

## 2022-12-10 LAB
BACTERIA SPEC ANAEROBE CULT: NORMAL

## 2023-02-07 DIAGNOSIS — E78.2 MIXED HYPERLIPIDEMIA: Chronic | ICD-10-CM

## 2023-02-07 DIAGNOSIS — E55.9 VITAMIN D DEFICIENCY: Chronic | ICD-10-CM

## 2023-02-07 RX ORDER — EZETIMIBE 10 MG/1
TABLET ORAL
Qty: 90 TABLET | Refills: 3 | Status: SHIPPED | OUTPATIENT
Start: 2023-02-07

## 2023-03-09 DIAGNOSIS — L40.9 PSORIASIS: Primary | Chronic | ICD-10-CM

## 2023-03-10 RX ORDER — HYDROXYZINE HYDROCHLORIDE 25 MG/1
TABLET, FILM COATED ORAL
Qty: 60 TABLET | Refills: 3 | Status: SHIPPED | OUTPATIENT
Start: 2023-03-10

## 2023-04-05 DIAGNOSIS — I10 BENIGN ESSENTIAL HYPERTENSION: Chronic | ICD-10-CM

## 2023-04-05 RX ORDER — AMLODIPINE BESYLATE 10 MG/1
TABLET ORAL
Qty: 90 TABLET | Refills: 0 | Status: SHIPPED | OUTPATIENT
Start: 2023-04-05 | End: 2023-04-13

## 2023-04-12 DIAGNOSIS — I10 BENIGN ESSENTIAL HYPERTENSION: Chronic | ICD-10-CM

## 2023-04-13 RX ORDER — AMLODIPINE BESYLATE 10 MG/1
TABLET ORAL
Qty: 90 TABLET | Refills: 0 | Status: SHIPPED | OUTPATIENT
Start: 2023-04-13

## 2023-04-24 ENCOUNTER — TELEPHONE (OUTPATIENT)
Dept: CARDIOLOGY | Facility: CLINIC | Age: 73
End: 2023-04-24
Payer: MEDICARE

## 2023-04-24 NOTE — TELEPHONE ENCOUNTER
Patient states that for the last month he has gotten progressively more SOA on exertion and he has noticed swelling in his legs. He also says that he has a dry cough. He said his BP has been stable in the 120s. He denies any weight gain. His PCP is out of the office until 5/23. Patient was last seen in June of 2022. At that time he was to FU with us in 6 months, but he never did. I went ahead and scheduled him to see Dr. Arnett tomorrow.    Ciara Anthony RN  Triage [facility]

## 2023-04-25 ENCOUNTER — APPOINTMENT (OUTPATIENT)
Dept: CT IMAGING | Facility: HOSPITAL | Age: 73
End: 2023-04-25
Payer: MEDICARE

## 2023-04-25 ENCOUNTER — OFFICE VISIT (OUTPATIENT)
Dept: CARDIOLOGY | Facility: CLINIC | Age: 73
End: 2023-04-25
Payer: MEDICARE

## 2023-04-25 ENCOUNTER — HOSPITAL ENCOUNTER (OUTPATIENT)
Facility: HOSPITAL | Age: 73
Discharge: HOME | End: 2023-04-26
Attending: EMERGENCY MEDICINE | Admitting: INTERNAL MEDICINE
Payer: MEDICARE

## 2023-04-25 ENCOUNTER — HOSPITAL ENCOUNTER (OUTPATIENT)
Dept: CARDIOLOGY | Facility: HOSPITAL | Age: 73
Discharge: HOME | End: 2023-04-25
Admitting: INTERNAL MEDICINE
Payer: MEDICARE

## 2023-04-25 ENCOUNTER — APPOINTMENT (OUTPATIENT)
Dept: GENERAL RADIOLOGY | Facility: HOSPITAL | Age: 73
End: 2023-04-25
Payer: MEDICARE

## 2023-04-25 VITALS — WEIGHT: 315 LBS | BODY MASS INDEX: 41.75 KG/M2 | HEIGHT: 73 IN

## 2023-04-25 DIAGNOSIS — R07.89 CHEST TIGHTNESS: ICD-10-CM

## 2023-04-25 DIAGNOSIS — I71.21 ANEURYSM OF ASCENDING AORTA WITHOUT RUPTURE: ICD-10-CM

## 2023-04-25 DIAGNOSIS — R06.09 EXERTIONAL DYSPNEA: Primary | ICD-10-CM

## 2023-04-25 DIAGNOSIS — I50.31 ACUTE DIASTOLIC CHF (CONGESTIVE HEART FAILURE): Primary | ICD-10-CM

## 2023-04-25 DIAGNOSIS — Z86.79 HISTORY OF CAD (CORONARY ARTERY DISEASE): ICD-10-CM

## 2023-04-25 DIAGNOSIS — Z86.711 HISTORY OF PULMONARY EMBOLISM: ICD-10-CM

## 2023-04-25 DIAGNOSIS — I10 BENIGN ESSENTIAL HYPERTENSION: ICD-10-CM

## 2023-04-25 DIAGNOSIS — Z87.59 HISTORY OF MATERNAL DEEP VEIN THROMBOSIS (DVT): ICD-10-CM

## 2023-04-25 DIAGNOSIS — R07.2 PRECORDIAL PAIN: ICD-10-CM

## 2023-04-25 DIAGNOSIS — I25.118 CORONARY ARTERY DISEASE INVOLVING NATIVE CORONARY ARTERY OF NATIVE HEART WITH OTHER FORM OF ANGINA PECTORIS: ICD-10-CM

## 2023-04-25 DIAGNOSIS — R06.09 EXERTIONAL DYSPNEA: ICD-10-CM

## 2023-04-25 DIAGNOSIS — Z86.718 HISTORY OF MATERNAL DEEP VEIN THROMBOSIS (DVT): ICD-10-CM

## 2023-04-25 DIAGNOSIS — I25.110 CORONARY ARTERY DISEASE INVOLVING NATIVE CORONARY ARTERY OF NATIVE HEART WITH UNSTABLE ANGINA PECTORIS: Primary | ICD-10-CM

## 2023-04-25 PROBLEM — R07.9 CHEST PAIN: Status: ACTIVE | Noted: 2023-04-25

## 2023-04-25 LAB
ALBUMIN SERPL-MCNC: 4.3 G/DL (ref 3.5–5.2)
ALBUMIN/GLOB SERPL: 1.8 G/DL
ALP SERPL-CCNC: 73 U/L (ref 39–117)
ALT SERPL W P-5'-P-CCNC: 17 U/L (ref 1–41)
ANION GAP SERPL CALCULATED.3IONS-SCNC: 12.3 MMOL/L (ref 5–15)
ANION GAP SERPL CALCULATED.3IONS-SCNC: 12.7 MMOL/L (ref 5–15)
AST SERPL-CCNC: 19 U/L (ref 1–40)
BASOPHILS # BLD AUTO: 0.04 10*3/MM3 (ref 0–0.2)
BASOPHILS # BLD AUTO: 0.04 10*3/MM3 (ref 0–0.2)
BASOPHILS NFR BLD AUTO: 0.7 % (ref 0–1.5)
BASOPHILS NFR BLD AUTO: 0.7 % (ref 0–1.5)
BILIRUB SERPL-MCNC: 0.5 MG/DL (ref 0–1.2)
BUN SERPL-MCNC: 19 MG/DL (ref 8–23)
BUN SERPL-MCNC: 20 MG/DL (ref 8–23)
BUN/CREAT SERPL: 18.9 (ref 7–25)
BUN/CREAT SERPL: 19.4 (ref 7–25)
CALCIUM SPEC-SCNC: 9.1 MG/DL (ref 8.6–10.5)
CALCIUM SPEC-SCNC: 9.1 MG/DL (ref 8.6–10.5)
CHLORIDE SERPL-SCNC: 104 MMOL/L (ref 98–107)
CHLORIDE SERPL-SCNC: 106 MMOL/L (ref 98–107)
CO2 SERPL-SCNC: 22.7 MMOL/L (ref 22–29)
CO2 SERPL-SCNC: 23.3 MMOL/L (ref 22–29)
CREAT SERPL-MCNC: 0.98 MG/DL (ref 0.76–1.27)
CREAT SERPL-MCNC: 1.06 MG/DL (ref 0.76–1.27)
D DIMER PPP FEU-MCNC: 0.79 MCGFEU/ML (ref 0–0.73)
D DIMER PPP FEU-MCNC: 0.87 MCGFEU/ML (ref 0–0.73)
DEPRECATED RDW RBC AUTO: 45.1 FL (ref 37–54)
DEPRECATED RDW RBC AUTO: 47 FL (ref 37–54)
EGFRCR SERPLBLD CKD-EPI 2021: 74.1 ML/MIN/1.73
EGFRCR SERPLBLD CKD-EPI 2021: 81.4 ML/MIN/1.73
EOSINOPHIL # BLD AUTO: 0.29 10*3/MM3 (ref 0–0.4)
EOSINOPHIL # BLD AUTO: 0.3 10*3/MM3 (ref 0–0.4)
EOSINOPHIL NFR BLD AUTO: 5.1 % (ref 0.3–6.2)
EOSINOPHIL NFR BLD AUTO: 5.3 % (ref 0.3–6.2)
ERYTHROCYTE [DISTWIDTH] IN BLOOD BY AUTOMATED COUNT: 14.8 % (ref 12.3–15.4)
ERYTHROCYTE [DISTWIDTH] IN BLOOD BY AUTOMATED COUNT: 15.2 % (ref 12.3–15.4)
GLOBULIN UR ELPH-MCNC: 2.4 GM/DL
GLUCOSE SERPL-MCNC: 111 MG/DL (ref 65–99)
GLUCOSE SERPL-MCNC: 112 MG/DL (ref 65–99)
HCT VFR BLD AUTO: 36.7 % (ref 37.5–51)
HCT VFR BLD AUTO: 37.7 % (ref 37.5–51)
HGB BLD-MCNC: 12.2 G/DL (ref 13–17.7)
HGB BLD-MCNC: 12.2 G/DL (ref 13–17.7)
HOLD SPECIMEN: NORMAL
HOLD SPECIMEN: NORMAL
IMM GRANULOCYTES # BLD AUTO: 0.01 10*3/MM3 (ref 0–0.05)
IMM GRANULOCYTES # BLD AUTO: 0.02 10*3/MM3 (ref 0–0.05)
IMM GRANULOCYTES NFR BLD AUTO: 0.2 % (ref 0–0.5)
IMM GRANULOCYTES NFR BLD AUTO: 0.4 % (ref 0–0.5)
LYMPHOCYTES # BLD AUTO: 1.1 10*3/MM3 (ref 0.7–3.1)
LYMPHOCYTES # BLD AUTO: 1.22 10*3/MM3 (ref 0.7–3.1)
LYMPHOCYTES NFR BLD AUTO: 20.1 % (ref 19.6–45.3)
LYMPHOCYTES NFR BLD AUTO: 20.9 % (ref 19.6–45.3)
MCH RBC QN AUTO: 27.7 PG (ref 26.6–33)
MCH RBC QN AUTO: 28.1 PG (ref 26.6–33)
MCHC RBC AUTO-ENTMCNC: 32.4 G/DL (ref 31.5–35.7)
MCHC RBC AUTO-ENTMCNC: 33.2 G/DL (ref 31.5–35.7)
MCV RBC AUTO: 84.6 FL (ref 79–97)
MCV RBC AUTO: 85.5 FL (ref 79–97)
MONOCYTES # BLD AUTO: 0.62 10*3/MM3 (ref 0.1–0.9)
MONOCYTES # BLD AUTO: 0.65 10*3/MM3 (ref 0.1–0.9)
MONOCYTES NFR BLD AUTO: 11.1 % (ref 5–12)
MONOCYTES NFR BLD AUTO: 11.3 % (ref 5–12)
NEUTROPHILS NFR BLD AUTO: 3.4 10*3/MM3 (ref 1.7–7)
NEUTROPHILS NFR BLD AUTO: 3.61 10*3/MM3 (ref 1.7–7)
NEUTROPHILS NFR BLD AUTO: 62 % (ref 42.7–76)
NEUTROPHILS NFR BLD AUTO: 62.2 % (ref 42.7–76)
NRBC BLD AUTO-RTO: 0 /100 WBC (ref 0–0.2)
NRBC BLD AUTO-RTO: 0 /100 WBC (ref 0–0.2)
NT-PROBNP SERPL-MCNC: 31 PG/ML (ref 0–900)
PLATELET # BLD AUTO: 215 10*3/MM3 (ref 140–450)
PLATELET # BLD AUTO: 218 10*3/MM3 (ref 140–450)
PMV BLD AUTO: 9.3 FL (ref 6–12)
PMV BLD AUTO: 9.3 FL (ref 6–12)
POTASSIUM SERPL-SCNC: 4.2 MMOL/L (ref 3.5–5.2)
POTASSIUM SERPL-SCNC: 4.2 MMOL/L (ref 3.5–5.2)
PROT SERPL-MCNC: 6.7 G/DL (ref 6–8.5)
RBC # BLD AUTO: 4.34 10*6/MM3 (ref 4.14–5.8)
RBC # BLD AUTO: 4.41 10*6/MM3 (ref 4.14–5.8)
SODIUM SERPL-SCNC: 140 MMOL/L (ref 136–145)
SODIUM SERPL-SCNC: 141 MMOL/L (ref 136–145)
TROPONIN T SERPL HS-MCNC: 24 NG/L
TROPONIN T SERPL HS-MCNC: 27 NG/L
WBC NRBC COR # BLD: 5.47 10*3/MM3 (ref 3.4–10.8)
WBC NRBC COR # BLD: 5.83 10*3/MM3 (ref 3.4–10.8)
WHOLE BLOOD HOLD COAG: NORMAL
WHOLE BLOOD HOLD SPECIMEN: NORMAL

## 2023-04-25 PROCEDURE — 85025 COMPLETE CBC W/AUTO DIFF WBC: CPT

## 2023-04-25 PROCEDURE — 84484 ASSAY OF TROPONIN QUANT: CPT | Performed by: PHYSICIAN ASSISTANT

## 2023-04-25 PROCEDURE — G0378 HOSPITAL OBSERVATION PER HR: HCPCS

## 2023-04-25 PROCEDURE — 93005 ELECTROCARDIOGRAM TRACING: CPT | Performed by: EMERGENCY MEDICINE

## 2023-04-25 PROCEDURE — 99284 EMERGENCY DEPT VISIT MOD MDM: CPT

## 2023-04-25 PROCEDURE — 80053 COMPREHEN METABOLIC PANEL: CPT | Performed by: INTERNAL MEDICINE

## 2023-04-25 PROCEDURE — 71046 X-RAY EXAM CHEST 2 VIEWS: CPT

## 2023-04-25 PROCEDURE — 25510000001 IOPAMIDOL PER 1 ML: Performed by: EMERGENCY MEDICINE

## 2023-04-25 PROCEDURE — 93005 ELECTROCARDIOGRAM TRACING: CPT

## 2023-04-25 PROCEDURE — 84484 ASSAY OF TROPONIN QUANT: CPT

## 2023-04-25 PROCEDURE — 83880 ASSAY OF NATRIURETIC PEPTIDE: CPT

## 2023-04-25 PROCEDURE — 93010 ELECTROCARDIOGRAM REPORT: CPT | Performed by: STUDENT IN AN ORGANIZED HEALTH CARE EDUCATION/TRAINING PROGRAM

## 2023-04-25 PROCEDURE — 99285 EMERGENCY DEPT VISIT HI MDM: CPT

## 2023-04-25 PROCEDURE — 99214 OFFICE O/P EST MOD 30 MIN: CPT | Performed by: INTERNAL MEDICINE

## 2023-04-25 PROCEDURE — 36415 COLL VENOUS BLD VENIPUNCTURE: CPT

## 2023-04-25 PROCEDURE — 1160F RVW MEDS BY RX/DR IN RCRD: CPT | Performed by: INTERNAL MEDICINE

## 2023-04-25 PROCEDURE — 1159F MED LIST DOCD IN RCRD: CPT | Performed by: INTERNAL MEDICINE

## 2023-04-25 PROCEDURE — 71275 CT ANGIOGRAPHY CHEST: CPT

## 2023-04-25 PROCEDURE — 85379 FIBRIN DEGRADATION QUANT: CPT | Performed by: INTERNAL MEDICINE

## 2023-04-25 PROCEDURE — 85379 FIBRIN DEGRADATION QUANT: CPT

## 2023-04-25 RX ORDER — LOSARTAN POTASSIUM 50 MG/1
50 TABLET ORAL DAILY
Status: DISCONTINUED | OUTPATIENT
Start: 2023-04-26 | End: 2023-04-25

## 2023-04-25 RX ORDER — ASPIRIN 81 MG/1
81 TABLET ORAL DAILY
Status: DISCONTINUED | OUTPATIENT
Start: 2023-04-26 | End: 2023-04-26 | Stop reason: HOSPADM

## 2023-04-25 RX ORDER — SODIUM CHLORIDE 0.9 % (FLUSH) 0.9 %
10 SYRINGE (ML) INJECTION AS NEEDED
Status: DISCONTINUED | OUTPATIENT
Start: 2023-04-25 | End: 2023-04-26 | Stop reason: HOSPADM

## 2023-04-25 RX ORDER — LOSARTAN POTASSIUM 50 MG/1
50 TABLET ORAL DAILY
Status: DISCONTINUED | OUTPATIENT
Start: 2023-04-25 | End: 2023-04-26 | Stop reason: HOSPADM

## 2023-04-25 RX ORDER — SODIUM CHLORIDE 9 MG/ML
40 INJECTION, SOLUTION INTRAVENOUS AS NEEDED
Status: DISCONTINUED | OUTPATIENT
Start: 2023-04-25 | End: 2023-04-26 | Stop reason: HOSPADM

## 2023-04-25 RX ORDER — ACETAMINOPHEN 325 MG/1
650 TABLET ORAL EVERY 4 HOURS PRN
Status: DISCONTINUED | OUTPATIENT
Start: 2023-04-25 | End: 2023-04-26 | Stop reason: HOSPADM

## 2023-04-25 RX ORDER — ZOLPIDEM TARTRATE 5 MG/1
10 TABLET ORAL NIGHTLY PRN
Status: DISCONTINUED | OUTPATIENT
Start: 2023-04-25 | End: 2023-04-26 | Stop reason: HOSPADM

## 2023-04-25 RX ORDER — PRAVASTATIN SODIUM 20 MG
20 TABLET ORAL DAILY
Status: DISCONTINUED | OUTPATIENT
Start: 2023-04-25 | End: 2023-04-26 | Stop reason: HOSPADM

## 2023-04-25 RX ORDER — CHOLECALCIFEROL (VITAMIN D3) 125 MCG
5 CAPSULE ORAL NIGHTLY PRN
Status: DISCONTINUED | OUTPATIENT
Start: 2023-04-25 | End: 2023-04-25

## 2023-04-25 RX ORDER — ONDANSETRON 4 MG/1
4 TABLET, FILM COATED ORAL EVERY 6 HOURS PRN
Status: DISCONTINUED | OUTPATIENT
Start: 2023-04-25 | End: 2023-04-26 | Stop reason: HOSPADM

## 2023-04-25 RX ORDER — AMLODIPINE BESYLATE 5 MG/1
10 TABLET ORAL DAILY
Status: DISCONTINUED | OUTPATIENT
Start: 2023-04-25 | End: 2023-04-26 | Stop reason: HOSPADM

## 2023-04-25 RX ORDER — SODIUM CHLORIDE 0.9 % (FLUSH) 0.9 %
10 SYRINGE (ML) INJECTION EVERY 12 HOURS SCHEDULED
Status: DISCONTINUED | OUTPATIENT
Start: 2023-04-25 | End: 2023-04-26 | Stop reason: HOSPADM

## 2023-04-25 RX ORDER — AMLODIPINE BESYLATE 10 MG/1
10 TABLET ORAL DAILY
Status: DISCONTINUED | OUTPATIENT
Start: 2023-04-26 | End: 2023-04-25

## 2023-04-25 RX ORDER — NITROGLYCERIN 0.4 MG/1
0.4 TABLET SUBLINGUAL
Status: DISCONTINUED | OUTPATIENT
Start: 2023-04-25 | End: 2023-04-26 | Stop reason: HOSPADM

## 2023-04-25 RX ORDER — PRAVASTATIN SODIUM 40 MG
20 TABLET ORAL DAILY
Status: DISCONTINUED | OUTPATIENT
Start: 2023-04-26 | End: 2023-04-25

## 2023-04-25 RX ORDER — ONDANSETRON 2 MG/ML
4 INJECTION INTRAMUSCULAR; INTRAVENOUS EVERY 6 HOURS PRN
Status: DISCONTINUED | OUTPATIENT
Start: 2023-04-25 | End: 2023-04-26 | Stop reason: HOSPADM

## 2023-04-25 RX ORDER — LEVOTHYROXINE SODIUM 175 UG/1
175 TABLET ORAL DAILY
Status: DISCONTINUED | OUTPATIENT
Start: 2023-04-26 | End: 2023-04-26 | Stop reason: HOSPADM

## 2023-04-25 RX ADMIN — Medication 10 ML: at 21:27

## 2023-04-25 RX ADMIN — PRAVASTATIN SODIUM 20 MG: 40 TABLET ORAL at 21:27

## 2023-04-25 RX ADMIN — AMLODIPINE BESYLATE 10 MG: 10 TABLET ORAL at 21:28

## 2023-04-25 RX ADMIN — LOSARTAN POTASSIUM 50 MG: 50 TABLET, FILM COATED ORAL at 21:28

## 2023-04-25 RX ADMIN — IOPAMIDOL 95 ML: 755 INJECTION, SOLUTION INTRAVENOUS at 17:50

## 2023-04-25 NOTE — Clinical Note
[facility] Obstetrics Postpartum Progress Note    , PPD#2,  @ 39w3d    S: Patient states she is doing well.  Still cramping with breast feeding, but tolerable. Perineum is sore, but bigger ice packs are helping. Eating and drinking without nausea/vomiting.  Ambulating without difficulty.  Voiding without difficulty.  Denies fevers/chills, dizziness, CP, SOB.  Breastfeeding.    O:  Patient Vitals for the past 24 hrs:   BP Temp Temp src Pulse Heart Rate Resp   05/10/20 2345 104/71 97.7  F (36.5  C) Oral 67 -- 16   05/10/20 1957 95/54 -- -- 82 -- 16   05/10/20 1953 -- 97.9  F (36.6  C) Oral -- -- --   05/10/20 1702 106/67 97.7  F (36.5  C) Oral -- 70 16   05/10/20 0923 106/69 97.7  F (36.5  C) Oral -- 87 18     Gen:  NAD  CV: regular rate and rhythm  Resp: CTAB, good air movement  Abd: soft, nondistended, nontender, fundus firm at 3cm below umbilicus  Ext: non-tender, trace pedal edema, no erythema    Hemoglobin   Date Value Ref Range Status   2020 11.4 (L) 11.7 - 15.7 g/dL Final   05/10/2020 11.0 (L) 11.7 - 15.7 g/dL Final       A/P: 30 year old  PPD#2 s/p , doing well postpartum    1. Heme: the patient had an admission Hgb of 13.3, a QBL of 690cc was associated with delivery.  PPD1 Hgb was 11.0.  Patient is not tachycardic or symptomatic.   2. Pain control: pain is well-controlled with Tylenol & Motrin, continue.  3. Rh positive  4. Rubella immune  5. Routine postpartum:    Encourage breastfeeding    Encourage ambulation    Continue regular diet    Continue bowel regimen    Contraception: condoms  6. Dispo: plan discharge likely today     Ayesha Miller MD  Obstetrics and Gynecology, PGY-3  May 11, 2020 , 8:00 AM     /71   Pulse 67   Temp 97.7  F (36.5  C) (Oral)   Resp 16   LMP 2019 (Exact Date)   SpO2 98%   Breastfeeding Unknown   Hemoglobin   Date Value Ref Range Status   2020 11.4 (L) 11.7 - 15.7 g/dL Final   ]    The patient was seen and examined by  Patient was given Post Procedure instruction by the staff. me separately from the team.  I have reviewed and agree with the above note.  She is feeling well, just nervous about going home as a first time mom.  Questions answered, reassurance given.  Discharge to home today as above.    Raisa Adam MD, FACOG

## 2023-04-25 NOTE — PROGRESS NOTES
[facility]      Patient Name: Durga Valenzuela  :1950  Age: 72 y.o.  Encounter Provider:  Shaka Arnett Jr, MD      Chief Complaint:   Chief Complaint   Patient presents with   • Coronary Artery Disease         Coronary Artery Disease  Pertinent negatives include no chest pain, dizziness, leg swelling or palpitations.     Durga Valenzuela is a 72 y.o. male past medical history of nonobstructive coronary artery disease, hypertension, dyslipidemia who presents for follow-up evaluation.      Last clinic visit note: Patient was evaluated by Dr. Deepthi Cline in 2017 and work-up for chest pain resulted in cardiac catheterization being performed.  He had a 50% mid LAD lesion with no other significant disease to speak of at the time.  He is fairly active gentleman with no chest pain or shortness of air.  No orthopnea, PND or edema.  No palpitations, dizziness or syncope.  Echocardiogram at the time showed normal left ventricular ejection fraction with grade 1 diastolic dysfunction and without significant valvular heart disease.  Aortic root was mildly dilated at 4.3 cm at the time.  Blood pressure and heart rate are well controlled today in clinic.  Patient is a non-smoker who denies alcohol or illicit drug use.  Family history was reviewed and is not pertinent to this clinic visit.    Patient had knee surgery in December.  He was on prophylactic dosing of 10 mg Xarelto for 30 days afterward per patient report.  He does have a history of clot formation with DVT and PE after orthopedic surgery in the past.  He is noting increased dyspnea on exertion and lower extremity edema.  He has occasional pleuritic type chest discomfort that has some temporal relationship to physical activity.  He has chronic stable orthopnea that he does not feel is increased over the past few weeks.  No palpitations, dizziness or syncope.  Social and family history was reviewed.    The following portions of the patient's  "history were reviewed and updated as appropriate: allergies, current medications, past family history, past medical history, past social history, past surgical history and problem list.      Review of Systems   Constitutional: Negative for chills and fever.   HENT: Negative for hoarse voice and sore throat.    Eyes: Negative for double vision and photophobia.   Cardiovascular: Negative for chest pain, leg swelling, near-syncope, orthopnea, palpitations, paroxysmal nocturnal dyspnea and syncope.   Respiratory: Negative for cough and wheezing.    Skin: Negative for poor wound healing and rash.   Musculoskeletal: Negative for arthritis and joint swelling.   Gastrointestinal: Negative for bloating, abdominal pain, hematemesis and hematochezia.   Neurological: Negative for dizziness and focal weakness.   Psychiatric/Behavioral: Negative for depression and suicidal ideas.       OBJECTIVE:   Vital Signs  There were no vitals filed for this visit.  Estimated body mass index is 41.56 kg/m² as calculated from the following:    Height as of this encounter: 185.4 cm (73\").    Weight as of this encounter: 143 kg (315 lb).    Vitals reviewed.   Constitutional:       Appearance: Healthy appearance. Not in distress.   Neck:      Vascular: No JVR. JVD normal.   Pulmonary:      Effort: Pulmonary effort is normal.      Breath sounds: Normal breath sounds. No wheezing. No rhonchi. No rales.   Chest:      Chest wall: Not tender to palpatation.   Cardiovascular:      PMI at left midclavicular line. Normal rate. Regular rhythm. Normal S1. Normal S2.      Murmurs: There is no murmur.      No gallop. No click. No rub.   Pulses:     Intact distal pulses.   Edema:     Peripheral edema absent.   Abdominal:      General: Bowel sounds are normal.      Palpations: Abdomen is soft.      Tenderness: There is no abdominal tenderness.   Musculoskeletal: Normal range of motion.         General: No tenderness. Skin:     General: Skin is warm and dry. "   Neurological:      General: No focal deficit present.      Mental Status: Alert and oriented to person, place and time.         Procedures          ASSESSMENT:     Nonobstructive coronary artery disease  Dyslipidemia  Hypertension  Dilated aortic root    PLAN OF CARE:     1. Nonobstructive coronary artery disease -50% mid LAD on cath in 2017.  No angina.  Continue aspirin, calcium channel blocker and statin.  LDL is at goal on recent lab surveillance.  2. Dyslipidemia  3. Hypertension  4. Dilated aortic root -stable size on echocardiogram in August 2022  5. Exertional dyspnea/chest pain -concern for PE given patient's past clinical history.  I am sending him over to the [facility] for stat labs.  If D-dimer is elevated we will send him to the ER for work-up and evaluation.  If testing is negative for thromboembolism we will plan for stress study and repeat echocardiogram in the near future.    Return to clinic 6 months             Discharge Medications          Accurate as of April 25, 2023  3:22 PM. If you have any questions, ask your nurse or doctor.            Changes to Medications      Instructions Start Date   aspirin 81 MG tablet  What changed:   · how much to take  · how to take this  · when to take this   One by mouth daily      clobetasol 0.05 % ointment  Commonly known as: TEMOVATE  What changed:   · how much to take  · how to take this  · when to take this   Apply twice a day as directed      Co Q-10 400 MG capsule  What changed:   · how much to take  · how to take this  · when to take this   Take 1 p.o. daily with food      pravastatin 20 MG tablet  Commonly known as: PRAVACHOL  What changed:   · how much to take  · how to take this  · when to take this   Take 1 p.o. daily for high cholesterol      Synthroid 175 MCG tablet  Generic drug: levothyroxine  What changed: See the new instructions.   TAKE ONE TABLET BY MOUTH DAILY FOR LOW THYROID      zolpidem 10 MG tablet  Commonly known as: AMBIEN  What changed:    · how much to take  · when to take this  · reasons to take this   Take 1 p.o. nightly as needed for insomnia         Continue These Medications      Instructions Start Date   amLODIPine 10 MG tablet  Commonly known as: NORVASC   TAKE ONE TABLET BY MOUTH DAILY      cephalexin 500 MG capsule  Commonly known as: KEFLEX   500 mg, Oral, Every 12 Hours      ezetimibe 10 MG tablet  Commonly known as: Zetia   Take 1 p.o. daily for high cholesterol      FLUoxetine 60 MG tablet  Commonly known as: PROzac   60 mg, Oral, Daily      hydrOXYzine 25 MG tablet  Commonly known as: ATARAX   TAKE ONE TABLET BY MOUTH THREE TIMES A DAY AS NEEDED FOR ITCHING      losartan 50 MG tablet  Commonly known as: COZAAR   TAKE ONE TABLET BY MOUTH DAILY      ondansetron 4 MG tablet  Commonly known as: Zofran   4 mg, Oral, Every 8 Hours PRN      oxyCODONE-acetaminophen  MG per tablet  Commonly known as: Percocet   1 tablet, Oral, Every 4 Hours PRN      vitamin D3 125 MCG (5000 UT) capsule capsule   1 by mouth daily as directed      Xarelto 10 MG tablet  Generic drug: rivaroxaban   Take 1 tablet by mouth Daily. Take one Aspirin (normal med) while on Xarelto. Once Xarelto is finished, take 2 Aspirin Daily for 4 weeks.  Then resume home aspirin once daily.             Thank you for allowing me to participate in the care of your patient,      Sincerely,   Shaka Arnett Jr, MD  [facility]  04/25/23  15:22 EDT

## 2023-04-25 NOTE — Clinical Note
Manual pressure applied to vessel. Manual pressure was held by LM. Manual pressure was held for 5 min. Hemostasis achieved successfully. Closure device additional comment: tensoplast

## 2023-04-25 NOTE — PROGRESS NOTES
MD ATTESTATION NOTE    The ALICIA and I have discussed this patient's history, physical exam, and treatment plan.  I have reviewed the documentation and personally had a face to face interaction with the patient. I affirm the documentation and agree with the treatment and plan.  The attached note describes my personal findings.      I provided a substantive portion of the care of the patient.  I personally performed the physical exam in its entirety, and below are my findings.  For this patient encounter, the patient wore surgical mask, I wore full protective PPE including N95 and eye protection.      Brief HPI: Patient presents for evaluation of chest pain and exertional dyspnea.  Patient states he has had exertional dyspnea over the last month.  Patient states he has had some chest tightness with this as well.  States it might be getting little bit worse.  Patient was seen in cardiology office today and sent down to the emergency department for further evaluation and management.  Patient is had no lower extremity swelling.  Has had DVT in the past.  Patient was seen in the emergency department and had CT scan of the chest that was negative, troponin of 24.  Patient's EKG nonacute.  Patient admitted to the observation unit for further evaluation and management.    PHYSICAL EXAM  ED Triage Vitals   Temp Heart Rate Resp BP SpO2   04/25/23 1556 04/25/23 1556 04/25/23 1556 04/25/23 1601 04/25/23 1556   97.5 °F (36.4 °C) 61 18 127/86 95 %      Temp src Heart Rate Source Patient Position BP Location FiO2 (%)   -- -- -- -- --                GENERAL: no acute distress  HENT: nares patent  EYES: no scleral icterus  CV: regular rhythm, normal rate  RESPIRATORY: normal effort  ABDOMEN: soft  MUSCULOSKELETAL: no deformity  NEURO: alert, moves all extremities, follows commands  PSYCH:  calm, cooperative  SKIN: warm, dry    Vital signs and nursing notes reviewed.        Plan: Patient will be admitted to the observation unit.   Cardiology consult.

## 2023-04-25 NOTE — ED NOTES
Pt to er sent from cardiologist with c/o chest discomfort and SOBx1 last month. Pt has hx of PEs not on a blood thinner.

## 2023-04-25 NOTE — ED PROVIDER NOTES
EMERGENCY DEPARTMENT ENCOUNTER    Room Number:  17/17  Date of encounter:  4/25/2023  PCP: Florian Burt MD  Historian: Patient    Patient was placed in face mask during triage process. Patient was wearing facemask when I entered the room and throughout our encounter. I wore full protective equipment throughout this patient encounter including a face mask, eye protection, and gloves. Hand hygiene was performed before donning protective equipment and again following doffing of PPE after leaving the room.    HPI:  Chief Complaint: Dyspnea  A complete HPI/ROS/PMH/PSH/SH/FH are unobtainable due to: N/A   Context: Durga Valenzuela is a 72 y.o. male who presents to the ED c/o dyspnea on exertion progressive over 1 month with occasional associated chest tightness.  Patient has had a mild nonproductive cough during this time with no reported fevers or hemoptysis.  Symptoms generally resolve with rest.  He notes that now even walking around the yard seems to make him short of breath.  He is several months status post left knee replacement from December 2022.  He has a remote history of DVT in 2017 but has not been chronically anticoagulated recently.  Otherwise, the patient reports has been doing well with no fevers, nausea, vomiting, diarrhea, black or bloody stools, dysuria or hematuria.      MEDICAL HISTORY REVIEW  EMR reviewed:    Cardiology office note from Dr. Arnett today reviewed: Patient has a history of nonobstructive coronary disease on catheterization in 2017.    PAST MEDICAL HISTORY  Active Ambulatory Problems     Diagnosis Date Noted   • Post traumatic stress disorder (PTSD) 02/25/2016   • Benign prostatic hyperplasia with urinary frequency 02/25/2016   • Chronic insomnia 02/25/2016   • Lumbar degenerative disc disease 02/25/2016   • Impaired fasting glucose 02/25/2016   • Benign essential hypertension 02/25/2016   • Primary osteoarthritis involving multiple joints 02/25/2016   • Hyperlipidemia  02/25/2016   • Primary hypothyroidism 02/25/2016   • Obstructive sleep apnea, tolerates CPAP well.  Previously failed oral appliance. 02/25/2016   • History of pulmonary embolism 06/10/2016   • Non-occlusive coronary artery disease, 05/16/2017--normal LM; proximal LAD mild LI; 50% mid LAD.  Mild distal LAD LI; normal Cx.  Mild LI marginal branches; normal RCA, nondominant.  EF 60%. 07/24/2017   • Therapeutic drug monitoring 08/21/2018   • Vitamin D deficiency 08/21/2018   • Psoriasis 09/17/2014   • Allergic rhinitis 08/21/2018   • Depression with anxiety 08/21/2018   • Multiple environmental allergies 08/21/2018   • Bilateral lower extremity edema 08/21/2018   • Morbid obesity (HCC) 10/19/2018   • Spinal stenosis of lumbar region with neurogenic claudication 02/15/2021   • Traumatic complete tear of left rotator cuff 03/17/2021   • Urinary incontinence without sensory awareness 03/17/2021   • Bipolar disorder, in full remission, most recent episode mixed 09/30/2021   • Weakness of both lower extremities 10/04/2022   • Failed total knee, left, sequela 12/05/2022     Resolved Ambulatory Problems     Diagnosis Date Noted   • Dyspnea on exertion 03/29/2017   • Cervical strain, acute, initial encounter 03/23/2018   • Acute post-traumatic headache 05/04/2018   • Closed nondisplaced lateral mass fracture of first cervical vertebra with routine healing 05/09/2018   • Cervical spine fracture 05/09/2018   • History of cervical fracture, C1 lateral mass 07/03/2018   • Closed traumatic lateral subluxation of patellofemoral joint, right, initial encounter 08/13/2018   • Primary osteoarthritis of right knee 08/21/2018   • History of deep venous thrombosis (DVT) of distal vein of right lower extremity 08/21/2018   • Trigger ring finger of left hand 08/21/2018   • Left carpal tunnel syndrome 09/05/2018   • Cubital tunnel syndrome on left 09/05/2018   • Screen for colon cancer 01/31/2019   • Acute recurrent maxillary sinusitis  02/11/2019   • Screen for colon cancer 05/30/2019   • Acute bronchitis 03/16/2020   • Acute right-sided low back pain with right-sided sciatica 01/13/2021   • Acute bronchitis with bronchospasm 04/07/2022     Past Medical History:   Diagnosis Date   • Affective psychosis, bipolar 09/30/2021   • Elevated cholesterol    • History of pneumonia    • Hyperlipidemia 02/25/2016   • Morbidly obese 10/19/2018         PAST SURGICAL HISTORY  Past Surgical History:   Procedure Laterality Date   • CARDIAC CATHETERIZATION N/A 5/16/2017    Procedure: Coronary angiography;  Surgeon: Malcolm Chen MD;  Location:  [facility];  Service:    • CARDIAC CATHETERIZATION N/A 5/16/2017    Procedure: Left Heart Cath;  Surgeon: Malcolm Chen MD;  Location:  [facility];  Service:    • CARDIAC CATHETERIZATION N/A 5/16/2017    Procedure: Left ventriculography;  Surgeon: Malcolm Chen MD;  Location:  [facility];  Service:    • COLONOSCOPY  2005 2005--colonoscopy reportedly normal.  Records not available.   • COLONOSCOPY N/A 6/13/2019    Procedure: COLONOSCOPY INTO CECUM WITH COLD BIOPSY POLYPECTOMY;  Surgeon: Ayaan Gallardo MD;  Location: [facility] ENDOSCOPY;  Service: General   • LUMBAR DECOMPRESSION  2007 2007--lumbar decompression without fusion or hardware.   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   • REVISION TOTAL KNEE ARTHROPLASTY Right 09/17/2018 09/17/2018--right total knee arthroplasty revision due to lateral subluxation of the patella due to multiple factors.   • TOTAL KNEE ARTHROPLASTY Left 11/03/2017 11/03/2017--right total knee replacement complicated by  patellofemoral subluxation.   • TOTAL KNEE ARTHROPLASTY Left 06/2014 June 2014--left total knee replacement.   • TOTAL KNEE ARTHROPLASTY REVISION Left 12/5/2022    Procedure: LEFT TOTAL KNEE REVISION Complex with Patella Tendon Reconstruction;  Surgeon: Sid Simon MD;  Location: [facility];  Service: Orthopedics;  Laterality: Left;         FAMILY HISTORY  Family History   Problem Relation Age of Onset   • Coronary artery disease Mother         Status post CABG   • Alzheimer's disease Mother    • Stroke Father    • Liver cancer Father         Father with biliary cancer including gallbladder   • No Known Problems Sister    • No Known Problems Brother          SOCIAL HISTORY  Social History     Socioeconomic History   • Marital status:      Spouse name: italo   • Highest education level: Bachelor's degree (e.g., BA, AB, BS)   Tobacco Use   • Smoking status: Never   • Smokeless tobacco: Never   • Tobacco comments:     CAFFEINE USE 2 CUPS COFFEE AND 1 GLASS TEA DAILY   Vaping Use   • Vaping Use: Never used   Substance and Sexual Activity   • Alcohol use: No   • Drug use: Yes     Types: Marijuana     Comment: weekly   • Sexual activity: Yes     Partners: Female         ALLERGIES  Hydrocodone and Zocor  [simvastatin]        REVIEW OF SYSTEMS  Review of Systems     All systems reviewed and negative except for those discussed in HPI.       PHYSICAL EXAM    I have reviewed the triage vital signs and nursing notes.    ED Triage Vitals   Temp Heart Rate Resp BP SpO2   04/25/23 1556 04/25/23 1556 04/25/23 1556 04/25/23 1601 04/25/23 1556   97.5 °F (36.4 °C) 61 18 127/86 95 %      Temp src Heart Rate Source Patient Position BP Location FiO2 (%)   -- -- -- -- --              Physical Exam    Physical Exam   Constitutional: No distress.  Very pleasant and nontoxic.  Speaks in full sentences.  HENT:  Head: Normocephalic and atraumatic.   Oropharynx: Mucous membranes are moist.   Eyes: No scleral icterus. No  conjunctival pallor.  Neck: Painless range of motion noted. Neck supple.   Cardiovascular: Normal rate, regular rhythm and intact distal pulses.  Pulmonary/Chest: No respiratory distress. There are no wheezes, no rhonchi, and no rales.   Abdominal: Soft. There is no tenderness. There is no rebound and no guarding.   Musculoskeletal: Moves all extremities equally.  No calf tenderness or erythema appreciated.    Neurological: Alert.  Baseline strength and sensation noted.   Skin: Skin is pink, warm, and dry. No pallor.   Psychiatric: Mood and affect normal.   Nursing note and vitals reviewed.    LAB RESULTS  Recent Results (from the past 24 hour(s))   D-dimer, Quantitative    Collection Time: 04/25/23  3:08 PM    Specimen: Blood   Result Value Ref Range    D-Dimer, Quantitative 0.87 (H) 0.00 - 0.73 MCGFEU/mL   Basic Metabolic Panel    Collection Time: 04/25/23  3:08 PM    Specimen: Blood   Result Value Ref Range    Glucose 112 (H) 65 - 99 mg/dL    BUN 19 8 - 23 mg/dL    Creatinine 0.98 0.76 - 1.27 mg/dL    Sodium 140 136 - 145 mmol/L    Potassium 4.2 3.5 - 5.2 mmol/L    Chloride 104 98 - 107 mmol/L    CO2 23.3 22.0 - 29.0 mmol/L    Calcium 9.1 8.6 - 10.5 mg/dL    BUN/Creatinine Ratio 19.4 7.0 - 25.0    Anion Gap 12.7 5.0 - 15.0 mmol/L    eGFR 81.4 >60.0 mL/min/1.73   CBC Auto Differential    Collection Time: 04/25/23  3:08 PM    Specimen: Blood   Result Value Ref Range    WBC 5.83 3.40 - 10.80 10*3/mm3    RBC 4.41 4.14 - 5.80 10*6/mm3    Hemoglobin 12.2 (L) 13.0 - 17.7 g/dL    Hematocrit 37.7 37.5 - 51.0 %    MCV 85.5 79.0 - 97.0 fL    MCH 27.7 26.6 - 33.0 pg    MCHC 32.4 31.5 - 35.7 g/dL    RDW 15.2 12.3 - 15.4 %    RDW-SD 47.0 37.0 - 54.0 fl    MPV 9.3 6.0 - 12.0 fL    Platelets 218 140 - 450 10*3/mm3    Neutrophil % 62.0 42.7 - 76.0 %    Lymphocyte % 20.9 19.6 - 45.3 %    Monocyte % 11.1 5.0 - 12.0 %    Eosinophil % 5.1 0.3 - 6.2 %    Basophil % 0.7 0.0 - 1.5 %    Immature Grans % 0.2 0.0 - 0.5 %    Neutrophils,  Absolute 3.61 1.70 - 7.00 10*3/mm3    Lymphocytes, Absolute 1.22 0.70 - 3.10 10*3/mm3    Monocytes, Absolute 0.65 0.10 - 0.90 10*3/mm3    Eosinophils, Absolute 0.30 0.00 - 0.40 10*3/mm3    Basophils, Absolute 0.04 0.00 - 0.20 10*3/mm3    Immature Grans, Absolute 0.01 0.00 - 0.05 10*3/mm3    nRBC 0.0 0.0 - 0.2 /100 WBC   Comprehensive Metabolic Panel    Collection Time: 04/25/23  3:08 PM    Specimen: Blood   Result Value Ref Range    Glucose 111 (H) 65 - 99 mg/dL    BUN 20 8 - 23 mg/dL    Creatinine 1.06 0.76 - 1.27 mg/dL    Sodium 141 136 - 145 mmol/L    Potassium 4.2 3.5 - 5.2 mmol/L    Chloride 106 98 - 107 mmol/L    CO2 22.7 22.0 - 29.0 mmol/L    Calcium 9.1 8.6 - 10.5 mg/dL    Total Protein 6.7 6.0 - 8.5 g/dL    Albumin 4.3 3.5 - 5.2 g/dL    ALT (SGPT) 17 1 - 41 U/L    AST (SGOT) 19 1 - 40 U/L    Alkaline Phosphatase 73 39 - 117 U/L    Total Bilirubin 0.5 0.0 - 1.2 mg/dL    Globulin 2.4 gm/dL    A/G Ratio 1.8 g/dL    BUN/Creatinine Ratio 18.9 7.0 - 25.0    Anion Gap 12.3 5.0 - 15.0 mmol/L    eGFR 74.1 >60.0 mL/min/1.73   BNP    Collection Time: 04/25/23  3:08 PM    Specimen: Blood   Result Value Ref Range    proBNP 31.0 0.0 - 900.0 pg/mL   Single High Sensitivity Troponin T    Collection Time: 04/25/23  3:08 PM    Specimen: Blood   Result Value Ref Range    HS Troponin T 24 (H) <15 ng/L   Green Top (Gel)    Collection Time: 04/25/23  4:05 PM   Result Value Ref Range    Extra Tube Hold for add-ons.    Lavender Top    Collection Time: 04/25/23  4:05 PM   Result Value Ref Range    Extra Tube hold for add-on    Gold Top - SST    Collection Time: 04/25/23  4:05 PM   Result Value Ref Range    Extra Tube Hold for add-ons.    Light Blue Top    Collection Time: 04/25/23  4:05 PM   Result Value Ref Range    Extra Tube Hold for add-ons.    CBC Auto Differential    Collection Time: 04/25/23  4:05 PM    Specimen: Blood   Result Value Ref Range    WBC 5.47 3.40 - 10.80 10*3/mm3    RBC 4.34 4.14 - 5.80 10*6/mm3    Hemoglobin  12.2 (L) 13.0 - 17.7 g/dL    Hematocrit 36.7 (L) 37.5 - 51.0 %    MCV 84.6 79.0 - 97.0 fL    MCH 28.1 26.6 - 33.0 pg    MCHC 33.2 31.5 - 35.7 g/dL    RDW 14.8 12.3 - 15.4 %    RDW-SD 45.1 37.0 - 54.0 fl    MPV 9.3 6.0 - 12.0 fL    Platelets 215 140 - 450 10*3/mm3    Neutrophil % 62.2 42.7 - 76.0 %    Lymphocyte % 20.1 19.6 - 45.3 %    Monocyte % 11.3 5.0 - 12.0 %    Eosinophil % 5.3 0.3 - 6.2 %    Basophil % 0.7 0.0 - 1.5 %    Immature Grans % 0.4 0.0 - 0.5 %    Neutrophils, Absolute 3.40 1.70 - 7.00 10*3/mm3    Lymphocytes, Absolute 1.10 0.70 - 3.10 10*3/mm3    Monocytes, Absolute 0.62 0.10 - 0.90 10*3/mm3    Eosinophils, Absolute 0.29 0.00 - 0.40 10*3/mm3    Basophils, Absolute 0.04 0.00 - 0.20 10*3/mm3    Immature Grans, Absolute 0.02 0.00 - 0.05 10*3/mm3    nRBC 0.0 0.0 - 0.2 /100 WBC   D-dimer, Quantitative    Collection Time: 04/25/23  4:05 PM    Specimen: Blood   Result Value Ref Range    D-Dimer, Quantitative 0.79 (H) 0.00 - 0.73 MCGFEU/mL   ECG 12 Lead ED Triage Standing Order; SOA    Collection Time: 04/25/23  4:17 PM   Result Value Ref Range    QT Interval 497 ms       Ordered the above labs and independently reviewed the results.        RADIOLOGY  XR Chest 2 View    Result Date: 4/25/2023  EXAMINATION: 2 VIEWS OF THE CHEST  HISTORY: 72-year-old male with history of shortness of air  FINDINGS: PA and lateral chest radiograph were obtained. Comparison is made to a chest radiograph dated 05/18/2022. The lungs are mildly hyperinflated but are clear of consolidation. The cardiomediastinal silhouette is within normal limits. Moderate multilevel mid thoracic spine osteophytic change is noted.      There is mild pulmonary hyperinflation. Clinical correlation for any history of COPD is recommended. This may be due to a prominent inspiratory effort.  This report was finalized on 4/25/2023 5:51 PM by Dr. Boby Montero M.D.      CT Angiogram Chest Pulmonary Embolism    Result Date: 4/25/2023  EXAMINATION: CT  ANGIOGRAM OF THE CHEST WITH CONTRAST  HISTORY: 72-year-old male status post recent orthopedic surgery with dyspnea  TECHNIQUE: Contiguous axial images were obtained through the chest following bolus intravenous administration of nonionic contrast. Three-D reformatted images were produced and presented for interpretation.  COMPARISON: CT of the chest, 08/04/2022  FINDINGS: No filling defects are seen within the pulmonary to a system to the level of the subsegmental pulmonary arteries. The heart and great vessels have a normal appearance. The RV to LV ratio is less than 1. There is mild ground glass opacification at the base of the left lower lobe with mild crowding of the vascular markings. The right lung is clear of consolidation. Mild subpleural opacification within the posterior aspect of the right upper lobe is noted. Moderate multilevel osteophytic change is seen throughout the thoracic spine. Moderate atheromatous calcification of the coronary circulation is noted. The visualized structures of the upper abdomen appear normal.      1. There is no evidence for a pulmonary embolism. . 2. There is mild atelectasis versus developing pneumonia in the left lower lobe and to a lesser degree in the right upper lobe.  Radiation dose reduction techniques were utilized, including automated exposure control and exposure modulation based on body size.         I ordered the above noted radiological studies. Reviewed by me and discussed with radiologist.  See dictation for official radiology interpretation.      PROCEDURES    Procedures        MEDICATIONS GIVEN IN ER    Medications   sodium chloride 0.9 % flush 10 mL (has no administration in time range)   iopamidol (ISOVUE-370) 76 % injection 100 mL (95 mL Intravenous Given 4/25/23 1750)         PROGRESS, DATA ANALYSIS, CONSULTS, AND MEDICAL DECISION MAKING    My differential diagnosis for dyspnea includes but is not limited to:  Asthma, COPD, pneumonia, pulmonary embolus,  acute respiratory distress syndrome, pneumothorax, pleural effusion, pulmonary fibrosis, congestive heart failure, myocardial infarction, DKA, uremia, acidosis, sepsis, anemia, drug related, hyperventilation, CNS disease    HEART SCORE:    History #1  (Highly suspicious 2, Moderately suspicious 1, Slightly or non-suspicious 0)    ECG #0  (Significant ST depression 2,  Nonspecific repol disturbance 1, Normal 0)    Age #2  (> or = 65 2, 46-65 1,  < or = 45 0)    Risk factors #2  (hypercholesterolemia, HTN, DM, smoking, pos fam hx, obesity)  (> or = to 3 RF 2, 1 or 2 1, No risk factors 0)    Troponin #1  (> or = 3x normal limit 2, 1-3x normal limit 1, < or = Normal limit 0)    HEART Score Key:  Scores 0-3: 0.9-1.7% risk of adverse cardiac event. In the HEART Score study, these patients were discharged (0.99% in the retrospective study, 1.7% in the prospective study)  Scores 4-6: 12-16.6% risk of adverse cardiac event. In the HEART Score study, these patients were admitted to the hospital. (11.6% retrospective, 16.6% prospective)  Scores ?7: 50-65% risk of adverse cardiac event. In the HEART Score study, these patients were candidates for early invasive measures. (65.2% retrospective, 50.1% prospective)      This patient's HEART score is 6      All labs have been independently reviewed by me.  All radiology studies have been reviewed by me and discussed with radiologist dictating the report.   EKG's independently viewed and interpreted by me.  Discussion below represents my analysis of pertinent findings related to patient's condition, differential diagnosis, treatment plan and final disposition.      ED Course as of 04/25/23 1856   Tue Apr 25, 2023   1730 CONSULT        Provider: Dr. Davey-cardiology    Discussion: It only before the patient's arrival and we discussed the patient's history and concerns.  From cardiology standpoint he is low level suspicion for coronary vascular disease causing the patient's dyspnea.   Patient has a history of PEs, is not on a blood thinner, and had recent orthopedic surgery.  His concern was related to possibility of recurrent PE.  Sent to the ED for further evaluation.    Agreeable c treatment and planned disposition.         [RS]   1731 proBNP: 31.0 [RS]   1731 HS Troponin T(!): 24 [RS]   1731 D-Dimer, Quant(!): 0.79 [RS]   1731 Glucose(!): 111 [RS]   1731 BUN: 20 [RS]   1731 Creatinine: 1.06 [RS]   1731 Potassium: 4.2 [RS]   1731 WBC: 5.47 [RS]   1731 Hemoglobin(!): 12.2 [RS]   1731 Platelets: 215 [RS]   1731 RADIOLOGY      Study: Two-view chest  Findings: No pneumothorax or focal infiltrate  I independently viewed and interpreted these images contemporaneously with treatment.    [RS]   1731 EKG           EKG time: 1617  Rhythm/Rate: Sinus bradycardia, 50-60  P waves and MI: JERRELL within normal limits  QRS, axis: Narrow complex  ST and T waves: No STEMI; QTc within normal limits    Interpreted Contemporaneously by me, independently viewed  Comparison: 6/15/2022-no acute change   [RS]   1856 CONSULT        Provider: SHIRA GONGORA    Discussion: Reviewed patient history, ED presentation and evaluation as well as prior discussion with cardiology.  Agreeable except patient to the ED observation unit for further evaluation and treatment.    Agreeable c treatment and planned disposition.         [RS]   1856 Patient updated with findings and concerns as well as recommendation for admission.  He is agreeable. [RS]      ED Course User Index  [RS] Ricky Morales MD       AS OF 18:56 EDT VITALS:    BP - 141/81  HR - 60  TEMP - 97.5 °F (36.4 °C)  O2 SATS - 97%        DIAGNOSIS  Final diagnoses:   Exertional dyspnea   Chest tightness   History of maternal deep vein thrombosis (DVT)   History of CAD (coronary artery disease)         DISPOSITION  ADMISSION    Discussed treatment plan and reason for admission with pt/family and admitting physician.  Pt/family voiced understanding of the plan for admission  for further testing/treatment as needed.          Ricky Morales MD  04/25/23 2380

## 2023-04-25 NOTE — ED NOTES
Nursing report ED to floor  Durga Valenzuela  72 y.o.  male    HPI :   Chief Complaint   Patient presents with    Shortness of Breath       Admitting doctor:   José Miguel Espinoza MD    Admitting diagnosis:   The primary encounter diagnosis was Exertional dyspnea. Diagnoses of Chest tightness, History of maternal deep vein thrombosis (DVT), and History of CAD (coronary artery disease) were also pertinent to this visit.    Code status:   Current Code Status       Date Active Code Status Order ID Comments User Context       4/25/2023 1859 CPR (Attempt to Resuscitate) [id number]  [facility]        Question Answer    Code Status (Patient has no pulse and is not breathing) CPR (Attempt to Resuscitate)    Medical Interventions (Patient has pulse or is breathing) Full Support    Level Of Support Discussed With Patient                    Allergies:   Hydrocodone and Zocor  [simvastatin]    Isolation:   No active isolations    Intake and Output  No intake or output data in the 24 hours ending 04/25/23 1930    Weight:       04/25/23  1600   Weight: (!) 143 kg (315 lb)       Most recent vitals:   Vitals:    04/25/23 1846 04/25/23 1847 04/25/23 1859 04/25/23 1916   BP: 146/85   131/74   Pulse:  52 52 51   Resp:       Temp:       SpO2: 97% 95% 97% 96%   Weight:       Height:           Active LDAs/IV Access:   Lines, Drains & Airways       Active LDAs       Name Placement date Placement time Site Days    Peripheral IV 04/25/23 1739 Left Antecubital 04/25/23  1739  Antecubital  less than 1    Closed/Suction Drain 1 Left Knee Accordion 15 Fr. 12/05/22  1132  Knee  141                    Labs (abnormal labs have a star):   Labs Reviewed   COMPREHENSIVE METABOLIC PANEL - Abnormal; Notable for the following components:       Result Value    Glucose 111 (*)     All other components within normal limits    Narrative:     GFR Normal >60  Chronic Kidney Disease <60  Kidney Failure <15    The GFR formula is only valid for adults  "with stable renal function between ages 18 and 70.   SINGLE HSTROPONIN T - Abnormal; Notable for the following components:    HS Troponin T 24 (*)     All other components within normal limits    Narrative:     High Sensitive Troponin T Reference Range:  <10.0 ng/L- Negative Female for AMI  <15.0 ng/L- Negative Male for AMI  >=10 - Abnormal Female indicating possible myocardial injury.  >=15 - Abnormal Male indicating possible myocardial injury.   Clinicians would have to utilize clinical acumen, EKG, Troponin, and serial changes to determine if it is an Acute Myocardial Infarction or myocardial injury due to an underlying chronic condition.        CBC WITH AUTO DIFFERENTIAL - Abnormal; Notable for the following components:    Hemoglobin 12.2 (*)     Hematocrit 36.7 (*)     All other components within normal limits   D-DIMER, QUANTITATIVE - Abnormal; Notable for the following components:    D-Dimer, Quantitative 0.79 (*)     All other components within normal limits    Narrative:     According to the assay 's published package insert, a normal (<0.50 MCGFEU/mL) D-dimer result in conjunction with a non-high clinical probability assessment, excludes deep vein thrombosis (DVT) and pulmonary embolism (PE) with high sensitivity.    D-dimer values increase with age and this can make VTE exclusion of an older population difficult. To address this, the [employer], based on best available evidence and recent guidelines, recommends that clinicians use age-adjusted D-dimer thresholds in patients greater than 50 years of age with: a) a low probability of PE who do not meet all Pulmonary Embolism Rule Out Criteria, or b) in those with intermediate probability of PE.   The formula for an age-adjusted D-dimer cut-off is \"age/100\".  For example, a 60 year old patient would have an age-adjusted cut-off of 0.60 MCGFEU/mL and an 80 year old 0.80 MCGFEU/mL.   BNP (IN-HOUSE) - Normal    Narrative:     " Among patients with dyspnea, NT-proBNP is highly sensitive for the detection of acute congestive heart failure. In addition NT-proBNP of <300 pg/ml effectively rules out acute congestive heart failure with 99% negative predictive value.    Results may be falsely decreased if patient taking Biotin.     RAINBOW DRAW    Narrative:     The following orders were created for panel order [place] Draw.  Procedure                               Abnormality         Status                     ---------                               -----------         ------                     Green Top (Gel)[[id number]]                                  Final result               Lavender Top[[id number]]                                     Final result               Gold Top - SST[[id number]]                                   Final result               Light Blue Top[[id number]]                                   Final result                 Please view results for these tests on the individual orders.   TROPONIN   CBC AND DIFFERENTIAL    Narrative:     The following orders were created for panel order CBC & Differential.  Procedure                               Abnormality         Status                     ---------                               -----------         ------                     CBC Auto Differential[[id number]]        Abnormal            Final result                 Please view results for these tests on the individual orders.   GREEN TOP   LAVENDER TOP   GOLD TOP - SST   LIGHT BLUE TOP       EKG:   ECG 12 Lead ED Triage Standing Order; SOA   Preliminary Result   HEART RATE= 52  bpm   RR Interval= 1154  ms   CA Interval= 180  ms   P Horizontal Axis= -28  deg   P Front Axis= 45  deg   QRSD Interval= 110  ms   QT Interval= 497  ms   QRS Axis= -15  deg   T Wave Axis= 47  deg   - OTHERWISE NORMAL ECG -   Sinus rhythm   Borderline left axis deviation   Abnormal R-wave progression, early transition   Electronically Signed By:    Date and  Time of Study: 2023-04-25 16:17:16          Meds given in ED:   Medications   sodium chloride 0.9 % flush 10 mL (has no administration in time range)   sodium chloride 0.9 % flush 10 mL (has no administration in time range)   sodium chloride 0.9 % flush 10 mL (has no administration in time range)   sodium chloride 0.9 % infusion 40 mL (has no administration in time range)   ondansetron (ZOFRAN) tablet 4 mg (has no administration in time range)     Or   ondansetron (ZOFRAN) injection 4 mg (has no administration in time range)   nitroglycerin (NITROSTAT) SL tablet 0.4 mg (has no administration in time range)   acetaminophen (TYLENOL) tablet 650 mg (has no administration in time range)   melatonin tablet 5 mg (has no administration in time range)   iopamidol (ISOVUE-370) 76 % injection 100 mL (95 mL Intravenous Given 4/25/23 1750)       Imaging results:  XR Chest 2 View    Result Date: 4/25/2023  There is mild pulmonary hyperinflation. Clinical correlation for any history of COPD is recommended. This may be due to a prominent inspiratory effort.  This report was finalized on 4/25/2023 5:51 PM by Dr. Boby Montero M.D.      CT Angiogram Chest Pulmonary Embolism    Result Date: 4/25/2023  1. There is no evidence for a pulmonary embolism. . 2. There is mild atelectasis versus developing pneumonia in the left lower lobe and to a lesser degree in the right upper lobe.  Radiation dose reduction techniques were utilized, including automated exposure control and exposure modulation based on body size.        Ambulatory status:   - up ad mirian    Social issues:   Social History     Socioeconomic History    Marital status:      Spouse name: italo    Highest education level: Bachelor's degree (e.g., BA, AB, BS)   Tobacco Use    Smoking status: Never    Smokeless tobacco: Never    Tobacco comments:     CAFFEINE USE 2 CUPS COFFEE AND 1 GLASS TEA DAILY   Vaping Use    Vaping Use: Never used   Substance and Sexual Activity     Alcohol use: No    Drug use: Yes     Types: Marijuana     Comment: weekly    Sexual activity: Yes     Partners: Female       NIH Stroke Scale:         Socorro Chin RN  04/25/23 19:30 EDT

## 2023-04-26 ENCOUNTER — READMISSION MANAGEMENT (OUTPATIENT)
Dept: CALL CENTER | Facility: HOSPITAL | Age: 73
End: 2023-04-26
Payer: MEDICARE

## 2023-04-26 VITALS
WEIGHT: 315 LBS | HEIGHT: 73 IN | SYSTOLIC BLOOD PRESSURE: 119 MMHG | HEART RATE: 49 BPM | BODY MASS INDEX: 41.75 KG/M2 | DIASTOLIC BLOOD PRESSURE: 65 MMHG | RESPIRATION RATE: 18 BRPM | OXYGEN SATURATION: 97 % | TEMPERATURE: 98 F

## 2023-04-26 LAB
ANION GAP SERPL CALCULATED.3IONS-SCNC: 10.5 MMOL/L (ref 5–15)
BUN SERPL-MCNC: 19 MG/DL (ref 8–23)
BUN/CREAT SERPL: 19.6 (ref 7–25)
CALCIUM SPEC-SCNC: 9.2 MG/DL (ref 8.6–10.5)
CHLORIDE SERPL-SCNC: 105 MMOL/L (ref 98–107)
CO2 SERPL-SCNC: 24.5 MMOL/L (ref 22–29)
CREAT SERPL-MCNC: 0.97 MG/DL (ref 0.76–1.27)
DEPRECATED RDW RBC AUTO: 47 FL (ref 37–54)
EGFRCR SERPLBLD CKD-EPI 2021: 82.4 ML/MIN/1.73
ERYTHROCYTE [DISTWIDTH] IN BLOOD BY AUTOMATED COUNT: 15.1 % (ref 12.3–15.4)
GLUCOSE SERPL-MCNC: 126 MG/DL (ref 65–99)
HCT VFR BLD AUTO: 39.5 % (ref 37.5–51)
HGB BLD-MCNC: 12.5 G/DL (ref 13–17.7)
MCH RBC QN AUTO: 27.4 PG (ref 26.6–33)
MCHC RBC AUTO-ENTMCNC: 31.6 G/DL (ref 31.5–35.7)
MCV RBC AUTO: 86.6 FL (ref 79–97)
PLATELET # BLD AUTO: 207 10*3/MM3 (ref 140–450)
PMV BLD AUTO: 9.7 FL (ref 6–12)
POTASSIUM SERPL-SCNC: 4.2 MMOL/L (ref 3.5–5.2)
QT INTERVAL: 483 MS
QT INTERVAL: 497 MS
RBC # BLD AUTO: 4.56 10*6/MM3 (ref 4.14–5.8)
SODIUM SERPL-SCNC: 140 MMOL/L (ref 136–145)
TROPONIN T SERPL HS-MCNC: 20 NG/L
WBC NRBC COR # BLD: 5.17 10*3/MM3 (ref 3.4–10.8)

## 2023-04-26 PROCEDURE — G0378 HOSPITAL OBSERVATION PER HR: HCPCS

## 2023-04-26 PROCEDURE — 25010000002 FENTANYL CITRATE (PF) 50 MCG/ML SOLUTION: Performed by: INTERNAL MEDICINE

## 2023-04-26 PROCEDURE — 93005 ELECTROCARDIOGRAM TRACING: CPT | Performed by: NURSE PRACTITIONER

## 2023-04-26 PROCEDURE — 99222 1ST HOSP IP/OBS MODERATE 55: CPT | Performed by: INTERNAL MEDICINE

## 2023-04-26 PROCEDURE — 80048 BASIC METABOLIC PNL TOTAL CA: CPT | Performed by: PHYSICIAN ASSISTANT

## 2023-04-26 PROCEDURE — C1894 INTRO/SHEATH, NON-LASER: HCPCS | Performed by: INTERNAL MEDICINE

## 2023-04-26 PROCEDURE — 25010000002 HEPARIN (PORCINE) PER 1000 UNITS: Performed by: INTERNAL MEDICINE

## 2023-04-26 PROCEDURE — 84484 ASSAY OF TROPONIN QUANT: CPT | Performed by: PHYSICIAN ASSISTANT

## 2023-04-26 PROCEDURE — 93458 L HRT ARTERY/VENTRICLE ANGIO: CPT | Performed by: INTERNAL MEDICINE

## 2023-04-26 PROCEDURE — C1769 GUIDE WIRE: HCPCS | Performed by: INTERNAL MEDICINE

## 2023-04-26 PROCEDURE — 85027 COMPLETE CBC AUTOMATED: CPT | Performed by: PHYSICIAN ASSISTANT

## 2023-04-26 PROCEDURE — 25010000002 MIDAZOLAM PER 1 MG: Performed by: INTERNAL MEDICINE

## 2023-04-26 PROCEDURE — 25510000001 IOPAMIDOL PER 1 ML: Performed by: INTERNAL MEDICINE

## 2023-04-26 RX ORDER — SODIUM CHLORIDE 9 MG/ML
40 INJECTION, SOLUTION INTRAVENOUS AS NEEDED
Status: DISCONTINUED | OUTPATIENT
Start: 2023-04-26 | End: 2023-04-26 | Stop reason: HOSPADM

## 2023-04-26 RX ORDER — FENTANYL CITRATE 50 UG/ML
INJECTION, SOLUTION INTRAMUSCULAR; INTRAVENOUS
Status: DISCONTINUED | OUTPATIENT
Start: 2023-04-26 | End: 2023-04-26 | Stop reason: HOSPADM

## 2023-04-26 RX ORDER — SODIUM CHLORIDE 9 MG/ML
INJECTION, SOLUTION INTRAVENOUS
Status: COMPLETED | OUTPATIENT
Start: 2023-04-26 | End: 2023-04-26

## 2023-04-26 RX ORDER — HEPARIN SODIUM 1000 [USP'U]/ML
INJECTION, SOLUTION INTRAVENOUS; SUBCUTANEOUS
Status: DISCONTINUED | OUTPATIENT
Start: 2023-04-26 | End: 2023-04-26 | Stop reason: HOSPADM

## 2023-04-26 RX ORDER — SODIUM CHLORIDE 0.9 % (FLUSH) 0.9 %
10 SYRINGE (ML) INJECTION EVERY 12 HOURS SCHEDULED
Status: DISCONTINUED | OUTPATIENT
Start: 2023-04-26 | End: 2023-04-26 | Stop reason: HOSPADM

## 2023-04-26 RX ORDER — ACETAMINOPHEN 325 MG/1
650 TABLET ORAL EVERY 4 HOURS PRN
Status: DISCONTINUED | OUTPATIENT
Start: 2023-04-26 | End: 2023-04-26 | Stop reason: HOSPADM

## 2023-04-26 RX ORDER — SODIUM CHLORIDE 0.9 % (FLUSH) 0.9 %
10 SYRINGE (ML) INJECTION AS NEEDED
Status: DISCONTINUED | OUTPATIENT
Start: 2023-04-26 | End: 2023-04-26 | Stop reason: HOSPADM

## 2023-04-26 RX ORDER — LIDOCAINE HYDROCHLORIDE 20 MG/ML
INJECTION, SOLUTION INFILTRATION; PERINEURAL
Status: DISCONTINUED | OUTPATIENT
Start: 2023-04-26 | End: 2023-04-26 | Stop reason: HOSPADM

## 2023-04-26 RX ORDER — MIDAZOLAM HYDROCHLORIDE 1 MG/ML
INJECTION INTRAMUSCULAR; INTRAVENOUS
Status: DISCONTINUED | OUTPATIENT
Start: 2023-04-26 | End: 2023-04-26 | Stop reason: HOSPADM

## 2023-04-26 RX ORDER — VERAPAMIL HYDROCHLORIDE 2.5 MG/ML
INJECTION, SOLUTION INTRAVENOUS
Status: DISCONTINUED | OUTPATIENT
Start: 2023-04-26 | End: 2023-04-26 | Stop reason: HOSPADM

## 2023-04-26 RX ADMIN — LEVOTHYROXINE SODIUM 175 MCG: 0.05 TABLET ORAL at 09:10

## 2023-04-26 RX ADMIN — Medication 10 ML: at 09:10

## 2023-04-26 RX ADMIN — ASPIRIN 81 MG: 81 TABLET, COATED ORAL at 09:10

## 2023-04-26 NOTE — OUTREACH NOTE
Prep Survey    Flowsheet Row Responses   [facility] patient discharged from? [facility]   Is LACE score < 7 ? Yes   Eligibility [facility]   Date of Admission 04/25/23   Date of Discharge 04/26/23   Discharge Disposition Home or Self Care   Discharge diagnosis Chest pain,   Nonobstructive coronary artery disease,   Hypertension,   Hyperlipidemia,  Hypothyroidism     Does the patient have one of the following disease processes/diagnoses(primary or secondary)? Other   Does the patient have Home health ordered? No   Is there a DME ordered? No   Prep survey completed? Yes          Debra FLOOD - Registered Nurse

## 2023-04-26 NOTE — CONSULTS
[facility] Consult Note       Encounter Date:23  Patient:Durga Valenzuela  :1950  MRN:[id number]    Date of Admission: 2023  Date of Encounter Visit: 23  Encounter Provider: Mirza Del Cid MD  Referring Provider: No ref. provider found  Place of Service: [facility]  Patient Care Team:  Florian Burt MD as PCP - General (Internal Medicine)      Consulted for: Chest pain    Chief Complaint: Chest pain and shortness of breath      History of Presenting Illness:      Mr. Valenzuela is a 72 y.o. gentleman with past medical history notable for coronary artery disease which has been medically managed, hypertension, mixed hyperlipidemia, hypothyroidism, history of DVT and PE in the setting of orthopedic surgery, and morbid obesity presents to the hospital after being seen in the office yesterday.  He states that over the last month he has had progressive shortness of breath.  Initially he was a short of breath going up stairs to his porch or moderate activity.  His symptoms have progressed from mild shortness of breath with heavy activities to now moderate to severe symptoms he now gets short of breath even walking across the room or walking to get into a car.  He does have some associated chest pressure with this.  The pain/pressure is nonradiating.  It is exertional in nature and resolves with rest.  No associated symptoms such as diaphoresis or nausea.  Again pain is now severe in severity.  He was seen in the office for this issue.  He did have knee surgery back in December.  Although clinically he did not appear to have a large pulmonary emboli there was that concern given his history.  His D-dimer was mildly elevated.  He was sent to the emergency room for further evaluation where a CTA was negative for PE.  He was admitted to be monitored overnight.  He was noted to have a mildly elevated troponins but essentially flat.      Review of Systems:  Review of  Systems   Constitutional: Positive for malaise/fatigue.   HENT: Negative.    Eyes: Negative.    Cardiovascular: Positive for chest pain and dyspnea on exertion.   Respiratory: Positive for shortness of breath.    Endocrine: Negative.    Hematologic/Lymphatic: Negative.    Skin: Negative.    Musculoskeletal: Negative.    Gastrointestinal: Negative.    Genitourinary: Negative.    Neurological: Negative.    Psychiatric/Behavioral: Negative.    Allergic/Immunologic: Negative.        Medications:    Current Facility-Administered Medications:   •  acetaminophen (TYLENOL) tablet 650 mg, 650 mg, Oral, Q4H PRN, Vero Franco PA  •  amLODIPine (NORVASC) tablet 10 mg, 10 mg, Oral, Daily, Qadah, Abdalhakim, APRN, 10 mg at 04/25/23 2128  •  aspirin EC tablet 81 mg, 81 mg, Oral, Daily, Qadah, Abdalhakim, APRN  •  levothyroxine (SYNTHROID, LEVOTHROID) tablet 175 mcg, 175 mcg, Oral, Daily, Qadah, Abdalhakim, APRN  •  losartan (COZAAR) tablet 50 mg, 50 mg, Oral, Daily, Qadah, Abdalhakim, APRN, 50 mg at 04/25/23 2128  •  nitroglycerin (NITROSTAT) SL tablet 0.4 mg, 0.4 mg, Sublingual, Q5 Min PRN, Vero Franco PA  •  ondansetron (ZOFRAN) tablet 4 mg, 4 mg, Oral, Q6H PRN **OR** ondansetron (ZOFRAN) injection 4 mg, 4 mg, Intravenous, Q6H PRN, Vero Franco PA  •  pravastatin (PRAVACHOL) tablet 20 mg, 20 mg, Oral, Daily, Qadah, Abdalhakim, APRN, 20 mg at 04/25/23 2127  •  sodium chloride 0.9 % flush 10 mL, 10 mL, Intravenous, PRN, Ricky Morales MD  •  sodium chloride 0.9 % flush 10 mL, 10 mL, Intravenous, Q12H, Vero Franco PA, 10 mL at 04/25/23 2127  •  sodium chloride 0.9 % flush 10 mL, 10 mL, Intravenous, PRN, Vero Franco PA  •  sodium chloride 0.9 % infusion 40 mL, 40 mL, Intravenous, PRN, Vero Franco PA  •  zolpidem (AMBIEN) tablet 10 mg, 10 mg, Oral, Nightly PRN, José Miguel Espinoza MD    Allergies   Allergen Reactions   • Hydrocodone Other (See Comments)     Severe vomiting - but CAN tolerate oxycodone  per patient   • Zocor  [Simvastatin]      MYALGIA       Past Medical History:   Diagnosis Date   • Affective psychosis, bipolar 09/30/2021   • Allergic rhinitis 08/21/2018    Patient has had allergy testing in the past which revealed allergies to molds and grass.  Immunotherapy for about 2 years in the 1980s.   • Benign essential hypertension 02/25/2016   • Benign prostatic hyperplasia with urinary frequency 02/25/2016   • Bilateral lower extremity edema 08/21/2018   • Bipolar disorder, in full remission, most recent episode mixed 09/30/2021   • Chronic insomnia 02/25/2016   • Closed traumatic lateral subluxation of patellofemoral joint, right, initial encounter 08/13/2018    08/10/2018--patient was evaluated by the orthopedist for right knee pain and found to have lateral subluxation of the right patella associated with presence of right artificial knee joint.  Physical therapy no help.  Surgery is scheduled 09/17/2018   • Depression with anxiety 08/21/2018   • Elevated cholesterol    • History of deep venous thrombosis (DVT) of distal vein of right lower extremity 08/21/2018 04/26/2016--CTA of the chest performed for elevated d-dimer and progressive shortness of breath and chest pain for one month revealed bilateral occlusive in near occlusive segmental and subsegmental pulmonary emboli in all lobes of the right lung as well as within the left lower lobe.  No evidence of pulmonary infarct.  Nonspecific multifocal groundglass attenuation in the right pulmonary apex, perhaps representing pneumonitis or submental atelectasis.  Questionable cholelithiasis.  Doppler venous study was positive for DVT in the right popliteal vein.  Hypercoagulable workup was normal.  He was treated with Eliquis for a total of 6 months and was subsequently discontinued.   • History of pneumonia    • History of pulmonary embolism 06/10/2016    04/26/2016--CTA of the chest performed for elevated d-dimer and progressive shortness of breath  and chest pain for one month revealed bilateral occlusive in near occlusive segmental and subsegmental pulmonary emboli in all lobes of the right lung as well as within the left lower lobe.  No evidence of pulmonary infarct.  Nonspecific multifocal groundglass attenuation in the right pulmonary apex, perhaps representing pneumonitis or submental atelectasis.  Questionable cholelithiasis.  Doppler venous study was positive for DVT in the right popliteal vein.  Hypercoagulable workup was normal.  He was treated with Eliquis for a total of 6 months and was subsequently discontinued.   • Hyperlipidemia 02/25/2016   • Impaired fasting glucose 02/25/2016   • Lumbar degenerative disc disease 02/25/2016   • Morbidly obese 10/19/2018   • Multiple environmental allergies 08/21/2018    Patient has had allergy testing in the past which revealed allergies to molds and grass.  Immunotherapy for about 2 years in the 1980s.   • Non-occlusive coronary artery disease, 05/16/2017--normal LM; proximal LAD mild LI; 50% mid LAD.  Mild distal LAD LI; normal Cx.  Mild LI marginal branches; normal RCA, nondominant.  EF 60%. 07/24/2017 05/16/2017--cardiac catheterization performed for abnormal stress test.  Normal LV with ejection fraction 60%.  Dilated aortic root.  No wall motion abnormalities.  Normal left main.  Ramus branch small caliber and normal.  Proximal LAD large caliber and mild luminal irregularities.  50% mid LAD.  Mild luminal irregularities distal LAD.  3 diagonal branches of small caliber with mild luminal irregularities.  Normal septal branches.  Circumflex is large caliber and free of disease.  Marginal branches are medium caliber with mild luminal irregularities.  RCA is a medium caliber and free of disease.  It is nondominant.   • Obstructive sleep apnea, tolerates CPAP well.  Previously failed oral appliance. 02/25/2016   • Post traumatic stress disorder (PTSD) 02/25/2016   • Primary hypothyroidism 02/25/2016   •  Primary osteoarthritis involving multiple joints 02/25/2016   • Psoriasis 09/17/2014   • Spinal stenosis of lumbar region with neurogenic claudication 02/15/2021   • Traumatic complete tear of left rotator cuff 03/17/2021 March 14, 2021--patient was evaluated by the orthopedic surgeon after he slipped on ice and fell onto his left shoulder.  Work-up revealed complete rotator cuff tear involving 2 tendons.  Specifically, he has an acute left supraspinatus tear and acute left infraspinatus tear.  He also has left glenohumeral joint osteoarthritis.  Apparently this is not repairable and patient would need a shoulder r   • Traumatic complete tear of left rotator cuff 03/17/2021 March 14, 2021--patient was evaluated by the orthopedic surgeon after he slipped on ice and fell onto his left shoulder.  Work-up revealed complete rotator cuff tear involving 2 tendons.  Specifically, he has an acute left supraspinatus tear and acute left infraspinatus tear.  He also has left glenohumeral joint osteoarthritis.  Apparently this is not repairable and patient would need a shoulder r   • Urinary incontinence without sensory awareness 03/17/2021   • Vitamin D deficiency 08/21/2018       Past Surgical History:   Procedure Laterality Date   • CARDIAC CATHETERIZATION N/A 5/16/2017    Procedure: Coronary angiography;  Surgeon: Malcolm Chen MD;  Location: [facility] INVASIVE LOCATION;  Service:    • CARDIAC CATHETERIZATION N/A 5/16/2017    Procedure: Left Heart Cath;  Surgeon: Malcolm Chen MD;  Location: [facility] INVASIVE LOCATION;  Service:    • CARDIAC CATHETERIZATION N/A 5/16/2017    Procedure: Left ventriculography;  Surgeon: Malcolm Chen MD;  Location: [facility] CATH INVASIVE LOCATION;  Service:    • COLONOSCOPY  2005 2005--colonoscopy reportedly normal.  Records not available.   • COLONOSCOPY N/A 6/13/2019    Procedure: COLONOSCOPY INTO CECUM WITH COLD BIOPSY POLYPECTOMY;  Surgeon: Sami  Ayaan Catalan MD;  Location:  [facility];  Service: General   • LUMBAR DECOMPRESSION  2007 2007--lumbar decompression without fusion or hardware.   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   • REVISION TOTAL KNEE ARTHROPLASTY Right 09/17/2018 09/17/2018--right total knee arthroplasty revision due to lateral subluxation of the patella due to multiple factors.   • TOTAL KNEE ARTHROPLASTY Left 11/03/2017 11/03/2017--right total knee replacement complicated by patellofemoral subluxation.   • TOTAL KNEE ARTHROPLASTY Left 06/2014 June 2014--left total knee replacement.   • TOTAL KNEE ARTHROPLASTY REVISION Left 12/5/2022    Procedure: LEFT TOTAL KNEE REVISION Complex with Patella Tendon Reconstruction;  Surgeon: Sid Simon MD;  Location: [facility] OR;  Service: Orthopedics;  Laterality: Left;       Social History     Socioeconomic History   • Marital status:      Spouse name: italo   • Highest education level: Bachelor's degree (e.g., BA, AB, BS)   Tobacco Use   • Smoking status: Never   • Smokeless tobacco: Never   • Tobacco comments:     CAFFEINE USE 2 CUPS COFFEE AND 1 GLASS TEA DAILY   Vaping Use   • Vaping Use: Never used   Substance and Sexual Activity   • Alcohol use: No   • Drug use: Yes     Types: Marijuana     Comment: weekly   • Sexual activity: Yes     Partners: Female       Family History   Problem Relation Age of Onset   • Coronary artery disease Mother         Status post CABG   • Alzheimer's disease Mother    • Stroke Father    • Liver cancer Father         Father with biliary cancer including gallbladder   • No Known Problems Sister    • No Known Problems Brother        The following portions  of the patient's history were reviewed and updated as appropriate: allergies, current medications, past family history, past medical history, past social history, past surgical history and problem list.         Objective:      Temp:  [97.5 °F (36.4 °C)-98.1 °F (36.7 °C)] 98 °F (36.7 °C)  Heart Rate:  [47-66] 52  Resp:  [16-18] 16  BP: (127-146)/(63-94) 129/63   No intake or output data in the 24 hours ending 04/26/23 0818  Body mass index is 41.56 kg/m².      04/25/23  1600 04/25/23  1901   Weight: (!) 143 kg (315 lb) (!) 143 kg (315 lb)           Physical Exam:  Constitutional: Well appearing, well developed, no acute distress   HENT: Oropharynx clear and membrane moist  Eyes: Normal conjunctiva, no sclera icterus.  Neck: Supple, no carotid bruit bilaterally.  Cardiovascular: Regular rate and rhythm, No Murmur, No bilateral lower extremity edema.  Pulmonary: Normal respiratory effort, normal lung sounds, no wheezing.  Abdominal: Soft, nontender, no hepatosplenomegaly, liver is non-pulsatile.  Neurological: Alert and orient x 3.   Skin: Warm, dry, no ecchymosis, no rash.  Psych: Appropriate mood and affect. Normal judgment and insight.         Lab Review:   Results from last 7 days   Lab Units 04/26/23  0456 04/25/23  1508   SODIUM mmol/L 140 141  140   POTASSIUM mmol/L 4.2 4.2  4.2   CHLORIDE mmol/L 105 106  104   CO2 mmol/L 24.5 22.7  23.3   BUN mg/dL 19 20  19   CREATININE mg/dL 0.97 1.06  0.98   GLUCOSE mg/dL 126* 111*  112*   CALCIUM mg/dL 9.2 9.1  9.1   AST (SGOT) U/L  --  19   ALT (SGPT) U/L  --  17     Results from last 7 days   Lab Units 04/26/23  0456 04/25/23  1916 04/25/23  1508   HSTROP T ng/L 20* 27* 24*     Results from last 7 days   Lab Units 04/26/23  0456 04/25/23  1605 04/25/23  1508   WBC 10*3/mm3 5.17 5.47 5.83   HEMOGLOBIN g/dL 12.5* 12.2* 12.2*   HEMATOCRIT % 39.5 36.7* 37.7   PLATELETS 10*3/mm3 207 215 218                   Invalid input(s): LDLCALC  The 10-year ASCVD risk score  (Fabiola PERALES, et al., 2019) is: 26.3%    Values used to calculate the score:      Age: 72 years      Sex: Male      Is Non- : No      Diabetic: No      Tobacco smoker: No      Systolic Blood Pressure: 129 mmHg      Is BP treated: Yes      HDL Cholesterol: 32 mg/dL      Total Cholesterol: 142 mg/dL     Results from last 7 days   Lab Units 04/25/23  1508   PROBNP pg/mL 31.0           Echocardiogram 8/2/2022 images reviewed by myself:  · Moderate dilation of the aortic root is present (4.7 cm). Moderate dilation of the ascending aorta is present(4.6 cm).  · Left ventricular wall thickness is consistent with moderate concentric hypertrophy.  · The left ventricular cavity is mildly dilated.  · Estimated left ventricular EF = 65% Left ventricular systolic function is normal.  · Normal global longitudinal LV strain (GLS) = -23.4%.  · Trace to mild aortic valve regurgitation is present.    Cardiac catheterization 5/16/2017 images reviewed by myself:  · The left  Main coronary artery is a large caliber vessel. The ostium is normal  Distal vessel bifurcates into LAD/LCX in the standard fashion and  is normal   · The Ramus branch is smal caliber and is normal   · The proximal LAD is alarge caliber vessel and has mild luminal irregularities/ The mid LAD is a medium/ caliber vessel and  has 50% stenosis. The distal LAD is a medium  caliber vessel and has mild luminal irregularities/ There diagonal branches are small  caliber with/mild luminal irregularities. Septal branches are normal  · The LCX is a large caliber dominant vessel and is free of disease/ The marginal branches are medium caliber with mild luminal irregularities/ has no stenosis.   · The RCA is a  medium caliber vessel is free of disease/.  It is the non-dominant vessel.    Stress MPI 4/15/2017:  · Findings consistent with a normal ECG stress test.  · GI artifact is present.  · Raw images reviewed with the following abnormalities noted:  Increased bowel activity with attenuation.  · Left ventricular ejection fraction is normal (Calculated EF = 66%).  · Small to medium sized fixed inferior defect with no reversibility. This could be due to GI uptake attenuation of the inferior wall. No ischemia. Normal LVEF.         Assessment:           Chest pain         Plan:       Mr. Valenzuela is a 72 y.o. gentleman with past medical history notable for coronary artery disease which has been medically managed, hypertension, mixed hyperlipidemia, hypothyroidism, history of DVT and PE in the setting of orthopedic surgery, and morbid obesity presents to the hospital after being seen in the office yesterday due to progressive and worsening dyspnea on exertion and chest pressure.  His presentation is concerning for unstable angina given that they have progressed over the last month and now to the point where any activity causes him to get severely short of breath with chest pressure.  Does have known coronary disease which was appropriately medically managed back in 2017.  I would defer stress test in this patient.  His last stress test was a false positive with inferior wall defect likely in the setting of a left dominant system and furthermore even if you were to have a normal stress test given the severity of his symptoms have a hard time believing clinically that we would be safe and fully excluding coronary disease with how severe his symptoms are.  His troponins are mildly elevated I do not think that this represents a non-ST elevation myocardial infarction but rather may be demand ischemia in the setting of a tight coronary blockage.  We will plan on proceeding forward cardiac catheterization later today.  I did discuss the case with our interventional partner Dr. Kaur as well as his primary cardiologist Dr. Arnett who are both in agreement      Coronary artery disease with unstable angina:  · Proceeding forward with cardiac catheterization today given high  risk clinical presentation  · JESSE risk score 5    Essential hypertension:  · Continue home medications    Mixed hyperlipidemia:  · Continue statin    History of PE/DVT:  · CTA yesterday negative for PE  · D-dimer essentially in indeterminate range no calf swelling or soreness to suggest DVT        Thank you for allowing me to participate in the care of Durga RYNE Valenzuela. Feel free to contact me directly with any further questions or concerns.    Mirza Del Cid MD  [facility]  04/26/23  08:18 EDT

## 2023-04-26 NOTE — DISCHARGE SUMMARY
[facility] Discharge    Patient Name: Durga Valenzuela  Age/Sex: 72 y.o. male  : 1950  MRN: [id number]    Encounter Provider: Mary Reynolds MD  Referring Provider: No ref. provider found  Place of Service: [facility]  Patient Care Team:  Florian Burt MD as PCP - General (Internal Medicine)         Date of Discharge:  2023     Date of Admit: 2023    Discharge Condition: Stable    Discharge Diagnosis:  1.  Nonobstructive coronary artery disease  2.  Hypertension  3.  Hyperlipidemia  4.  Hypothyroidism    Hospital Course:   Durga Valenzuela is a 72 y.o. male with coronary artery disease, hypertension, hyperlipidemia, hypothyroidism, prior DVT and pulmonary embolism following orthopedic surgery, morbid obesity, who presented to the hospital with worsening shortness of breath.    He presents the hospital on 2023 with worsening shortness of breath associated with chest pressure.  D-dimer was mildly elevated and a CT angiogram of the chest was negative for pulmonary embolism.  Remainder of his work-up was unremarkable.  Based on his exertional symptoms cardiac catheterization was recommended and performed on 2023.  This showed primarily nonobstructive coronary artery disease and normal left ventricular filling pressures.  His left ventricular systolic function.  Normal on his left ventriculogram.    The patient was discharged following his cardiac catheterization with instructions to follow-up with Dr. Arnett or TREY Rice in 1 week.    Objective:  Temp:  [97.5 °F (36.4 °C)-98.1 °F (36.7 °C)] 98 °F (36.7 °C)  Heart Rate:  [47-66] 49  Resp:  [10-18] 18  BP: (109-146)/(40-99) 119/65    Intake/Output Summary (Last 24 hours) at 2023 1336  Last data filed at 2023 1145  Gross per 24 hour   Intake 335 ml   Output --   Net 335 ml     Body mass index is 41.56 kg/m².      23  1600 23  1901   Weight: (!) 143  kg (315 lb) (!) 143 kg (315 lb)     Weight change:       Procedures Performed  Procedure(s):  Left Heart Cath  Coronary angiography  Left ventriculography       Consults:  Consults     Date and Time Order Name Status Description    4/26/2023 12:34 AM Inpatient Cardiology Consult Completed           Pertinent Test Results:  Results from last 7 days   Lab Units 04/26/23  0456 04/25/23  1508   SODIUM mmol/L 140 141  140   POTASSIUM mmol/L 4.2 4.2  4.2   CHLORIDE mmol/L 105 106  104   CO2 mmol/L 24.5 22.7  23.3   BUN mg/dL 19 20  19   CREATININE mg/dL 0.97 1.06  0.98   GLUCOSE mg/dL 126* 111*  112*   CALCIUM mg/dL 9.2 9.1  9.1   AST (SGOT) U/L  --  19   ALT (SGPT) U/L  --  17     Results from last 7 days   Lab Units 04/26/23  0456 04/25/23  1916 04/25/23  1508   HSTROP T ng/L 20* 27* 24*     Results from last 7 days   Lab Units 04/26/23  0456 04/25/23  1605 04/25/23  1508   WBC 10*3/mm3 5.17 5.47 5.83   HEMOGLOBIN g/dL 12.5* 12.2* 12.2*   HEMATOCRIT % 39.5 36.7* 37.7   PLATELETS 10*3/mm3 207 215 218                   Invalid input(s): LDLCALC  Results from last 7 days   Lab Units 04/25/23  1508   PROBNP pg/mL 31.0           Discharge Medications     Discharge Medications      Changes to Medications      Instructions Start Date   aspirin 81 MG tablet  What changed:   · how much to take  · how to take this  · when to take this   One by mouth daily      clobetasol 0.05 % ointment  Commonly known as: TEMOVATE  What changed:   · how much to take  · how to take this  · when to take this   Apply twice a day as directed      Co Q-10 400 MG capsule  What changed:   · how much to take  · how to take this  · when to take this   Take 1 p.o. daily with food      pravastatin 20 MG tablet  Commonly known as: PRAVACHOL  What changed:   · how much to take  · how to take this  · when to take this   Take 1 p.o. daily for high cholesterol      Synthroid 175 MCG tablet  Generic drug: levothyroxine  What changed: See the new  instructions.   TAKE ONE TABLET BY MOUTH DAILY FOR LOW THYROID      zolpidem 10 MG tablet  Commonly known as: AMBIEN  What changed:   · how much to take  · when to take this  · reasons to take this   Take 1 p.o. nightly as needed for insomnia         Continue These Medications      Instructions Start Date   amLODIPine 10 MG tablet  Commonly known as: NORVASC   TAKE ONE TABLET BY MOUTH DAILY      ezetimibe 10 MG tablet  Commonly known as: Zetia   Take 1 p.o. daily for high cholesterol      FLUoxetine 60 MG tablet  Commonly known as: PROzac   60 mg, Oral, Daily      hydrOXYzine 25 MG tablet  Commonly known as: ATARAX   TAKE ONE TABLET BY MOUTH THREE TIMES A DAY AS NEEDED FOR ITCHING      losartan 50 MG tablet  Commonly known as: COZAAR   TAKE ONE TABLET BY MOUTH DAILY      vitamin D3 125 MCG (5000 UT) capsule capsule   1 by mouth daily as directed             Discharge Diet:    Dietary Orders (From admission, onward)     Start     Ordered    04/26/23 0801  NPO Diet NPO Type: Sips with Meds  Diet Effective Midnight        Question:  NPO Type  Answer:  Sips with Meds    04/26/23 0800                Activity at Discharge:   As instructed    Discharge disposition: home     Discharge Instructions and Follow ups:  Future Appointments   Date Time Provider Department Center   5/23/2023  3:00 PM Florian Burt MD [facility]     Additional Instructions for the Follow-ups that You Need to Schedule     Discharge Follow-up with Specified Provider: Follow up with Dr. Arnett or TREY Rice in 1 week; 1 Week   As directed      To: Follow up with Dr. Arnett or TREY Rice in 1 week    Follow Up: 1 Week            Follow-up Information     Florian Burt MD .    Specialty: Internal Medicine  Contact information:  [address] [address] [address] [phone]             Shaka Arnett Jr., MD Follow up in 1 week(s).    Specialties: Cardiology, Interventional Cardiology  Why: follow up  with Dr. Arnett or Kristine Salmon  Contact information:  [address]  [address]  [place] [id number] [phone]             Mary Reynolds MD Follow up.    Specialty: Cardiology  Why: if needed - Dr. Reynolds performed your heart cath today  Contact information:  [address]  [address]  [facility] [id number] [phone]                              Mary Reynolds MD  04/26/23  13:36 EDT

## 2023-04-26 NOTE — NURSING NOTE
Patient to the cath lab via wheelchair accompanied by Coco MOFFETT and Mariaelena Bai RN. Patient's belongings with the patient.  Patient to be admitted to Dr Reynolds.  No outstanding consults.   Disposition dependent on cardiac cath results.

## 2023-04-26 NOTE — PROGRESS NOTES
ED OBSERVATION PROGRESS/DISCHARGE SUMMARY    Date of Admission: 4/25/2023   LOS: 0 days   PCP: Florian Burt MD    Subjective  Patient reports no symptoms at rest but has shortness of breath with any amount of activity.    Hospital Outcome: 72-year-old male admitted to the observation unit for further evaluation of dyspnea on exertion and chest pressure.  High-sensitivity troponin 24, 27, 28.  D-dimer elevated, CTA chest negative for pulmonary embolism.  Initial EKG shows sinus rhythm, no acute ST-T changes.  Patient was seen by cardiology, discussed with Dr. Del Cid.  Patient will have heart catheterization today with Dr. Reynolds.  Care will be transferred to cardiology at that time.  Discussed with patient who is in agreement with plan.    ROS:  General: no fevers, chills  Respiratory: no cough, dyspnea  Cardiovascular: no chest pain, palpitations  Abdomen: No abdominal pain, nausea, vomiting, or diarrhea  Neurologic: No focal weakness    Objective   Physical Exam:  I have reviewed the vital signs.  Temp:  [97.5 °F (36.4 °C)-98.1 °F (36.7 °C)] 97.9 °F (36.6 °C)  Heart Rate:  [47-66] 53  Resp:  [18] 18  BP: (127-146)/(66-94) 133/73  General Appearance:    Alert, cooperative, no distress  Head:    Normocephalic, atraumatic  Eyes:    Sclerae anicteric  Neck:   Supple, no mass  Lungs: Clear to auscultation bilaterally, respirations unlabored  Heart: Regular rate and rhythm, S1 and S2 normal, no murmur, rub or gallop  Abdomen:  Soft, non-tender, bowel sounds active, nondistended  Extremities: No clubbing, cyanosis, or edema to lower extremities  Pulses:  2+ and symmetric in distal lower extremities  Skin: No rashes   Neurologic: Oriented x3, Normal strength to extremities    Results Review:    I have reviewed the labs, radiology results and diagnostic studies.    Results from last 7 days   Lab Units 04/26/23  0456   WBC 10*3/mm3 5.17   HEMOGLOBIN g/dL 12.5*   HEMATOCRIT % 39.5   PLATELETS 10*3/mm3 207     Results  from last 7 days   Lab Units 04/26/23  0456 04/25/23  1508   SODIUM mmol/L 140 141  140   POTASSIUM mmol/L 4.2 4.2  4.2   CHLORIDE mmol/L 105 106  104   CO2 mmol/L 24.5 22.7  23.3   BUN mg/dL 19 20  19   CREATININE mg/dL 0.97 1.06  0.98   CALCIUM mg/dL 9.2 9.1  9.1   BILIRUBIN mg/dL  --  0.5   ALK PHOS U/L  --  73   ALT (SGPT) U/L  --  17   AST (SGOT) U/L  --  19   GLUCOSE mg/dL 126* 111*  112*     Imaging Results (Last 24 Hours)     Procedure Component Value Units Date/Time    CT Angiogram Chest Pulmonary Embolism [[id number]] Collected: 04/25/23 1851     Updated: 04/25/23 2055    Narrative:      EXAMINATION: CT ANGIOGRAM OF THE CHEST WITH CONTRAST     HISTORY: 72-year-old male status post recent orthopedic surgery with  dyspnea     TECHNIQUE: Contiguous axial images were obtained through the chest  following bolus intravenous administration of nonionic contrast. Three-D  reformatted images were produced and presented for interpretation.     COMPARISON: CT of the chest, 08/04/2022     FINDINGS: No filling defects are seen within the pulmonary to a system  to the level of the subsegmental pulmonary arteries. The heart and great  vessels have a normal appearance. The RV to LV ratio is less than 1.  There is mild ground glass opacification at the base of the left lower  lobe with mild crowding of the vascular markings. The right lung is  clear of consolidation. Mild subpleural opacification within the  posterior aspect of the right upper lobe is noted. Moderate multilevel  osteophytic change is seen throughout the thoracic spine. Moderate  atheromatous calcification of the coronary circulation is noted. The  visualized structures of the upper abdomen appear normal.       Impression:      1. There is no evidence for a pulmonary embolism. .  2. There is mild atelectasis versus developing pneumonia in the left  lower lobe and to a lesser degree in the right upper lobe.     Radiation dose reduction techniques were  utilized, including automated  exposure control and exposure modulation based on body size.     This report was finalized on 4/25/2023 8:52 PM by Dr. Boby Montero M.D.       XR Chest 2 View [[id number]] Collected: 04/25/23 1658     Updated: 04/25/23 1754    Narrative:      EXAMINATION: 2 VIEWS OF THE CHEST     HISTORY: 72-year-old male with history of shortness of air     FINDINGS: PA and lateral chest radiograph were obtained. Comparison is  made to a chest radiograph dated 05/18/2022. The lungs are mildly  hyperinflated but are clear of consolidation. The cardiomediastinal  silhouette is within normal limits. Moderate multilevel mid thoracic  spine osteophytic change is noted.       Impression:      There is mild pulmonary hyperinflation. Clinical correlation  for any history of COPD is recommended. This may be due to a prominent  inspiratory effort.     This report was finalized on 4/25/2023 5:51 PM by Dr. Boby Montero M.D.             I have reviewed the medications.  ---------------------------------------------------------------------------------------------  Assessment & Plan   Assessment/Problem List    Chest pain      Plan:    Exertional dyspnea  Chest tightness  CAD  -Initial troponin 24, repeat 27  -EKG shows sinus rhythm with borderline left axis deviation  -CTA chest negative for PE  -Cardiac monitoring  -N.p.o. after midnight for possible stress test in a.m.  - Echo on 8/2022 shows LVEF 65%.  Mild dilation of the aortic root and ascending aorta.  -Cardiac catheterization on 5/2017 shows 50% stenosis of the mid LAD  -Continue amlodipine, aspirin, and statin  -Cardiology consulted, Dr. Del Cid  -Heart catheterization today with Dr. Reynolds     Hypertension  -Continue losartan and amlodipine  -Vital signs every 4 hours     Hypothyroidism  -Continue Synthroid     Insomnia  -Continue Ambien    Disposition: To Cath Lab    30 minutes has been spent by [facility] providers  in the care of this patient while under observation status     This note will serve as transfer summary    JADEN Carroll 04/26/23 07:14 EDT

## 2023-04-26 NOTE — PLAN OF CARE
Goal Outcome Evaluation:   Patient admitted for chest pain and SOB x 1 month. Patient seen at Cardiologist's office and sent to ER.   Outcome summary: Patient vitals stable, AO x 4, up ad mirian. Troponin remains elevated at 20. Cardiology to see today.

## 2023-04-26 NOTE — H&P
.   [facility]   HISTORY AND PHYSICAL    Patient Name: Durga Valenzuela  : 1950  MRN: [id number]  Primary Care Physician:  Florian Burt MD  Date of admission: 2023    Subjective   Subjective     Chief Complaint: Chest pain     HPI:    Durga Valenzuela is a 72 y.o. male with past medical history including but not limited to hypertension, hyperlipidemia, CAD, DVT, and CONNIE presents to [facility] with exertional dyspnea for the past week.  Patient was sent to the ED from Dr. Arnett's office for further evaluation of his symptoms.  Patient reports his exertional dyspnea has progressively worsened over the past few days.  Reports associated chest tightness.  Symptoms improved with rest, worsened with any type of physical activity.  Reports symptoms getting so severe to the point where he is having shortness of breath walking from his bed to the bathroom.  Currently patient asymptomatic.  Denies orthopnea or PND.  Reports mild nonproductive cough.  Denies hemoptysis.  Denies fever or chills.  Denies abdominal pain, nausea, vomit, or diarrhea.  Denies any hematochezia or melena.  Denies urinary symptoms.    Laboratory evaluation shows troponin 24, 27, creatinine 1.06, D-dimer 0.79, WBC 5.47, hemoglobin 12.2, and platelets 250.    CTA chest shows no evidence of PE.    Review of Systems   All systems were reviewed and negative except for: The mentioned above in HPI    Personal History     Past Medical History:   Diagnosis Date   • Affective psychosis, bipolar 2021   • Allergic rhinitis 2018    Patient has had allergy testing in the past which revealed allergies to molds and grass.  Immunotherapy for about 2 years in the 1980s.   • Benign essential hypertension 2016   • Benign prostatic hyperplasia with urinary frequency 2016   • Bilateral lower extremity edema 2018   • Bipolar disorder, in full remission, most recent episode mixed 2021   • Chronic  insomnia 02/25/2016   • Closed traumatic lateral subluxation of patellofemoral joint, right, initial encounter 08/13/2018    08/10/2018--patient was evaluated by the orthopedist for right knee pain and found to have lateral subluxation of the right patella associated with presence of right artificial knee joint.  Physical therapy no help.  Surgery is scheduled 09/17/2018   • Depression with anxiety 08/21/2018   • Elevated cholesterol    • History of deep venous thrombosis (DVT) of distal vein of right lower extremity 08/21/2018 04/26/2016--CTA of the chest performed for elevated d-dimer and progressive shortness of breath and chest pain for one month revealed bilateral occlusive in near occlusive segmental and subsegmental pulmonary emboli in all lobes of the right lung as well as within the left lower lobe.  No evidence of pulmonary infarct.  Nonspecific multifocal groundglass attenuation in the right pulmonary apex, perhaps representing pneumonitis or submental atelectasis.  Questionable cholelithiasis.  Doppler venous study was positive for DVT in the right popliteal vein.  Hypercoagulable workup was normal.  He was treated with Eliquis for a total of 6 months and was subsequently discontinued.   • History of pneumonia    • History of pulmonary embolism 06/10/2016    04/26/2016--CTA of the chest performed for elevated d-dimer and progressive shortness of breath and chest pain for one month revealed bilateral occlusive in near occlusive segmental and subsegmental pulmonary emboli in all lobes of the right lung as well as within the left lower lobe.  No evidence of pulmonary infarct.  Nonspecific multifocal groundglass attenuation in the right pulmonary apex, perhaps representing pneumonitis or submental atelectasis.  Questionable cholelithiasis.  Doppler venous study was positive for DVT in the right popliteal vein.  Hypercoagulable workup was normal.  He was treated with Eliquis for a total of 6 months and was  subsequently discontinued.   • Hyperlipidemia 02/25/2016   • Impaired fasting glucose 02/25/2016   • Lumbar degenerative disc disease 02/25/2016   • Morbidly obese 10/19/2018   • Multiple environmental allergies 08/21/2018    Patient has had allergy testing in the past which revealed allergies to molds and grass.  Immunotherapy for about 2 years in the 1980s.   • Non-occlusive coronary artery disease, 05/16/2017--normal LM; proximal LAD mild LI; 50% mid LAD.  Mild distal LAD LI; normal Cx.  Mild LI marginal branches; normal RCA, nondominant.  EF 60%. 07/24/2017 05/16/2017--cardiac catheterization performed for abnormal stress test.  Normal LV with ejection fraction 60%.  Dilated aortic root.  No wall motion abnormalities.  Normal left main.  Ramus branch small caliber and normal.  Proximal LAD large caliber and mild luminal irregularities.  50% mid LAD.  Mild luminal irregularities distal LAD.  3 diagonal branches of small caliber with mild luminal irregularities.  Normal septal branches.  Circumflex is large caliber and free of disease.  Marginal branches are medium caliber with mild luminal irregularities.  RCA is a medium caliber and free of disease.  It is nondominant.   • Obstructive sleep apnea, tolerates CPAP well.  Previously failed oral appliance. 02/25/2016   • Post traumatic stress disorder (PTSD) 02/25/2016   • Primary hypothyroidism 02/25/2016   • Primary osteoarthritis involving multiple joints 02/25/2016   • Psoriasis 09/17/2014   • Spinal stenosis of lumbar region with neurogenic claudication 02/15/2021   • Traumatic complete tear of left rotator cuff 03/17/2021 March 14, 2021--patient was evaluated by the orthopedic surgeon after he slipped on ice and fell onto his left shoulder.  Work-up revealed complete rotator cuff tear involving 2 tendons.  Specifically, he has an acute left supraspinatus tear and acute left infraspinatus tear.  He also has left glenohumeral joint osteoarthritis.   Apparently this is not repairable and patient would need a shoulder r   • Traumatic complete tear of left rotator cuff 03/17/2021 March 14, 2021--patient was evaluated by the orthopedic surgeon after he slipped on ice and fell onto his left shoulder.  Work-up revealed complete rotator cuff tear involving 2 tendons.  Specifically, he has an acute left supraspinatus tear and acute left infraspinatus tear.  He also has left glenohumeral joint osteoarthritis.  Apparently this is not repairable and patient would need a shoulder r   • Urinary incontinence without sensory awareness 03/17/2021   • Vitamin D deficiency 08/21/2018       Past Surgical History:   Procedure Laterality Date   • CARDIAC CATHETERIZATION N/A 5/16/2017    Procedure: Coronary angiography;  Surgeon: Malcolm Chen MD;  Location: [facility] CATH INVASIVE LOCATION;  Service:    • CARDIAC CATHETERIZATION N/A 5/16/2017    Procedure: Left Heart Cath;  Surgeon: Malcolm Chen MD;  Location: [facility] CATH INVASIVE LOCATION;  Service:    • CARDIAC CATHETERIZATION N/A 5/16/2017    Procedure: Left ventriculography;  Surgeon: Malcolm Chen MD;  Location: [facility] INVASIVE LOCATION;  Service:    • COLONOSCOPY  2005 2005--colonoscopy reportedly normal.  Records not available.   • COLONOSCOPY N/A 6/13/2019    Procedure: COLONOSCOPY INTO CECUM WITH COLD BIOPSY POLYPECTOMY;  Surgeon: Ayaan Gallardo MD;  Location: [facility] ENDOSCOPY;  Service: General   • LUMBAR DECOMPRESSION  2007 2007--lumbar decompression without fusion or hardware.   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   • REVISION TOTAL KNEE ARTHROPLASTY Right 09/17/2018 09/17/2018--right  total knee arthroplasty revision due to lateral subluxation of the patella due to multiple factors.   • TOTAL KNEE ARTHROPLASTY Left 11/03/2017 11/03/2017--right total knee replacement complicated by patellofemoral subluxation.   • TOTAL KNEE ARTHROPLASTY Left 06/2014 June 2014--left total knee replacement.   • TOTAL KNEE ARTHROPLASTY REVISION Left 12/5/2022    Procedure: LEFT TOTAL KNEE REVISION Complex with Patella Tendon Reconstruction;  Surgeon: Sid Simon MD;  Location: [facility];  Service: Orthopedics;  Laterality: Left;       Family History: family history includes Alzheimer's disease in his mother; Coronary artery disease in his mother; Liver cancer in his father; No Known Problems in his brother and sister; Stroke in his father. Otherwise pertinent FHx was reviewed and not pertinent to current issue.    Social History:  reports that he has never smoked. He has never used smokeless tobacco. He reports current drug use. Drug: Marijuana. He reports that he does not drink alcohol.    Home Medications:  Co Q-10, FLUoxetine, amLODIPine, aspirin, cephalexin, clobetasol, ezetimibe, hydrOXYzine, levothyroxine, losartan, ondansetron, oxyCODONE-acetaminophen, pravastatin, rivaroxaban, vitamin D3, and zolpidem    Allergies:  Allergies   Allergen Reactions   • Hydrocodone Other (See Comments)     Severe vomiting - but CAN tolerate oxycodone per patient   • Zocor  [Simvastatin]      MYALGIA       Objective   Objective     Vitals:   Temp:  [97.5 °F (36.4 °C)] 97.5 °F (36.4 °C)  Heart Rate:  [47-61] 51  Resp:  [18] 18  BP: (127-146)/(74-86) 131/74  Physical Exam    Constitutional: Awake, alert   Eyes: PERRLA, sclerae anicteric, no conjunctival injection   HENT: NCAT, mucous membranes moist   Neck: Supple, no thyromegaly, no lymphadenopathy, trachea midline   Respiratory: Clear to auscultation bilaterally, nonlabored respirations    Cardiovascular: RRR, no murmurs, rubs, or gallops, palpable pedal pulses  bilaterally   Gastrointestinal: Positive bowel sounds, soft, nontender, nondistended   Musculoskeletal: No bilateral ankle edema, no clubbing or cyanosis to extremities   Psychiatric: Appropriate affect, cooperative   Neurologic: Oriented x 3, strength symmetric in all extremities, Cranial Nerves grossly intact to confrontation, speech clear   Skin: No rashes     Result Review    Result Review:  I have personally reviewed the results from the time of this admission to 4/25/2023 20:10 EDT and agree with these findings:  []  Laboratory list / accordion  []  Microbiology  []  Radiology  []  EKG/Telemetry   []  Cardiology/Vascular   []  Pathology  []  Old records  []  Other:    Assessment & Plan   Assessment / Plan     Brief Patient Summary:  Durga Valenzuela is a 72 y.o. male who was seen and evaluated the ED for exertional dyspnea.  CTA chest negative for PE and patient is being admitted to observation for further evaluation and cardiology consult    Active Hospital Problems:  Active Hospital Problems    Diagnosis    • **Chest pain      Plan:    Exertional dyspnea  Chest tightness  CAD  -Initial troponin 24, repeat 27  -EKG shows sinus rhythm with borderline left axis deviation  -CTA chest negative for PE  -Cardiology consult  -Cardiac monitoring  -N.p.o. after midnight for possible stress test in a.m.  - Echo on 8/2022 shows LVEF 65%.  Mild dilation of the aortic root and ascending aorta.  -Cardiac catheterization on 5/2017 shows 50% stenosis of the mid LAD  -Continue amlodipine, aspirin, and statin    Hypertension  -Continue losartan and amlodipine  -Vital signs every 4 hours    Hypothyroidism  -Continue Synthroid    Insomnia  -Continue Ambien    DVT prophylaxis:  Mechanical DVT prophylaxis orders are present.    CODE STATUS:    Level Of Support Discussed With: Patient  Code Status (Patient has no pulse and is not breathing): CPR (Attempt to Resuscitate)  Medical Interventions (Patient has pulse or is breathing):  Full Support    Admission Status:  I believe this patient meets observation status.    Total of 75 minutes have been spent on this H&P including face-to-face encounter and reviewing labs and imaging    Electronically signed by TREY Romero, 04/25/23, 8:10 PM EDT.

## 2023-04-26 NOTE — DISCHARGE INSTRUCTIONS
[facility]  [address] [place] [address]    Coronary Angiogram (Radial/Ulnar Approach) After Care    Refer to this sheet in the next few weeks. These instructions provide you with information on caring for yourself after your procedure. Your caregiver may also give you more specific instructions. Your treatment has been planned according to current medical practices, but problems sometimes occur. Call your caregiver if you have any problems or questions after your procedure.    Home Care Instructions:  You may shower the day after the procedure. Remove the bandage (dressing) and gently wash the site with plain soap and water. Gently pat the site dry. You may apply a band aid daily for 2 days if desired.    Do not apply powder or lotion to the site.  Do not submerge the affected site in water for 3 to 5 days or until the site is completely healed.   Do not lift, push or pull anything over 5 pounds for 5 days after your procedure or as directed by your physician.  As a reference, a gallon of milk weighs 8 pounds.   Inspect the site at least twice daily. You may notice some bruising at the site and it may be tender for 1 to 2 weeks.     Increase your fluid intake for the next 2 days.    Keep arm elevated for 24 hours. For the remainder of the day, keep your arm in “Pledge of Allegiance” position when up and about.     You may drive 24 hours after the procedure unless otherwise instructed by your caregiver.  Do not operate machinery or power tools for 24 hours.  A responsible adult should be with you for the first 24 hours after you arrive home. Do not make any important legal decisions or sign legal papers for 24 hours.  Do not drink alcohol for 24 hours.    Metformin or any medications containing Metformin should not be taken for 48 hours after your procedure.      Call Your Doctor if:   You have unusual pain at the radial/ulnar (wrist) site.  You have redness, warmth, swelling, or pain at the  radial/ulnar (wrist) site.  You have drainage (other than a small amount of blood on the dressing).  `You have chills or a fever > 101.  Your arm becomes pale or dark, cool, tingly, or numb.  You develop chest pain, shortness of breath, feel faint or pass out.    You have heavy bleeding from the site, hold pressure on the site for 20 minutes.  If the bleeding stops, apply a fresh bandage and call your cardiologist.  However, if you        continue to have bleeding, call 911 and continue to apply pressure to the site.   You have any symptoms of a stroke.  Remember BE FAST  B-balance. Sudden trouble walking or loss of balance.  E-eyes.  Sudden changes in how you see or a sudden onset of a very bad headache.   F-face. Sudden weakness or loss of feeling of the face or facial droop on one side.   A-arms Sudden weakness or numbness in one arm.  One arm drifts down if they are both held out in front of you. This happens suddenly and usually on one side of the body.   S-speech.  Sudden trouble speaking, slurred speech or trouble understanding what are saying.   T-time  Time to call emergency services.  Write down the symptoms and the time they started.

## 2023-04-27 ENCOUNTER — TRANSITIONAL CARE MANAGEMENT TELEPHONE ENCOUNTER (OUTPATIENT)
Dept: CALL CENTER | Facility: HOSPITAL | Age: 73
End: 2023-04-27
Payer: MEDICARE

## 2023-04-27 NOTE — OUTREACH NOTE
Call Center TCM Note    Flowsheet Row Responses   [facility] patient discharged from? [facility]   Does the patient have one of the following disease processes/diagnoses(primary or secondary)? Other   TCM attempt successful? Yes   Call start time 1424   Call end time 1428   Meds reviewed with patient/caregiver? Yes   Is the patient having any side effects they believe may be caused by any medication additions or changes? No   Does the patient have all medications ordered at discharge? N/A   Is the patient taking all medications as directed (includes completed medication regime)? Yes   Comments Patient has 15 minute office visit with PCP on 05/23/23 at 1:00 pm which is out of TCM time frame. Patient is eligible for TCM appt, and agreeable to have earlier appt if available.    Does the patient have an appointment with their PCP within 7 days of discharge? No   Nursing Interventions Routed TCM call to PCP office, PCP office requested to make appointment - message sent   Has home health visited the patient within 72 hours of discharge? N/A   Psychosocial issues? No   Did the patient receive a copy of their discharge instructions? Yes   Nursing interventions Reviewed instructions with patient   What is the patient's perception of their health status since discharge? Same   Is the patient/caregiver able to teach back signs and symptoms related to disease process for when to call PCP? Yes   Is the patient/caregiver able to teach back signs and symptoms related to disease process for when to call 911? Yes   Is the patient/caregiver able to teach back the hierarchy of who to call/visit for symptoms/problems? PCP, Specialist, Home health nurse, Urgent Care, ED, 911 Yes   If the patient is a current smoker, are they able to teach back resources for cessation? Not a smoker   Additional teach back comments Reviewed cardiac s/s.   TCM call completed? Yes   Wrap up additional comments Patient states is feeling about the  same. Denies any further SOA. States still has some edema of feet/lower legs unchanged since discharge. Denies any needs today. TCM complete.   Call end time 1428   Would this patient benefit from a Referral to [facility] Social Work? No   Is the patient interested in additional calls from an ambulatory ?  NOTE:  applies to high risk patients requiring additional follow-up. No          Lluvia Maldonado RN    4/27/2023, 14:30 EDT

## 2023-05-01 NOTE — PROGRESS NOTES
I called pt and he is follow up with cardiology.  He states he will follow up with [place] if they advise that he needs to.

## 2023-05-05 ENCOUNTER — OFFICE VISIT (OUTPATIENT)
Dept: CARDIOLOGY | Facility: CLINIC | Age: 73
End: 2023-05-05
Payer: MEDICARE

## 2023-05-05 DIAGNOSIS — Z86.711 HISTORY OF PULMONARY EMBOLISM: Chronic | ICD-10-CM

## 2023-05-05 DIAGNOSIS — I25.10 NON-OCCLUSIVE CORONARY ARTERY DISEASE: Chronic | ICD-10-CM

## 2023-05-05 DIAGNOSIS — I25.110 CORONARY ARTERY DISEASE INVOLVING NATIVE CORONARY ARTERY OF NATIVE HEART WITH UNSTABLE ANGINA PECTORIS: Primary | ICD-10-CM

## 2023-05-05 DIAGNOSIS — R06.09 DYSPNEA ON EXERTION: ICD-10-CM

## 2023-05-05 DIAGNOSIS — I10 BENIGN ESSENTIAL HYPERTENSION: Chronic | ICD-10-CM

## 2023-05-05 DIAGNOSIS — E78.2 MIXED HYPERLIPIDEMIA: Chronic | ICD-10-CM

## 2023-05-05 NOTE — PROGRESS NOTES
[facility] Follow Up Office Note     Encounter Date:23  Patient:Durga Valenzuela  :1950  MRN:[id number]      Chief Complaint:   Chief Complaint   Patient presents with   • Hypertension         History of Presenting Illness:        Durga Valenzuela is a 72 y.o. male who is here for follow-up.  He is a patient of Dr Arnett.    Patient has past medical history significant for nonobstructive coronary artery disease, essential hypertension, mixed hyperlipidemia, history of DVT and PE following surgery.    In  patient had chest pain and ultimately had a left heart cath for further evaluation revealing 50% mid LAD disease with no other significant disease noted.  Echo at this time showed normal LVEF, grade 1 diastolic dysfunction and mild dilation of aortic root at 4.3 cm.    Patient was seen in the office by Dr. Arnett on 2023.  At this time he was referred porting about a month of dyspnea on exertion.  Patient had had knee surgery in December and previously completed prophylactic dose of 10 mg Xarelto for 30 days.  Dr. Herrmann was worried about a DVT given his history and sent him for D-dimer which was in indeterminate range.  Ultimately, he was sent to the ER for further evaluation including CTA to rule out PE.  This was negative however he was noted to have a mildly elevated troponin.  He underwent left heart catheterization which showed nonobstructive disease, 30 to 40% proximal LAD disease and small RCA with 60 to 70% proximal stenosis.    Patient is seen today for follow-up.  He has not had any recurrent chest pain or presents.  He continues to have dyspnea with exertion, he notices it with trying to walk faster or other exertional activities.  He denies orthopnea or PND.  He also denies palpitations, presyncope.  He denies concerns at his radial stick site.      Review of Systems:  Review of Systems   Cardiovascular: Positive for dyspnea on exertion. Negative for chest pain, leg  swelling, orthopnea and palpitations.   Respiratory: Negative for shortness of breath.        Current Outpatient Medications on File Prior to Visit   Medication Sig Dispense Refill   • amLODIPine (NORVASC) 10 MG tablet TAKE ONE TABLET BY MOUTH DAILY 90 tablet 0   • aspirin 81 MG tablet One by mouth daily (Patient taking differently: Take 1 tablet by mouth Daily. One by mouth daily)     • clobetasol (TEMOVATE) 0.05 % ointment Apply twice a day as directed (Patient taking differently: Apply 1 application topically to the appropriate area as directed 2 (Two) Times a Day. Apply twice a day as directed) 60 g 6   • Coenzyme Q10 (Co Q-10) 400 MG capsule Take 1 p.o. daily with food (Patient taking differently: Take 400 mg by mouth Daily. Take 1 p.o. daily with food) 30 capsule    • ezetimibe (Zetia) 10 MG tablet Take 1 p.o. daily for high cholesterol 90 tablet 3   • FLUoxetine (PROzac) 60 MG tablet Take 1 tablet by mouth Daily.     • hydrOXYzine (ATARAX) 25 MG tablet TAKE ONE TABLET BY MOUTH THREE TIMES A DAY AS NEEDED FOR ITCHING 60 tablet 3   • losartan (COZAAR) 50 MG tablet TAKE ONE TABLET BY MOUTH DAILY (Patient taking differently: Take 1 tablet by mouth Daily.) 90 tablet 3   • pravastatin (PRAVACHOL) 20 MG tablet Take 1 p.o. daily for high cholesterol (Patient taking differently: Take 1 tablet by mouth Daily. Take 1 p.o. daily for high cholesterol) 90 tablet 3   • Synthroid 175 MCG tablet TAKE ONE TABLET BY MOUTH DAILY FOR LOW THYROID (Patient taking differently: Take 1 tablet by mouth Daily. TAKE ONE TABLET BY MOUTH DAILY FOR LOW THYROID) 90 tablet 2   • vitamin D3 125 MCG (5000 UT) capsule capsule 1 by mouth daily as directed 30 capsule 6   • zolpidem (AMBIEN) 10 MG tablet Take 1 p.o. nightly as needed for insomnia (Patient taking differently: 1 tablet At Night As Needed. Take 1 p.o. nightly as needed for insomnia) 30 tablet 5     No current facility-administered medications on file prior to visit.       Allergies    Allergen Reactions   • Hydrocodone Other (See Comments)     Severe vomiting - but CAN tolerate oxycodone per patient   • Zocor  [Simvastatin]      MYALGIA       Past Medical History:   Diagnosis Date   • Affective psychosis, bipolar 09/30/2021   • Allergic rhinitis 08/21/2018    Patient has had allergy testing in the past which revealed allergies to molds and grass.  Immunotherapy for about 2 years in the 1980s.   • Benign essential hypertension 02/25/2016   • Benign prostatic hyperplasia with urinary frequency 02/25/2016   • Bilateral lower extremity edema 08/21/2018   • Bipolar disorder, in full remission, most recent episode mixed 09/30/2021   • Chronic insomnia 02/25/2016   • Closed traumatic lateral subluxation of patellofemoral joint, right, initial encounter 08/13/2018    08/10/2018--patient was evaluated by the orthopedist for right knee pain and found to have lateral subluxation of the right patella associated with presence of right artificial knee joint.  Physical therapy no help.  Surgery is scheduled 09/17/2018   • Depression with anxiety 08/21/2018   • Elevated cholesterol    • History of deep venous thrombosis (DVT) of distal vein of right lower extremity 08/21/2018 04/26/2016--CTA of the chest performed for elevated d-dimer and progressive shortness of breath and chest pain for one month revealed bilateral occlusive in near occlusive segmental and subsegmental pulmonary emboli in all lobes of the right lung as well as within the left lower lobe.  No evidence of pulmonary infarct.  Nonspecific multifocal groundglass attenuation in the right pulmonary apex, perhaps representing pneumonitis or submental atelectasis.  Questionable cholelithiasis.  Doppler venous study was positive for DVT in the right popliteal vein.  Hypercoagulable workup was normal.  He was treated with Eliquis for a total of 6 months and was subsequently discontinued.   • History of pneumonia    • History of pulmonary embolism  06/10/2016    04/26/2016--CTA of the chest performed for elevated d-dimer and progressive shortness of breath and chest pain for one month revealed bilateral occlusive in near occlusive segmental and subsegmental pulmonary emboli in all lobes of the right lung as well as within the left lower lobe.  No evidence of pulmonary infarct.  Nonspecific multifocal groundglass attenuation in the right pulmonary apex, perhaps representing pneumonitis or submental atelectasis.  Questionable cholelithiasis.  Doppler venous study was positive for DVT in the right popliteal vein.  Hypercoagulable workup was normal.  He was treated with Eliquis for a total of 6 months and was subsequently discontinued.   • Hyperlipidemia 02/25/2016   • Impaired fasting glucose 02/25/2016   • Lumbar degenerative disc disease 02/25/2016   • Morbidly obese 10/19/2018   • Multiple environmental allergies 08/21/2018    Patient has had allergy testing in the past which revealed allergies to molds and grass.  Immunotherapy for about 2 years in the 1980s.   • Non-occlusive coronary artery disease, 05/16/2017--normal LM; proximal LAD mild LI; 50% mid LAD.  Mild distal LAD LI; normal Cx.  Mild LI marginal branches; normal RCA, nondominant.  EF 60%. 07/24/2017 05/16/2017--cardiac catheterization performed for abnormal stress test.  Normal LV with ejection fraction 60%.  Dilated aortic root.  No wall motion abnormalities.  Normal left main.  Ramus branch small caliber and normal.  Proximal LAD large caliber and mild luminal irregularities.  50% mid LAD.  Mild luminal irregularities distal LAD.  3 diagonal branches of small caliber with mild luminal irregularities.  Normal septal branches.  Circumflex is large caliber and free of disease.  Marginal branches are medium caliber with mild luminal irregularities.  RCA is a medium caliber and free of disease.  It is nondominant.   • Obstructive sleep apnea, tolerates CPAP well.  Previously failed oral appliance.  02/25/2016   • Post traumatic stress disorder (PTSD) 02/25/2016   • Primary hypothyroidism 02/25/2016   • Primary osteoarthritis involving multiple joints 02/25/2016   • Psoriasis 09/17/2014   • Spinal stenosis of lumbar region with neurogenic claudication 02/15/2021   • Traumatic complete tear of left rotator cuff 03/17/2021 March 14, 2021--patient was evaluated by the orthopedic surgeon after he slipped on ice and fell onto his left shoulder.  Work-up revealed complete rotator cuff tear involving 2 tendons.  Specifically, he has an acute left supraspinatus tear and acute left infraspinatus tear.  He also has left glenohumeral joint osteoarthritis.  Apparently this is not repairable and patient would need a shoulder r   • Traumatic complete tear of left rotator cuff 03/17/2021 March 14, 2021--patient was evaluated by the orthopedic surgeon after he slipped on ice and fell onto his left shoulder.  Work-up revealed complete rotator cuff tear involving 2 tendons.  Specifically, he has an acute left supraspinatus tear and acute left infraspinatus tear.  He also has left glenohumeral joint osteoarthritis.  Apparently this is not repairable and patient would need a shoulder r   • Urinary incontinence without sensory awareness 03/17/2021   • Vitamin D deficiency 08/21/2018       Past Surgical History:   Procedure Laterality Date   • CARDIAC CATHETERIZATION N/A 5/16/2017    Procedure: Coronary angiography;  Surgeon: Malcolm Chen MD;  Location: [facility] INVASIVE LOCATION;  Service:    • CARDIAC CATHETERIZATION N/A 5/16/2017    Procedure: Left Heart Cath;  Surgeon: Malcolm Chen MD;  Location: [facility] CATH INVASIVE LOCATION;  Service:    • CARDIAC CATHETERIZATION N/A 5/16/2017    Procedure: Left ventriculography;  Surgeon: Malcolm Chen MD;  Location: [facility] CATH INVASIVE LOCATION;  Service:    • CARDIAC CATHETERIZATION N/A 4/26/2023    Procedure: Left Heart Cath;  Surgeon: Gail  MD Mary;  Location:  [facility] CATH INVASIVE LOCATION;  Service: Cardiovascular;  Laterality: N/A;   • CARDIAC CATHETERIZATION N/A 4/26/2023    Procedure: Coronary angiography;  Surgeon: Mary Reynolds MD;  Location:  [facility] CATH INVASIVE LOCATION;  Service: Cardiovascular;  Laterality: N/A;   • CARDIAC CATHETERIZATION N/A 4/26/2023    Procedure: Left ventriculography;  Surgeon: Mary Reynolds MD;  [facility]AS[facility];  Service: Cardiovascular;  Laterality: N/A;   • COLONOSCOPY  2005 2005--colonoscopy reportedly normal.  Records not available.   • COLONOSCOPY N/A 6/13/2019    Procedure: COLONOSCOPY INTO CECUM WITH COLD BIOPSY POLYPECTOMY;  Surgeon: Ayaan Gallardo MD;  Location: [facility] ENDOSCOPY;  Service: General   • LUMBAR DECOMPRESSION  2007 2007--lumbar decompression without fusion or hardware.   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   • REVISION TOTAL KNEE ARTHROPLASTY Right 09/17/2018 09/17/2018--right total knee arthroplasty revision due to lateral subluxation of the patella due to multiple factors.   • TOTAL KNEE ARTHROPLASTY Left 11/03/2017 11/03/2017--right total knee replacement complicated by patellofemoral subluxation.   • TOTAL KNEE ARTHROPLASTY Left 06/2014 June 2014--left total knee replacement.   • TOTAL KNEE ARTHROPLASTY REVISION Left 12/5/2022    Procedure: LEFT TOTAL KNEE REVISION Complex with Patella Tendon Reconstruction;  Surgeon: Sid Simon MD;  Location: [facility] OR;  Service: Orthopedics;  Laterality: Left;       Social History     Socioeconomic History   • Marital status:      Spouse name: italo   • Highest education level: Bachelor's degree  "(e.g., BA, AB, BS)   Tobacco Use   • Smoking status: Never   • Smokeless tobacco: Never   • Tobacco comments:     CAFFEINE USE 2 CUPS COFFEE AND 1 GLASS TEA DAILY   Vaping Use   • Vaping Use: Never used   Substance and Sexual Activity   • Alcohol use: No   • Drug use: Yes     Types: Marijuana     Comment: weekly   • Sexual activity: Yes     Partners: Female       Family History   Problem Relation Age of Onset   • Coronary artery disease Mother         Status post CABG   • Alzheimer's disease Mother    • Stroke Father    • Liver cancer Father         Father with biliary cancer including gallbladder   • No Known Problems Sister    • No Known Problems Brother        The following portions of the patient's history were reviewed and updated as appropriate: allergies, current medications, past family history, past medical history, past social history, past surgical history and problem list.       Objective:       Vitals:    05/05/23 1330   BP: (P) 120/68   Pulse: (P) 52   Height: (P) 185.4 cm (73\")         Physical Exam:  Constitutional: Well appearing, well developed, no acute distress   HENT: Oropharynx clear and membrane moist  Eyes: Normal conjunctiva, no sclera icterus  Neck: Supple, no carotid bruit bilaterally  Cardiovascular: Regular rate and rhythm, No Murmur, trace bilateral lower extremity edema. Radial cath stick site healing appropriately  Pulmonary: Normal respiratory effort, normal lung sounds, no wheezing  Neurological: Alert and orient x 3  Skin: Warm, dry, no ecchymosis, no rash  Psych: Appropriate mood and affect. Normal judgment and insight         Lab Results   Component Value Date     04/26/2023     04/25/2023     04/25/2023    K 4.2 04/26/2023    K 4.2 04/25/2023    K 4.2 04/25/2023     04/26/2023     04/25/2023     04/25/2023    CO2 24.5 04/26/2023    CO2 23.3 04/25/2023    CO2 22.7 04/25/2023    BUN 19 04/26/2023    BUN 19 04/25/2023    BUN 20 04/25/2023    " CREATININE 0.97 04/26/2023    CREATININE 0.98 04/25/2023    CREATININE 1.06 04/25/2023    EGFRIFNONA 79 09/27/2021    EGFRIFNONA 80 04/26/2021    EGFRIFAFRI 102 03/09/2020    EGFRIFAFRI 105 08/30/2019    GLUCOSE 126 (H) 04/26/2023    GLUCOSE 112 (H) 04/25/2023    GLUCOSE 111 (H) 04/25/2023    CALCIUM 9.2 04/26/2023    CALCIUM 9.1 04/25/2023    CALCIUM 9.1 04/25/2023    PROTENTOTREF 6.4 11/08/2022    PROTENTOTREF 6.0 09/27/2022    ALBUMIN 4.3 04/25/2023    ALBUMIN 3.70 12/06/2022    BILITOT 0.5 04/25/2023    BILITOT 0.4 12/06/2022    AST 19 04/25/2023    AST 18 12/06/2022    ALT 17 04/25/2023    ALT 13 12/06/2022     Lab Results   Component Value Date    WBC 5.17 04/26/2023    WBC 5.47 04/25/2023    HGB 12.5 (L) 04/26/2023    HGB 12.2 (L) 04/25/2023    HCT 39.5 04/26/2023    HCT 36.7 (L) 04/25/2023    MCV 86.6 04/26/2023    MCV 84.6 04/25/2023     04/26/2023     04/25/2023     Lab Results   Component Value Date    CHOL 191 03/29/2017    TRIG 140 11/08/2022    TRIG 169 (H) 09/27/2022    HDL 31 (L) 09/22/2017    HDL 35 (L) 03/29/2017    LDL 41 09/22/2017     (H) 03/29/2017     Lab Results   Component Value Date    PROBNP 31.0 04/25/2023    PROBNP 52.7 04/26/2021     Lab Results   Component Value Date    CKTOTAL 228 (H) 11/08/2022    TROPONINT 20 (H) 04/26/2023     Lab Results   Component Value Date    TSH 1.600 09/27/2022    TSH 4.550 (H) 03/28/2022           ECG 12 Lead    Date/Time: 5/5/2023 1:33 PM  Performed by: Jodi Dugan APRN  Authorized by: Jodi Dugan APRN   Comparison: compared with previous ECG from 4/26/2023  Similar to previous ECG  Rhythm: sinus rhythm  Rate: normal  BPM: 52  ST Segments: ST segments normal  T Waves: T waves normal                 Assessment:          Diagnosis Plan   1. Coronary artery disease involving native coronary artery of native heart with unstable angina pectoris  ECG 12 Lead      2. Dyspnea on exertion  Ambulatory Referral to Pulmonology       3. Benign essential hypertension        4. Hyperlipidemia        5. Non-occlusive coronary artery disease, 05/16/2017--normal LM; proximal LAD mild LI; 50% mid LAD.  Mild distal LAD LI; normal Cx.  Mild LI marginal branches; normal RCA, nondominant.  EF 60%.        6. History of pulmonary embolism               Plan:       Nonobstructive coronary artery disease - continue medical management of non-obstructive disease. Continue aspirin and statin. Cath site healing appropriately    Chest pain and dyspnea - chest pain resolved. Still dyspneic. Filling pressures normal on recent cath.  CTA negative for PE. Referred to pulmonary    Mixed hyperlipidemia - did not tolerate lipitor, crestor, simvastatin due to myalgias. He is on pravastatin low dose with coQ10 and zetia.  He just resumed taking statin and should make sure he reaches LDL. If not would consider PSK9 inhibitor    Essential hypertension - BP is controlled on current regimen    Patient is seen today for follow-up.  I am not recommending any changes.  I think he is stable from a cardiac perspective but I think we need to look at other possible etiologies for dyspnea.  I referred him to pulmonary.  Follow-up with Dr. Arnett in 6 months      Orders Placed This Encounter   Procedures   • Ambulatory Referral to Pulmonology     Referral Priority:   Routine     Referral Type:   Consultation     Referral Reason:   Specialty Services Required     Referred to Provider:   Eric Hassan Jr., MD     Requested Specialty:   Pulmonary Disease     Number of Visits Requested:   1   • ECG 12 Lead     This order was created via procedure documentation     Order Specific Question:   Release to patient     Answer:   Routine Release            TREY Vizcaino  [facility]  05/05/23  13:58 EDT      (3) adequate

## 2023-05-19 ENCOUNTER — TELEPHONE (OUTPATIENT)
Dept: INTERNAL MEDICINE | Facility: CLINIC | Age: 73
End: 2023-05-19
Payer: MEDICARE

## 2023-05-19 NOTE — TELEPHONE ENCOUNTER
Caller: Durga Valenzuela    Relationship: Self    Best call back number: [phone] (Mobile)    What medication are you requesting: ANTIBIOTIC FOR SINUS SYMPTOMS    What are your current symptoms: COUGHING UP SOMETHING COLORFUL, COUGH.    How long have you been experiencing symptoms:  1 WEEK NOW.    Have you had these symptoms before:    [] Yes  [] No    Have you been treated for these symptoms before:   [] Yes  [] No    If a prescription is needed, what is your preferred pharmacy and phone number: [facility] [id number] - [facility] [address] [facility] [phone] [facility] [phone] FX         Additional notes: PLEASE CONTACT PATIENT TO ADVISE.          THANKS

## 2023-05-22 ENCOUNTER — TELEPHONE (OUTPATIENT)
Dept: CARDIOLOGY | Facility: CLINIC | Age: 73
End: 2023-05-22

## 2023-05-22 NOTE — TELEPHONE ENCOUNTER
Caller: Durga Valenzuela    Relationship to patient: Self    Best call back number: [phone]    Patient is needing: PATIENT STATED THAT HE SAW GARO YANG ON 05.05.23 AND SHE ADVISED THAT SHE WAS SENDING A REFERRAL TO PULMONARY DOCTOR AND HE HAS NOT HEARD ANYTHING. PATIENT STATED THAT HE FEELS HE IS GETTING WORSE. PLEASE CONTACT PATIENT TO ADVISE. THANK YOU.

## 2023-06-15 DIAGNOSIS — E78.2 MIXED HYPERLIPIDEMIA: Chronic | ICD-10-CM

## 2023-06-16 RX ORDER — PRAVASTATIN SODIUM 20 MG
TABLET ORAL
Qty: 90 TABLET | Refills: 3
Start: 2023-06-16

## 2023-08-07 ENCOUNTER — APPOINTMENT (OUTPATIENT)
Dept: MRI IMAGING | Facility: HOSPITAL | Age: 73
End: 2023-08-07
Payer: MEDICARE

## 2023-08-07 ENCOUNTER — APPOINTMENT (OUTPATIENT)
Dept: CT IMAGING | Facility: HOSPITAL | Age: 73
End: 2023-08-07
Payer: MEDICARE

## 2023-08-07 ENCOUNTER — HOSPITAL ENCOUNTER (OUTPATIENT)
Facility: HOSPITAL | Age: 73
Setting detail: OBSERVATION
Discharge: HOME | End: 2023-08-08
Attending: EMERGENCY MEDICINE | Admitting: EMERGENCY MEDICINE
Payer: MEDICARE

## 2023-08-07 DIAGNOSIS — S12.000K: ICD-10-CM

## 2023-08-07 DIAGNOSIS — M54.2 NECK PAIN: Primary | ICD-10-CM
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
## 2023-08-07 LAB
ANION GAP SERPL CALCULATED.3IONS-SCNC: 8.3 MMOL/L (ref 5–15)
BASOPHILS # BLD AUTO: 0.02 10*3/MM3 (ref 0–0.2)
BASOPHILS NFR BLD AUTO: 0.3 % (ref 0–1.5)
BUN SERPL-MCNC: 19 MG/DL (ref 8–23)
BUN/CREAT SERPL: 21.1 (ref 7–25)
CALCIUM SPEC-SCNC: 8.9 MG/DL (ref 8.6–10.5)
CHLORIDE SERPL-SCNC: 104 MMOL/L (ref 98–107)
CO2 SERPL-SCNC: 27.7 MMOL/L (ref 22–29)
CREAT SERPL-MCNC: 0.9 MG/DL (ref 0.76–1.27)
DEPRECATED RDW RBC AUTO: 45.1 FL (ref 37–54)
EGFRCR SERPLBLD CKD-EPI 2021: 90.2 ML/MIN/1.73
EOSINOPHIL # BLD AUTO: 0.16 10*3/MM3 (ref 0–0.4)
EOSINOPHIL NFR BLD AUTO: 2.1 % (ref 0.3–6.2)
ERYTHROCYTE [DISTWIDTH] IN BLOOD BY AUTOMATED COUNT: 14.1 % (ref 12.3–15.4)
GLUCOSE SERPL-MCNC: 140 MG/DL (ref 65–99)
HCT VFR BLD AUTO: 38.3 % (ref 37.5–51)
HGB BLD-MCNC: 12.5 G/DL (ref 13–17.7)
IMM GRANULOCYTES # BLD AUTO: 0.03 10*3/MM3 (ref 0–0.05)
IMM GRANULOCYTES NFR BLD AUTO: 0.4 % (ref 0–0.5)
INR PPP: 1.04 (ref 0.9–1.1)
LYMPHOCYTES # BLD AUTO: 0.72 10*3/MM3 (ref 0.7–3.1)
LYMPHOCYTES NFR BLD AUTO: 9.7 % (ref 19.6–45.3)
MCH RBC QN AUTO: 28.9 PG (ref 26.6–33)
MCHC RBC AUTO-ENTMCNC: 32.6 G/DL (ref 31.5–35.7)
MCV RBC AUTO: 88.7 FL (ref 79–97)
MONOCYTES # BLD AUTO: 0.44 10*3/MM3 (ref 0.1–0.9)
MONOCYTES NFR BLD AUTO: 5.9 % (ref 5–12)
NEUTROPHILS NFR BLD AUTO: 6.08 10*3/MM3 (ref 1.7–7)
NEUTROPHILS NFR BLD AUTO: 81.6 % (ref 42.7–76)
NRBC BLD AUTO-RTO: 0 /100 WBC (ref 0–0.2)
PLATELET # BLD AUTO: 176 10*3/MM3 (ref 140–450)
PMV BLD AUTO: 9.3 FL (ref 6–12)
POTASSIUM SERPL-SCNC: 4.3 MMOL/L (ref 3.5–5.2)
PROTHROMBIN TIME: 13.7 SECONDS (ref 11.7–14.2)
RBC # BLD AUTO: 4.32 10*6/MM3 (ref 4.14–5.8)
SODIUM SERPL-SCNC: 140 MMOL/L (ref 136–145)
WBC NRBC COR # BLD: 7.45 10*3/MM3 (ref 3.4–10.8)

## 2023-08-07 PROCEDURE — A9577 INJ MULTIHANCE: HCPCS | Performed by: EMERGENCY MEDICINE

## 2023-08-07 PROCEDURE — G0378 HOSPITAL OBSERVATION PER HR: HCPCS

## 2023-08-07 PROCEDURE — 25010000002 LORAZEPAM PER 2 MG: Performed by: NURSE PRACTITIONER

## 2023-08-07 PROCEDURE — 70450 CT HEAD/BRAIN W/O DYE: CPT

## 2023-08-07 PROCEDURE — 72156 MRI NECK SPINE W/O & W/DYE: CPT

## 2023-08-07 PROCEDURE — 99284 EMERGENCY DEPT VISIT MOD MDM: CPT

## 2023-08-07 PROCEDURE — 80048 BASIC METABOLIC PNL TOTAL CA: CPT | Performed by: NURSE PRACTITIONER

## 2023-08-07 PROCEDURE — 85025 COMPLETE CBC W/AUTO DIFF WBC: CPT | Performed by: NURSE PRACTITIONER

## 2023-08-07 PROCEDURE — 0 GADOBENATE DIMEGLUMINE 529 MG/ML SOLUTION: Performed by: EMERGENCY MEDICINE

## 2023-08-07 PROCEDURE — 36415 COLL VENOUS BLD VENIPUNCTURE: CPT

## 2023-08-07 PROCEDURE — 85610 PROTHROMBIN TIME: CPT | Performed by: NURSE PRACTITIONER

## 2023-08-07 PROCEDURE — 99214 OFFICE O/P EST MOD 30 MIN: CPT | Performed by: NURSE PRACTITIONER

## 2023-08-07 PROCEDURE — 72125 CT NECK SPINE W/O DYE: CPT

## 2023-08-07 PROCEDURE — 96374 THER/PROPH/DIAG INJ IV PUSH: CPT

## 2023-08-07 RX ORDER — POLYETHYLENE GLYCOL 3350 17 G/17G
17 POWDER, FOR SOLUTION ORAL DAILY PRN
Status: DISCONTINUED | OUTPATIENT
Start: 2023-08-07 | End: 2023-08-08 | Stop reason: HOSPADM

## 2023-08-07 RX ORDER — LEVOTHYROXINE SODIUM 0.05 MG/1
175 TABLET ORAL
Status: DISCONTINUED | OUTPATIENT
Start: 2023-08-08 | End: 2023-08-08 | Stop reason: HOSPADM

## 2023-08-07 RX ORDER — ACETAMINOPHEN 325 MG/1
650 TABLET ORAL EVERY 4 HOURS PRN
Status: DISCONTINUED | OUTPATIENT
Start: 2023-08-07 | End: 2023-08-08 | Stop reason: HOSPADM

## 2023-08-07 RX ORDER — AMOXICILLIN 250 MG
2 CAPSULE ORAL 2 TIMES DAILY
Status: DISCONTINUED | OUTPATIENT
Start: 2023-08-07 | End: 2023-08-08 | Stop reason: HOSPADM

## 2023-08-07 RX ORDER — NITROGLYCERIN 0.4 MG/1
0.4 TABLET SUBLINGUAL
Status: DISCONTINUED | OUTPATIENT
Start: 2023-08-07 | End: 2023-08-08 | Stop reason: HOSPADM

## 2023-08-07 RX ORDER — METHOCARBAMOL 750 MG/1
750 TABLET, FILM COATED ORAL EVERY 6 HOURS SCHEDULED
Status: DISCONTINUED | OUTPATIENT
Start: 2023-08-07 | End: 2023-08-08 | Stop reason: HOSPADM

## 2023-08-07 RX ORDER — BISACODYL 5 MG/1
5 TABLET, DELAYED RELEASE ORAL DAILY PRN
Status: DISCONTINUED | OUTPATIENT
Start: 2023-08-07 | End: 2023-08-08 | Stop reason: HOSPADM

## 2023-08-07 RX ORDER — SODIUM CHLORIDE 0.9 % (FLUSH) 0.9 %
10 SYRINGE (ML) INJECTION EVERY 12 HOURS SCHEDULED
Status: DISCONTINUED | OUTPATIENT
Start: 2023-08-07 | End: 2023-08-08 | Stop reason: HOSPADM

## 2023-08-07 RX ORDER — BISACODYL 10 MG
10 SUPPOSITORY, RECTAL RECTAL DAILY PRN
Status: DISCONTINUED | OUTPATIENT
Start: 2023-08-07 | End: 2023-08-08 | Stop reason: HOSPADM

## 2023-08-07 RX ORDER — HYDROXYZINE HYDROCHLORIDE 25 MG/1
25 TABLET, FILM COATED ORAL 3 TIMES DAILY PRN
Status: DISCONTINUED | OUTPATIENT
Start: 2023-08-07 | End: 2023-08-08 | Stop reason: HOSPADM

## 2023-08-07 RX ORDER — LORAZEPAM 2 MG/ML
1 INJECTION INTRAMUSCULAR ONCE
Status: COMPLETED | OUTPATIENT
Start: 2023-08-07 | End: 2023-08-07

## 2023-08-07 RX ORDER — PRAVASTATIN SODIUM 40 MG
20 TABLET ORAL DAILY
Status: DISCONTINUED | OUTPATIENT
Start: 2023-08-07 | End: 2023-08-08 | Stop reason: HOSPADM

## 2023-08-07 RX ORDER — SODIUM CHLORIDE 0.9 % (FLUSH) 0.9 %
10 SYRINGE (ML) INJECTION AS NEEDED
Status: DISCONTINUED | OUTPATIENT
Start: 2023-08-07 | End: 2023-08-08 | Stop reason: HOSPADM

## 2023-08-07 RX ORDER — OXYCODONE HYDROCHLORIDE AND ACETAMINOPHEN 5; 325 MG/1; MG/1
1 TABLET ORAL EVERY 4 HOURS PRN
Status: DISCONTINUED | OUTPATIENT
Start: 2023-08-07 | End: 2023-08-08 | Stop reason: HOSPADM

## 2023-08-07 RX ORDER — CALCIUM CARBONATE 500 MG/1
2 TABLET, CHEWABLE ORAL 3 TIMES DAILY PRN
Status: DISCONTINUED | OUTPATIENT
Start: 2023-08-07 | End: 2023-08-08 | Stop reason: HOSPADM

## 2023-08-07 RX ORDER — FLUOXETINE HYDROCHLORIDE 20 MG/1
60 CAPSULE ORAL DAILY
Status: DISCONTINUED | OUTPATIENT
Start: 2023-08-07 | End: 2023-08-08 | Stop reason: HOSPADM

## 2023-08-07 RX ORDER — LOSARTAN POTASSIUM 50 MG/1
50 TABLET ORAL DAILY
Status: DISCONTINUED | OUTPATIENT
Start: 2023-08-07 | End: 2023-08-08 | Stop reason: HOSPADM

## 2023-08-07 RX ORDER — AMLODIPINE BESYLATE 10 MG/1
10 TABLET ORAL DAILY
Status: DISCONTINUED | OUTPATIENT
Start: 2023-08-07 | End: 2023-08-08 | Stop reason: HOSPADM

## 2023-08-07 RX ORDER — ZOLPIDEM TARTRATE 5 MG/1
10 TABLET ORAL NIGHTLY PRN
Status: DISCONTINUED | OUTPATIENT
Start: 2023-08-07 | End: 2023-08-08 | Stop reason: HOSPADM

## 2023-08-07 RX ORDER — SODIUM CHLORIDE 9 MG/ML
40 INJECTION, SOLUTION INTRAVENOUS AS NEEDED
Status: DISCONTINUED | OUTPATIENT
Start: 2023-08-07 | End: 2023-08-08 | Stop reason: HOSPADM

## 2023-08-07 RX ADMIN — PRAVASTATIN SODIUM 20 MG: 40 TABLET ORAL at 19:39

## 2023-08-07 RX ADMIN — ANTACID TABLETS 2 TABLET: 500 TABLET, CHEWABLE ORAL at 20:20

## 2023-08-07 RX ADMIN — GADOBENATE DIMEGLUMINE 20 ML: 529 INJECTION, SOLUTION INTRAVENOUS at 21:06

## 2023-08-07 RX ADMIN — Medication 10 ML: at 19:40

## 2023-08-07 RX ADMIN — OXYCODONE HYDROCHLORIDE AND ACETAMINOPHEN 1 TABLET: 5; 325 TABLET ORAL at 19:39

## 2023-08-07 RX ADMIN — LORAZEPAM 1 MG: 2 INJECTION INTRAMUSCULAR; INTRAVENOUS at 20:16

## 2023-08-07 RX ADMIN — METHOCARBAMOL TABLETS 750 MG: 750 TABLET, COATED ORAL at 23:07

## 2023-08-07 RX ADMIN — METHOCARBAMOL TABLETS 750 MG: 750 TABLET, COATED ORAL at 17:36

## 2023-08-07 RX ADMIN — FLUOXETINE HYDROCHLORIDE 60 MG: 20 CAPSULE ORAL at 19:39

## 2023-08-07 RX ADMIN — AMLODIPINE BESYLATE 10 MG: 10 TABLET ORAL at 19:39

## 2023-08-07 RX ADMIN — LOSARTAN POTASSIUM 50 MG: 50 TABLET, FILM COATED ORAL at 19:39

## 2023-08-07 NOTE — ED TRIAGE NOTES
Pt to ed from home via PV      Pt c/o fall. Pt was in chair and fell asleep then fell out of chair. Pt hit head. Pt denies LOC and blood thinners. Pt c/o neck and head pain. Pt placed in c-collar

## 2023-08-07 NOTE — ED NOTES
Nursing report ED to floor  Durga Valenzuela  72 y.o.  male    HPI :   Chief Complaint   Patient presents with    Fall       Admitting doctor:   Georgia Nichols MD    Admitting diagnosis:   There were no encounter diagnoses.    Code status:   Current Code Status       Date Active Code Status Order ID Comments User Context       Prior            Allergies:   Hydrocodone and Zocor  [simvastatin]    Isolation:   No active isolations    Intake and Output  No intake or output data in the 24 hours ending 08/07/23 1527    Weight:       08/07/23  1304   Weight: 136 kg (300 lb)       Most recent vitals:   Vitals:    08/07/23 1402 08/07/23 1441 08/07/23 1443 08/07/23 1501   BP:  129/84  147/73   BP Location:       Patient Position:       Pulse: (!) 47 (!) 40 (!) 42 (!) 42   Resp:       Temp:       TempSrc:       SpO2: 95% 95% 96% 96%   Weight:       Height:           Active LDAs/IV Access:   Lines, Drains & Airways       Active LDAs       Name Placement date Placement time Site Days    Peripheral IV 08/07/23 1328 Left Antecubital 08/07/23  1328  Antecubital  less than 1    Closed/Suction Drain 1 Left Knee Accordion 15 Fr. 12/05/22  1132  Knee  245                    Labs (abnormal labs have a star):   Labs Reviewed - No data to display    EKG:   No orders to display       Meds given in ED:   Medications - No data to display    Imaging results:  CT Head Without Contrast    Result Date: 8/7/2023  1. No acute intracranial abnormality is identified with no acute change when compared to prior head CT on 05/08/2018. 2. There is mild-to-moderate small vessel disease in the cerebral white matter and mild cerebral atrophy, both of which have progressed when compared to 05/08/2018. 3. This patient has a chronic fracture of the right anterior ring of C1 with 4 mm fracture gap at this site similar to prior head CT 05/08/2018, three years and 3 months ago. The remainder of the head CT is within normal limits with no acute skull fracture or  intracranial hemorrhage identified.   CT SCAN OF THE CERVICAL SPINE TECHNIQUE: Spiral CT images were obtained from the skull base down to the T2-3 thoracic level. The images were reformatted and submitted in 2 mm thick axial and sagittal CT sections with soft tissue algorithm and 1 mm thick axial, sagittal and coronal CT sections with high-resolution bone algorithm.  This is correlated to a prior cervical spine CT from [facility] on 05/09/2018.  FINDINGS: There is a chronic displaced fracture of the right side of the anterior ring of C1, 15 mm to the right of midline and there is a 4-5 mm medial lateral gap at this fracture site.  The fracture was present and was acute in nature back on cervical spine CT 05/09/2018 where there was a 4 mm fracture gap at the site. Just posterior superior to this fracture is subtle fracture through the far anterior inferior medial tip of the right occipital condyle with 1 mm separation of the small 5 x 2.5 mm fragment off the anterior inferior medial right occipital condyle and I suspect that this is a chronic finding.  It is newly apparent when compared to prior CT 05/09/2018. The precise age of the right occipital condyle fracture is indeterminate, but again I suspect that it is most probably nonacute.  At C2-3 the posterior disc margin, facets and uncovertebral joints are within normal limits with no canal or foraminal narrowing.  At C3-4 there is mild left posterior lateral endplate spurring mildly narrowing the left side of the canal. There is moderate left facet overgrowth and some bony fusion across the left facet joint.  The right facets are normal.  There is some left-sided uncovertebral joint hypertrophy and there is moderate left bony foraminal narrowing.  There is no right foraminal narrowing.  At C4-5 there is moderate left facet overgrowth.  There is disc space narrowing and there are degenerative endplate changes.  There is diffuse posterior disc  osteophyte complex and posterior bulging disc material that abuts and deforms the ventral surface of the cord at least mild to moderately narrowing the canal.  There is no right foraminal narrowing. There is left uncovertebral joint spurring, and the combination with left facet overgrowth results in moderate-to-severe left bony foraminal narrowing.  At C5-6 there is disc space narrowing and degenerative endplate changes and mild posterior spurring.  There is mild-to-moderate canal narrowing.  There is some uncovertebral joint hypertrophy and there is mild right and mild-to-moderate left bony foraminal narrowing.  At C6-7 there is severe disc space narrowing.  There are degenerative disc and endplate changes and diffuse posterior endplate spurring results in moderate bony canal narrowing and bilateral uncovertebral joint hypertrophy and mild-to-moderate right and moderate-to-severe left bony foraminal narrowing.  At C7-T1 there is disc space narrowing, degenerative endplate changes, posterior spurring with at least mild canal narrowing and uncovertebral joint spurring into the foramina, left greater than right, and there is moderate left and no right foraminal narrowing.  IMPRESSION:  This patient had a mildly displaced fracture of the right anterior ring of C1 on a cervical spine CT 5 years and 3 months ago on 05/08/2018. There is a persistent nonunited displaced fracture plane extending through the right anterior ring of C1 with a 4-5 mm fracture gap.  Posterior superior to this is a hairline 1 mm lucency through the far anterior inferior medial tip of the adjacent right occipital condyle that was not apparent or evident on the prior cervical spine CT on 05/08/2018.  The precise age of this tiny fracture off the anterior inferior medial tip of the right occipital condyle is uncertain but I suspect that it is subacute or chronic, rather than acute but it is impossible to precisely date. There is also a very subtle  hairline lucency of the right posterior lateral ring of C1, that I suspect is due to the adjacent sclerosis, is subacute or chronic nearly healed fracture. Otherwise I see no acute fracture in the remainder of the cervical spine.  There is cervical spondylosis as described above.  The results of the study were communicated to Makeda Hill MD, from the ER by telephone on 08/07/2023 at 2:20 PM.    Radiation dose reduction techniques were utilized, including automated exposure control and exposure modulation based on body size.       CT Cervical Spine Without Contrast    Result Date: 8/7/2023  1. No acute intracranial abnormality is identified with no acute change when compared to prior head CT on 05/08/2018. 2. There is mild-to-moderate small vessel disease in the cerebral white matter and mild cerebral atrophy, both of which have progressed when compared to 05/08/2018. 3. This patient has a chronic fracture of the right anterior ring of C1 with 4 mm fracture gap at this site similar to prior head CT 05/08/2018, three years and 3 months ago. The remainder of the head CT is within normal limits with no acute skull fracture or intracranial hemorrhage identified.   CT SCAN OF THE CERVICAL SPINE TECHNIQUE: Spiral CT images were obtained from the skull base down to the T2-3 thoracic level. The images were reformatted and submitted in 2 mm thick axial and sagittal CT sections with soft tissue algorithm and 1 mm thick axial, sagittal and coronal CT sections with high-resolution bone algorithm.  This is correlated to a prior cervical spine CT from [facility] on 05/09/2018.  FINDINGS: There is a chronic displaced fracture of the right side of the anterior ring of C1, 15 mm to the right of midline and there is a 4-5 mm medial lateral gap at this fracture site.  The fracture was present and was acute in nature back on cervical spine CT 05/09/2018 where there was a 4 mm fracture gap at the site. Just posterior  superior to this fracture is subtle fracture through the far anterior inferior medial tip of the right occipital condyle with 1 mm separation of the small 5 x 2.5 mm fragment off the anterior inferior medial right occipital condyle and I suspect that this is a chronic finding.  It is newly apparent when compared to prior CT 05/09/2018. The precise age of the right occipital condyle fracture is indeterminate, but again I suspect that it is most probably nonacute.  At C2-3 the posterior disc margin, facets and uncovertebral joints are within normal limits with no canal or foraminal narrowing.  At C3-4 there is mild left posterior lateral endplate spurring mildly narrowing the left side of the canal. There is moderate left facet overgrowth and some bony fusion across the left facet joint.  The right facets are normal.  There is some left-sided uncovertebral joint hypertrophy and there is moderate left bony foraminal narrowing.  There is no right foraminal narrowing.  At C4-5 there is moderate left facet overgrowth.  There is disc space narrowing and there are degenerative endplate changes.  There is diffuse posterior disc osteophyte complex and posterior bulging disc material that abuts and deforms the ventral surface of the cord at least mild to moderately narrowing the canal.  There is no right foraminal narrowing. There is left uncovertebral joint spurring, and the combination with left facet overgrowth results in moderate-to-severe left bony foraminal narrowing.  At C5-6 there is disc space narrowing and degenerative endplate changes and mild posterior spurring.  There is mild-to-moderate canal narrowing.  There is some uncovertebral joint hypertrophy and there is mild right and mild-to-moderate left bony foraminal narrowing.  At C6-7 there is severe disc space narrowing.  There are degenerative disc and endplate changes and diffuse posterior endplate spurring results in moderate bony canal narrowing and bilateral  uncovertebral joint hypertrophy and mild-to-moderate right and moderate-to-severe left bony foraminal narrowing.  At C7-T1 there is disc space narrowing, degenerative endplate changes, posterior spurring with at least mild canal narrowing and uncovertebral joint spurring into the foramina, left greater than right, and there is moderate left and no right foraminal narrowing.  IMPRESSION:  This patient had a mildly displaced fracture of the right anterior ring of C1 on a cervical spine CT 5 years and 3 months ago on 05/08/2018. There is a persistent nonunited displaced fracture plane extending through the right anterior ring of C1 with a 4-5 mm fracture gap.  Posterior superior to this is a hairline 1 mm lucency through the far anterior inferior medial tip of the adjacent right occipital condyle that was not apparent or evident on the prior cervical spine CT on 05/08/2018.  The precise age of this tiny fracture off the anterior inferior medial tip of the right occipital condyle is uncertain but I suspect that it is subacute or chronic, rather than acute but it is impossible to precisely date. There is also a very subtle hairline lucency of the right posterior lateral ring of C1, that I suspect is due to the adjacent sclerosis, is subacute or chronic nearly healed fracture. Otherwise I see no acute fracture in the remainder of the cervical spine.  There is cervical spondylosis as described above.  The results of the study were communicated to Makeda Hill MD, from the ER by telephone on 08/07/2023 at 2:20 PM.    Radiation dose reduction techniques were utilized, including automated exposure control and exposure modulation based on body size.        Ambulatory status:   - ad mirian    Social issues:   Social History     Socioeconomic History    Marital status:      Spouse name: italo    Highest education level: Bachelor's degree (e.g., BA, AB, BS)   Tobacco Use    Smoking status: Never    Smokeless tobacco: Never     Tobacco comments:     CAFFEINE USE 2 CUPS COFFEE AND 1 GLASS TEA DAILY   Vaping Use    Vaping Use: Never used   Substance and Sexual Activity    Alcohol use: No    Drug use: Yes     Types: Marijuana     Comment: weekly    Sexual activity: Yes     Partners: Female       NIH Stroke Scale:       Nigel Morales RN  08/07/23 15:27 EDT

## 2023-08-07 NOTE — CASE MANAGEMENT/SOCIAL WORK
Discharge Planning Assessment  [facility]     Patient Name: Durga Valenzuela  MRN: [id number]  Today's Date: 8/7/2023    Admit Date: 8/7/2023    Plan: Plans to return home if status allows-STEPHEN Gonzalez RN   Discharge Needs Assessment       Row Name 08/07/23 1602       Living Environment    People in Home spouse    Name(s) of People in Home Maya    Current Living Arrangements home    Potentially Unsafe Housing Conditions none    Primary Care Provided by self    Provides Primary Care For no one    Family Caregiver if Needed spouse    Family Caregiver Names Maya, [phone]    Quality of Family Relationships supportive    Able to Return to Prior Arrangements yes       Resource/Environmental Concerns    Resource/Environmental Concerns none       Transition Planning    Patient/Family Anticipates Transition to home with family    Patient/Family Anticipated Services at Transition none    Transportation Anticipated family or friend will provide       Discharge Needs Assessment    Equipment Currently Used at Home bp cuff;cpap    Concerns to be Addressed other (see comments)  denies any needs at present time, CM will follow    Anticipated Changes Related to Illness none    Equipment Needed After Discharge none    Provided Post Acute Provider List? N/A    Provided Post Acute Provider Quality & Resource List? N/A                   Discharge Plan       Row Name 08/07/23 5427       Plan    Plan Plans to return home if status allows-STEPHEN Gonzalez RN    Patient/Family in Agreement with Plan yes    Provided Post Acute Provider List? N/A    Provided Post Acute Provider Quality & Resource List? N/A    Plan Comments Spoke w/ patient at bedside w/ his permission. Introduced self and explained role. All info on facehseet, including PCP as FARHAD Burt, verified. Lives in two story house w/ wife Maya, w/ 3 steps to enter, and has been able to navigate steps and around home w/o difficulty. Independent w/ ADLs. Uses CPAP and B/P cuff at  home. At present time, denies need for any DME or community resources at d/c. Uses [facility] on [facility] in [place] and is able to obtain and pay for meds. To return home at d/c, if status allows, w/ wife's assist; family will transport at d/c; agreeable w/ plan. CM will continue to follow-STEPHEN Gonzalez RN                  Continued Care and Services - Admitted Since 8/7/2023    Coordination has not been started for this encounter.          Demographic Summary       Row Name 08/07/23 1601       General Information    Admission Type observation    Arrived From emergency department    Referral Source admission list    Reason for Consult discharge planning    Preferred Language English       Contact Information    Permission Granted to Share Info With                    Functional Status       Row Name 08/07/23 1601       Functional Status    Usual Activity Tolerance good    Current Activity Tolerance fair       Functional Status, IADL    Medications independent    IADL Comments wife does most meal prep, housekeeping, laundry and shopping       Mental Status    General Appearance WDL WDL       Mental Status Summary    Recent Changes in Mental Status/Cognitive Functioning no changes                   Psychosocial       Row Name 08/07/23 1602       Behavior WDL    Behavior WDL WDL       Emotion Mood WDL    Emotion/Mood/Affect WDL WDL       Speech WDL    Speech WDL WDL       Perceptual State WDL    Perceptual State WDL WDL       Thought Process WDL    Thought Process WDL WDL       Intellectual Performance WDL    Intellectual Performance WDL WDL                   Abuse/Neglect    No documentation.                  Legal    No documentation.                  Substance Abuse    No documentation.                  Patient Forms    No documentation.                     Serena Gonzalez, RN

## 2023-08-07 NOTE — ED PROVIDER NOTES
EMERGENCY DEPARTMENT ENCOUNTER    Room Number:  107/1  PCP: Florian Burt MD  Patient Care Team:  Florian Burt MD as PCP - General (Internal Medicine)   Independent Historians: Patient and his significant other    HPI:  Chief Complaint: Pain    A complete HPI/ROS/PMH/PSH/SH/FH are unobtainable due to: None    Chronic or social conditions impacting patient care (Social Determinants of Health): None  (Financial Resource Strain / Food Insecurity / Transportation Needs / Physical Activity / Stress / Social Connections / Intimate Partner Violence / Housing Stability)    Context: Durga Valenzuela is a 72 y.o. male who presents to the ED c/o acute neck pain.  Patient says he fell asleep in a chair and then fell forward hitting his head on a wall on Friday.  Since then he has had headache as well as neck pain in the setting of prior injury to his neck that was not an operative injury but was a significant fracture per family.  Has been ambulatory with normal gait and denies any numbness or tingling or radicular pain    Review of prior external notes (non-ED) -and- Review of prior external test results outside of this encounter: On record review of patient's cardiology notes and primary doctor notes, patient has a history of: coronary artery disease, hyperlipidemia, hypertension, PE, and C-spine fracture and degenerative disc disease in his cervical spine and lumbar spine  I reviewed patient's previous imaging which included a CT cervical spine and may have 2018.  Patient was noted then to have some degenerative disc disease throughout but most pronounced at C6-C7 followed by C7-T1 and C5-C6.  He was noted to have a minimally displaced fracture involving the anterior arch of the C1 vertebra to the right of midline along the anterior margin of the right lateral mass.  On review of neurosurgery notes from that time patient was managed nonoperatively and a hard cervical collar.  He wore that for 6 to 12  weeks.    Prescription drug monitoring program review: Virgilio reviewed by Georgia Nichols MD, Makeda Hill MD       PAST MEDICAL HISTORY  Active Ambulatory Problems     Diagnosis Date Noted    Post traumatic stress disorder (PTSD) 02/25/2016    Benign prostatic hyperplasia with urinary frequency 02/25/2016    Chronic insomnia 02/25/2016    Lumbar degenerative disc disease 02/25/2016    Impaired fasting glucose 02/25/2016    Benign essential hypertension 02/25/2016    Primary osteoarthritis involving multiple joints 02/25/2016    Hyperlipidemia 02/25/2016    Primary hypothyroidism 02/25/2016    Obstructive sleep apnea, tolerates CPAP well.  Previously failed oral appliance. 02/25/2016    History of pulmonary embolism 06/10/2016    Non-occlusive coronary artery disease, 05/16/2017--normal LM; proximal LAD mild LI; 50% mid LAD.  Mild distal LAD LI; normal Cx.  Mild LI marginal branches; normal RCA, nondominant.  EF 60%. 07/24/2017    Therapeutic drug monitoring 08/21/2018    Vitamin D deficiency 08/21/2018    Psoriasis 09/17/2014    Allergic rhinitis 08/21/2018    Depression with anxiety 08/21/2018    Multiple environmental allergies 08/21/2018    Bilateral lower extremity edema 08/21/2018    Morbid obesity 10/19/2018    Spinal stenosis of lumbar region with neurogenic claudication 02/15/2021    Traumatic complete tear of left rotator cuff 03/17/2021    Urinary incontinence without sensory awareness 03/17/2021    Bipolar disorder, in full remission, most recent episode mixed 09/30/2021    Weakness of both lower extremities 10/04/2022    Failed total knee, left, sequela 12/05/2022    Chest pain 04/25/2023     Resolved Ambulatory Problems     Diagnosis Date Noted    Dyspnea on exertion 03/29/2017    Cervical strain, acute, initial encounter 03/23/2018    Acute post-traumatic headache 05/04/2018    Closed nondisplaced lateral mass fracture of first cervical vertebra with routine healing 05/09/2018    Cervical  spine fracture 05/09/2018    History of cervical fracture, C1 lateral mass 07/03/2018    Closed traumatic lateral subluxation of patellofemoral joint, right, initial encounter 08/13/2018    Primary osteoarthritis of right knee 08/21/2018    History of deep venous thrombosis (DVT) of distal vein of right lower extremity 08/21/2018    Trigger ring finger of left hand 08/21/2018    Left carpal tunnel syndrome 09/05/2018    Cubital tunnel syndrome on left 09/05/2018    Screen for colon cancer 01/31/2019    Acute recurrent maxillary sinusitis 02/11/2019    Screen for colon cancer 05/30/2019    Acute bronchitis 03/16/2020    Acute right-sided low back pain with right-sided sciatica 01/13/2021    Acute bronchitis with bronchospasm 04/07/2022     Past Medical History:   Diagnosis Date    Affective psychosis, bipolar 09/30/2021    Elevated cholesterol     History of pneumonia     Hyperlipidemia 02/25/2016    Morbidly obese 10/19/2018         PAST SURGICAL HISTORY  Past Surgical History:   Procedure Laterality Date    CARDIAC CATHETERIZATION N/A 5/16/2017    Procedure: Coronary angiography;  Surgeon: Malcolm Chen MD;  Location[facility] C[facility];  Service:     CARDIAC CATHETERIZATION N/A 5/16/2017    Procedure: Left Heart Cath;  Surgeon: Malcolm Chen MD;  Location:  [facility] CATH INVASIVE LOCATION;  Service:     CARDIAC CATHETERIZATION N/A 5/16/2017    Procedure: Left ventriculography;  Surgeon: Malcolm Chen MD;  Location:  [facility] CATH INVASIVE LOCATION;  Service:     CARDIAC CATHETERIZATION N/A 4/26/2023    Procedure: Left Heart Cath;  Surgeon: Mary Reynolds MD;  [facility];  Service: Cardiovascular;  Laterality: N/A;    CARDIAC CATHETERIZATION N/A 4/26/2023    Procedure: Coronary angiography;  Surgeon: Mary Reynolds MD;  Location:  [facility] CATH INVASIVE LOCATION;  Service: Cardiovascular;  Laterality: N/A;    CARDIAC CATHETERIZATION N/A 4/26/2023     Procedure: Left ventriculography;  Surgeon: Mary Reynolds MD;  Location: [facility] CATH INVASIVE LOCATION;  Service: Cardiovascular;  Laterality: N/A;    COLONOSCOPY  2005 2005--colonoscopy reportedly normal.  Records not available.    COLONOSCOPY N/A 6/13/2019    Procedure: COLONOSCOPY INTO CECUM WITH COLD BIOPSY POLYPECTOMY;  Surgeon: Ayaan Gallardo MD;  Location: [facility] ENDOSCOPY;  Service: General    LUMBAR DECOMPRESSION  2007 2007--lumbar decompression without fusion or hardware.    PATELLAR RECONSTRUCTION Left 12/5/2022    Procedure: PATELLAR Tendon RECONSTRUCTION;  Surgeon: Sid Simon MD;  Location: [facility] OR;  Service: Orthopedics;  Laterality: Left;    REVISION AMPUTATION OF FINGER  09/2017 September 2017--traumatic left index finger amputation with flap just distal to DIP joint.    REVISION TOTAL KNEE ARTHROPLASTY Right 09/17/2018 09/17/2018--right total knee arthroplasty revision due to lateral subluxation of the patella due to multiple factors.    TOTAL KNEE ARTHROPLASTY Left 11/03/2017 11/03/2017--right total knee replacement complicated by patellofemoral subluxation.    TOTAL KNEE ARTHROPLASTY Left 06/2014 June 2014--left total knee replacement.    TOTAL KNEE ARTHROPLASTY REVISION Left 12/5/2022    Procedure: LEFT TOTAL KNEE REVISION Complex with Patella Tendon Reconstruction;  Surgeon: Sid Simon MD;  Location: [facility];  Service: Orthopedics;  Laterality: Left;         FAMILY HISTORY  Family History   Problem Relation Age of Onset    Coronary artery disease Mother         Status post CABG    Alzheimer's disease Mother     Stroke Father     Liver cancer Father         Father with biliary cancer including gallbladder    No Known Problems Sister     No Known Problems Brother          SOCIAL HISTORY  Social History     Socioeconomic History    Marital status:      Spouse name: italo    Highest education level: Bachelor's degree (e.g., BA, AB, BS)    Tobacco Use    Smoking status: Never    Smokeless tobacco: Never    Tobacco comments:     CAFFEINE USE 2 CUPS COFFEE AND 1 GLASS TEA DAILY   Vaping Use    Vaping Use: Never used   Substance and Sexual Activity    Alcohol use: No    Drug use: Yes     Types: Marijuana     Comment: weekly    Sexual activity: Defer     Partners: Female         ALLERGIES  Hydrocodone and Zocor  [simvastatin]        REVIEW OF SYSTEMS  Review of Systems  Included in HPI  All systems reviewed and negative except for those discussed in HPI.      PHYSICAL EXAM    I have reviewed the triage vital signs and nursing notes.    ED Triage Vitals   Temp Heart Rate Resp BP SpO2   08/07/23 1300 08/07/23 1300 08/07/23 1304 08/07/23 1310 08/07/23 1300   97.3 øF (36.3 øC) 63 16 155/80 96 %      Temp src Heart Rate Source Patient Position BP Location FiO2 (%)   08/07/23 1300 08/07/23 1300 08/07/23 1310 08/07/23 1310 --   Tympanic Monitor Lying Right arm        Physical Exam  GENERAL: Morbidly obese  male cooperative and conversant, alert, no acute distress, wearing a c-collar  SKIN: Warm, dry  HENT: Normocephalic, posterior scalp abrasion without tenderness or swelling  EYES: no scleral icterus  Neck: Immobilized in a c-collar  CV: regular rhythm, regular rate  RESPIRATORY: normal effort, lungs clear  MUSCULOSKELETAL: no deformity, equal  strengths, normal range of motion of both arms  NEURO: alert, moves all extremities, follows commands, grossly intact sensation throughout                                                               LAB RESULTS  Recent Results (from the past 24 hour(s))   CBC Auto Differential    Collection Time: 08/07/23  3:38 PM    Specimen: Blood   Result Value Ref Range    WBC 7.45 3.40 - 10.80 10*3/mm3    RBC 4.32 4.14 - 5.80 10*6/mm3    Hemoglobin 12.5 (L) 13.0 - 17.7 g/dL    Hematocrit 38.3 37.5 - 51.0 %    MCV 88.7 79.0 - 97.0 fL    MCH 28.9 26.6 - 33.0 pg    MCHC 32.6 31.5 - 35.7 g/dL    RDW 14.1 12.3 - 15.4 %     RDW-SD 45.1 37.0 - 54.0 fl    MPV 9.3 6.0 - 12.0 fL    Platelets 176 140 - 450 10*3/mm3    Neutrophil % 81.6 (H) 42.7 - 76.0 %    Lymphocyte % 9.7 (L) 19.6 - 45.3 %    Monocyte % 5.9 5.0 - 12.0 %    Eosinophil % 2.1 0.3 - 6.2 %    Basophil % 0.3 0.0 - 1.5 %    Immature Grans % 0.4 0.0 - 0.5 %    Neutrophils, Absolute 6.08 1.70 - 7.00 10*3/mm3    Lymphocytes, Absolute 0.72 0.70 - 3.10 10*3/mm3    Monocytes, Absolute 0.44 0.10 - 0.90 10*3/mm3    Eosinophils, Absolute 0.16 0.00 - 0.40 10*3/mm3    Basophils, Absolute 0.02 0.00 - 0.20 10*3/mm3    Immature Grans, Absolute 0.03 0.00 - 0.05 10*3/mm3    nRBC 0.0 0.0 - 0.2 /100 WBC   Basic Metabolic Panel    Collection Time: 08/07/23  3:38 PM    Specimen: Blood   Result Value Ref Range    Glucose 140 (H) 65 - 99 mg/dL    BUN 19 8 - 23 mg/dL    Creatinine 0.90 0.76 - 1.27 mg/dL    Sodium 140 136 - 145 mmol/L    Potassium 4.3 3.5 - 5.2 mmol/L    Chloride 104 98 - 107 mmol/L    CO2 27.7 22.0 - 29.0 mmol/L    Calcium 8.9 8.6 - 10.5 mg/dL    BUN/Creatinine Ratio 21.1 7.0 - 25.0    Anion Gap 8.3 5.0 - 15.0 mmol/L    eGFR 90.2 >60.0 mL/min/1.73   Protime-INR    Collection Time: 08/07/23  3:38 PM    Specimen: Blood   Result Value Ref Range    Protime 13.7 11.7 - 14.2 Seconds    INR 1.04 0.90 - 1.10       I ordered the above labs and independently reviewed the results.        RADIOLOGY  CT Head Without Contrast, CT Cervical Spine Without Contrast    Result Date: 8/7/2023  EMERGENCY CT SCAN OF THE HEAD AND CERVICAL SPINE WITHOUT CONTRAST ON 08/07/2023  CLINICAL HISTORY: Patient fell on Friday, 08/04/2023, head and neck trauma. The patient has history of cervical spine fracture involving the anterior ring of C1 that occurred back on 05/09/2018.  HEAD CT TECHNIQUE: Spiral CT images were obtained from the base of the skull to the vertex without intravenous contrast. The images were reformatted and are submitted in 3 mm thick axial, sagittal and coronal CT sections with brain algorithm  and 2 mm thick axial CT sections with high resolution bone algorithm.  This is correlated to a prior head CT from [facility] on 05/08/2018.  FINDINGS: There is some patchy low-density extending from the periventricular into the subcortical white matter of the cerebral hemispheres consistent with mild-to-moderate small vessel disease. The remainder of the brain parenchyma is normal in attenuation. The ventricles are normal in size. I see no focal mass effect. There is no midline shift and no extra-axial fluid collections are identified. There is no evidence of acute intracranial hemorrhage. No acute skull fracture is identified. The paranasal sinuses, the mastoid air cells and middle ear cavities are clear. Calcified atherosclerotic plaques are present in the cavernous internal carotid arteries bilaterally. This patient has displaced fracture of the right anterior ring of C1.  There is a 4 mm fracture gap at the fracture site.  This fracture was also present on head CT 05/08/2018, and is a chronic fracture.      1. No acute intracranial abnormality is identified with no acute change when compared to prior head CT on 05/08/2018. 2. There is mild-to-moderate small vessel disease in the cerebral white matter and mild cerebral atrophy, both of which have progressed when compared to 05/08/2018. 3. This patient has a chronic fracture of the right anterior ring of C1 with 4 mm fracture gap at this site similar to prior head CT 05/08/2018, three years and 3 months ago. The remainder of the head CT is within normal limits with no acute skull fracture or intracranial hemorrhage identified.   CT SCAN OF THE CERVICAL SPINE TECHNIQUE: Spiral CT images were obtained from the skull base down to the T2-3 thoracic level. The images were reformatted and submitted in 2 mm thick axial and sagittal CT sections with soft tissue algorithm and 1 mm thick axial, sagittal and coronal CT sections with high-resolution bone  algorithm.  This is correlated to a prior cervical spine CT from [facility] on 05/09/2018.  FINDINGS: There is a chronic displaced fracture of the right side of the anterior ring of C1, 15 mm to the right of midline and there is a 4-5 mm medial lateral gap at this fracture site.  The fracture was present and was acute in nature back on cervical spine CT 05/09/2018 where there was a 4 mm fracture gap at the site. Just posterior superior to this fracture is subtle fracture through the far anterior inferior medial tip of the right occipital condyle with 1 mm separation of the small 5 x 2.5 mm fragment off the anterior inferior medial right occipital condyle and I suspect that this is a chronic finding.  It is newly apparent when compared to prior CT 05/09/2018. The precise age of the right occipital condyle fracture is indeterminate, but again I suspect that it is most probably nonacute.  At C2-3 the posterior disc margin, facets and uncovertebral joints are within normal limits with no canal or foraminal narrowing.  At C3-4 there is mild left posterior lateral endplate spurring mildly narrowing the left side of the canal. There is moderate left facet overgrowth and some bony fusion across the left facet joint.  The right facets are normal.  There is some left-sided uncovertebral joint hypertrophy and there is moderate left bony foraminal narrowing.  There is no right foraminal narrowing.  At C4-5 there is moderate left facet overgrowth.  There is disc space narrowing and there are degenerative endplate changes.  There is diffuse posterior disc osteophyte complex and posterior bulging disc material that abuts and deforms the ventral surface of the cord at least mild to moderately narrowing the canal.  There is no right foraminal narrowing. There is left uncovertebral joint spurring, and the combination with left facet overgrowth results in moderate-to-severe left bony foraminal narrowing.  At C5-6 there  is disc space narrowing and degenerative endplate changes and mild posterior spurring.  There is mild-to-moderate canal narrowing.  There is some uncovertebral joint hypertrophy and there is mild right and mild-to-moderate left bony foraminal narrowing.  At C6-7 there is severe disc space narrowing.  There are degenerative disc and endplate changes and diffuse posterior endplate spurring results in moderate bony canal narrowing and bilateral uncovertebral joint hypertrophy and mild-to-moderate right and moderate-to-severe left bony foraminal narrowing.  At C7-T1 there is disc space narrowing, degenerative endplate changes, posterior spurring with at least mild canal narrowing and uncovertebral joint spurring into the foramina, left greater than right, and there is moderate left and no right foraminal narrowing.  IMPRESSION:  This patient had a mildly displaced fracture of the right anterior ring of C1 on a cervical spine CT 5 years and 3 months ago on 05/08/2018. There is a persistent nonunited displaced fracture plane extending through the right anterior ring of C1 with a 4-5 mm fracture gap.  Posterior superior to this is a hairline 1 mm lucency through the far anterior inferior medial tip of the adjacent right occipital condyle that was not apparent or evident on the prior cervical spine CT on 05/08/2018.  The precise age of this tiny fracture off the anterior inferior medial tip of the right occipital condyle is uncertain but I suspect that it is subacute or chronic, rather than acute but it is impossible to precisely date. There is also a very subtle hairline lucency of the right posterior lateral ring of C1, that I suspect is due to the adjacent sclerosis, is subacute or chronic nearly healed fracture. Otherwise I see no acute fracture in the remainder of the cervical spine.  There is cervical spondylosis as described above.  The results of the study were communicated to Makeda Hill MD, from the ER by  telephone on 08/07/2023 at 2:20 PM.    Radiation dose reduction techniques were utilized, including automated exposure control and exposure modulation based on body size.        I ordered the above noted radiological studies. Reviewed by me. See dictation for official radiology interpretation.      PROCEDURES    Procedures      MEDICATIONS GIVEN IN ER    Medications   sodium chloride 0.9 % flush 10 mL (has no administration in time range)   sodium chloride 0.9 % flush 10 mL (has no administration in time range)   sodium chloride 0.9 % infusion 40 mL (has no administration in time range)   acetaminophen (TYLENOL) tablet 650 mg (has no administration in time range)   sennosides-docusate (PERICOLACE) 8.6-50 MG per tablet 2 tablet (has no administration in time range)     And   polyethylene glycol (MIRALAX) packet 17 g (has no administration in time range)     And   bisacodyl (DULCOLAX) EC tablet 5 mg (has no administration in time range)     And   bisacodyl (DULCOLAX) suppository 10 mg (has no administration in time range)   nitroglycerin (NITROSTAT) SL tablet 0.4 mg (has no administration in time range)   oxyCODONE-acetaminophen (PERCOCET) 5-325 MG per tablet 1 tablet (has no administration in time range)         ORDERS PLACED DURING THIS VISIT:  Orders Placed This Encounter   Procedures    CT Head Without Contrast    CT Cervical Spine Without Contrast    MRI Cervical Spine With & Without Contrast    CBC (No Diff)    CBC Auto Differential    Basic Metabolic Panel    Protime-INR    Basic Metabolic Panel    Diet: Regular/House Diet; Texture: Regular Texture (IDDSI 7); Fluid Consistency: Thin (IDDSI 0)    NPO Diet NPO Type: Strict NPO    Up With Assistance    Intake & Output    Weigh Patient    Oral Care    Place Sequential Compression Device    Maintain Sequential Compression Device    Vital Signs    Telemetry - Maintain IV Access    Telemetry - Place Orders & Notify Provider of Results When Patient Experiences Acute  Chest Pain, Dysrhythmia or Respiratory Distress    May Be Off Telemetry for Tests    Neurosurgery (on-call MD unless specified)    Inpatient Neurosurgery Consult    PT Consult: Eval & Treat Functional Mobility Below Baseline    Insert Peripheral IV    Initiate ED Observation Status         PROGRESS, DATA ANALYSIS, CONSULTS, AND MEDICAL DECISION MAKING    All labs have been independently interpreted by me.  All radiology studies have been reviewed by me.   EKG's independently viewed and interpreted by me.  Discussion below represents my analysis of pertinent findings related to patient's condition, differential diagnosis, treatment plan and final disposition.    MDM new cervical spine injury, exacerbation of previous cervical spine injury, cervical strain or ligament damage, pathologic fracture/mass/malignancy, musculoskeletal pain.    ED Course as of 08/07/23 1657   Mon Aug 07, 2023   1403 I reviewed patient's CT head and on my interpretation patient has no obvious intracranial hemorrhage nor midline shift [AR]   1458 Discussed case with Kiarra barajas for neurosurgery who will review imaging but likely recommend MRI. [AR]   1500 I discussed all results with patient and answered all questions to the best of my ability.  Is amenable to observation stay for further imaging.     [AR]   1527 I discussed patient's case with the provider in our observation unit who agreed to accept the patient there for further testing and neurosurgery evaluation. [AR]      ED Course User Index  [AR] Makeda Hill MD           PPE: I wore and adhered to appropriate PPE per hospital protocols for specific patient presentation. (For respiratory patients with suspected Covid-19 or other infectious etiology suspected for patient's symptoms, the patient wore a mask and I wore an N95 mask throughout the entire patient encounter.) Proper hand hygiene both before and after patient encounter was performed as well.         AS OF 16:57 EDT  VITALS:    BP - 155/68  HR - (!) 47  TEMP - 97.5 øF (36.4 øC) (Oral)  O2 SATS - 99%        DIAGNOSIS  Final diagnoses:   Neck pain   Traumatic closed fracture of C1 vertebra with minimal displacement, with nonunion, subsequent encounter         DISPOSITION  ED Disposition       ED Disposition   Decision to Admit    Condition   --    Comment   Observation unit                  Note Disclaimer: At [facility], we believe that sharing information builds trust and better relationships. You are receiving this note because you recently visited [facility]. It is possible you will see health information before a provider has talked with you about it. This kind of information can be easy to misunderstand. To help you fully understand what it means for your health, we urge you to discuss this note with your provider.         Makeda Hill MD  08/07/23 0368

## 2023-08-07 NOTE — H&P
[facility]   HISTORY AND PHYSICAL    Patient Name: Durga Valenzuela  : 1950  MRN: [id number]  Primary Care Physician:  Florian Burt MD  Date of admission: 2023    Subjective   Subjective     Chief Complaint:   Chief Complaint   Patient presents with    Fall         HPI:    Durga Valenzuela is an afebrile ambulatory 72 y.o.  male with a past medical history of hypertension, hyperlipidemia, bipolar disorder, BPH, sleep apnea, remote DVT now on baby aspirin.     He presents to the emergency department [facility] today with complaint of neck pain.  Is been admitted to the ED observation unit for further testing and evaluation.    Patient states he fell hitting his head on Friday and has been having neck pain ever since.  He denies any arm or leg numbness or weakness.  No dizziness or difficulty ambulating.  Patient underwent CT of his cervical spine in the emergency department which showed abnormality at patient's first cervical ring with concerns of right occipital condyle fracture not seen on prior imaging.  Neurosurgery was consulted and APRN recommends patient be maintained in a hard collar and undergo MRI.    Patient states he has never had prior procedure on his cervical spine.  Prior record review shows that patient had a C1 fracture present on imaging in 2019 but right occipital condyle fracture was also noted at that time.     Review of Systems   All systems were reviewed and negative except for: neck pain    Personal History     Past Medical History:   Diagnosis Date    Affective psychosis, bipolar 2021    Allergic rhinitis 2018    Patient has had allergy testing in the past which revealed allergies to molds and grass.  Immunotherapy for about 2 years in the 1980s.    Benign essential hypertension 2016    Benign prostatic hyperplasia with urinary frequency 2016    Bilateral lower extremity edema 2018    Bipolar disorder, in full  remission, most recent episode mixed 09/30/2021    Chronic insomnia 02/25/2016    Closed traumatic lateral subluxation of patellofemoral joint, right, initial encounter 08/13/2018    08/10/2018--patient was evaluated by the orthopedist for right knee pain and found to have lateral subluxation of the right patella associated with presence of right artificial knee joint.  Physical therapy no help.  Surgery is scheduled 09/17/2018    Depression with anxiety 08/21/2018    Elevated cholesterol     History of deep venous thrombosis (DVT) of distal vein of right lower extremity 08/21/2018 04/26/2016--CTA of the chest performed for elevated d-dimer and progressive shortness of breath and chest pain for one month revealed bilateral occlusive in near occlusive segmental and subsegmental pulmonary emboli in all lobes of the right lung as well as within the left lower lobe.  No evidence of pulmonary infarct.  Nonspecific multifocal groundglass attenuation in the right pulmonary apex, perhaps representing pneumonitis or submental atelectasis.  Questionable cholelithiasis.  Doppler venous study was positive for DVT in the right popliteal vein.  Hypercoagulable workup was normal.  He was treated with Eliquis for a total of 6 months and was subsequently discontinued.    History of pneumonia     History of pulmonary embolism 06/10/2016    04/26/2016--CTA of the chest performed for elevated d-dimer and progressive shortness of breath and chest pain for one month revealed bilateral occlusive in near occlusive segmental and subsegmental pulmonary emboli in all lobes of the right lung as well as within the left lower lobe.  No evidence of pulmonary infarct.  Nonspecific multifocal groundglass attenuation in the right pulmonary apex, perhaps representing pneumonitis or submental atelectasis.  Questionable cholelithiasis.  Doppler venous study was positive for DVT in the right popliteal vein.  Hypercoagulable workup was normal.  He was  treated with Eliquis for a total of 6 months and was subsequently discontinued.    Hyperlipidemia 02/25/2016    Impaired fasting glucose 02/25/2016    Lumbar degenerative disc disease 02/25/2016    Morbidly obese 10/19/2018    Multiple environmental allergies 08/21/2018    Patient has had allergy testing in the past which revealed allergies to molds and grass.  Immunotherapy for about 2 years in the 1980s.    Non-occlusive coronary artery disease, 05/16/2017--normal LM; proximal LAD mild LI; 50% mid LAD.  Mild distal LAD LI; normal Cx.  Mild LI marginal branches; normal RCA, nondominant.  EF 60%. 07/24/2017 05/16/2017--cardiac catheterization performed for abnormal stress test.  Normal LV with ejection fraction 60%.  Dilated aortic root.  No wall motion abnormalities.  Normal left main.  Ramus branch small caliber and normal.  Proximal LAD large caliber and mild luminal irregularities.  50% mid LAD.  Mild luminal irregularities distal LAD.  3 diagonal branches of small caliber with mild luminal irregularities.  Normal septal branches.  Circumflex is large caliber and free of disease.  Marginal branches are medium caliber with mild luminal irregularities.  RCA is a medium caliber and free of disease.  It is nondominant.    Obstructive sleep apnea, tolerates CPAP well.  Previously failed oral appliance. 02/25/2016    Post traumatic stress disorder (PTSD) 02/25/2016    Primary hypothyroidism 02/25/2016    Primary osteoarthritis involving multiple joints 02/25/2016    Psoriasis 09/17/2014    Spinal stenosis of lumbar region with neurogenic claudication 02/15/2021    Traumatic complete tear of left rotator cuff 03/17/2021 March 14, 2021--patient was evaluated by the orthopedic surgeon after he slipped on ice and fell onto his left shoulder.  Work-up revealed complete rotator cuff tear involving 2 tendons.  Specifically, he has an acute left supraspinatus tear and acute left infraspinatus tear.  He also has left  glenohumeral joint osteoarthritis.  Apparently this is not repairable and patient would need a shoulder r    Traumatic complete tear of left rotator cuff 03/17/2021 March 14, 2021--patient was evaluated by the orthopedic surgeon after he slipped on ice and fell onto his left shoulder.  Work-up revealed complete rotator cuff tear involving 2 tendons.  Specifically, he has an acute left supraspinatus tear and acute left infraspinatus tear.  He also has left glenohumeral joint osteoarthritis.  Apparently this is not repairable and patient would need a shoulder r    Urinary incontinence without sensory awareness 03/17/2021    Vitamin D deficiency 08/21/2018       Past Surgical History:   Procedure Laterality Date    CARDIAC CATHETERIZATION N/A 5/16/2017    Procedure: Coronary angiography;  Surgeon: Malcolm Chen MD;  Location: [facility] CATH INVASIVE LOCATION;  Service:     CARDIAC CATHETERIZATION N/A 5/16/2017    Procedure: Left Heart Cath;  Surgeon: Malcolm Chen MD;  Location[facility] C[facility] LOC[facility];  Service:     CARDIAC CATHETERIZATION N/A 5/16/2017    Procedure: Left ventriculography;  Surgeon: Malcolm Chen MD;  Location[facility] C[facility] LOC[facility];  Service:     CARDIAC CATHETERIZATION N/A 4/26/2023    Procedure: Left Heart Cath;  Surgeon: Mary Reynolds MD;  Location: [facility] C[facility] LOC[facility];  Service: Cardiovascular;  Laterality: N/A;    CARDIAC CATHETERIZATION N/A 4/26/2023    Procedure: Coronary angiography;  Surgeon: Mary Reynolds MD;  Location: [facility] C[facility]AS[facility] LOC[facility];  Service: Cardiovascular;  Laterality: N/A;    CARDIAC CATHETERIZATION N/A 4/26/2023    Procedure: Left ventriculography;  Surgeon: Mary Reynolds MD;  Location: [facility] CATH INVASIVE LOCATION;  Service: Cardiovascular;  Laterality: N/A;    COLONOSCOPY  2005 2005--colonoscopy reportedly normal.  Records not available.    COLONOSCOPY N/A 6/13/2019    Procedure: COLONOSCOPY INTO CECUM  WITH COLD BIOPSY POLYPECTOMY;  Surgeon: Ayaan Gallardo MD;  Location:  [facility];  Service: General    LUMBAR DECOMPRESSION  2007 2007--lumbar decompression without fusion or hardware.    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    REVISION TOTAL KNEE ARTHROPLASTY Right 09/17/2018 09/17/2018--right total knee arthroplasty revision due to lateral subluxation of the patella due to multiple factors.    TOTAL KNEE ARTHROPLASTY Left 11/03/2017 11/03/2017--right total knee replacement complicated by patellofemoral subluxation.    TOTAL KNEE ARTHROPLASTY Left 06/2014 June 2014--left total knee replacement.    TOTAL KNEE ARTHROPLASTY REVISION Left 12/5/2022    Procedure: LEFT TOTAL KNEE REVISION Complex with Patella Tendon Reconstruction;  Surgeon: Sid Simon MD;  Location: [facility] OR;  Service: Orthopedics;  Laterality: Left;       Family History: family history includes Alzheimer's disease in his mother; Coronary artery disease in his mother; Liver cancer in his father; No Known Problems in his brother and sister; Stroke in his father. Otherwise pertinent FHx was reviewed and not pertinent to current issue.    Social History:  reports that he has never smoked. He has never used smokeless tobacco. He reports current drug use. Drug: Marijuana. He reports that he does not drink alcohol.    Home Medications:  Co Q-10, FLUoxetine, amLODIPine, aspirin, clobetasol, ezetimibe, hydrOXYzine, levothyroxine, losartan, pravastatin, vitamin D3, and zolpidem    Allergies:  Allergies   Allergen Reactions    Hydrocodone Other (See Comments)     Severe vomiting - but CAN tolerate oxycodone per patient    Zocor  [Simvastatin]       MYALGIA       Objective   Objective     Vitals:   Temp:  [97.3 øF (36.3 øC)-97.5 øF (36.4 øC)] 97.5 øF (36.4 øC)  Heart Rate:  [40-63] 47  Resp:  [16-18] 18  BP: (129-155)/(65-86) 155/68  Physical Exam    Constitutional: Awake, alert   Eyes: PERRLA, sclerae anicteric, no conjunctival injection   HENT: NCAT, mucous membranes moist   Neck: Supple, no thyromegaly, no lymphadenopathy, trachea midline   Respiratory: Clear to auscultation bilaterally, nonlabored respirations    Cardiovascular: RRR, no murmurs, rubs, or gallops, palpable pedal pulses bilaterally   Gastrointestinal: Positive bowel sounds, soft, nontender, nondistended   Musculoskeletal: No bilateral ankle edema, no clubbing or cyanosis to extremities   Psychiatric: Appropriate affect, cooperative   Neurologic: Oriented x 3, strength symmetric in all extremities, Cranial Nerves grossly intact to confrontation, speech clear   Skin: No rashes     Result Review    Result Review:  I have personally reviewed the results from the time of this admission to 8/7/2023 17:21 EDT and agree with these findings:  []  Laboratory list / accordion  []  Microbiology  []  Radiology  []  EKG/Telemetry   []  Cardiology/Vascular   []  Pathology  []  Old records  []  Other:  Most notable findings include: CT head without contrast shows no acute intracranial abnormality, CT cervical spineShows mildly displaced fracture of right anterior ring of C1 at the CT spine with persistent nonunion displaced fracture through anterior ring of C1      Assessment & Plan   Assessment / Plan     Brief Patient Summary:  Durga Valenzuela is a 72 y.o. male who is being evaluated for neck pain.     Active Hospital Problems:  Active Hospital Problems    Diagnosis     **Neck pain      Plan:     Neck pain  Abnormal CT cervical spine  Hard collar to continue  MRI cervical spine pending  Consult to neurosurgery  Prn analgesia  Robaxin-750mg every 6 hours    Hypertension  Vitals every 4 hours  Continue  home medications    DVT prophylaxis:  Mechanical DVT prophylaxis orders are present.    CODE STATUS:    Level Of Support Discussed With: Patient  Code Status (Patient has no pulse and is not breathing): CPR (Attempt to Resuscitate)  Medical Interventions (Patient has pulse or is breathing): Full Support  Release to patient: Routine Release    Admission Status:  I believe this patient meets observation status.    Electronically signed by TREY Rodriguez, 08/07/23, 5:21 PM EDT.    77 minutes has been spent by [facility] providers in the care of this patient while under observation status      I have worn appropriate PPE during this patient encounter, sanitized my hands both with entering and exiting patient's room.    I have discussed plan of care with patient including advance care plan and/or surrogate decision maker.  Patient advises that their wife Maya will be their primary surrogate decision maker

## 2023-08-07 NOTE — PLAN OF CARE
Goal Outcome Evaluation:              Outcome Evaluation: Patient is a 72 yr old male who admitted to the observation unit r/t fall from chair , pt does have a hx of neck surgery, pt currently on a hard cervical andrea, alert and oriented and able to verbalize needs, neurosurgery consulted and MRI ordered, patient is to be NPO at midngiht SB on the monitor patient has no further questions at this time.

## 2023-08-07 NOTE — ED NOTES
Pt says he fell asleep in a desk chair on Friday evening and fell forward while asleep striking his head on the wall. Pt says since this incident he's been experiencing neck pain, headaches, and intermittent nausea. Pt says the pain is worsening on the R side of his neck whenever he tries to turn his head. Pt also complains of a frontal H/A and nausea on this RN's assessment & has a quarter sized scab on posterior scalp. Denies LOC, vision changes, memory changes, vomiting, blood thinners, numbness/tingling or gait issues. Pt is notably diaphoretic & bradycardic HR 42. Hx of HTN, and broken neck last year.

## 2023-08-07 NOTE — CONSULTS
[facility]   Consult Note    Patient Name: Durga Valenzuela  : 1950  MRN: [id number]  Primary Care Physician:  Florian Burt MD  Referring Physician: Georgia Nichols MD  Date of admission: 2023    Inpatient Neurosurgery Consult  Consult performed by: Margarita Collazo APRN  Consult ordered by: Gifty Sorto APRN  Reason for consult: neck pain after fall out of office chair      Subjective   Subjective     Reason for Consult/ Chief Complaint: worsening neck pain x 2 days    History of Present Illness  Durga Valenzuela is a 72 y.o. male who has been evaluated by neurosurgery in the past back in 2019 after a fall out of a chair in which he sustained a C1 ring fracture.  This was monitored nonoperatively with treatment in a cervical collar.  Cervical pain improved and the patient denies any recurrence and pain or arm pain as well as no weakness in his upper or lower extremities.  He also denied any difficulty walking.  The patient had a recurrent episode on the evening of .  He stated he had fallen asleep in his office chair.  His body lunged forward while sleeping and he fell out of the chair striking the top of his head on a wall.  He suffered an abrasion and knot on the top of his head.  He denied any seizures, syncopal episode, loss of strength in his upper or lower extremities.  No difficulty walking.  He also denied any radiating arm pain, leg pain or leg heaviness.  He denies any numbness or tingling in his upper extremities or lower extremities.  He applied ice to his neck and tried some over-the-counter medications however his symptoms did not improve.  He therefore presented today, 3 days later for worsening neck pain.  Imaging in the emergency department consisted of a cervical spine CT and head CT.  The head CT showed no acute intracranial abnormality.  The cervical spine CT was compared to a previous study in 2019.  It also shows the previous C1 ring fracture that had been present on  the prior study.  There is more of a gap at the C1 ring that had been present previously and there is also evidence of a right occipital condyle fracture that was not present previously but still appears more chronic in nature than acute.  Given the patient's intractable neck pain symptoms, neurosurgery was consulted to evaluate the patient who currently has orders for an MRI cervical spine pending.  The patient has been placed in a hard cervical collar as well.    Review of Systems   Constitutional:  Positive for activity change. Negative for fever.   HENT: Negative.  Negative for nosebleeds and rhinorrhea.    Eyes: Negative.    Respiratory: Negative.  Negative for cough and shortness of breath.    Cardiovascular: Negative.  Negative for chest pain and palpitations.   Gastrointestinal: Negative.  Negative for nausea and vomiting.   Genitourinary: Negative.  Negative for difficulty urinating.   Musculoskeletal:  Positive for neck pain.   Skin:  Positive for wound.        Abrasion with raised knot noted at the top of the head.   Allergic/Immunologic: Negative.    Neurological: Negative.  Negative for dizziness, seizures, syncope, facial asymmetry, speech difficulty, weakness, light-headedness, numbness and headaches.   Hematological: Negative.    Psychiatric/Behavioral:  Positive for sleep disturbance. Negative for agitation and confusion.    All other systems reviewed and are negative.     Personal History     Past Medical History:   Diagnosis Date    Affective psychosis, bipolar 09/30/2021    Allergic rhinitis 08/21/2018    Patient has had allergy testing in the past which revealed allergies to molds and grass.  Immunotherapy for about 2 years in the 1980s.    Benign essential hypertension 02/25/2016    Benign prostatic hyperplasia with urinary frequency 02/25/2016    Bilateral lower extremity edema 08/21/2018    Bipolar disorder, in full remission, most recent episode mixed 09/30/2021    Chronic insomnia  02/25/2016    Closed traumatic lateral subluxation of patellofemoral joint, right, initial encounter 08/13/2018    08/10/2018--patient was evaluated by the orthopedist for right knee pain and found to have lateral subluxation of the right patella associated with presence of right artificial knee joint.  Physical therapy no help.  Surgery is scheduled 09/17/2018    Depression with anxiety 08/21/2018    Elevated cholesterol     History of deep venous thrombosis (DVT) of distal vein of right lower extremity 08/21/2018 04/26/2016--CTA of the chest performed for elevated d-dimer and progressive shortness of breath and chest pain for one month revealed bilateral occlusive in near occlusive segmental and subsegmental pulmonary emboli in all lobes of the right lung as well as within the left lower lobe.  No evidence of pulmonary infarct.  Nonspecific multifocal groundglass attenuation in the right pulmonary apex, perhaps representing pneumonitis or submental atelectasis.  Questionable cholelithiasis.  Doppler venous study was positive for DVT in the right popliteal vein.  Hypercoagulable workup was normal.  He was treated with Eliquis for a total of 6 months and was subsequently discontinued.    History of pneumonia     History of pulmonary embolism 06/10/2016    04/26/2016--CTA of the chest performed for elevated d-dimer and progressive shortness of breath and chest pain for one month revealed bilateral occlusive in near occlusive segmental and subsegmental pulmonary emboli in all lobes of the right lung as well as within the left lower lobe.  No evidence of pulmonary infarct.  Nonspecific multifocal groundglass attenuation in the right pulmonary apex, perhaps representing pneumonitis or submental atelectasis.  Questionable cholelithiasis.  Doppler venous study was positive for DVT in the right popliteal vein.  Hypercoagulable workup was normal.  He was treated with Eliquis for a total of 6 months and was subsequently  discontinued.    Hyperlipidemia 02/25/2016    Impaired fasting glucose 02/25/2016    Lumbar degenerative disc disease 02/25/2016    Morbidly obese 10/19/2018    Multiple environmental allergies 08/21/2018    Patient has had allergy testing in the past which revealed allergies to molds and grass.  Immunotherapy for about 2 years in the 1980s.    Non-occlusive coronary artery disease, 05/16/2017--normal LM; proximal LAD mild LI; 50% mid LAD.  Mild distal LAD LI; normal Cx.  Mild LI marginal branches; normal RCA, nondominant.  EF 60%. 07/24/2017 05/16/2017--cardiac catheterization performed for abnormal stress test.  Normal LV with ejection fraction 60%.  Dilated aortic root.  No wall motion abnormalities.  Normal left main.  Ramus branch small caliber and normal.  Proximal LAD large caliber and mild luminal irregularities.  50% mid LAD.  Mild luminal irregularities distal LAD.  3 diagonal branches of small caliber with mild luminal irregularities.  Normal septal branches.  Circumflex is large caliber and free of disease.  Marginal branches are medium caliber with mild luminal irregularities.  RCA is a medium caliber and free of disease.  It is nondominant.    Obstructive sleep apnea, tolerates CPAP well.  Previously failed oral appliance. 02/25/2016    Post traumatic stress disorder (PTSD) 02/25/2016    Primary hypothyroidism 02/25/2016    Primary osteoarthritis involving multiple joints 02/25/2016    Psoriasis 09/17/2014    Spinal stenosis of lumbar region with neurogenic claudication 02/15/2021    Traumatic complete tear of left rotator cuff 03/17/2021 March 14, 2021--patient was evaluated by the orthopedic surgeon after he slipped on ice and fell onto his left shoulder.  Work-up revealed complete rotator cuff tear involving 2 tendons.  Specifically, he has an acute left supraspinatus tear and acute left infraspinatus tear.  He also has left glenohumeral joint osteoarthritis.  Apparently this is not repairable  and patient would need a shoulder r    Traumatic complete tear of left rotator cuff 03/17/2021 March 14, 2021--patient was evaluated by the orthopedic surgeon after he slipped on ice and fell onto his left shoulder.  Work-up revealed complete rotator cuff tear involving 2 tendons.  Specifically, he has an acute left supraspinatus tear and acute left infraspinatus tear.  He also has left glenohumeral joint osteoarthritis.  Apparently this is not repairable and patient would need a shoulder r    Urinary incontinence without sensory awareness 03/17/2021    Vitamin D deficiency 08/21/2018       Past Surgical History:   Procedure Laterality Date    CARDIAC CATHETERIZATION N/A 5/16/2017    Procedure: Coronary angiography;  Surgeon: Malcolm Chen MD;  Location: [facility] CATH INVASIVE LOCATION;  Service:     CARDIAC CATHETERIZATION N/A 5/16/2017    Procedure: Left Heart Cath;  Surgeon: Malcolm Chen MD;  Location:  [facility] CATH INVASIVE LOCATION;  Service:     CARDIAC CATHETERIZATION N/A 5/16/2017    Procedure: Left ventriculography;  Surgeon: Malcolm Chen MD;  Location:  [facility] CATH INVASIVE LOCATION;  Service:     CARDIAC CATHETERIZATION N/A 4/26/2023    Procedure: Left Heart Cath;  Surgeon: Mary Reynolds MD;  Location: [facility] CATH INVASIVE LOCATION;  Service: Cardiovascular;  Laterality: N/A;    CARDIAC CATHETERIZATION N/A 4/26/2023    Procedure: Coronary angiography;  Surgeon: Mary Reynolds MD;  Location: [facility] CATH INVASIVE LOCATION;  Service: Cardiovascular;  Laterality: N/A;    CARDIAC CATHETERIZATION N/A 4/26/2023    Procedure: Left ventriculography;  Surgeon: Mary Reynolds MD;  Location: [facility] CATH INVASIVE LOCATION;  Service: Cardiovascular;  Laterality: N/A;    COLONOSCOPY  2005 2005--colonoscopy reportedly normal.  Records not available.    COLONOSCOPY N/A 6/13/2019    Procedure: COLONOSCOPY INTO CECUM WITH COLD BIOPSY POLYPECTOMY;  Surgeon: Ayaan Gallardo MD;   Location:  [facility];  Service: General    LUMBAR DECOMPRESSION  2007 2007--lumbar decompression without fusion or hardware.    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    REVISION TOTAL KNEE ARTHROPLASTY Right 09/17/2018 09/17/2018--right total knee arthroplasty revision due to lateral subluxation of the patella due to multiple factors.    TOTAL KNEE ARTHROPLASTY Left 11/03/2017 11/03/2017--right total knee replacement complicated by patellofemoral subluxation.    TOTAL KNEE ARTHROPLASTY Left 06/2014 June 2014--left total knee replacement.    TOTAL KNEE ARTHROPLASTY REVISION Left 12/5/2022    Procedure: LEFT TOTAL KNEE REVISION Complex with Patella Tendon Reconstruction;  Surgeon: Sid Simon MD;  Location:  [facility];  Service: Orthopedics;  Laterality: Left;       Family History: family history includes Alzheimer's disease in his mother; Coronary artery disease in his mother; Liver cancer in his father; No Known Problems in his brother and sister; Stroke in his father. Otherwise pertinent FHx was reviewed and not pertinent to current issue.    Social History:  reports that he has never smoked. He has never used smokeless tobacco. He reports current drug use. Drug: Marijuana. He reports that he does not drink alcohol.    Home Medications:   Co Q-10, FLUoxetine, amLODIPine, aspirin, clobetasol, ezetimibe, hydrOXYzine, levothyroxine, losartan, pravastatin, vitamin D3, and zolpidem    Allergies:  Allergies   Allergen Reactions    Hydrocodone Other (See Comments)     Severe vomiting - but CAN tolerate oxycodone per patient    Zocor  [Simvastatin]      MYALGIA       Objective    Objective     Vitals:  Temp:  [97.3 øF  (36.3 øC)-97.5 øF (36.4 øC)] 97.5 øF (36.4 øC)  Heart Rate:  [40-63] 47  Resp:  [16-18] 18  BP: (129-155)/(65-86) 155/68    Physical Exam  Vitals reviewed.   Constitutional:       General: He is not in acute distress.     Appearance: He is well-developed. He is obese. He is not ill-appearing, toxic-appearing or diaphoretic.   HENT:      Head: Normocephalic.      Right Ear: External ear normal.      Left Ear: External ear normal.      Nose: Nose normal.      Mouth/Throat:      Mouth: Mucous membranes are moist.   Eyes:      General:         Right eye: No discharge.         Left eye: No discharge.      Extraocular Movements: Extraocular movements intact.      Conjunctiva/sclera: Conjunctivae normal.   Neck:      Trachea: No tracheal deviation.      Comments: The patient has tenderness to palpation of the paraspinous muscles in the occipital cervical region as well as bilateral trapezius regions.  The tenderness seems to be more pronounced on the right side than on the left.  Cardiovascular:      Rate and Rhythm: Normal rate.   Pulmonary:      Effort: Pulmonary effort is normal. No respiratory distress.   Abdominal:      General: There is no distension.      Palpations: Abdomen is soft.      Tenderness: There is no abdominal tenderness.   Musculoskeletal:         General: No swelling, tenderness or deformity. Normal range of motion.      Cervical back: Normal range of motion and neck supple. Tenderness present.      Right lower leg: No edema.      Left lower leg: No edema.   Skin:     General: Skin is warm and dry.      Findings: No erythema.   Neurological:      General: No focal deficit present.      Mental Status: He is alert and oriented to person, place, and time. Mental status is at baseline.      GCS: GCS eye subscore is 4. GCS verbal subscore is 5. GCS motor subscore is 6.      Sensory: No sensory deficit.      Motor: No weakness or abnormal muscle tone.      Coordination: Coordination normal.      Deep Tendon  Reflexes: Reflexes are normal and symmetric. Reflexes normal.      Comments: AA&O x 3.  Speech is normal.  Converses appropriately.  PERRL. EOM's intact. Face symmetric. Tongue midline without fasiculations or atrophy. Sensation equal and intact throughout the face.  Negative pronator drift.  No dysmetria.  Gait not tested due to safety concern.  Patient is wearing a Aspen hard collar.  He follows commands in all 4 extremities.  There are no motor or sensory deficits in bilateral upper and lower extremities. DTR's 1+ in the upper extremities negative in the lower extremities.  Patient has a history of bilateral knee replacements.Negative Ryan's; negative clonus.    Psychiatric:         Behavior: Behavior is cooperative.         Thought Content: Thought content normal.       Result Review    Result Review:  I have personally reviewed the results from the time of this admission to 8/7/2023 17:22 EDT and agree with these findings:  [x]  Laboratory list / accordion  []  Microbiology  [x]  Radiology  [x]  EKG/Telemetry   []  Cardiology/Vascular   []  Pathology  [x]  Old records  []  Other:  Most notable findings include:     CT HEAD WO CONTRAST 8/7/23    No evidence of acute intracranial abnormality.    CT CERVICAL SPINE WO CONTRAST 8/7/23    Chronic displaced fracture through the right side of the anterior C1 ring with a 4 to 5 mm medial lateral collapse at the fracture site.  This fracture was acute on previous CT scan dated May 9, 2018 however at that time the fracture gap was only 4 mm in size.  There is also a fracture through the far anterior inferior medial tip of the right occipital condyle with 1 mm separation  From the small 5 x 2.5 mm fragment within the medial right occipital condyle.  This is suspected to be a chronic finding however it is new when compared to the previous study from May 2018.    .  Results from last 7 days   Lab Units 08/07/23  1538   WBC 10*3/mm3 7.45   HEMOGLOBIN g/dL 12.5*    HEMATOCRIT % 38.3   PLATELETS 10*3/mm3 176     .  Results from last 7 days   Lab Units 08/07/23  1538   INR  1.04       Assessment & Plan   Assessment / Plan     Brief Patient Summary:  Durga Valenzuela is a 72 y.o. male who was previously evaluated by neurosurgery back in 2018 following a fall out of an office chair.  He sustained a right C1 ring fracture with approximately 5 mm gap.  He was treated nonoperatively in a hard cervical collar and subsequently released from neurosurgical care sometime in 2019.  He fell out of a office chair again on the evening of August 4 after falling asleep.  He lunged forward and struck the top of his head on an adjacent wall.  No loss of consciousness.  No issues with numbness or tingling in the upper or lower extremities.  No motor deficits in the upper or lower extremities.  His primary complaint is of paraspinous, muscular pain in the neck and radiation into the occipital cervical region right side greater than left.  The pain symptoms did not relieve with conservative treatment at home including ice, laying down, pain relievers.  He denied any weakness in his upper extremities or lower extremities.  Denies any arm pain.  Denies any gait imbalance.  Denies any numbness or tingling.  Due to continued pain symptoms with no relief, the patient presented to the emergency department for further work-up.  CT scan shows evidence of the previous fracture of the C1 ring with some worsening of the gap space since 2019 and also new evidence of a previous occipital condyle fracture on the right.  The patient has been placed in a hard cervical collar.  MRI imaging of the cervical spine without contrast has been requested and is currently pending.    Active Hospital Problems:  Active Hospital Problems    Diagnosis     **Neck pain     Fall against object     Closed fracture of first cervical vertebra with nonunion      Plan:     I have discussed the above history, exam and radiographic  findings with Dr. Espinoza.  The patient will be maintained in the hard cervical collar.  We will try some muscle relaxers for symptom relief.  An MRI of the cervical spine will be performed and reviewed tomorrow with further recommendations to follow.      TREY Denson

## 2023-08-08 ENCOUNTER — TELEPHONE (OUTPATIENT)
Dept: NEUROSURGERY | Facility: CLINIC | Age: 73
End: 2023-08-08
Payer: MEDICARE

## 2023-08-08 ENCOUNTER — READMISSION MANAGEMENT (OUTPATIENT)
Dept: CALL CENTER | Facility: HOSPITAL | Age: 73
End: 2023-08-08
Payer: MEDICARE

## 2023-08-08 VITALS
TEMPERATURE: 97.7 F | RESPIRATION RATE: 18 BRPM | OXYGEN SATURATION: 96 % | SYSTOLIC BLOOD PRESSURE: 143 MMHG | DIASTOLIC BLOOD PRESSURE: 69 MMHG | HEART RATE: 43 BPM | HEIGHT: 71 IN | WEIGHT: 300 LBS | BODY MASS INDEX: 42 KG/M2

## 2023-08-08 DIAGNOSIS — S02.11AG: Primary | ICD-10-CM

## 2023-08-08 PROBLEM — S12.000K: Chronic | Status: ACTIVE | Noted: 2023-08-07

## 2023-08-08 PROBLEM — S02.11AA: Status: ACTIVE | Noted: 2023-08-08

## 2023-08-08 LAB
ANION GAP SERPL CALCULATED.3IONS-SCNC: 7 MMOL/L (ref 5–15)
BUN SERPL-MCNC: 16 MG/DL (ref 8–23)
BUN/CREAT SERPL: 16.3 (ref 7–25)
CALCIUM SPEC-SCNC: 9.3 MG/DL (ref 8.6–10.5)
CHLORIDE SERPL-SCNC: 103 MMOL/L (ref 98–107)
CO2 SERPL-SCNC: 27 MMOL/L (ref 22–29)
CREAT SERPL-MCNC: 0.98 MG/DL (ref 0.76–1.27)
DEPRECATED RDW RBC AUTO: 47.6 FL (ref 37–54)
EGFRCR SERPLBLD CKD-EPI 2021: 81.4 ML/MIN/1.73
ERYTHROCYTE [DISTWIDTH] IN BLOOD BY AUTOMATED COUNT: 14.2 % (ref 12.3–15.4)
GLUCOSE SERPL-MCNC: 158 MG/DL (ref 65–99)
HCT VFR BLD AUTO: 40.6 % (ref 37.5–51)
HGB BLD-MCNC: 13 G/DL (ref 13–17.7)
MCH RBC QN AUTO: 28.9 PG (ref 26.6–33)
MCHC RBC AUTO-ENTMCNC: 32 G/DL (ref 31.5–35.7)
MCV RBC AUTO: 90.2 FL (ref 79–97)
PLATELET # BLD AUTO: 186 10*3/MM3 (ref 140–450)
PMV BLD AUTO: 9.6 FL (ref 6–12)
POTASSIUM SERPL-SCNC: 4 MMOL/L (ref 3.5–5.2)
RBC # BLD AUTO: 4.5 10*6/MM3 (ref 4.14–5.8)
SODIUM SERPL-SCNC: 137 MMOL/L (ref 136–145)
WBC NRBC COR # BLD: 7.19 10*3/MM3 (ref 3.4–10.8)

## 2023-08-08 PROCEDURE — 97161 PT EVAL LOW COMPLEX 20 MIN: CPT

## 2023-08-08 PROCEDURE — 85027 COMPLETE CBC AUTOMATED: CPT | Performed by: NURSE PRACTITIONER

## 2023-08-08 PROCEDURE — 99213 OFFICE O/P EST LOW 20 MIN: CPT | Performed by: NURSE PRACTITIONER

## 2023-08-08 PROCEDURE — G0378 HOSPITAL OBSERVATION PER HR: HCPCS

## 2023-08-08 PROCEDURE — 80048 BASIC METABOLIC PNL TOTAL CA: CPT | Performed by: NURSE PRACTITIONER

## 2023-08-08 RX ORDER — OXYCODONE HYDROCHLORIDE AND ACETAMINOPHEN 5; 325 MG/1; MG/1
1 TABLET ORAL EVERY 4 HOURS PRN
Qty: 12 TABLET | Refills: 0 | Status: SHIPPED | OUTPATIENT
Start: 2023-08-08 | End: 2023-08-15

## 2023-08-08 RX ORDER — TIZANIDINE HYDROCHLORIDE 2 MG/1
2 CAPSULE, GELATIN COATED ORAL 3 TIMES DAILY PRN
Qty: 30 CAPSULE | Refills: 0 | Status: SHIPPED | OUTPATIENT
Start: 2023-08-08

## 2023-08-08 RX ADMIN — Medication 10 ML: at 08:44

## 2023-08-08 RX ADMIN — AMLODIPINE BESYLATE 10 MG: 10 TABLET ORAL at 08:43

## 2023-08-08 RX ADMIN — LOSARTAN POTASSIUM 50 MG: 50 TABLET, FILM COATED ORAL at 08:47

## 2023-08-08 RX ADMIN — SENNOSIDES AND DOCUSATE SODIUM 2 TABLET: 50; 8.6 TABLET ORAL at 08:43

## 2023-08-08 RX ADMIN — LEVOTHYROXINE SODIUM 175 MCG: 0.05 TABLET ORAL at 06:28

## 2023-08-08 RX ADMIN — SENNOSIDES AND DOCUSATE SODIUM 2 TABLET: 50; 8.6 TABLET ORAL at 04:37

## 2023-08-08 RX ADMIN — METHOCARBAMOL TABLETS 750 MG: 750 TABLET, COATED ORAL at 06:28

## 2023-08-08 RX ADMIN — METHOCARBAMOL TABLETS 750 MG: 750 TABLET, COATED ORAL at 12:27

## 2023-08-08 RX ADMIN — PRAVASTATIN SODIUM 20 MG: 40 TABLET ORAL at 08:43

## 2023-08-08 NOTE — PLAN OF CARE
Goal Outcome Evaluation:         Pt has complained of intermittent pain throughout the night. C-collar in place. MRI C-spine completed. Neurosurgery following

## 2023-08-08 NOTE — PROGRESS NOTES
ED OBSERVATION PROGRESS/DISCHARGE SUMMARY    Date of Admission: 8/7/2023   LOS: 0 days   PCP: Florian Burt MD    Final Diagnosis Neck pain      Subjective   No acute events overnight.  Hospital Outcome:   72-year-old male admitted to the observation unit with a complaint of neck pain after a fall on Friday.  In the emergency department lab work was essentially unremarkable.  CT head shows no acute intracranial abnormality is identified with no acute change when compared to prior head CT on 5/8/2018.  This patient has a chronic fracture of the right anterior ring of C1 with 4 mm fracture gap at this site is similar to prior head CT on 5/8/2018.  CT cervical spine shows mildly displaced fracture of the right anterior ring of C1 on cervical spine CT 5 years ago 5 years and 3 months ago 1 5/8/2018.  Posterior superior to this is a hairline 1 mm lucency through the far anterior inferior medial tip of the adjacent right occipital condyle that was not apparent or evident on prior imaging.  MRI C-spine shows that the possible nondisplaced right occipital condyle fracture was not seen clearly on prior imaging.  This is poorly assessed on the current study,some edema within the right occipital condyle cannot be excluded.  Patient remains in hard cervical collar.  Neurosurgery has been consulted and have seen the patient.They have recommended swapping patient's hard collar and maintaining this for 3 weeks and will follow him up as an outpatient. He is agreeable to plan.     ROS:  General: no fevers, chills  Respiratory: no cough, dyspnea  Cardiovascular: no chest pain, palpitations  Abdomen: No abdominal pain, nausea, vomiting, or diarrhea  Neurologic: No focal weakness    Objective   Physical Exam:  I have reviewed the vital signs.  Temp:  [97.3 øF (36.3 øC)-97.7 øF (36.5 øC)] 97.5 øF (36.4 øC)  Heart Rate:  [40-63] 49  Resp:  [16-18] 18  BP: (129-157)/(62-86) 157/67  General Appearance:    Alert, cooperative, no  distress  Head:    Normocephalic, atraumatic  Eyes:    Sclerae anicteric  Neck:   Supple, no mass, hard C collar in place  Lungs: Clear to auscultation bilaterally, respirations unlabored  Heart: Regular rate and rhythm, S1 and S2 normal, no murmur, rub or gallop  Abdomen:  Soft, non-tender, bowel sounds active, nondistended  Extremities: No clubbing, cyanosis, or edema to lower extremities, normal active ROM of all extremities  Pulses:  2+ and symmetric in distal lower extremities  Skin: No rashes   Neurologic: Oriented x3, Normal strength to extremities    Results Review:    I have reviewed the labs, radiology results and diagnostic studies.    Results from last 7 days   Lab Units 08/07/23  1538   WBC 10*3/mm3 7.45   HEMOGLOBIN g/dL 12.5*   HEMATOCRIT % 38.3   PLATELETS 10*3/mm3 176     Results from last 7 days   Lab Units 08/07/23  1538   SODIUM mmol/L 140   POTASSIUM mmol/L 4.3   CHLORIDE mmol/L 104   CO2 mmol/L 27.7   BUN mg/dL 19   CREATININE mg/dL 0.90   CALCIUM mg/dL 8.9   GLUCOSE mg/dL 140*     Imaging Results (Last 24 Hours)       Procedure Component Value Units Date/Time    MRI Cervical Spine With & Without Contrast [[id number]] Collected: 08/08/23 0029     Updated: 08/08/23 0036    Narrative:      CERVICAL SPINE MRI WITHOUT AND WITH GADOLINIUM     HISTORY: neck pain s/p fall, recent C spine fx; M54.2-Cervicalgia;  S12.000K-Unspecified displaced fracture of first cervical vertebra,  subsequent encounter for fracture with nonunion     COMPARISON:  08/07/2023.     FINDINGS:  Multiplanar images of the cervical spine obtained without and  with gadolinium.  Axial images obtained from C2-T1     Images are significantly degraded by motion artifact. This patient has a  history of displaced fracture of the right anterior ring of C1. This is  better seen on the patient's prior CT. The current CT also suggests a  nondisplaced fracture through the right occipital condyle. This was not  clearly seen on the prior study.  It is difficult to see this area on the  current study, as fractures best seen on the coronal series the prior  study. However, there may be some edema within the right occipital  condyle. No additional fractures are seen. Marrow signal is otherwise  unremarkable. No cord signal abnormalities are seen. There is mild  anterolisthesis of C4 on C5. Intervertebral disc space narrowing is  noted at multiple levels. Postcontrast imaging is significantly limited  due to motion artifact. No abnormal enhancement is seen following  contrast administration.     C2-C3: There is mild central disc bulge, which flattens the anterior  aspect of the thecal sac. There is no significant canal stenosis or  neural foraminal narrowing.  C3-C4: There is disc bulge, which flattens the anterior aspect of the  thecal sac. There is moderate neural foraminal narrowing on the left.  There is some mild neural foraminal narrowing on the right.  C4-C5: Left-sided disc osteophyte complex results in severe neural  foraminal narrowing on the left. There is mild canal stenosis.  C5-C6: Disc osteophyte complex results in moderate canal narrowing.  There is severe neural foraminal narrowing on the left. There is  moderate neural foraminal narrowing on the right.  C6-C7: Disc osteophyte complex results in moderate canal stenosis. There  is severe neural foraminal narrowing on the left and moderate narrowing  on the right.  C7-T1: There is some minimal canal narrowing. There is severe neural  foraminal narrowing on the left and mild narrowing on the right.       Impression:      1. Earlier CT suggested a possible nondisplaced right occipital condyle  fracture, not clearly seen on prior imaging. This is poorly assessed on  the current study. I cannot exclude some edema within the right  occipital condyle. Chronic fracture of the anterior ring of C1 is again  noted.  2. Degenerative changes, as noted above.     FINDINGS were relayed to Nurse Practitioner  Nohemy Obrien in the  observation at 12:32 AM.     This report was finalized on 8/8/2023 12:33 AM by Dr. Tiki Flores M.D.       CT Head Without Contrast [[id number]] Collected: 08/07/23 1459     Updated: 08/07/23 1702    Narrative:      EMERGENCY CT SCAN OF THE HEAD AND CERVICAL SPINE WITHOUT CONTRAST ON  08/07/2023     CLINICAL HISTORY: Patient fell on Friday, 08/04/2023, head and neck  trauma. The patient has history of cervical spine fracture involving the  anterior ring of C1 that occurred back on 05/09/2018.     HEAD CT TECHNIQUE: Spiral CT images were obtained from the base of the  skull to the vertex without intravenous contrast. The images were  reformatted and are submitted in 3 mm thick axial, sagittal and coronal  CT sections with brain algorithm and 2 mm thick axial CT sections with  high resolution bone algorithm.     This is correlated to a prior head CT from [facility] on  05/08/2018.     FINDINGS: There is some patchy low-density extending from the  periventricular into the subcortical white matter of the cerebral  hemispheres consistent with mild-to-moderate small vessel disease. The  remainder of the brain parenchyma is normal in attenuation. The  ventricles are normal in size. I see no focal mass effect. There is no  midline shift and no extra-axial fluid collections are identified. There  is no evidence of acute intracranial hemorrhage. No acute skull fracture  is identified. The paranasal sinuses, the mastoid air cells and middle  ear cavities are clear. Calcified atherosclerotic plaques are present in  the cavernous internal carotid arteries bilaterally. This patient has  displaced fracture of the right anterior ring of C1.  There is a 4 mm  fracture gap at the fracture site.  This fracture was also present on  head CT 05/08/2018, and is a chronic fracture.       Impression:      1. No acute intracranial abnormality is identified with no acute change  when compared to prior  head CT on 05/08/2018.  2. There is mild-to-moderate small vessel disease in the cerebral white  matter and mild cerebral atrophy, both of which have progressed when  compared to 05/08/2018.  3. This patient has a chronic fracture of the right anterior ring of C1  with 4 mm fracture gap at this site similar to prior head CT 05/08/2018,  three years and 3 months ago. The remainder of the head CT is within  normal limits with no acute skull fracture or intracranial hemorrhage  identified.        CT SCAN OF THE CERVICAL SPINE TECHNIQUE: Spiral CT images were obtained  from the skull base down to the T2-3 thoracic level. The images were  reformatted and submitted in 2 mm thick axial and sagittal CT sections  with soft tissue algorithm and 1 mm thick axial, sagittal and coronal CT  sections with high-resolution bone algorithm.     This is correlated to a prior cervical spine CT from [facility] on 05/09/2018.     FINDINGS: There is a chronic displaced fracture of the right side of the  anterior ring of C1, 15 mm to the right of midline and there is a 4-5 mm  medial lateral gap at this fracture site.  The fracture was present and  was acute in nature back on cervical spine CT 05/09/2018 where there was  a 4 mm fracture gap at the site. Just posterior superior to this  fracture is subtle fracture through the far anterior inferior medial tip  of the right occipital condyle with 1 mm separation of the small 5 x 2.5  mm fragment off the anterior inferior medial right occipital condyle and  I suspect that this is a chronic finding.  It is newly apparent when  compared to prior CT 05/09/2018. The precise age of the right occipital  condyle fracture is indeterminate, but again I suspect that it is most  probably nonacute.     At C2-3 the posterior disc margin, facets and uncovertebral joints are  within normal limits with no canal or foraminal narrowing.     At C3-4 there is mild left posterior lateral endplate  spurring mildly  narrowing the left side of the canal. There is moderate left facet  overgrowth and some bony fusion across the left facet joint.  The right  facets are normal.  There is some left-sided uncovertebral joint  hypertrophy and there is moderate left bony foraminal narrowing.  There  is no right foraminal narrowing.     At C4-5 there is moderate left facet overgrowth.  There is disc space  narrowing and there are degenerative endplate changes.  There is diffuse  posterior disc osteophyte complex and posterior bulging disc material  that abuts and deforms the ventral surface of the cord at least mild to  moderately narrowing the canal.  There is no right foraminal narrowing.   There is left uncovertebral joint spurring, and the combination with  left facet overgrowth results in moderate-to-severe left bony foraminal  narrowing.     At C5-6 there is disc space narrowing and degenerative endplate changes  and mild posterior spurring.  There is mild-to-moderate canal narrowing.   There is some uncovertebral joint hypertrophy and there is mild right  and mild-to-moderate left bony foraminal narrowing.     At C6-7 there is severe disc space narrowing.  There are degenerative  disc and endplate changes and diffuse posterior endplate spurring  results in moderate bony canal narrowing and bilateral uncovertebral  joint hypertrophy and mild-to-moderate right and moderate-to-severe left  bony foraminal narrowing.      At C7-T1 there is disc space narrowing, degenerative endplate changes,  posterior spurring with at least mild canal narrowing and uncovertebral  joint spurring into the foramina, left greater than right, and there is  moderate left and no right foraminal narrowing.     IMPRESSION:    1. This patient had a mildly displaced fracture of the right anterior  ring of C1 on a cervical spine CT 5 years and 3 months ago on  05/08/2018. There is a persistent nonunited displaced fracture plane  extending through  the right anterior ring of C1 with a 4-5 mm fracture  gap.  Posterior superior to this is a hairline 1 mm lucency through the  far anterior inferior medial tip of the adjacent right occipital condyle  that was not apparent or evident on the prior cervical spine CT on  05/08/2018.  The precise age of this tiny fracture off the anterior  inferior medial tip of the right occipital condyle is uncertain but I  suspect that it is subacute or chronic, rather than acute but it is  impossible to precisely date. There is also a very subtle hairline  lucency of the right posterior lateral ring of C1, that I suspect is due  to the adjacent sclerosis, is subacute or chronic nearly healed  fracture. Otherwise I see no acute fracture in the remainder of the  cervical spine.  There is cervical spondylosis as described above.  The  results of the study were communicated to Makeda Hill MD, from the ER  by telephone on 08/07/2023 at 2:20 PM.            Radiation dose reduction techniques were utilized, including automated  exposure control and exposure modulation based on body size.     This report was finalized on 8/7/2023 4:59 PM by Dr. Nigel Leiva M.D.       CT Cervical Spine Without Contrast [[id number]] Collected: 08/07/23 1459     Updated: 08/07/23 1702    Narrative:      EMERGENCY CT SCAN OF THE HEAD AND CERVICAL SPINE WITHOUT CONTRAST ON  08/07/2023     CLINICAL HISTORY: Patient fell on Friday, 08/04/2023, head and neck  trauma. The patient has history of cervical spine fracture involving the  anterior ring of C1 that occurred back on 05/09/2018.     HEAD CT TECHNIQUE: Spiral CT images were obtained from the base of the  skull to the vertex without intravenous contrast. The images were  reformatted and are submitted in 3 mm thick axial, sagittal and coronal  CT sections with brain algorithm and 2 mm thick axial CT sections with  high resolution bone algorithm.     This is correlated to a prior head CT from [facility]  [place] on  05/08/2018.     FINDINGS: There is some patchy low-density extending from the  periventricular into the subcortical white matter of the cerebral  hemispheres consistent with mild-to-moderate small vessel disease. The  remainder of the brain parenchyma is normal in attenuation. The  ventricles are normal in size. I see no focal mass effect. There is no  midline shift and no extra-axial fluid collections are identified. There  is no evidence of acute intracranial hemorrhage. No acute skull fracture  is identified. The paranasal sinuses, the mastoid air cells and middle  ear cavities are clear. Calcified atherosclerotic plaques are present in  the cavernous internal carotid arteries bilaterally. This patient has  displaced fracture of the right anterior ring of C1.  There is a 4 mm  fracture gap at the fracture site.  This fracture was also present on  head CT 05/08/2018, and is a chronic fracture.       Impression:      1. No acute intracranial abnormality is identified with no acute change  when compared to prior head CT on 05/08/2018.  2. There is mild-to-moderate small vessel disease in the cerebral white  matter and mild cerebral atrophy, both of which have progressed when  compared to 05/08/2018.  3. This patient has a chronic fracture of the right anterior ring of C1  with 4 mm fracture gap at this site similar to prior head CT 05/08/2018,  three years and 3 months ago. The remainder of the head CT is within  normal limits with no acute skull fracture or intracranial hemorrhage  identified.        CT SCAN OF THE CERVICAL SPINE TECHNIQUE: Spiral CT images were obtained  from the skull base down to the T2-3 thoracic level. The images were  reformatted and submitted in 2 mm thick axial and sagittal CT sections  with soft tissue algorithm and 1 mm thick axial, sagittal and coronal CT  sections with high-resolution bone algorithm.     This is correlated to a prior cervical spine CT from [facility] on 05/09/2018.     FINDINGS: There is a chronic displaced fracture of the right side of the  anterior ring of C1, 15 mm to the right of midline and there is a 4-5 mm  medial lateral gap at this fracture site.  The fracture was present and  was acute in nature back on cervical spine CT 05/09/2018 where there was  a 4 mm fracture gap at the site. Just posterior superior to this  fracture is subtle fracture through the far anterior inferior medial tip  of the right occipital condyle with 1 mm separation of the small 5 x 2.5  mm fragment off the anterior inferior medial right occipital condyle and  I suspect that this is a chronic finding.  It is newly apparent when  compared to prior CT 05/09/2018. The precise age of the right occipital  condyle fracture is indeterminate, but again I suspect that it is most  probably nonacute.     At C2-3 the posterior disc margin, facets and uncovertebral joints are  within normal limits with no canal or foraminal narrowing.     At C3-4 there is mild left posterior lateral endplate spurring mildly  narrowing the left side of the canal. There is moderate left facet  overgrowth and some bony fusion across the left facet joint.  The right  facets are normal.  There is some left-sided uncovertebral joint  hypertrophy and there is moderate left bony foraminal narrowing.  There  is no right foraminal narrowing.     At C4-5 there is moderate left facet overgrowth.  There is disc space  narrowing and there are degenerative endplate changes.  There is diffuse  posterior disc osteophyte complex and posterior bulging disc material  that abuts and deforms the ventral surface of the cord at least mild to  moderately narrowing the canal.  There is no right foraminal narrowing.   There is left uncovertebral joint spurring, and the combination with  left facet overgrowth results in moderate-to-severe left bony foraminal  narrowing.     At C5-6 there is disc space narrowing and  degenerative endplate changes  and mild posterior spurring.  There is mild-to-moderate canal narrowing.   There is some uncovertebral joint hypertrophy and there is mild right  and mild-to-moderate left bony foraminal narrowing.     At C6-7 there is severe disc space narrowing.  There are degenerative  disc and endplate changes and diffuse posterior endplate spurring  results in moderate bony canal narrowing and bilateral uncovertebral  joint hypertrophy and mild-to-moderate right and moderate-to-severe left  bony foraminal narrowing.      At C7-T1 there is disc space narrowing, degenerative endplate changes,  posterior spurring with at least mild canal narrowing and uncovertebral  joint spurring into the foramina, left greater than right, and there is  moderate left and no right foraminal narrowing.     IMPRESSION:    1. This patient had a mildly displaced fracture of the right anterior  ring of C1 on a cervical spine CT 5 years and 3 months ago on  05/08/2018. There is a persistent nonunited displaced fracture plane  extending through the right anterior ring of C1 with a 4-5 mm fracture  gap.  Posterior superior to this is a hairline 1 mm lucency through the  far anterior inferior medial tip of the adjacent right occipital condyle  that was not apparent or evident on the prior cervical spine CT on  05/08/2018.  The precise age of this tiny fracture off the anterior  inferior medial tip of the right occipital condyle is uncertain but I  suspect that it is subacute or chronic, rather than acute but it is  impossible to precisely date. There is also a very subtle hairline  lucency of the right posterior lateral ring of C1, that I suspect is due  to the adjacent sclerosis, is subacute or chronic nearly healed  fracture. Otherwise I see no acute fracture in the remainder of the  cervical spine.  There is cervical spondylosis as described above.  The  results of the study were communicated to Makeda Hill MD, from the  ER  by telephone on 08/07/2023 at 2:20 PM.            Radiation dose reduction techniques were utilized, including automated  exposure control and exposure modulation based on body size.     This report was finalized on 8/7/2023 4:59 PM by Dr. Nigel Leiva M.D.           MALISSA query complete. Treatment plan to include limited course of prescribed  controlled substance. Risks including addiction, benefits, and alternatives presented to patient.       I have reviewed the medications.  ---------------------------------------------------------------------------------------------  Assessment & Plan   Assessment/Problem List    Neck pain    Fall against object    Closed fracture of first cervical vertebra with nonunion      Plan:  Neck pain  Abnormal CT cervical spine  Hard collar to continue  MRI cervical spine confirms nondisplaced right occipital condyle fracture  Consult to neurosurgery, clear for discharge home  Home on percocet & zanaflex     Hypertension  Continue home medications       Disposition: Home    Follow-up after Discharge: PCP & Y[facility]    This note will serve as a discharge summary.    Nohemy Obrien, APRN 08/08/23 05:34 EDT    I have worn appropriate PPE during this patient encounter, sanitized my hands both with entering and exiting patient's room.      38 minutes has been spent by [facility] providers in the care of this patient while under observation status

## 2023-08-08 NOTE — TELEPHONE ENCOUNTER
----- Message from TREY Denson sent at 8/8/2023  2:39 PM EDT -----  Regarding: please schedule  Please arrange office f/u with Dr. Espinoza in 3 weeks with repeat Ap and lateral cervical spine X-rays for occipital condyle fracture. Please call patient with the appointment. THANKS>

## 2023-08-08 NOTE — PROGRESS NOTES
LOS: 0 days   Patient Care Team:  Florian Burt MD as PCP - General (Internal Medicine)    Chief Complaint: Right-sided cervical pain after striking the top of the head on a wall.        Subjective       Interval History: Pain is slightly improved today with muscle relaxers.  Cervical collar switched out to a more properly fitting [place] J collar.  Patient reports it is much more comfortable.  Pain is a bit more manageable today.  Denies any radiating arm pain, arm or leg weakness, or numbness/tingling in the upper extremities.    History taken from: patient chart    Objective          Vital Signs  Temp:  [97.3 øF (36.3 øC)-97.7 øF (36.5 øC)] 97.5 øF (36.4 øC)  Heart Rate:  [40-63] 49  Resp:  [16-18] 18  BP: (129-157)/(62-86) 141/67    Physical Exam:     Slight tenderness to palpation in the right occipital cervical region.  [place] J cervical collar fits well.  No motor or sensory deficits.  Negative Vinny's.  Negative clonus.       Results Review:     I reviewed the patient's new clinical results.  I reviewed the patient's new imaging results and agree with the interpretation.  Discussed with Dr. Espinoza and patient.     CERVICAL SPINE MRI WITHOUT AND WITH GADOLINIUM  8/07/2023     Images are somewhat compromised by motion artifact however the C1 anterior ring fracture that was present on previous cervical spine CT appears chronic on current MRI.  There is a nondisplaced fracture through the right occipital condyle.  There is some associated edema and therefore this fracture is considered more acute in nature. The right occipital condyle fracture was not definitively evaluated on the previous cervical spine CT.  No evidence of abnormal enhancement in the cervical spine.  No evidence of other fractures.  No evidence of abnormal cord signal.  Multilevel degenerative changes in the cervical spine secondary to disc osteophyte complexes moderate canal stenosis and multilevel degenerative neuroforaminal  narrowing on the left at multiple levels.      .  Results from last 7 days   Lab Units 08/08/23  0442 08/07/23  1538   WBC 10*3/mm3 7.19 7.45   HEMOGLOBIN g/dL 13.0 12.5*   HEMATOCRIT % 40.6 38.3   PLATELETS 10*3/mm3 186 176       Assessment & Plan       Neck pain    Fall against object    Closed fracture of first cervical vertebra with nonunion - chronic    Closed [place] type I fracture of right occipital condyle      The patient will be fitted for a more appropriately fitting hard cervical collar today.    He is cleared for discharge anytime from neurosurgical standpoint.    The patient was advised to avoid all strenuous activities particularly no lifting over 5 pounds, no repetitive reaching overhead or out in front of body.  No bending below waist.  He is to wear the hard cervical collar at all times except to shower.  He will call our office if he experiences any worsening symptoms.      He will be seen by Dr. Espinoza in Neurosurgery office in 3 weeks with repeat cervical spine x-rays.  I have sent a message to our office to contact the patient with the appointment.  He is cleared for discharge anytime from neurosurgical standpoint.      Off the shelf hard collar has been ordered due to diagnosis of cervical occipital condyle fracture.  The purpose of the brace is to support weak muscles, stabilize and restrict movement of the neck to aid in healing and pain relief of the cervical fracture.       Margarita Collazo, TREY  08/08/23  10:05 EDT

## 2023-08-08 NOTE — PLAN OF CARE
Goal Outcome Evaluation:  Plan of Care Reviewed With: patient           Outcome Evaluation: Pt is a 73 yo M who was admitted with neck pain s/p fall and striking head. Pt with acute on chronic C1 fx and is to wear a hard c-collar at all times. Pt demonstrates adequate strength and balance to perform functional mobility and gait independently. Pt was able to walk in the halls with supervision and demonstrates no LOB. Acute care PT will sign off at this time.      Anticipated Discharge Disposition (PT): home

## 2023-08-08 NOTE — OUTREACH NOTE
Prep Survey      Flowsheet Row Responses   [facility] patient discharged from? [place]   Is LACE score < 7 ? Yes   Eligibility [facility]   Date of Admission 08/07/23   Date of Discharge 08/08/23   Discharge Disposition Home or Self Care   Discharge diagnosis Neck painHypertension   Does the patient have one of the following disease processes/diagnoses(primary or secondary)? Other   Does the patient have Home health ordered? No   Is there a DME ordered? No   Prep survey completed? Yes            GORAN CACERES - Registered Nurse

## 2023-08-08 NOTE — PLAN OF CARE
Goal Outcome Evaluation:              Outcome Evaluation: pt was discharged with all belongings and discharge paperwork,vss, pt to follow up with nuerology as indicated, pt had no further questions at the time of discharge, wife provided transport

## 2023-08-08 NOTE — THERAPY EVALUATION
Patient Name: Durga Valenzuela  : 1950    MRN: [id number]                              Today's Date: 2023       Admit Date: 2023    Visit Dx:     ICD-10-CM ICD-9-CM   1. Neck pain  M54.2 723.1   2. Traumatic closed fracture of C1 vertebra with minimal displacement, with nonunion, subsequent encounter  S12.000K 733.82     Patient Active Problem List   Diagnosis    Post traumatic stress disorder (PTSD)    Benign prostatic hyperplasia with urinary frequency    Chronic insomnia    Lumbar degenerative disc disease    Impaired fasting glucose    Benign essential hypertension    Primary osteoarthritis involving multiple joints    Hyperlipidemia    Primary hypothyroidism    Obstructive sleep apnea, tolerates CPAP well.  Previously failed oral appliance.    History of pulmonary embolism    Non-occlusive coronary artery disease, 2017--normal LM; proximal LAD mild LI; 50% mid LAD.  Mild distal LAD LI; normal Cx.  Mild LI marginal branches; normal RCA, nondominant.  EF 60%.    Therapeutic drug monitoring    Vitamin D deficiency    Psoriasis    Allergic rhinitis    Depression with anxiety    Multiple environmental allergies    Bilateral lower extremity edema    Morbid obesity    Spinal stenosis of lumbar region with neurogenic claudication    Traumatic complete tear of left rotator cuff    Urinary incontinence without sensory awareness    Bipolar disorder, in full remission, most recent episode mixed    Weakness of both lower extremities    Failed total knee, left, sequela    Chest pain    Neck pain    Fall against object    Closed fracture of first cervical vertebra with nonunion - chronic    Closed Vaughn-[place] type I fracture of right occipital condyle     Past Medical History:   Diagnosis Date    Affective psychosis, bipolar 2021    Allergic rhinitis 2018    Patient has had allergy testing in the past which revealed allergies to molds and grass.  Immunotherapy for about 2 years in the  1980s.    Benign essential hypertension 02/25/2016    Benign prostatic hyperplasia with urinary frequency 02/25/2016    Bilateral lower extremity edema 08/21/2018    Bipolar disorder, in full remission, most recent episode mixed 09/30/2021    Chronic insomnia 02/25/2016    Closed traumatic lateral subluxation of patellofemoral joint, right, initial encounter 08/13/2018    08/10/2018--patient was evaluated by the orthopedist for right knee pain and found to have lateral subluxation of the right patella associated with presence of right artificial knee joint.  Physical therapy no help.  Surgery is scheduled 09/17/2018    Depression with anxiety 08/21/2018    Elevated cholesterol     History of deep venous thrombosis (DVT) of distal vein of right lower extremity 08/21/2018 04/26/2016--CTA of the chest performed for elevated d-dimer and progressive shortness of breath and chest pain for one month revealed bilateral occlusive in near occlusive segmental and subsegmental pulmonary emboli in all lobes of the right lung as well as within the left lower lobe.  No evidence of pulmonary infarct.  Nonspecific multifocal groundglass attenuation in the right pulmonary apex, perhaps representing pneumonitis or submental atelectasis.  Questionable cholelithiasis.  Doppler venous study was positive for DVT in the right popliteal vein.  Hypercoagulable workup was normal.  He was treated with Eliquis for a total of 6 months and was subsequently discontinued.    History of pneumonia     History of pulmonary embolism 06/10/2016    04/26/2016--CTA of the chest performed for elevated d-dimer and progressive shortness of breath and chest pain for one month revealed bilateral occlusive in near occlusive segmental and subsegmental pulmonary emboli in all lobes of the right lung as well as within the left lower lobe.  No evidence of pulmonary infarct.  Nonspecific multifocal groundglass attenuation in the right pulmonary apex, perhaps  representing pneumonitis or submental atelectasis.  Questionable cholelithiasis.  Doppler venous study was positive for DVT in the right popliteal vein.  Hypercoagulable workup was normal.  He was treated with Eliquis for a total of 6 months and was subsequently discontinued.    Hyperlipidemia 02/25/2016    Impaired fasting glucose 02/25/2016    Lumbar degenerative disc disease 02/25/2016    Morbidly obese 10/19/2018    Multiple environmental allergies 08/21/2018    Patient has had allergy testing in the past which revealed allergies to molds and grass.  Immunotherapy for about 2 years in the 1980s.    Non-occlusive coronary artery disease, 05/16/2017--normal LM; proximal LAD mild LI; 50% mid LAD.  Mild distal LAD LI; normal Cx.  Mild LI marginal branches; normal RCA, nondominant.  EF 60%. 07/24/2017 05/16/2017--cardiac catheterization performed for abnormal stress test.  Normal LV with ejection fraction 60%.  Dilated aortic root.  No wall motion abnormalities.  Normal left main.  Ramus branch small caliber and normal.  Proximal LAD large caliber and mild luminal irregularities.  50% mid LAD.  Mild luminal irregularities distal LAD.  3 diagonal branches of small caliber with mild luminal irregularities.  Normal septal branches.  Circumflex is large caliber and free of disease.  Marginal branches are medium caliber with mild luminal irregularities.  RCA is a medium caliber and free of disease.  It is nondominant.    Obstructive sleep apnea, tolerates CPAP well.  Previously failed oral appliance. 02/25/2016    Post traumatic stress disorder (PTSD) 02/25/2016    Primary hypothyroidism 02/25/2016    Primary osteoarthritis involving multiple joints 02/25/2016    Psoriasis 09/17/2014    Spinal stenosis of lumbar region with neurogenic claudication 02/15/2021    Traumatic complete tear of left rotator cuff 03/17/2021 March 14, 2021--patient was evaluated by the orthopedic surgeon after he slipped on ice and fell onto  his left shoulder.  Work-up revealed complete rotator cuff tear involving 2 tendons.  Specifically, he has an acute left supraspinatus tear and acute left infraspinatus tear.  He also has left glenohumeral joint osteoarthritis.  Apparently this is not repairable and patient would need a shoulder r    Traumatic complete tear of left rotator cuff 03/17/2021 March 14, 2021--patient was evaluated by the orthopedic surgeon after he slipped on ice and fell onto his left shoulder.  Work-up revealed complete rotator cuff tear involving 2 tendons.  Specifically, he has an acute left supraspinatus tear and acute left infraspinatus tear.  He also has left glenohumeral joint osteoarthritis.  Apparently this is not repairable and patient would need a shoulder r    Urinary incontinence without sensory awareness 03/17/2021    Vitamin D deficiency 08/21/2018     Past Surgical History:   Procedure Laterality Date    CARDIAC CATHETERIZATION N/A 5/16/2017    Procedure: Coronary angiography;  Surgeon: Malcolm Chen MD;  Location: [facility] INVASIVE LOCATION;  Service:     CARDIAC CATHETERIZATION N/A 5/16/2017    Procedure: Left Heart Cath;  Surgeon: Malcolm Chen MD;  Location: [facility] CATH INVASIVE LOCATION;  Service:     CARDIAC CATHETERIZATION N/A 5/16/2017    Procedure: Left ventriculography;  Surgeon: Malcolm Chen MD;  Location: [facility] CATH INVASIVE LOCATION;  Service:     CARDIAC CATHETERIZATION N/A 4/26/2023    Procedure: Left Heart Cath;  Surgeon: Mary Reynolds MD;  Location: [facility] CATH INVASIVE LOCATION;  Service: Cardiovascular;  Laterality: N/A;    CARDIAC CATHETERIZATION N/A 4/26/2023    Procedure: Coronary angiography;  Surgeon: Mary Reynolds MD;  Location: [facility] CATH INVASIVE LOCATION;  Service: Cardiovascular;  Laterality: N/A;    CARDIAC CATHETERIZATION N/A 4/26/2023    Procedure: Left ventriculography;  Surgeon: Mary Reynolds MD;  Location: [facility] INVASIVE LOCATION;   Service: Cardiovascular;  Laterality: N/A;    COLONOSCOPY  2005 2005--colonoscopy reportedly normal.  Records not available.    COLONOSCOPY N/A 6/13/2019    Procedure: COLONOSCOPY INTO CECUM WITH COLD BIOPSY POLYPECTOMY;  Surgeon: Ayaan Gallardo MD;  Location: [facility] ENDOSCOPY;  Service: General    LUMBAR DECOMPRESSION  2007 2007--lumbar decompression without fusion or hardware.    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    REVISION TOTAL KNEE ARTHROPLASTY Right 09/17/2018 09/17/2018--right total knee arthroplasty revision due to lateral subluxation of the patella due to multiple factors.    TOTAL KNEE ARTHROPLASTY Left 11/03/2017 11/03/2017--right total knee replacement complicated by patellofemoral subluxation.    TOTAL KNEE ARTHROPLASTY Left 06/2014 June 2014--left total knee replacement.    TOTAL KNEE ARTHROPLASTY REVISION Left 12/5/2022    Procedure: LEFT TOTAL KNEE REVISION Complex with Patella Tendon Reconstruction;  Surgeon: Sid Simon MD;  Location: [facility];  Service: Orthopedics;  Laterality: Left;      General Information       Row Name 08/08/23 1430          Physical Therapy Time and Intention    Document Type evaluation  -     Mode of Treatment individual therapy;physical therapy  -       Row Name 08/08/23 1430          General Information    Prior Level of Function independent:;gait;transfer;bed mobility  no AD  -     Existing Precautions/Restrictions brace on at all times  hard c-collar  -     Barriers to Rehab none identified  -       Row Name 08/08/23 1430          Living Environment    People in Home spouse  -       Row Name 08/08/23 1430          Cognition    Orientation  Status (Cognition) oriented x 4  -               User Key  (r) = Recorded By, (t) = Taken By, (c) = Cosigned By      Initials Name Provider Type    Cherie Lazo, PT Physical Therapist                   Mobility       Row Name 08/08/23 1430          Bed Mobility    Bed Mobility supine-sit;sit-supine  -     Supine-Sit [place] (Bed Mobility) supervision  -     Sit-Supine [place] (Bed Mobility) not tested  sitting EOB with nursing staff present  -       Row Name 08/08/23 1430          Sit-Stand Transfer    Sit-Stand [place] (Transfers) supervision  -       Row Name 08/08/23 1430          Gait/Stairs (Locomotion)    [place] Level (Gait) supervision  -     Distance in Feet (Gait) 150  -     Comment, (Gait/Stairs) no LOB noted,, pace and mechanics WFL  -               User Key  (r) = Recorded By, (t) = Taken By, (c) = Cosigned By      Initials Name Provider Type    Cherie Lazo, PT Physical Therapist                   Obj/Interventions       Row Name 08/08/23 1431          Range of Motion Comprehensive    General Range of Motion no range of motion deficits identified  -CH       Row Name 08/08/23 1431          Strength Comprehensive (MMT)    General Manual Muscle Testing (MMT) Assessment no strength deficits identified  -     Comment, General Manual Muscle Testing (MMT) Assessment B LE grossly >/=4+/5  -[facility] Name 08/08/23 1431          Balance    Balance Assessment standing static balance;standing dynamic balance  -     Static Standing Balance supervision  -     Dynamic Standing Balance supervision  -               User Key  (r) = Recorded By, (t) = Taken By, (c) = Cosigned By      Initials Name Provider Type    Cherie Lazo, PT Physical Therapist                   Goals/Plan    No documentation.                  Clinical Impression       Row Name 08/08/23 1432          Pain    Pretreatment Pain Rating 6/10  -     Posttreatment Pain Rating 6/10   -     Pain Location - neck  -     Pain Intervention(s) Repositioned  -       Row Name 08/08/23 1432          Plan of Care Review    Plan of Care Reviewed With patient  -     Outcome Evaluation Pt is a 73 yo M who was admitted with neck pain s/p fall and striking head. Pt with acute on chronic C1 fx and is to wear a hard c-collar at all times. Pt demonstrates adequate strength and balance to perform functional mobility and gait independently. Pt was able to walk in the halls with supervision and demonstrates no LOB. Acute care PT will sign off at this time.  -       Row Name 08/08/23 1432          Positioning and Restraints    Pre-Treatment Position in bed  -CH     Post Treatment Position bed  -CH     In Bed sitting EOB;call light within reach;encouraged to call for assist;with nsg  nsg staff present swithing out the lead for the heart monitor  -               User Key  (r) = Recorded By, (t) = Taken By, (c) = Cosigned By      Initials Name Provider Type     Cherie Simmons, PT Physical Therapist                   Outcome Measures       Row Name 08/08/23 1435 08/08/23 0800       How much help from another person do you currently need...    Turning from your back to your side while in flat bed without using bedrails? 4  -CH 4  -CL    Moving from lying on back to sitting on the side of a flat bed without bedrails? 4  -CH 4  -CL    Moving to and from a bed to a chair (including a wheelchair)? 4  -CH 4  -CL    Standing up from a chair using your arms (e.g., wheelchair, bedside chair)? 4  -CH 3  -CL    Climbing 3-5 steps with a railing? 3  -CH 3  -CL    To walk in hospital room? 4  -CH 3  -CL    AM-PAC 6 Clicks Score (PT) 23  -CH 21  -CL    Highest level of mobility 7 --> Walked 25 feet or more  -CH 6 --> Walked 10 steps or more  -CL      Row Name 08/08/23 1435          Functional Assessment    Outcome Measure Options AM-PAC 6 Clicks Basic Mobility (PT)  -               User Key  (r) = Recorded By, (t)  = Taken By, (c) = Cosigned By      Initials Name Provider Type     Cherie Simmons PT Physical Therapist    CL Leon Reyes, Claudia, RN Registered Nurse                                 Physical Therapy Education       Title: PT OT SLP Therapies (In Progress)       Topic: Physical Therapy (In Progress)       Point: Mobility training (Done)       Learning Progress Summary             Patient Acceptance, E,TB,D, VU,DU by  at 8/8/2023 1435                         Point: Home exercise program (Not Started)       Learner Progress:  Not documented in this visit.              Point: Precautions (Done)       Learning Progress Summary             Patient Acceptance, E,TB,D, VU,DU by  at 8/8/2023 1435                                         User Key       Initials Effective Dates Name Provider Type Discipline     06/16/21 -  Cherie Simmons PT Physical Therapist PT                  PT Recommendation and Plan     Plan of Care Reviewed With: patient  Outcome Evaluation: Pt is a 71 yo M who was admitted with neck pain s/p fall and striking head. Pt with acute on chronic C1 fx and is to wear a hard c-collar at all times. Pt demonstrates adequate strength and balance to perform functional mobility and gait independently. Pt was able to walk in the halls with supervision and demonstrates no LOB. Acute care PT will sign off at this time.     Time Calculation:         PT Charges       Row Name 08/08/23 1436             Time Calculation    Start Time 1402  -      Stop Time 1411  -      Time Calculation (min) 9 min  -      PT Received On 08/08/23  -                User Key  (r) = Recorded By, (t) = Taken By, (c) = Cosigned By      Initials Name Provider Type     Cherie Simmons PT Physical Therapist                  Therapy Charges for Today       Code Description Service Date Service Provider Modifiers Qty    [id number] HC PT EVAL LOW COMPLEXITY 2 8/8/2023 Cherie Simmons PT GP 1            PT  G-Codes  Outcome Measure Options: AM-PAC 6 Clicks Basic Mobility (PT)  AM-PAC 6 Clicks Score (PT): 23  PT Discharge Summary  Anticipated Discharge Disposition (PT): home    Cherie Simmons, PT  8/8/2023

## 2023-08-09 ENCOUNTER — TRANSITIONAL CARE MANAGEMENT TELEPHONE ENCOUNTER (OUTPATIENT)
Dept: CALL CENTER | Facility: HOSPITAL | Age: 73
End: 2023-08-09
Payer: MEDICARE

## 2023-08-09 NOTE — OUTREACH NOTE
Call Center TCM Note      Flowsheet Row Responses   [facility] patient discharged from? [place]   Does the patient have one of the following disease processes/diagnoses(primary or secondary)? Other   TCM attempt successful? Yes   Call start time 1444   Call end time 1446   Discharge diagnosis Neck painHypertension   Meds reviewed with patient/caregiver? Yes   Is the patient having any side effects they believe may be caused by any medication additions or changes? No   Does the patient have all medications ordered at discharge? Yes   Is the patient taking all medications as directed (includes completed medication regime)? Yes   Does the patient have an appointment with their PCP within 7-14 days of discharge? No appointments available   Nursing Interventions Patient desires to follow up with specialty only, Routed TCM call to PCP office   Has home health visited the patient within 72 hours of discharge? N/A   Psychosocial issues? No   Did the patient receive a copy of their discharge instructions? Yes   Nursing interventions Reviewed instructions with patient   What is the patient's perception of their health status since discharge? Improving   Is the patient/caregiver able to teach back signs and symptoms related to disease process for when to call PCP? Yes   Is the patient/caregiver able to teach back signs and symptoms related to disease process for when to call 911? Yes   Is the patient/caregiver able to teach back the hierarchy of who to call/visit for symptoms/problems? PCP, Specialist, Home health nurse, Urgent Care, ED, 911 Yes   If the patient is a current smoker, are they able to teach back resources for cessation? Not a smoker   TCM call completed? Yes   Wrap up additional comments D/C DX , Neck painHypertension - Pt doing pretty well. Some neck discomfort, neck brace managable. New rx's Oxycodone-acet, Tizanidine in place. No questions at this time. Pt has follow up with Neurosrgn on 08/29/2023.  PCP Dr Florian Burt has no available times I could access to sched TCM APPT. Pt fine with later follow up as long as ok with PCP   Call end time [id number]            Mariaelena Manzanares MA    8/9/2023, 14:48 EDT

## 2023-08-15 ENCOUNTER — TELEPHONE (OUTPATIENT)
Dept: NEUROSURGERY | Facility: CLINIC | Age: 73
End: 2023-08-15
Payer: MEDICARE

## 2023-08-15 ENCOUNTER — OFFICE VISIT (OUTPATIENT)
Dept: INTERNAL MEDICINE | Facility: CLINIC | Age: 73
End: 2023-08-15
Payer: MEDICARE

## 2023-08-15 VITALS
WEIGHT: 310 LBS | OXYGEN SATURATION: 97 % | HEART RATE: 45 BPM | DIASTOLIC BLOOD PRESSURE: 84 MMHG | HEIGHT: 71 IN | BODY MASS INDEX: 43.4 KG/M2 | TEMPERATURE: 96.5 F | SYSTOLIC BLOOD PRESSURE: 118 MMHG

## 2023-08-15 RX ORDER — ALBUTEROL SULFATE 90 UG/1
AEROSOL, METERED RESPIRATORY (INHALATION)
COMMUNITY
Start: 2023-05-24

## 2023-08-15 RX ORDER — DEXTROAMPHETAMINE SACCHARATE, AMPHETAMINE ASPARTATE, DEXTROAMPHETAMINE SULFATE AND AMPHETAMINE SULFATE 5; 5; 5; 5 MG/1; MG/1; MG/1; MG/1
20 TABLET ORAL DAILY
COMMUNITY

## 2023-08-15 RX ORDER — DEXTROAMPHETAMINE SACCHARATE, AMPHETAMINE ASPARTATE, DEXTROAMPHETAMINE SULFATE AND AMPHETAMINE SULFATE 2.5; 2.5; 2.5; 2.5 MG/1; MG/1; MG/1; MG/1
10 TABLET ORAL DAILY
COMMUNITY

## 2023-08-15 NOTE — PROGRESS NOTES
08/15/2023    Patient Information  Durga Valenzuela                                                                                          [id number] [facility] [address]      1950  [unfilled]  There is no work phone number on file.    Chief Complaint:       History of Present Illness:      ROS    Active Problems:    Patient Active Problem List   Diagnosis    Post traumatic stress disorder (PTSD)    Benign prostatic hyperplasia with urinary frequency    Chronic insomnia    Lumbar degenerative disc disease    Impaired fasting glucose    Benign essential hypertension    Primary osteoarthritis involving multiple joints    Hyperlipidemia    Primary hypothyroidism    Obstructive sleep apnea, tolerates CPAP well.  Previously failed oral appliance.    History of pulmonary embolism    Non-occlusive coronary artery disease, 05/16/2017--normal LM; proximal LAD mild LI; 50% mid LAD.  Mild distal LAD LI; normal Cx.  Mild LI marginal branches; normal RCA, nondominant.  EF 60%.    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  Immunotherapy for about 2  years in the 1980s.    Benign essential hypertension 02/25/2016    Benign prostatic hyperplasia with urinary frequency 02/25/2016    Bilateral lower extremity edema 08/21/2018    Bipolar disorder, in full remission, most recent episode mixed 09/30/2021    Chronic insomnia 02/25/2016    Closed traumatic lateral subluxation of patellofemoral joint, right, initial encounter 08/13/2018    08/10/2018--patient was evaluated by the orthopedist for right knee pain and found to have lateral subluxation of the right patella associated with presence of right artificial knee joint.  Physical therapy no help.  Surgery is scheduled 09/17/2018    Depression with anxiety 08/21/2018    Elevated cholesterol     History of deep venous thrombosis (DVT) of distal vein of right lower extremity 08/21/2018 04/26/2016--CTA of the chest performed for elevated d-dimer and progressive shortness of breath and chest pain for one month revealed bilateral occlusive in near occlusive segmental and subsegmental pulmonary emboli in all lobes of the right lung as well as within the left lower lobe.  No evidence of pulmonary infarct.  Nonspecific multifocal groundglass attenuation in the right pulmonary apex, perhaps representing pneumonitis or submental atelectasis.  Questionable cholelithiasis.  Doppler venous study was positive for DVT in the right popliteal vein.  Hypercoagulable workup was normal.  He was treated with Eliquis for a total of 6 months and was subsequently discontinued.    History of pneumonia     History of pulmonary embolism 06/10/2016    04/26/2016--CTA of the chest performed for elevated d-dimer and progressive shortness of breath and chest pain for one month revealed bilateral occlusive in near occlusive segmental and subsegmental pulmonary emboli in all lobes of the right lung as well as within the left lower lobe.  No evidence of pulmonary infarct.  Nonspecific multifocal groundglass attenuation in the right pulmonary apex,  perhaps representing pneumonitis or submental atelectasis.  Questionable cholelithiasis.  Doppler venous study was positive for DVT in the right popliteal vein.  Hypercoagulable workup was normal.  He was treated with Eliquis for a total of 6 months and was subsequently discontinued.    Hyperlipidemia 02/25/2016    Impaired fasting glucose 02/25/2016    Lumbar degenerative disc disease 02/25/2016    Morbidly obese 10/19/2018    Multiple environmental allergies 08/21/2018    Patient has had allergy testing in the past which revealed allergies to molds and grass.  Immunotherapy for about 2 years in the 1980s.    Non-occlusive coronary artery disease, 05/16/2017--normal LM; proximal LAD mild LI; 50% mid LAD.  Mild distal LAD LI; normal Cx.  Mild LI marginal branches; normal RCA, nondominant.  EF 60%. 07/24/2017 05/16/2017--cardiac catheterization performed for abnormal stress test.  Normal LV with ejection fraction 60%.  Dilated aortic root.  No wall motion abnormalities.  Normal left main.  Ramus branch small caliber and normal.  Proximal LAD large caliber and mild luminal irregularities.  50% mid LAD.  Mild luminal irregularities distal LAD.  3 diagonal branches of small caliber with mild luminal irregularities.  Normal septal branches.  Circumflex is large caliber and free of disease.  Marginal branches are medium caliber with mild luminal irregularities.  RCA is a medium caliber and free of disease.  It is nondominant.    Obstructive sleep apnea, tolerates CPAP well.  Previously failed oral appliance. 02/25/2016    Post traumatic stress disorder (PTSD) 02/25/2016    Primary hypothyroidism 02/25/2016    Primary osteoarthritis involving multiple joints 02/25/2016    Psoriasis 09/17/2014    Spinal stenosis of lumbar region with neurogenic claudication 02/15/2021    Traumatic complete tear of left rotator cuff 03/17/2021 March 14, 2021--patient was evaluated by the orthopedic surgeon after he slipped on ice and  fell onto his left shoulder.  Work-up revealed complete rotator cuff tear involving 2 tendons.  Specifically, he has an acute left supraspinatus tear and acute left infraspinatus tear.  He also has left glenohumeral joint osteoarthritis.  Apparently this is not repairable and patient would need a shoulder r    Traumatic complete tear of left rotator cuff 03/17/2021 March 14, 2021--patient was evaluated by the orthopedic surgeon after he slipped on ice and fell onto his left shoulder.  Work-up revealed complete rotator cuff tear involving 2 tendons.  Specifically, he has an acute left supraspinatus tear and acute left infraspinatus tear.  He also has left glenohumeral joint osteoarthritis.  Apparently this is not repairable and patient would need a shoulder r    Urinary incontinence without sensory awareness 03/17/2021    Vitamin D deficiency 08/21/2018         Past Surgical History:   Procedure Laterality Date    CARDIAC CATHETERIZATION N/A 5/16/2017    Procedure: Coronary angiography;  Surgeon: Malcolm Chen MD;  Location: [facility] CATH INVASIVE LOCATION;  Service:     CARDIAC CATHETERIZATION N/A 5/16/2017    Procedure: Left Heart Cath;  Surgeon: Malcolm Chen MD;  Location[facility]AS[facility] LOC[facility];  Service:     CARDIAC CATHETERIZATION N/A 5/16/2017    Procedure: Left ventriculography;  Surgeon: Malcolm Chen MD;  [facility];  Service:     CARDIAC CATHETERIZATION N/A 4/26/2023    Procedure: Left Heart Cath;  Surgeon: Mary Reynolds MD;  Location: [facility] CATH INVASIVE LOCATION;  Service: Cardiovascular;  Laterality: N/A;    CARDIAC CATHETERIZATION N/A 4/26/2023    Procedure: Coronary angiography;  Surgeon: Mary Reynolds MD;  [facility];  Service: Cardiovascular;  Laterality: N/A;    CARDIAC CATHETERIZATION N/A 4/26/2023    Procedure: Left ventriculography;  Surgeon: Mary Reynolds MD;  [facility]  [facility];  Service: Cardiovascular;  Laterality: N/A;    COLONOSCOPY  2005 2005--colonoscopy reportedly normal.  Records not available.    COLONOSCOPY N/A 6/13/2019    Procedure: COLONOSCOPY INTO CECUM WITH COLD BIOPSY POLYPECTOMY;  Surgeon: Ayaan Gallardo MD;  Location:  [facility];  Service: General    LUMBAR DECOMPRESSION  2007 2007--lumbar decompression without fusion or hardware.    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    REVISION TOTAL KNEE ARTHROPLASTY Right 09/17/2018 09/17/2018--right total knee arthroplasty revision due to lateral subluxation of the patella due to multiple factors.    TOTAL KNEE ARTHROPLASTY Left 11/03/2017 11/03/2017--right total knee replacement complicated by patellofemoral subluxation.    TOTAL KNEE ARTHROPLASTY Left 06/2014 June 2014--left total knee replacement.    TOTAL KNEE ARTHROPLASTY REVISION Left 12/5/2022    Procedure: LEFT TOTAL KNEE REVISION Complex with Patella Tendon Reconstruction;  Surgeon: Sid Simon MD;  Location: [facility] OR;  Service: Orthopedics;  Laterality: Left;         Allergies   Allergen Reactions    Hydrocodone Other (See Comments)     Severe vomiting - but CAN tolerate oxycodone per patient    Zocor  [Simvastatin]      MYALGIA           Current Outpatient Medications:     albuterol sulfate  (90 Base) MCG/ACT inhaler, , Disp: , Rfl:     amLODIPine (NORVASC) 10 MG tablet, TAKE ONE TABLET BY MOUTH DAILY, Disp: 90 tablet, Rfl: 0    amphetamine-dextroamphetamine (ADDERALL) 10 MG tablet, Take 1 tablet by mouth Daily., Disp: , Rfl:     amphetamine-dextroamphetamine (ADDERALL) 20 MG tablet, Take 1 tablet by mouth Daily., Disp: , Rfl:      aspirin 81 MG tablet, One by mouth daily (Patient taking differently: Take 1 tablet by mouth Daily. One by mouth daily), Disp: , Rfl:     clobetasol (TEMOVATE) 0.05 % ointment, Apply twice a day as directed (Patient taking differently: Apply 1 application  topically to the appropriate area as directed 2 (Two) Times a Day. Apply twice a day as directed), Disp: 60 g, Rfl: 6    Coenzyme Q10 (Co Q-10) 400 MG capsule, Take 1 p.o. daily with food (Patient taking differently: Take 400 mg by mouth Daily. Take 1 p.o. daily with food), Disp: 30 capsule, Rfl:     ezetimibe (Zetia) 10 MG tablet, Take 1 p.o. daily for high cholesterol, Disp: 90 tablet, Rfl: 3    FLUoxetine (PROzac) 60 MG tablet, Take 1 tablet by mouth Daily., Disp: , Rfl:     hydrOXYzine (ATARAX) 25 MG tablet, TAKE ONE TABLET BY MOUTH THREE TIMES A DAY AS NEEDED FOR ITCHING, Disp: 60 tablet, Rfl: 3    losartan (COZAAR) 50 MG tablet, TAKE ONE TABLET BY MOUTH DAILY (Patient taking differently: Take 1 tablet by mouth Daily.), Disp: 90 tablet, Rfl: 3    oxyCODONE-acetaminophen (PERCOCET) 5-325 MG per tablet, Take 1 tablet by mouth Every 4 (Four) Hours As Needed for Moderate Pain for up to 6 days., Disp: 12 tablet, Rfl: 0    pravastatin (PRAVACHOL) 20 MG tablet, Take 1 p.o. daily for high cholesterol (Patient taking differently: Take 1 tablet by mouth Daily. Take 1 p.o. daily for high cholesterol), Disp: 90 tablet, Rfl: 3    Synthroid 175 MCG tablet, TAKE ONE TABLET BY MOUTH DAILY FOR LOW THYROID, Disp: 90 tablet, Rfl: 0    vitamin D3 125 MCG (5000 UT) capsule capsule, 1 by mouth daily as directed, Disp: 30 capsule, Rfl: 6    zolpidem (AMBIEN) 10 MG tablet, Take 1 p.o. nightly as needed for insomnia (Patient taking differently: 1 tablet At Night As Needed. Take 1 p.o. nightly as needed for insomnia), Disp: 30 tablet, Rfl: 5    TiZANidine (ZANAFLEX) 2 MG capsule, Take 1 capsule by mouth 3 (Three) Times a Day As Needed for Muscle Spasms. (Patient not taking: Reported  "on 8/15/2023), Disp: 30 capsule, Rfl: 0      Family History   Problem Relation Age of Onset    Coronary artery disease Mother         Status post CABG    Alzheimer's disease Mother     Stroke Father     Liver cancer Father         Father with biliary cancer including gallbladder    No Known Problems Sister     No Known Problems Brother          Social History     Socioeconomic History    Marital status:      Spouse name: italo    Highest education level: Bachelor's degree (e.g., BA, AB, BS)   Tobacco Use    Smoking status: Never    Smokeless tobacco: Never    Tobacco comments:     CAFFEINE USE 2 CUPS COFFEE AND 1 GLASS TEA DAILY   Vaping Use    Vaping Use: Never used   Substance and Sexual Activity    Alcohol use: No    Drug use: Yes     Types: Marijuana     Comment: weekly    Sexual activity: Defer     Partners: Female         Vitals:    08/15/23 1230   BP: 118/84   Pulse: (!) 45   Temp: 96.5 øF (35.8 øC)   SpO2: 97%   Weight: (!) 141 kg (310 lb)   Height: 180 cm (70.87\")        Body mass index is 43.4 kg/mý.      Physical Exam:      Lab/other results:      Assessment/Plan:        No diagnosis found.              Procedures      This encounter was created in error - please disregard.  "

## 2023-08-15 NOTE — TELEPHONE ENCOUNTER
Patient called for refill on his oxycodone that he was originally given when he was seen in the ER, I explained to the patient that we are not able to refill his pain meds because Dr. Espinoza does not prescribe pain medications unless the patient has had surgery. I advised patient that he can try to call his PCP to get a refill, patient was very upset and said that this is ignorant and disconnected the call

## 2023-08-17 ENCOUNTER — TELEPHONE (OUTPATIENT)
Dept: INTERNAL MEDICINE | Facility: CLINIC | Age: 73
End: 2023-08-17
Payer: MEDICARE

## 2023-08-17 NOTE — TELEPHONE ENCOUNTER
Pt's wife called to report that pt is having very severe vertigo with neck pain.  She states he feels really bad.  He was recently seen in ER for a C1 Vertabrae fracture and was seen in follow up by Dr. Burt.  I advised pt's wife to take pt back to ER for evaluation and treatment.  She agreed.

## 2023-08-22 NOTE — PROGRESS NOTES
"Subjective   History of Present Illness: Durga Valenzuela is a 72 y.o. male is here today for follow-up with new cervical xray's after being seen in the [facility] after falling out of an office chair which resulted in the patient having severe neck pain on 08-04-23.    Today, Mr. Valenzuela reports his neck pain had diminished.  He is frustrated with the duration of wearing the collar but is satisfied that his neck pain is improving.  He denies any radicular pain.  He has continued to struggle with vertigo.    History of Present Illness    Tobacco Use: Low Risk     Smoking Tobacco Use: Never    Smokeless Tobacco Use: Never    Passive Exposure: Not on file        The following portions of the patient's history were reviewed and updated as appropriate: allergies, current medications, past family history, past medical history, past social history, past surgical history and problem list.    Review of Systems    Objective     Vitals:    08/29/23 0844   BP: 132/70   Weight: (!) 141 kg (310 lb)   Height: 180 cm (70.87\")     Body mass index is 43.39 kg/mý.      Physical Exam  Neurologic Exam    Physical Exam:    CONSTITUTIONAL:  appears well developed, well-nourished and in no acute distress.  He is wearing a cervical collar appropriately.    NECK: the neck is supple and symmetric. The trachea is midline with no masses.  Wearing Manning J collar    PULMONARY: Respiratory effort is normal with no increased work of breathing or signs of respiratory distress.    CARDIOVASCULAR: Pedal pulses are +2/4 bilaterally. Examination of the extremities shows no edema or varicosities.    MUSCULOSKELETAL: Gait wide-based    SKIN: The skin is warm, dry and intact.  Intact    NEUROLOGIC:   Normal motor strength noted. Muscle bulk and tone are normal.  Sensory exam is normal to fine touch  Cortical function is intact and without deficits. Speech is normal.    PSYCHIATRIC: oriented to person, place and time. Patient's mood and affect are " normal.    Assessment & Plan   Independent Review of Radiographic Studies:      I personally reviewed the images from the following studies.    Cervical spine radiographs done at [facility] on August 28, 2023 reveals no malalignment of the occiput to C2.    CT cervical spine dated 8/7/2023 at [facility] reveals chronic displaced fracture through the right side of the anterior C1 ring with a 4 to 5 mm medial lateral collapse at the fracture site.  This fracture was acute on previous CT scan dated May 9, 2018 however at that time the fracture gap was only 4 mm in size.  There is also a fracture through the far anterior inferior medial tip of the right occipital condyle with 1 mm separation from the small 5 x 2.5 mm fragment within the medial right occipital condyle.  This is suspected to be a chronic finding however it is new when compared to the previous study from May 2018.    Medical Decision Making:      Cervical x-rays reveal satisfying alignment of the occiput to C2 complex.  Since his pain is improving and the x-rays are stable we will continue the hard cervical collar for a total of 6 weeks so I will see him back in 3 more weeks with another set of x-rays.  If the x-rays are satisfying at that time we will get him out of the collar and start physical therapy.    Return in about 3 weeks (around 9/19/2023) for review of completed images.    Diagnoses and all orders for this visit:    1. Closed displaced fracture of posterior arch of first cervical vertebra with nonunion, subsequent encounter (Primary)    2. Closed Vaughn-Elwood type I fracture of right occipital condyle with routine healing, subsequent encounter             Indra Espinoza MD FACS [facility]  Neurological Surgery

## 2023-08-28 ENCOUNTER — HOSPITAL ENCOUNTER (OUTPATIENT)
Dept: GENERAL RADIOLOGY | Facility: HOSPITAL | Age: 73
Discharge: HOME | End: 2023-08-28
Admitting: NURSE PRACTITIONER
Payer: MEDICARE

## 2023-08-28 DIAGNOSIS — S02.11AG: ICD-10-CM

## 2023-08-28 PROCEDURE — 72040 X-RAY EXAM NECK SPINE 2-3 VW: CPT

## 2023-08-29 ENCOUNTER — OFFICE VISIT (OUTPATIENT)
Dept: NEUROSURGERY | Facility: CLINIC | Age: 73
End: 2023-08-29
Payer: MEDICARE

## 2023-08-29 VITALS
BODY MASS INDEX: 43.4 KG/M2 | HEIGHT: 71 IN | DIASTOLIC BLOOD PRESSURE: 70 MMHG | WEIGHT: 310 LBS | SYSTOLIC BLOOD PRESSURE: 132 MMHG

## 2023-08-29 DIAGNOSIS — S02.11AD CLOSED ANDERSON-MONTESANO TYPE I FRACTURE OF RIGHT OCCIPITAL CONDYLE WITH ROUTINE HEALING, SUBSEQUENT ENCOUNTER: ICD-10-CM

## 2023-08-29 DIAGNOSIS — S12.030K CLOSED DISPLACED FRACTURE OF POSTERIOR ARCH OF FIRST CERVICAL VERTEBRA WITH NONUNION, SUBSEQUENT ENCOUNTER: Primary | ICD-10-CM

## 2023-08-29 DIAGNOSIS — S02.11AG: ICD-10-CM

## 2023-08-29 DIAGNOSIS — S12.030K CLOSED DISPLACED FRACTURE OF POSTERIOR ARCH OF FIRST CERVICAL VERTEBRA WITH NONUNION, SUBSEQUENT ENCOUNTER: Primary | Chronic | ICD-10-CM

## 2023-09-13 NOTE — PROGRESS NOTES
"Subjective   History of Present Illness: Durga Valenzuela is a 73 y.o. male is here today for follow-up with new cervical xray's.    Today, Mr. Valenzuela reports he is doing well. He denies any neck pain.     History of Present Illness    Tobacco Use: Low Risk     Smoking Tobacco Use: Never    Smokeless Tobacco Use: Never    Passive Exposure: Not on file        The following portions of the patient's history were reviewed and updated as appropriate: allergies, current medications, past family history, past medical history, past social history, past surgical history and problem list.    Review of Systems    Objective     Vitals:    09/19/23 0821   BP: 138/64   Weight: (!) 141 kg (310 lb)   Height: 180 cm (70.87\")     Body mass index is 43.39 kg/m².      Physical Exam  Neurologic Exam    Physical Exam:    CONSTITUTIONAL:  appears well developed, well-nourished and in no acute distress.    NECK: the neck is supple and symmetric. The trachea is midline with no masses.  Out of the collar the patient has very limited mobility of his neck in flexion extension, lateral bending, and even rotation.  But when he does those activities he feels the stiffness but no accelerated pain.  He has no point tenderness palpation in the neck.    PULMONARY: Respiratory effort is normal with no increased work of breathing or signs of respiratory distress.    CARDIOVASCULAR: Pedal pulses are +2/4 bilaterally. Examination of the extremities shows no edema or varicosities.    MUSCULOSKELETAL: Gait normal    SKIN: The skin is warm, dry and intact.      NEUROLOGIC:   Normal motor strength noted. Muscle bulk and tone are normal.  Sensory exam is normal to fine touch  Cortical function is intact and without deficits. Speech is normal.    PSYCHIATRIC: oriented to person, place and time. Patient's mood and affect are normal.    Assessment & Plan   Independent Review of Radiographic Studies:      I personally reviewed the images from the following " studies.    Cervical radiographs done on September 18, 2023 at [facility] show adequate alignment of the C1-C2 complex.    Medical Decision Making:      Has done very well and the cervical collar is plain films show adequate alignment of the C1-C2 complex.  Therefore he can come out of the cervical collar and will start physical therapy I will see him back in 3 to 4 weeks after he completes physical therapy with another set of cervical x-rays to make sure in the long run he maintains stability of the C1-C2 complex.    Return in about 4 weeks (around 10/17/2023) for discussion of Physical Therapy results, review of completed images.    Diagnoses and all orders for this visit:    1. Closed Vaughn-Maria Ines type I fracture of right occipital condyle with routine healing, subsequent encounter (Primary)  -     Ambulatory Referral to Physical Therapy Evaluate and treat  -     XR Spine Cervical Complete 4 or 5 View; Future    2. Closed displaced fracture of posterior arch of first cervical vertebra with nonunion, subsequent encounter  -     Ambulatory Referral to Physical Therapy Evaluate and treat  -     XR Spine Cervical Complete 4 or 5 View; Future             Indra Espinoza MD FACS [facility]  Neurological Surgery

## 2023-09-18 ENCOUNTER — HOSPITAL ENCOUNTER (OUTPATIENT)
Dept: GENERAL RADIOLOGY | Facility: HOSPITAL | Age: 73
Discharge: HOME | End: 2023-09-18
Admitting: NEUROLOGICAL SURGERY
Payer: MEDICARE

## 2023-09-18 ENCOUNTER — TELEPHONE (OUTPATIENT)
Dept: NEUROSURGERY | Facility: CLINIC | Age: 73
End: 2023-09-18
Payer: MEDICARE

## 2023-09-18 DIAGNOSIS — S02.11AG: ICD-10-CM

## 2023-09-18 DIAGNOSIS — S02.11AD CLOSED ANDERSON-MONTESANO TYPE I FRACTURE OF RIGHT OCCIPITAL CONDYLE WITH ROUTINE HEALING, SUBSEQUENT ENCOUNTER: ICD-10-CM

## 2023-09-18 PROCEDURE — 72040 X-RAY EXAM NECK SPINE 2-3 VW: CPT

## 2023-09-19 ENCOUNTER — OFFICE VISIT (OUTPATIENT)
Dept: NEUROSURGERY | Facility: CLINIC | Age: 73
End: 2023-09-19
Payer: MEDICARE

## 2023-09-19 VITALS
HEIGHT: 71 IN | WEIGHT: 310 LBS | SYSTOLIC BLOOD PRESSURE: 138 MMHG | DIASTOLIC BLOOD PRESSURE: 64 MMHG | BODY MASS INDEX: 43.4 KG/M2

## 2023-09-19 DIAGNOSIS — S12.030K CLOSED DISPLACED FRACTURE OF POSTERIOR ARCH OF FIRST CERVICAL VERTEBRA WITH NONUNION, SUBSEQUENT ENCOUNTER: Chronic | ICD-10-CM

## 2023-09-19 DIAGNOSIS — S02.11AD CLOSED ANDERSON-MONTESANO TYPE I FRACTURE OF RIGHT OCCIPITAL CONDYLE WITH ROUTINE HEALING, SUBSEQUENT ENCOUNTER: Primary | ICD-10-CM

## 2023-10-10 RX ORDER — LOSARTAN POTASSIUM 50 MG/1
TABLET ORAL
Qty: 90 TABLET | Refills: 3 | Status: SHIPPED | OUTPATIENT
Start: 2023-10-10

## 2023-10-13 DIAGNOSIS — E78.2 MIXED HYPERLIPIDEMIA: Chronic | ICD-10-CM

## 2023-10-16 RX ORDER — PRAVASTATIN SODIUM 20 MG
TABLET ORAL
Qty: 90 TABLET | Refills: 3 | Status: SHIPPED | OUTPATIENT
Start: 2023-10-16

## 2023-10-16 NOTE — PROGRESS NOTES
"Subjective   History of Present Illness: Durga Valenzuela is a 73 y.o. male is here today for follow-up with new cervical xray's.    Today, Mr. Valenzuela reports no significant neck pain at this time and he simply has the chronic stiffness that he has had.  He is responded very well to physical therapy and notes improvement with each visit.  He denies any radicular symptoms in the upper extremities.    History of Present Illness    Tobacco Use: Low Risk  (10/19/2023)    Patient History     Smoking Tobacco Use: Never     Smokeless Tobacco Use: Never     Passive Exposure: Not on file        The following portions of the patient's history were reviewed and updated as appropriate: allergies, current medications, past family history, past medical history, past social history, past surgical history and problem list.    Review of Systems   Constitutional:  Negative for fever.   Musculoskeletal:  Negative for neck pain and neck stiffness.   Neurological:  Negative for weakness and numbness.       Objective     Vitals:    10/19/23 0844   BP: 122/76   BP Location: Left arm   Patient Position: Sitting   Cuff Size: Adult   Weight: (!) 141 kg (310 lb)   Height: 180 cm (70.87\")   PainSc: 0-No pain     Body mass index is 43.4 kg/m².      Physical Exam  Neurologic Exam    Physical Exam:    CONSTITUTIONAL:  appears well developed, well-nourished and in no acute distress.    NECK: the neck is supple and symmetric. The trachea is midline with no masses.  No pain with range of motion patient has very little extension to only about 30 degrees of rotation without pain    PULMONARY: Respiratory effort is normal with no increased work of breathing or signs of respiratory distress.    CARDIOVASCULAR: Pedal pulses are +2/4 bilaterally. Examination of the extremities shows no edema or varicosities.    MUSCULOSKELETAL: Gait normal    SKIN: The skin is warm, dry and intact.      NEUROLOGIC:   Normal motor strength noted. Muscle bulk and tone are " normal.  Sensory exam is normal to fine touch  Cortical function is intact and without deficits. Speech is normal.    PSYCHIATRIC: oriented to person, place and time. Patient's mood and affect are normal.    Assessment & Plan   Independent Review of Radiographic Studies:      I personally reviewed the images from the following studies.    Cervical radiographs done on October 18, 2023 reveal what appears to be a stable appearance of the occiput to C2 interface when compared to prior studies including the most recent cervical spine x-ray and the CT scan from August 7..        Medical Decision Making:      Patient is very satisfied with the results he is getting from the physical therapy and he notes with each physical therapy treatment and set of exercises he gets stronger and better every time.  He is documented to graphic stability as well as clinical improvement and therefore I do not need to extend any further neurosurgical or radiographic follow-up.  Should he develop concerns or questions in the future would be happy to reevaluate    Return for any new or recurrent neurosurgical problems.    Diagnoses and all orders for this visit:    1. Closed Vaughn-[place] type I fracture of right occipital condyle with routine healing, subsequent encounter (Primary)    2. Closed displaced fracture of posterior arch of first cervical vertebra with nonunion, subsequent encounter             Indra Espinoza MD [facility]  Neurological Surgery

## 2023-10-18 ENCOUNTER — HOSPITAL ENCOUNTER (OUTPATIENT)
Dept: GENERAL RADIOLOGY | Facility: HOSPITAL | Age: 73
Discharge: HOME | End: 2023-10-18
Admitting: NEUROLOGICAL SURGERY
Payer: MEDICARE

## 2023-10-18 DIAGNOSIS — S02.11AD CLOSED ANDERSON-MONTESANO TYPE I FRACTURE OF RIGHT OCCIPITAL CONDYLE WITH ROUTINE HEALING, SUBSEQUENT ENCOUNTER: ICD-10-CM

## 2023-10-18 DIAGNOSIS — S12.030K CLOSED DISPLACED FRACTURE OF POSTERIOR ARCH OF FIRST CERVICAL VERTEBRA WITH NONUNION, SUBSEQUENT ENCOUNTER: Chronic | ICD-10-CM

## 2023-10-18 PROCEDURE — 72050 X-RAY EXAM NECK SPINE 4/5VWS: CPT

## 2023-10-19 ENCOUNTER — OFFICE VISIT (OUTPATIENT)
Dept: NEUROSURGERY | Facility: CLINIC | Age: 73
End: 2023-10-19
Payer: MEDICARE

## 2023-10-19 ENCOUNTER — TELEPHONE (OUTPATIENT)
Dept: INTERNAL MEDICINE | Facility: CLINIC | Age: 73
End: 2023-10-19

## 2023-10-19 VITALS
BODY MASS INDEX: 43.4 KG/M2 | HEIGHT: 71 IN | WEIGHT: 310 LBS | DIASTOLIC BLOOD PRESSURE: 76 MMHG | SYSTOLIC BLOOD PRESSURE: 122 MMHG

## 2023-10-19 DIAGNOSIS — S12.030K CLOSED DISPLACED FRACTURE OF POSTERIOR ARCH OF FIRST CERVICAL VERTEBRA WITH NONUNION, SUBSEQUENT ENCOUNTER: Chronic | ICD-10-CM

## 2023-10-19 DIAGNOSIS — S02.11AD CLOSED ANDERSON-MONTESANO TYPE I FRACTURE OF RIGHT OCCIPITAL CONDYLE WITH ROUTINE HEALING, SUBSEQUENT ENCOUNTER: Primary | ICD-10-CM

## 2023-10-19 RX ORDER — LOSARTAN POTASSIUM 50 MG/1
50 TABLET ORAL DAILY
Qty: 90 TABLET | Refills: 3 | Status: CANCELLED | OUTPATIENT
Start: 2023-10-19

## 2023-10-19 NOTE — TELEPHONE ENCOUNTER
Caller: Durga Valenzuela    Relationship: Self    Best call back number: [phone]     What was the call regarding: PATIENT STATES THAT [facility] IS NOT GIVING HIM THE LOSARTAN AND THEY ARE WAITING ON DR. FONG. PATIENT IS NOT SURE WHAT THE ISSUE IS. PATIENT IS OUT OF MEDICATION.       losartan (COZAAR) 50 MG tablet       [facility] [id number] [facility] [id number] [address] [facility] [phone] [facility] [phone]  [phone]

## 2023-10-20 RX ORDER — LOSARTAN POTASSIUM 50 MG/1
50 TABLET ORAL DAILY
Qty: 90 TABLET | Refills: 3 | Status: SHIPPED | OUTPATIENT
Start: 2023-10-20 | End: 2024-10-14

## 2023-11-23 DIAGNOSIS — I10 BENIGN ESSENTIAL HYPERTENSION: Chronic | ICD-10-CM

## 2023-11-27 ENCOUNTER — OFFICE VISIT (OUTPATIENT)
Dept: INTERNAL MEDICINE | Facility: CLINIC | Age: 73
End: 2023-11-27
Payer: MEDICARE

## 2023-11-27 VITALS
RESPIRATION RATE: 18 BRPM | BODY MASS INDEX: 44.1 KG/M2 | SYSTOLIC BLOOD PRESSURE: 108 MMHG | DIASTOLIC BLOOD PRESSURE: 68 MMHG | HEART RATE: 63 BPM | WEIGHT: 315 LBS | OXYGEN SATURATION: 98 % | HEIGHT: 71 IN

## 2023-11-27 DIAGNOSIS — S02.11AD CLOSED ANDERSON-MONTESANO TYPE I FRACTURE OF RIGHT OCCIPITAL CONDYLE WITH ROUTINE HEALING, SUBSEQUENT ENCOUNTER: ICD-10-CM

## 2023-11-27 DIAGNOSIS — N40.1 BENIGN PROSTATIC HYPERPLASIA WITH URINARY FREQUENCY: Chronic | ICD-10-CM

## 2023-11-27 DIAGNOSIS — M15.9 PRIMARY OSTEOARTHRITIS INVOLVING MULTIPLE JOINTS: Chronic | ICD-10-CM

## 2023-11-27 DIAGNOSIS — M51.36 LUMBAR DEGENERATIVE DISC DISEASE: Chronic | ICD-10-CM

## 2023-11-27 DIAGNOSIS — G47.33 OBSTRUCTIVE SLEEP APNEA: Chronic | ICD-10-CM

## 2023-11-27 DIAGNOSIS — Z86.711 HISTORY OF PULMONARY EMBOLISM: Chronic | ICD-10-CM

## 2023-11-27 DIAGNOSIS — L40.9 PSORIASIS: Chronic | ICD-10-CM

## 2023-11-27 DIAGNOSIS — R35.0 BENIGN PROSTATIC HYPERPLASIA WITH URINARY FREQUENCY: Chronic | ICD-10-CM

## 2023-11-27 DIAGNOSIS — F51.04 CHRONIC INSOMNIA: Chronic | ICD-10-CM

## 2023-11-27 DIAGNOSIS — S76.301S HAMSTRING INJURY, RIGHT, SEQUELA: Primary | ICD-10-CM

## 2023-11-27 DIAGNOSIS — R29.898 WEAKNESS OF BOTH LOWER EXTREMITIES: ICD-10-CM

## 2023-11-27 DIAGNOSIS — F31.78 BIPOLAR DISORDER, IN FULL REMISSION, MOST RECENT EPISODE MIXED: Chronic | ICD-10-CM

## 2023-11-27 DIAGNOSIS — R73.01 IMPAIRED FASTING GLUCOSE: Chronic | ICD-10-CM

## 2023-11-27 DIAGNOSIS — E55.9 VITAMIN D DEFICIENCY: Chronic | ICD-10-CM

## 2023-11-27 DIAGNOSIS — Z51.81 THERAPEUTIC DRUG MONITORING: ICD-10-CM

## 2023-11-27 DIAGNOSIS — Z00.00 ENCOUNTER FOR SUBSEQUENT ANNUAL WELLNESS VISIT (AWV) IN MEDICARE PATIENT: Primary | ICD-10-CM

## 2023-11-27 DIAGNOSIS — R60.0 BILATERAL LOWER EXTREMITY EDEMA: Chronic | ICD-10-CM

## 2023-11-27 DIAGNOSIS — I25.10 NON-OCCLUSIVE CORONARY ARTERY DISEASE: Chronic | ICD-10-CM

## 2023-11-27 DIAGNOSIS — E78.2 MIXED HYPERLIPIDEMIA: Chronic | ICD-10-CM

## 2023-11-27 DIAGNOSIS — N39.42 URINARY INCONTINENCE WITHOUT SENSORY AWARENESS: Chronic | ICD-10-CM

## 2023-11-27 DIAGNOSIS — M48.062 SPINAL STENOSIS OF LUMBAR REGION WITH NEUROGENIC CLAUDICATION: Chronic | ICD-10-CM

## 2023-11-27 DIAGNOSIS — I10 BENIGN ESSENTIAL HYPERTENSION: Chronic | ICD-10-CM

## 2023-11-27 DIAGNOSIS — Z91.09 MULTIPLE ENVIRONMENTAL ALLERGIES: Chronic | ICD-10-CM

## 2023-11-27 DIAGNOSIS — E03.9 PRIMARY HYPOTHYROIDISM: Chronic | ICD-10-CM

## 2023-11-27 DIAGNOSIS — F41.8 DEPRESSION WITH ANXIETY: Chronic | ICD-10-CM

## 2023-11-27 DIAGNOSIS — E66.01 MORBID OBESITY: Chronic | ICD-10-CM

## 2023-11-27 DIAGNOSIS — F43.10 POST TRAUMATIC STRESS DISORDER (PTSD): Chronic | ICD-10-CM

## 2023-11-27 PROBLEM — M54.2 NECK PAIN: Status: RESOLVED | Noted: 2023-08-07 | Resolved: 2023-11-27

## 2023-11-27 PROBLEM — R07.9 CHEST PAIN: Status: RESOLVED | Noted: 2023-04-25 | Resolved: 2023-11-27
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
## 2023-11-27 PROBLEM — S02.11AA: Status: RESOLVED | Noted: 2023-08-08 | Resolved: 2023-11-27

## 2023-11-27 PROBLEM — T84.093S FAILED TOTAL KNEE, LEFT, SEQUELA: Status: RESOLVED | Noted: 2022-12-05 | Resolved: 2023-11-27

## 2023-11-27 RX ORDER — QUINIDINE SULFATE 200 MG
TABLET ORAL
Start: 2023-11-27

## 2023-11-27 RX ORDER — AMLODIPINE BESYLATE 10 MG/1
TABLET ORAL
Qty: 90 TABLET | Refills: 0 | Status: SHIPPED | OUTPATIENT
Start: 2023-11-27

## 2023-11-27 RX ORDER — ASPIRIN 81 MG/1
TABLET ORAL
Start: 2023-11-27

## 2023-11-27 RX ORDER — CLOBETASOL PROPIONATE 0.5 MG/G
OINTMENT TOPICAL
Start: 2023-11-27

## 2023-11-27 RX ORDER — ZOLPIDEM TARTRATE 10 MG/1
TABLET ORAL
Start: 2023-11-27

## 2023-11-27 RX ORDER — MELOXICAM 15 MG/1
TABLET ORAL
COMMUNITY
Start: 2023-11-19

## 2023-11-27 NOTE — PROGRESS NOTES
The ABCs of the Annual Wellness Visit  Subsequent Medicare Wellness Visit    Subjective      Durga Valenzuela is a 73 y.o. male who presents for a Subsequent Medicare Wellness Visit.    The following portions of the patient's history were reviewed and   updated as appropriate: allergies, current medications, past family history, past medical history, past social history, past surgical history, and problem list.    Compared to one year ago, the patient feels his physical   health is worse.    Compared to one year ago, the patient feels his mental   health is the same.    Recent Hospitalizations:  This patient has had a [facility] admission record on file within the last 365 days.    Current Medical Providers:  Patient Care Team:  Florian Burt MD as PCP - General (Internal Medicine)    Outpatient Medications Prior to Visit   Medication Sig Dispense Refill    albuterol sulfate  (90 Base) MCG/ACT inhaler       amLODIPine (NORVASC) 10 MG tablet TAKE ONE TABLET BY MOUTH DAILY 90 tablet 0    amphetamine-dextroamphetamine (ADDERALL) 10 MG tablet Take 1 tablet by mouth Daily.      amphetamine-dextroamphetamine (ADDERALL) 20 MG tablet Take 1 tablet by mouth Daily.      ezetimibe (Zetia) 10 MG tablet Take 1 p.o. daily for high cholesterol 90 tablet 3    FLUoxetine (PROzac) 60 MG tablet Take 1 tablet by mouth Daily.      hydrOXYzine (ATARAX) 25 MG tablet TAKE ONE TABLET BY MOUTH THREE TIMES A DAY AS NEEDED FOR ITCHING 60 tablet 3    losartan (COZAAR) 50 MG tablet Take 1 tablet by mouth Daily for 360 days. 90 tablet 3    meloxicam (MOBIC) 15 MG tablet       pravastatin (PRAVACHOL) 20 MG tablet TAKE ONE TABLET BY MOUTH DAILY 90 tablet 3    Synthroid 175 MCG tablet TAKE ONE TABLET BY MOUTH DAILY FOR LOW THYROID 90 tablet 0    TiZANidine (ZANAFLEX) 2 MG capsule Take 1 capsule by mouth 3 (Three) Times a Day As Needed for Muscle Spasms. 30 capsule 0    vitamin D3 125 MCG (5000 UT) capsule capsule 1 by mouth daily as  directed 30 capsule 6    aspirin 81 MG tablet One by mouth daily (Patient taking differently: Take 1 tablet by mouth Daily. One by mouth daily)      clobetasol (TEMOVATE) 0.05 % ointment Apply twice a day as directed (Patient taking differently: Apply 1 application  topically to the appropriate area as directed 2 (Two) Times a Day. Apply twice a day as directed) 60 g 6    Coenzyme Q10 (Co Q-10) 400 MG capsule Take 1 p.o. daily with food (Patient taking differently: Take 400 mg by mouth Daily. Take 1 p.o. daily with food) 30 capsule     zolpidem (AMBIEN) 10 MG tablet Take 1 p.o. nightly as needed for insomnia (Patient taking differently: 1 tablet At Night As Needed. Take 1 p.o. nightly as needed for insomnia) 30 tablet 5     No facility-administered medications prior to visit.       No opioid medication identified on active medication list. I have reviewed chart for other potential  high risk medication/s and harmful drug interactions in the elderly.        Aspirin is on active medication list. Aspirin use is indicated based on review of current medical condition/s. Pros and cons of this therapy have been discussed today. Benefits of this medication outweigh potential harm.  Patient has been encouraged to continue taking this medication.  .      Patient Active Problem List   Diagnosis    Post traumatic stress disorder (PTSD)    Benign prostatic hyperplasia with urinary frequency    Chronic insomnia    Lumbar degenerative disc disease    Impaired fasting glucose    Benign essential hypertension    Primary osteoarthritis involving multiple joints    Hyperlipidemia    Primary hypothyroidism    Obstructive sleep apnea, tolerates CPAP well.  Previously failed oral appliance.    History of pulmonary embolism    Non-occlusive coronary artery disease, 05/16/2017--normal LM; proximal LAD mild LI; 50% mid LAD.  Mild distal LAD LI; normal Cx.  Mild LI marginal branches; normal RCA, nondominant.  EF 60%.    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  ACP discussion was held with the patient during this visit. Patient has an advance directive in EMR which is still valid.      Objective    Vitals:    23 1442   BP: 108/68   BP Location: Left arm   Patient Position: Sitting   Cuff Size: Adult   Pulse: 63   Resp: 18   SpO2: 98%   Weight: (!) 145 kg (319 lb)   Height: 180 cm (70.87\")   PainSc:   8   PainLoc: Leg     Estimated body mass index is 44.65 kg/m² as calculated from the following:    Height as of this encounter: 180 cm (70.87\").    Weight as of this encounter: 145 kg (319 lb).           Does the patient have evidence of cognitive impairment?   No            HEALTH RISK ASSESSMENT    Smoking Status:  Social History     Tobacco Use   Smoking Status Never   Smokeless Tobacco Never   Tobacco Comments    CAFFEINE USE 2 CUPS COFFEE AND 1 GLASS TEA DAILY     Alcohol Consumption:  Social History     Substance and Sexual Activity   Alcohol Use No     Fall Risk Screen:    RICHARADI Fall Risk Assessment has not been completed.    Depression Screenin/15/2023    12:34 PM   PHQ-2/PHQ-9 Depression Screening   Little Interest or Pleasure in Doing Things 1-->several days   Feeling Down, Depressed or Hopeless 1-->several days   Trouble Falling or Staying Asleep, or Sleeping Too Much 1-->several days   Feeling Tired or Having Little " Energy 1-->several days   Poor Appetite or Overeating 0-->not at all   Feeling Bad about Yourself - or that You are a Failure or Have Let Yourself or Your Family Down 0-->not at all   Trouble Concentrating on Things, Such as Reading the Newspaper or Watching Television 0-->not at all   Thoughts that You Would be Better Off Dead or of Hurting Yourself in Some Way 0-->not at all   PHQ-9: Brief Depression Severity Measure Score 4   If You Checked Off Any Problems, How Difficult Have These Problems Made It For You to Do Your Work, Take Care of Things at Home, or Get Along with Other People? somewhat difficult       Health Habits and Functional and Cognitive Screenin/27/2023     2:45 PM   Functional & Cognitive Status   Do you have difficulty preparing food and eating? No   Do you have difficulty bathing yourself, getting dressed or grooming yourself? No   Do you have difficulty using the toilet? No   Do you have difficulty moving around from place to place? Yes   Do you have trouble with steps or getting out of a bed or a chair? Yes   Current Diet Limited Junk Food   Dental Exam Up to date   Eye Exam Up to date   Exercise (times per week) 0 times per week   Current Exercises Include No Regular Exercise   Do you need help using the phone?  No   Are you deaf or do you have serious difficulty hearing?  No   Do you need help to go to places out of walking distance? Yes   Do you need help shopping? No   Do you need help preparing meals?  No   Do you need help with housework?  No   Do you need help with laundry? No   Do you need help taking your medications? No   Do you need help managing money? No   Do you ever drive or ride in a car without wearing a seat belt? No   Have you felt unusual stress, anger or loneliness in the last month? No   Who do you live with? Spouse   If you need help, do you have trouble finding someone available to you? No   Have you been bothered in the last four weeks by sexual problems? No    Do you have difficulty concentrating, remembering or making decisions? No       Age-appropriate Screening Schedule:  Refer to the list below for future screening recommendations based on patient's age, sex and/or medical conditions. Orders for these recommended tests are listed in the plan section. The patient has been provided with a written plan.    Health Maintenance   Topic Date Due    INFLUENZA VACCINE  08/01/2023    LIPID PANEL  11/08/2023    ANNUAL WELLNESS VISIT  11/16/2023    COLORECTAL CANCER SCREENING  06/13/2024    BMI FOLLOWUP  09/19/2024    HEPATITIS C SCREENING  Completed    Pneumococcal Vaccine 65+  Completed    AAA SCREEN (ONE-TIME)  Completed    COVID-19 Vaccine  Discontinued    TDAP/TD VACCINES  Discontinued    ZOSTER VACCINE  Discontinued                  CMS Preventative Services Quick Reference  Risk Factors Identified During Encounter:    Chronic Pain: Orthopedics Referral Ordered  Depression/Dysphoria:  Stable on current medication  Hearing Problem:  Not severe enough to warrant intervention at this time  Immunizations Discussed/Encouraged: Influenza, COVID19, and RSV (Respiratory Syncytial Virus)  Urinary Incontinence: Referred to Urology    The above risks/problems have been discussed with the patient.  Pertinent information has been shared with the patient in the After Visit Summary.    Diagnoses and all orders for this visit:    1. Encounter for subsequent annual wellness visit (AWV) in Medicare patient (Primary)    2. Impaired fasting glucose    3. Hyperlipidemia  -     Coenzyme Q10 (Co Q-10) 400 MG capsule; Take 1 p.o. daily with food    4. Vitamin D deficiency    5. Primary hypothyroidism    6. Benign essential hypertension    7. Benign prostatic hyperplasia with urinary frequency    8. Urinary incontinence without sensory awareness    9. Bilateral lower extremity edema    10. Bipolar disorder, in full remission, most recent episode mixed    11. Chronic insomnia  -     zolpidem  (AMBIEN) 10 MG tablet; Take 1 p.o. nightly as needed for insomnia    12. Depression with anxiety    13. History of pulmonary embolism    14. Lumbar degenerative disc disease    15. Morbid obesity    16. Multiple environmental allergies    17. Non-occlusive coronary artery disease, 05/16/2017--normal LM; proximal LAD mild LI; 50% mid LAD.  Mild distal LAD LI; normal Cx.  Mild LI marginal branches; normal RCA, nondominant.  EF 60%.  -     aspirin 81 MG EC tablet; Take 1 p.o. daily for blood thinner/heart problem    18. Obstructive sleep apnea, tolerates CPAP well.  Previously failed oral appliance.    19. Post traumatic stress disorder (PTSD)    20. Primary osteoarthritis involving multiple joints    21. Psoriasis  -     clobetasol (TEMOVATE) 0.05 % ointment; Apply twice a day as directed    22. Spinal stenosis of lumbar region with neurogenic claudication    23. Closed Vaughn-[place] type I fracture of right occipital condyle with routine healing, subsequent encounter    24. Weakness of both lower extremities    25. Therapeutic drug monitoring        Follow Up:   Next Medicare Wellness visit to be scheduled in 1 year.      An After Visit Summary and PPPS were made available to the patient.

## 2023-12-07 ENCOUNTER — TELEPHONE (OUTPATIENT)
Dept: INTERNAL MEDICINE | Facility: CLINIC | Age: 73
End: 2023-12-07

## 2023-12-07 LAB
25(OH)D3+25(OH)D2 SERPL-MCNC: 35.8 NG/ML (ref 30–100)
ALBUMIN SERPL-MCNC: 4.6 G/DL (ref 3.8–4.8)
ALBUMIN/GLOB SERPL: 2.2 {RATIO} (ref 1.2–2.2)
ALP SERPL-CCNC: 61 IU/L (ref 44–121)
ALT SERPL-CCNC: 23 IU/L (ref 0–44)
AST SERPL-CCNC: 32 IU/L (ref 0–40)
BILIRUB SERPL-MCNC: 0.5 MG/DL (ref 0–1.2)
BUN SERPL-MCNC: 24 MG/DL (ref 8–27)
BUN/CREAT SERPL: 21 (ref 10–24)
CALCIUM SERPL-MCNC: 9.7 MG/DL (ref 8.6–10.2)
CHLORIDE SERPL-SCNC: 101 MMOL/L (ref 96–106)
CHOLEST SERPL-MCNC: 122 MG/DL (ref 100–199)
CK SERPL-CCNC: 746 U/L (ref 41–331)
CO2 SERPL-SCNC: 24 MMOL/L (ref 20–29)
CREAT SERPL-MCNC: 1.13 MG/DL (ref 0.76–1.27)
EGFRCR SERPLBLD CKD-EPI 2021: 68 ML/MIN/1.73
ERYTHROCYTE [DISTWIDTH] IN BLOOD BY AUTOMATED COUNT: 16.3 % (ref 11.6–15.4)
GLOBULIN SER CALC-MCNC: 2.1 G/DL (ref 1.5–4.5)
GLUCOSE SERPL-MCNC: 131 MG/DL (ref 70–99)
HBA1C MFR BLD: 6.2 % (ref 4.8–5.6)
HCT VFR BLD AUTO: 40.6 % (ref 37.5–51)
HDL SERPL-SCNC: 28.9 UMOL/L
HDLC SERPL-MCNC: 37 MG/DL
HGB BLD-MCNC: 13.4 G/DL (ref 13–17.7)
LDL SERPL QN: 20.8 NM
LDL SERPL-SCNC: 707 NMOL/L
LDL SMALL SERPL-SCNC: 264 NMOL/L
LDLC SERPL CALC-MCNC: 63 MG/DL (ref 0–99)
MCH RBC QN AUTO: 31.1 PG (ref 26.6–33)
MCHC RBC AUTO-ENTMCNC: 33 G/DL (ref 31.5–35.7)
MCV RBC AUTO: 94 FL (ref 79–97)
PLATELET # BLD AUTO: 186 X10E3/UL (ref 150–450)
POTASSIUM SERPL-SCNC: 4.5 MMOL/L (ref 3.5–5.2)
PROT SERPL-MCNC: 6.7 G/DL (ref 6–8.5)
PSA SERPL-MCNC: 0.6 NG/ML (ref 0–4)
RBC # BLD AUTO: 4.31 X10E6/UL (ref 4.14–5.8)
SODIUM SERPL-SCNC: 141 MMOL/L (ref 134–144)
T3FREE SERPL-MCNC: 1.5 PG/ML (ref 2–4.4)
T4 FREE SERPL-MCNC: 0.26 NG/DL (ref 0.82–1.77)
TRIGL SERPL-MCNC: 124 MG/DL (ref 0–149)
TSH SERPL DL<=0.005 MIU/L-ACNC: 154 UIU/ML (ref 0.45–4.5)
WBC # BLD AUTO: 6 X10E3/UL (ref 3.4–10.8)

## 2023-12-07 NOTE — TELEPHONE ENCOUNTER
Caller: DERRELL - [facility]    Relationship:     Best call back number: [phone] [facility] [phone]    Who are you requesting to speak with (clinical staff, provider,  specific staff member): DR. FONG     What was the call regarding: SOFI WITH [facility] STATES SHE IS DOING A COURTESY CALL TO ALERT DR. FOGN ABOUT THE PATIENTS COLLECTION FROM 12/06/2023, TOTAL CK LEVEL IS HIGH .    DERRELL STATES SHE IS GOING TO FAX THE RESULT OVER TO DR. FONG AS WELL.

## 2023-12-13 ENCOUNTER — OFFICE VISIT (OUTPATIENT)
Dept: INTERNAL MEDICINE | Facility: CLINIC | Age: 73
End: 2023-12-13
Payer: MEDICARE

## 2023-12-13 VITALS
HEART RATE: 67 BPM | HEIGHT: 71 IN | DIASTOLIC BLOOD PRESSURE: 78 MMHG | BODY MASS INDEX: 43.82 KG/M2 | WEIGHT: 313 LBS | SYSTOLIC BLOOD PRESSURE: 130 MMHG | RESPIRATION RATE: 16 BRPM | OXYGEN SATURATION: 97 % | TEMPERATURE: 97.5 F

## 2023-12-13 DIAGNOSIS — R35.0 BENIGN PROSTATIC HYPERPLASIA WITH URINARY FREQUENCY: Chronic | ICD-10-CM

## 2023-12-13 DIAGNOSIS — N39.42 URINARY INCONTINENCE WITHOUT SENSORY AWARENESS: Chronic | ICD-10-CM

## 2023-12-13 DIAGNOSIS — M48.062 SPINAL STENOSIS OF LUMBAR REGION WITH NEUROGENIC CLAUDICATION: Chronic | ICD-10-CM

## 2023-12-13 DIAGNOSIS — E66.01 MORBID OBESITY: Chronic | ICD-10-CM

## 2023-12-13 DIAGNOSIS — L40.9 PSORIASIS: Chronic | ICD-10-CM

## 2023-12-13 DIAGNOSIS — R73.01 IMPAIRED FASTING GLUCOSE: Primary | Chronic | ICD-10-CM

## 2023-12-13 DIAGNOSIS — F43.10 POST TRAUMATIC STRESS DISORDER (PTSD): Chronic | ICD-10-CM

## 2023-12-13 DIAGNOSIS — N40.1 BENIGN PROSTATIC HYPERPLASIA WITH URINARY FREQUENCY: Chronic | ICD-10-CM

## 2023-12-13 DIAGNOSIS — M51.36 LUMBAR DEGENERATIVE DISC DISEASE: Chronic | ICD-10-CM

## 2023-12-13 DIAGNOSIS — Z51.81 THERAPEUTIC DRUG MONITORING: ICD-10-CM

## 2023-12-13 DIAGNOSIS — F51.04 CHRONIC INSOMNIA: Chronic | ICD-10-CM

## 2023-12-13 DIAGNOSIS — F41.8 DEPRESSION WITH ANXIETY: Chronic | ICD-10-CM

## 2023-12-13 DIAGNOSIS — M15.9 PRIMARY OSTEOARTHRITIS INVOLVING MULTIPLE JOINTS: Chronic | ICD-10-CM

## 2023-12-13 DIAGNOSIS — Z91.09 MULTIPLE ENVIRONMENTAL ALLERGIES: Chronic | ICD-10-CM

## 2023-12-13 DIAGNOSIS — Z86.711 HISTORY OF PULMONARY EMBOLISM: Chronic | ICD-10-CM

## 2023-12-13 DIAGNOSIS — I10 BENIGN ESSENTIAL HYPERTENSION: Chronic | ICD-10-CM

## 2023-12-13 DIAGNOSIS — E03.9 PRIMARY HYPOTHYROIDISM: Chronic | ICD-10-CM

## 2023-12-13 DIAGNOSIS — E55.9 VITAMIN D DEFICIENCY: Chronic | ICD-10-CM

## 2023-12-13 DIAGNOSIS — E78.2 MIXED HYPERLIPIDEMIA: Chronic | ICD-10-CM

## 2023-12-13 DIAGNOSIS — F31.78 BIPOLAR DISORDER, IN FULL REMISSION, MOST RECENT EPISODE MIXED: Chronic | ICD-10-CM

## 2023-12-13 DIAGNOSIS — G47.33 OBSTRUCTIVE SLEEP APNEA: Chronic | ICD-10-CM

## 2023-12-13 DIAGNOSIS — R60.0 BILATERAL LOWER EXTREMITY EDEMA: Chronic | ICD-10-CM

## 2023-12-13 DIAGNOSIS — I25.10 NON-OCCLUSIVE CORONARY ARTERY DISEASE: Chronic | ICD-10-CM

## 2023-12-13 NOTE — PROGRESS NOTES
12/13/2023    Patient Information  Durga Valenzuela                                                                                          [id number] [facility] [address]      1950  [unfilled]  There is no work phone number on file.    Chief Complaint:     Follow-up medical problems as outlined below.  No new acute complaints.    History of Present Illness:    Patient with a multitude of chronic medical problems as listed below in assessment and plan presents today for follow-up.  Patient had lab work in order to monitor his chronic medical issues.  His past medical history reviewed and updated were necessary including health maintenance parameters.  This reveals he is up-to-date including his influenza vaccine which he got at the local drugstore and the documentation has not appeared yet in the chart.    Review of Systems   Constitutional: Negative.   HENT: Negative.     Eyes: Negative.    Cardiovascular: Negative.    Respiratory: Negative.     Endocrine: Negative.    Hematologic/Lymphatic: Negative.    Skin: Negative.    Musculoskeletal:  Positive for arthritis, back pain and joint pain.   Gastrointestinal: Negative.    Genitourinary: Negative.    Neurological: Negative.    Psychiatric/Behavioral: Negative.     Allergic/Immunologic: Negative.        Active Problems:    Patient Active Problem List   Diagnosis    Post traumatic stress disorder (PTSD)    Benign prostatic hyperplasia with urinary frequency    Chronic insomnia    Lumbar degenerative disc disease    Impaired fasting glucose    Benign essential hypertension    Primary osteoarthritis involving multiple joints    Hyperlipidemia    Primary hypothyroidism    Obstructive sleep apnea, tolerates CPAP well.  Previously failed oral appliance.    History of pulmonary embolism    Non-occlusive coronary artery disease, 05/16/2017--normal LM; proximal LAD mild LI; 50% mid LAD.  Mild distal LAD LI; normal Cx.  Mild LI marginal branches;  normal RCA, nondominant.  EF 60%.    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  Immunotherapy for about 2 years in the 1980s.    Benign essential hypertension 02/25/2016    Benign prostatic hyperplasia with urinary frequency 02/25/2016    Bilateral lower extremity edema 08/21/2018    Bipolar disorder, in full remission, most recent episode mixed 09/30/2021    Chronic insomnia 02/25/2016    Closed traumatic lateral subluxation of patellofemoral joint, right, initial encounter 08/13/2018    08/10/2018--patient was evaluated by the orthopedist for right knee pain and found to have lateral subluxation of the right patella associated with presence of right artificial knee joint.  Physical therapy no help.  Surgery is scheduled 09/17/2018    Depression with anxiety 08/21/2018    History of deep venous thrombosis (DVT) of distal vein of right lower extremity 08/21/2018 04/26/2016--CTA of the chest performed for elevated d-dimer and progressive shortness of breath and chest pain for one month revealed bilateral occlusive in near occlusive segmental and subsegmental pulmonary emboli in all lobes of the right lung as well as within the left lower lobe.  No evidence of pulmonary infarct.  Nonspecific multifocal groundglass attenuation in the right pulmonary apex, perhaps  representing pneumonitis or submental atelectasis.  Questionable cholelithiasis.  Doppler venous study was positive for DVT in the right popliteal vein.  Hypercoagulable workup was normal.  He was treated with Eliquis for a total of 6 months and was subsequently discontinued.    History of pneumonia     History of pulmonary embolism 06/10/2016    04/26/2016--CTA of the chest performed for elevated d-dimer and progressive shortness of breath and chest pain for one month revealed bilateral occlusive in near occlusive segmental and subsegmental pulmonary emboli in all lobes of the right lung as well as within the left lower lobe.  No evidence of pulmonary infarct.  Nonspecific multifocal groundglass attenuation in the right pulmonary apex, perhaps representing pneumonitis or submental atelectasis.  Questionable cholelithiasis.  Doppler venous study was positive for DVT in the right popliteal vein.  Hypercoagulable workup was normal.  He was treated with Eliquis for a total of 6 months and was subsequently discontinued.    Hyperlipidemia 02/25/2016    Impaired fasting glucose 02/25/2016    Lumbar degenerative disc disease 02/25/2016    Morbidly obese 10/19/2018    Multiple environmental allergies 08/21/2018    Patient has had allergy testing in the past which revealed allergies to molds and grass.  Immunotherapy for about 2 years in the 1980s.    Non-occlusive coronary artery disease, 05/16/2017--normal LM; proximal LAD mild LI; 50% mid LAD.  Mild distal LAD LI; normal Cx.  Mild LI marginal branches; normal RCA, nondominant.  EF 60%. 07/24/2017 05/16/2017--cardiac catheterization performed for abnormal stress test.  Normal LV with ejection fraction 60%.  Dilated aortic root.  No wall motion abnormalities.  Normal left main.  Ramus branch small caliber and normal.  Proximal LAD large caliber and mild luminal irregularities.  50% mid LAD.  Mild luminal irregularities distal LAD.  3 diagonal branches of small caliber  with mild luminal irregularities.  Normal septal branches.  Circumflex is large caliber and free of disease.  Marginal branches are medium caliber with mild luminal irregularities.  RCA is a medium caliber and free of disease.  It is nondominant.    Obstructive sleep apnea, tolerates CPAP well.  Previously failed oral appliance. 02/25/2016    Post traumatic stress disorder (PTSD) 02/25/2016    Primary hypothyroidism 02/25/2016    Primary osteoarthritis involving multiple joints 02/25/2016    Psoriasis 09/17/2014    Spinal stenosis of lumbar region with neurogenic claudication 02/15/2021    Traumatic complete tear of left rotator cuff 03/17/2021 March 14, 2021--patient was evaluated by the orthopedic surgeon after he slipped on ice and fell onto his left shoulder.  Work-up revealed complete rotator cuff tear involving 2 tendons.  Specifically, he has an acute left supraspinatus tear and acute left infraspinatus tear.  He also has left glenohumeral joint osteoarthritis.  Apparently this is not repairable and patient would need a shoulder r    Urinary incontinence without sensory awareness 03/17/2021    Vitamin D deficiency 08/21/2018         Past Surgical History:   Procedure Laterality Date    CARDIAC CATHETERIZATION N/A 5/16/2017    Procedure: Coronary angiography;  Surgeon: Malcolm Chen MD;  Location: [facility] CATH INVASIVE LOCATION;  Service:     CARDIAC CATHETERIZATION N/A 5/16/2017    Procedure: Left Heart Cath;  Surgeon: Malcolm Chen MD;  Location: [facility] CATH INVASIVE LOCATION;  Service:     CARDIAC CATHETERIZATION N/A 5/16/2017    Procedure: Left ventriculography;  Surgeon: Malcolm Chen MD;  Location: [facility] CATH INVASIVE LOCATION;  Service:     CARDIAC CATHETERIZATION N/A 4/26/2023    Procedure: Left Heart Cath;  Surgeon: Mary Reynolds MD;  Location: [facility] CATH INVASIVE LOCATION;  Service: Cardiovascular;  Laterality: N/A;    CARDIAC CATHETERIZATION N/A 4/26/2023     Procedure: Coronary angiography;  Surgeon: Mary Reynolds MD;  Location:  [facility] CATH INVASIVE LOCATION;  Service: Cardiovascular;  Laterality: N/A;    CARDIAC CATHETERIZATION N/A 4/26/2023    Procedure: Left ventriculography;  Surgeon: Mary Reynolds MD;  Location:  [facility] CATH INVASIVE LOCATION;  Service: Cardiovascular;  Laterality: N/A;    COLONOSCOPY  2005 2005--colonoscopy reportedly normal.  Records not available.    COLONOSCOPY N/A 6/13/2019    Procedure: COLONOSCOPY INTO CECUM WITH COLD BIOPSY POLYPECTOMY;  Surgeon: Ayaan Gallardo MD;  Location: [facility] ENDOSCOPY;  Service: General    LUMBAR DECOMPRESSION  2007 2007--lumbar decompression without fusion or hardware.    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    REVISION TOTAL KNEE ARTHROPLASTY Right 09/17/2018 09/17/2018--right total knee arthroplasty revision due to lateral subluxation of the patella due to multiple factors.    TOTAL KNEE ARTHROPLASTY Left 11/03/2017 11/03/2017--right total knee replacement complicated by patellofemoral subluxation.    TOTAL KNEE ARTHROPLASTY Left 06/2014 June 2014--left total knee replacement.    TOTAL KNEE ARTHROPLASTY REVISION Left 12/5/2022    Procedure: LEFT TOTAL KNEE REVISION Complex with Patella Tendon Reconstruction;  Surgeon: Sid Simon MD;  Location:  [facility] OR;  Service: Orthopedics;  Laterality: Left;         Allergies   Allergen Reactions    Hydrocodone Other (See Comments)     Severe vomiting - but CAN tolerate oxycodone per patient    Zocor  [Simvastatin]      MYALGIA           Current Outpatient Medications:     albuterol sulfate  (90 Base) MCG/ACT inhaler, , Disp: , Rfl:     amLODIPine  (NORVASC) 10 MG tablet, TAKE ONE TABLET BY MOUTH DAILY, Disp: 90 tablet, Rfl: 0    amphetamine-dextroamphetamine (ADDERALL) 20 MG tablet, Take 1 tablet by mouth Daily., Disp: , Rfl:     aspirin 81 MG EC tablet, Take 1 p.o. daily for blood thinner/heart problem, Disp: , Rfl:     clobetasol (TEMOVATE) 0.05 % ointment, Apply twice a day as directed, Disp: , Rfl:     Coenzyme Q10 (Co Q-10) 400 MG capsule, Take 1 p.o. daily with food, Disp: , Rfl:     ezetimibe (Zetia) 10 MG tablet, Take 1 p.o. daily for high cholesterol, Disp: 90 tablet, Rfl: 3    FLUoxetine (PROzac) 60 MG tablet, Take 1 tablet by mouth Daily., Disp: , Rfl:     hydrOXYzine (ATARAX) 25 MG tablet, TAKE ONE TABLET BY MOUTH THREE TIMES A DAY AS NEEDED FOR ITCHING, Disp: 60 tablet, Rfl: 3    losartan (COZAAR) 50 MG tablet, Take 1 tablet by mouth Daily for 360 days., Disp: 90 tablet, Rfl: 3    meloxicam (MOBIC) 15 MG tablet, , Disp: , Rfl:     pravastatin (PRAVACHOL) 20 MG tablet, TAKE ONE TABLET BY MOUTH DAILY, Disp: 90 tablet, Rfl: 3    Synthroid 175 MCG tablet, TAKE ONE TABLET BY MOUTH DAILY FOR LOW THYROID, Disp: 90 tablet, Rfl: 0    TiZANidine (ZANAFLEX) 2 MG capsule, Take 1 capsule by mouth 3 (Three) Times a Day As Needed for Muscle Spasms., Disp: 30 capsule, Rfl: 0    vitamin D3 125 MCG (5000 UT) capsule capsule, 1 by mouth daily as directed, Disp: 30 capsule, Rfl: 6    zolpidem (AMBIEN) 10 MG tablet, Take 1 p.o. nightly as needed for insomnia, Disp: , Rfl:       Family History   Problem Relation Age of Onset    Coronary artery disease Mother         Status post CABG    Alzheimer's disease Mother     Stroke Father     Liver cancer Father         Father with biliary cancer including gallbladder    No Known Problems Sister     No Known Problems Brother          Social History     Socioeconomic History    Marital status:      Spouse name: italo    Highest education level: Bachelor's degree (e.g., BA, AB, BS)   Tobacco Use    Smoking status: Never     "Smokeless tobacco: Never    Tobacco comments:     CAFFEINE USE 2 CUPS COFFEE AND 1 GLASS TEA DAILY   Vaping Use    Vaping Use: Never used   Substance and Sexual Activity    Alcohol use: No    Drug use: Yes     Types: Marijuana     Comment: weekly    Sexual activity: Defer     Partners: Female         Vitals:    12/13/23 1042   BP: 130/78   Pulse: 67   Resp: 16   Temp: 97.5 °F (36.4 °C)   TempSrc: Temporal   SpO2: 97%   Weight: (!) 142 kg (313 lb)   Height: 180 cm (70.87\")        Body mass index is 43.82 kg/m².      Physical Exam:    General: Alert and oriented x 3.  No acute distress.  Obese.  Normal affect.  HEENT: Pupils equal, round, reactive to light; extraocular movements intact; sclerae nonicteric; pharynx, ear canals and TMs normal.  Neck: Without JVD, thyromegaly, bruit, or adenopathy.  Lungs: Clear to auscultation in all fields.  Heart: Regular rate and rhythm without murmur, rub, gallop, or click.  Abdomen: Soft, nontender, without hepatosplenomegaly or hernia.  Bowel sounds normal.  : Deferred.  Rectal: Deferred.  Extremities: Without clubbing, cyanosis, edema, or pulse deficit.  Neurologic: Intact without focal deficit.  Normal station and gait observed during ingress and egress from the examination room.  Skin: Without significant lesion.  Musculoskeletal: Unremarkable.    Lab/other results:    CBC normal.  CPK elevated at 746.  CMP normal except glucose 131.  Hemoglobin A1c 6.2.  Total cholesterol 122.  Triglycerides 124.  LDL particle #[id number].  HDL particle number low at 28.9.  TSH is dramatically elevated at 154.  Free T3 and free T4 are very low.  PSA normal at 0.6.  Vitamin D normal at 35.8.    Assessment/Plan:     Diagnosis Plan   1. Impaired fasting glucose  Comprehensive Metabolic Panel    Hemoglobin A1c      2. Hyperlipidemia  CK    Comprehensive Metabolic Panel    NMR LipoProfile      3. Primary hypothyroidism  TSH    T4, Free    T3, Free    TSH    T4, Free    T3, Free      4. Vitamin D " deficiency  Vitamin D,25-Hydroxy      5. Benign essential hypertension  Comprehensive Metabolic Panel      6. Benign prostatic hyperplasia with urinary frequency        7. Urinary incontinence without sensory awareness        8. Bilateral lower extremity edema        9. History of pulmonary embolism        10. Non-occlusive coronary artery disease, 05/16/2017--normal LM; proximal LAD mild LI; 50% mid LAD.  Mild distal LAD LI; normal Cx.  Mild LI marginal branches; normal RCA, nondominant.  EF 60%.        11. Obstructive sleep apnea, tolerates CPAP well.  Previously failed oral appliance.        12. Morbid obesity        13. Bipolar disorder, in full remission, most recent episode mixed        14. Post traumatic stress disorder (PTSD)        15. Depression with anxiety        16. Chronic insomnia        17. Spinal stenosis of lumbar region with neurogenic claudication        18. Lumbar degenerative disc disease        19. Primary osteoarthritis involving multiple joints        20. Psoriasis        21. Multiple environmental allergies        22. Therapeutic drug monitoring  CBC (No Diff)        Patient has impaired fasting glucose and is at risk for developing overt diabetes given his morbid obesity.  I have strongly recommended low carbohydrate diet and weight loss and attempts to attain ideal body weight.  Hyperlipidemia is under good control with pravastatin and Zetia.  He has primary hypothyroidism and clearly is not taking his thyroid medication.  However, patient insists that he is taking the medication on a daily basis and I certainly believe him.  Patient reports he started chewing his levothyroxine about 6 to 8 months ago whereas previously he was just swallowing it.  He takes it in the morning and does not regularly take any medication at the exact same time.  This certainly could be lab error.  This might explain why his CPK is elevated.  I am surprised he is not terribly symptomatic.  Patient is taking  jes Synthroid 175 mcg/day.  Vitamin D in normal range with supplementation.  His blood pressure appears to be controlled with losartan.  He has BPH which is symptomatic and also has some urinary incontinence.  He has been evaluated and treated by the urologist.  His bilateral lower extremity edema is reasonably controlled.  He has a history of pulmonary embolism and is no longer on anticoagulation.  He has sleep apnea and tolerates CPAP well.  His psychiatric conditions including bipolar disorder and PTSD as well as depression with anxiety is treated by his therapist/psychiatrist and the medication seems to be working.  His environmental allergies currently not a big issue.  Patient has osteoarthritis involving multiple joints and also has chronic lower back pain which is reasonably stable at the present time.  He tends to have exacerbations.    Plan is as follows: Diet and weight loss as discussed.  Compliance with Synthroid discussed as well.  We will recheck thyroid function test today.  I will contact him tomorrow with further instructions.  Will make sure the patient has a refill on this medication.  Will schedule him for fasting lab and follow-up around first part of May.  This is subject to change depending upon the thyroid situation.  I have also recommended that he stop chewing the Synthroid and instead to swallow it normally.        Procedures

## 2023-12-14 LAB
T3FREE SERPL-MCNC: 1.4 PG/ML (ref 2–4.4)
T4 FREE SERPL-MCNC: 0.34 NG/DL (ref 0.93–1.7)
TSH SERPL DL<=0.005 MIU/L-ACNC: ABNORMAL UIU/ML (ref 0.27–4.2)

## 2024-01-19 DIAGNOSIS — E03.9 PRIMARY HYPOTHYROIDISM: Chronic | ICD-10-CM

## 2024-01-19 NOTE — TELEPHONE ENCOUNTER
Rx Refill Note  Requested Prescriptions     Pending Prescriptions Disp Refills    Synthroid 175 MCG tablet [Pharmacy Med Name: SYNTHROID 175 MCG TABLET] 90 tablet 0     Sig: TAKE 1 TABLET BY MOUTH DAILY FOR LOW THYROID      Last office visit with prescribing clinician: 12/13/2023   Last telemedicine visit with prescribing clinician: Visit date not found   Next office visit with prescribing clinician: 4/11/2024       Elda Galvez MA  01/19/24, 14:19 EST

## 2024-01-22 RX ORDER — LEVOTHYROXINE SODIUM 175 MCG
TABLET ORAL
Qty: 90 TABLET | Refills: 0 | Status: SHIPPED | OUTPATIENT
Start: 2024-01-22

## 2024-01-25 DIAGNOSIS — I10 BENIGN ESSENTIAL HYPERTENSION: Chronic | ICD-10-CM

## 2024-01-25 DIAGNOSIS — R73.01 IMPAIRED FASTING GLUCOSE: Chronic | ICD-10-CM

## 2024-01-25 DIAGNOSIS — E03.9 PRIMARY HYPOTHYROIDISM: Chronic | ICD-10-CM

## 2024-01-25 DIAGNOSIS — Z51.81 THERAPEUTIC DRUG MONITORING: ICD-10-CM

## 2024-01-25 DIAGNOSIS — E78.2 MIXED HYPERLIPIDEMIA: Chronic | ICD-10-CM

## 2024-01-25 DIAGNOSIS — E55.9 VITAMIN D DEFICIENCY: Chronic | ICD-10-CM

## 2024-01-27 LAB
25(OH)D3+25(OH)D2 SERPL-MCNC: 43.7 NG/ML (ref 30–100)
ALBUMIN SERPL-MCNC: 4.4 G/DL (ref 3.8–4.8)
ALBUMIN/GLOB SERPL: 2.1 {RATIO} (ref 1.2–2.2)
ALP SERPL-CCNC: 60 IU/L (ref 44–121)
ALT SERPL-CCNC: 16 IU/L (ref 0–44)
AST SERPL-CCNC: 22 IU/L (ref 0–40)
BILIRUB SERPL-MCNC: 0.3 MG/DL (ref 0–1.2)
BUN SERPL-MCNC: 25 MG/DL (ref 8–27)
BUN/CREAT SERPL: 24 (ref 10–24)
CALCIUM SERPL-MCNC: 9.1 MG/DL (ref 8.6–10.2)
CHLORIDE SERPL-SCNC: 102 MMOL/L (ref 96–106)
CHOLEST SERPL-MCNC: 99 MG/DL (ref 100–199)
CK SERPL-CCNC: 384 U/L (ref 41–331)
CO2 SERPL-SCNC: 23 MMOL/L (ref 20–29)
CREAT SERPL-MCNC: 1.04 MG/DL (ref 0.76–1.27)
EGFRCR SERPLBLD CKD-EPI 2021: 75 ML/MIN/1.73
ERYTHROCYTE [DISTWIDTH] IN BLOOD BY AUTOMATED COUNT: 13.8 % (ref 11.6–15.4)
GLOBULIN SER CALC-MCNC: 2.1 G/DL (ref 1.5–4.5)
GLUCOSE SERPL-MCNC: 125 MG/DL (ref 70–99)
HBA1C MFR BLD: 6.2 % (ref 4.8–5.6)
HCT VFR BLD AUTO: 40.9 % (ref 37.5–51)
HDL SERPL-SCNC: 24.8 UMOL/L
HDLC SERPL-MCNC: 32 MG/DL
HGB BLD-MCNC: 13.6 G/DL (ref 13–17.7)
LDL SERPL QN: 20.1 NM
LDL SERPL-SCNC: 617 NMOL/L
LDL SMALL SERPL-SCNC: 415 NMOL/L
LDLC SERPL CALC-MCNC: 48 MG/DL (ref 0–99)
MCH RBC QN AUTO: 31.5 PG (ref 26.6–33)
MCHC RBC AUTO-ENTMCNC: 33.3 G/DL (ref 31.5–35.7)
MCV RBC AUTO: 95 FL (ref 79–97)
PLATELET # BLD AUTO: 189 X10E3/UL (ref 150–450)
POTASSIUM SERPL-SCNC: 4.6 MMOL/L (ref 3.5–5.2)
PROT SERPL-MCNC: 6.5 G/DL (ref 6–8.5)
RBC # BLD AUTO: 4.32 X10E6/UL (ref 4.14–5.8)
SODIUM SERPL-SCNC: 140 MMOL/L (ref 134–144)
T3FREE SERPL-MCNC: 2.4 PG/ML (ref 2–4.4)
T4 FREE SERPL-MCNC: 0.65 NG/DL (ref 0.82–1.77)
TRIGL SERPL-MCNC: 100 MG/DL (ref 0–149)
TSH SERPL DL<=0.005 MIU/L-ACNC: 33.2 UIU/ML (ref 0.45–4.5)
WBC # BLD AUTO: 6.5 X10E3/UL (ref 3.4–10.8)

## 2024-03-13 DIAGNOSIS — I10 BENIGN ESSENTIAL HYPERTENSION: Chronic | ICD-10-CM

## 2024-03-13 RX ORDER — AMLODIPINE BESYLATE 10 MG/1
TABLET ORAL
Qty: 90 TABLET | Refills: 0 | Status: SHIPPED | OUTPATIENT
Start: 2024-03-13

## 2024-03-15 ENCOUNTER — TELEPHONE (OUTPATIENT)
Dept: INTERNAL MEDICINE | Facility: CLINIC | Age: 74
End: 2024-03-15

## 2024-03-15 DIAGNOSIS — M48.062 SPINAL STENOSIS OF LUMBAR REGION WITH NEUROGENIC CLAUDICATION: Primary | Chronic | ICD-10-CM

## 2024-03-15 RX ORDER — IBUPROFEN 800 MG/1
TABLET ORAL
Qty: 90 TABLET | Refills: 1 | Status: SHIPPED | OUTPATIENT
Start: 2024-03-15

## 2024-03-15 NOTE — TELEPHONE ENCOUNTER
PATIENT CALLED FOR A MEDICATION REFILL OF IBUPROFEN 800 MG.( NOT ON CURRENT MED LIST) THIS IS FOR LOWER BACK PAIN, HE HAS AN APPOINTMENT WITH DR FINE ON 3/21/24  THE IBUPROFEN WORK WELL.     [facility] [id number] - [facility] [address] - [phone]  - [phone]  [phone]     CALL BACK NUMBER [phone]  HE ALSO SENT A MESSAGE VIA MY CHART       lmvm for pt call and schedule appt

## 2024-04-04 DIAGNOSIS — R73.01 IMPAIRED FASTING GLUCOSE: ICD-10-CM

## 2024-04-04 DIAGNOSIS — E03.9 PRIMARY HYPOTHYROIDISM: Primary | ICD-10-CM

## 2024-04-04 DIAGNOSIS — E78.2 MIXED HYPERLIPIDEMIA: ICD-10-CM

## 2024-04-04 DIAGNOSIS — E03.9 PRIMARY HYPOTHYROIDISM: ICD-10-CM

## 2024-04-04 DIAGNOSIS — Z51.81 THERAPEUTIC DRUG MONITORING: ICD-10-CM

## 2024-04-05 LAB
ALBUMIN SERPL-MCNC: 4.3 G/DL (ref 3.8–4.8)
ALBUMIN/GLOB SERPL: 1.9 {RATIO} (ref 1.2–2.2)
ALP SERPL-CCNC: 67 IU/L (ref 44–121)
ALT SERPL-CCNC: 19 IU/L (ref 0–44)
AST SERPL-CCNC: 22 IU/L (ref 0–40)
BILIRUB SERPL-MCNC: 0.4 MG/DL (ref 0–1.2)
BUN SERPL-MCNC: 24 MG/DL (ref 8–27)
BUN/CREAT SERPL: 24 (ref 10–24)
CALCIUM SERPL-MCNC: 9.6 MG/DL (ref 8.6–10.2)
CHLORIDE SERPL-SCNC: 101 MMOL/L (ref 96–106)
CHOLEST SERPL-MCNC: 113 MG/DL (ref 100–199)
CK SERPL-CCNC: 355 U/L (ref 41–331)
CO2 SERPL-SCNC: 24 MMOL/L (ref 20–29)
CREAT SERPL-MCNC: 0.99 MG/DL (ref 0.76–1.27)
EGFRCR SERPLBLD CKD-EPI 2021: 80 ML/MIN/1.73
ERYTHROCYTE [DISTWIDTH] IN BLOOD BY AUTOMATED COUNT: 13.3 % (ref 11.6–15.4)
FT4I SERPL CALC-MCNC: 0.8 (ref 1.2–4.9)
GLOBULIN SER CALC-MCNC: 2.3 G/DL (ref 1.5–4.5)
GLUCOSE SERPL-MCNC: 124 MG/DL (ref 70–99)
HBA1C MFR BLD: 6.2 % (ref 4.8–5.6)
HCT VFR BLD AUTO: 41.7 % (ref 37.5–51)
HDL SERPL-SCNC: 27 UMOL/L
HDLC SERPL-MCNC: 34 MG/DL
HGB BLD-MCNC: 13.6 G/DL (ref 13–17.7)
LDL SERPL QN: 20.6 NM
LDL SERPL-SCNC: 661 NMOL/L
LDL SMALL SERPL-SCNC: 178 NMOL/L
LDLC SERPL CALC-MCNC: 55 MG/DL (ref 0–99)
MCH RBC QN AUTO: 30.4 PG (ref 26.6–33)
MCHC RBC AUTO-ENTMCNC: 32.6 G/DL (ref 31.5–35.7)
MCV RBC AUTO: 93 FL (ref 79–97)
PLATELET # BLD AUTO: 239 X10E3/UL (ref 150–450)
POTASSIUM SERPL-SCNC: 4.8 MMOL/L (ref 3.5–5.2)
PROT SERPL-MCNC: 6.6 G/DL (ref 6–8.5)
RBC # BLD AUTO: 4.48 X10E6/UL (ref 4.14–5.8)
SODIUM SERPL-SCNC: 139 MMOL/L (ref 134–144)
T3FREE SERPL-MCNC: 2.1 PG/ML (ref 2–4.4)
T3RU NFR SERPL: 21 % (ref 24–39)
T4 FREE SERPL-MCNC: 0.56 NG/DL (ref 0.82–1.77)
T4 SERPL-MCNC: 3.6 UG/DL (ref 4.5–12)
TRIGL SERPL-MCNC: 134 MG/DL (ref 0–149)
TSH SERPL DL<=0.005 MIU/L-ACNC: 91.6 UIU/ML (ref 0.45–4.5)
WBC # BLD AUTO: 6.8 X10E3/UL (ref 3.4–10.8)

## 2024-04-11 ENCOUNTER — OFFICE VISIT (OUTPATIENT)
Dept: INTERNAL MEDICINE | Facility: CLINIC | Age: 74
End: 2024-04-11
Payer: MEDICARE

## 2024-04-11 VITALS
DIASTOLIC BLOOD PRESSURE: 78 MMHG | TEMPERATURE: 96.4 F | HEART RATE: 60 BPM | WEIGHT: 307 LBS | BODY MASS INDEX: 42.98 KG/M2 | HEIGHT: 71 IN | OXYGEN SATURATION: 98 % | SYSTOLIC BLOOD PRESSURE: 132 MMHG | RESPIRATION RATE: 16 BRPM

## 2024-04-11 DIAGNOSIS — R73.01 IMPAIRED FASTING GLUCOSE: Primary | Chronic | ICD-10-CM

## 2024-04-11 DIAGNOSIS — F31.78 BIPOLAR DISORDER, IN FULL REMISSION, MOST RECENT EPISODE MIXED: Chronic | ICD-10-CM

## 2024-04-11 DIAGNOSIS — E03.9 PRIMARY HYPOTHYROIDISM: Chronic | ICD-10-CM

## 2024-04-11 DIAGNOSIS — N40.1 BENIGN PROSTATIC HYPERPLASIA WITH URINARY FREQUENCY: Chronic | ICD-10-CM

## 2024-04-11 DIAGNOSIS — E55.9 VITAMIN D DEFICIENCY: Chronic | ICD-10-CM

## 2024-04-11 DIAGNOSIS — F41.8 DEPRESSION WITH ANXIETY: Chronic | ICD-10-CM

## 2024-04-11 DIAGNOSIS — F43.10 POST TRAUMATIC STRESS DISORDER (PTSD): Chronic | ICD-10-CM

## 2024-04-11 DIAGNOSIS — G47.33 OBSTRUCTIVE SLEEP APNEA: Chronic | ICD-10-CM

## 2024-04-11 DIAGNOSIS — M48.062 SPINAL STENOSIS OF LUMBAR REGION WITH NEUROGENIC CLAUDICATION: Chronic | ICD-10-CM

## 2024-04-11 DIAGNOSIS — Z86.711 HISTORY OF PULMONARY EMBOLISM: Chronic | ICD-10-CM

## 2024-04-11 DIAGNOSIS — N39.42 URINARY INCONTINENCE WITHOUT SENSORY AWARENESS: Chronic | ICD-10-CM

## 2024-04-11 DIAGNOSIS — Z51.81 THERAPEUTIC DRUG MONITORING: ICD-10-CM

## 2024-04-11 DIAGNOSIS — I10 BENIGN ESSENTIAL HYPERTENSION: Chronic | ICD-10-CM

## 2024-04-11 DIAGNOSIS — L40.9 PSORIASIS: Chronic | ICD-10-CM

## 2024-04-11 DIAGNOSIS — E78.2 MIXED HYPERLIPIDEMIA: Chronic | ICD-10-CM

## 2024-04-11 DIAGNOSIS — E66.01 MORBID OBESITY: Chronic | ICD-10-CM

## 2024-04-11 DIAGNOSIS — I25.10 NON-OCCLUSIVE CORONARY ARTERY DISEASE: Chronic | ICD-10-CM

## 2024-04-11 DIAGNOSIS — R60.0 BILATERAL LOWER EXTREMITY EDEMA: Chronic | ICD-10-CM

## 2024-04-11 DIAGNOSIS — R35.0 BENIGN PROSTATIC HYPERPLASIA WITH URINARY FREQUENCY: Chronic | ICD-10-CM

## 2024-04-11 DIAGNOSIS — F51.04 CHRONIC INSOMNIA: Chronic | ICD-10-CM

## 2024-04-11 DIAGNOSIS — M15.9 PRIMARY OSTEOARTHRITIS INVOLVING MULTIPLE JOINTS: Chronic | ICD-10-CM

## 2024-04-11 NOTE — PROGRESS NOTES
04/11/2024    Patient Information  Durga Valenzuela                                                                                          [id number] [facility] [id number]      1950  [unfilled]  There is no work phone number on file.    Chief Complaint:     Follow-up medical problems noted below.  Recent lab work.    History of Present Illness:    Patient with multiple chronic medical problems as noted below in assessment and plan presents today for follow-up with lab prior in order to monitor these chronic medical issues.  His past medical history reviewed and updated were necessary including health maintenance parameters.  This reveals he needs RSV vaccine which I encouraged him to get at the local drugstore.    Review of Systems   Constitutional: Negative.   HENT: Negative.     Eyes: Negative.    Cardiovascular: Negative.    Respiratory: Negative.     Endocrine: Negative.    Hematologic/Lymphatic: Negative.    Skin: Negative.    Musculoskeletal:  Positive for arthritis, back pain and joint pain.   Gastrointestinal: Negative.    Genitourinary: Negative.    Neurological: Negative.    Psychiatric/Behavioral: Negative.  Negative for depression.         Depressive symptoms seem to be in remission with current medication.   Allergic/Immunologic: Negative.        Active Problems:    Patient Active Problem List   Diagnosis    Post traumatic stress disorder (PTSD)    Benign prostatic hyperplasia with urinary frequency    Chronic insomnia    Lumbar degenerative disc disease    Impaired fasting glucose    Benign essential hypertension    Primary osteoarthritis involving multiple joints    Hyperlipidemia    Primary hypothyroidism    Obstructive sleep apnea, tolerates CPAP well.  Previously failed oral appliance.    History of pulmonary embolism    Non-occlusive coronary artery disease, 05/16/2017--normal LM; proximal LAD mild LI; 50% mid LAD.  Mild distal LAD LI; normal Cx.  Mild LI marginal  branches; normal RCA, nondominant.  EF 60%.    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  Immunotherapy for about 2 years in the 1980s.    Benign essential hypertension 02/25/2016    Benign prostatic hyperplasia with urinary frequency 02/25/2016    Bilateral lower extremity edema 08/21/2018    Bipolar disorder, in full remission, most recent episode mixed 09/30/2021    Chronic insomnia 02/25/2016    Closed traumatic lateral subluxation of patellofemoral joint, right, initial encounter 08/13/2018    08/10/2018--patient was evaluated by the orthopedist for right knee pain and found to have lateral subluxation of the right patella associated with presence of right artificial knee joint.  Physical therapy no help.  Surgery is scheduled 09/17/2018    Depression with anxiety 08/21/2018    History of deep venous thrombosis (DVT) of distal vein of right lower extremity 08/21/2018 04/26/2016--CTA of the chest performed for elevated d-dimer and progressive shortness of breath and chest pain for one month revealed bilateral occlusive in near occlusive segmental and subsegmental pulmonary emboli in all lobes of the right lung as well as within the left lower lobe.  No evidence of pulmonary infarct.  Nonspecific multifocal groundglass attenuation in the right pulmonary  apex, perhaps representing pneumonitis or submental atelectasis.  Questionable cholelithiasis.  Doppler venous study was positive for DVT in the right popliteal vein.  Hypercoagulable workup was normal.  He was treated with Eliquis for a total of 6 months and was subsequently discontinued.    History of pneumonia     History of pulmonary embolism 06/10/2016    04/26/2016--CTA of the chest performed for elevated d-dimer and progressive shortness of breath and chest pain for one month revealed bilateral occlusive in near occlusive segmental and subsegmental pulmonary emboli in all lobes of the right lung as well as within the left lower lobe.  No evidence of pulmonary infarct.  Nonspecific multifocal groundglass attenuation in the right pulmonary apex, perhaps representing pneumonitis or submental atelectasis.  Questionable cholelithiasis.  Doppler venous study was positive for DVT in the right popliteal vein.  Hypercoagulable workup was normal.  He was treated with Eliquis for a total of 6 months and was subsequently discontinued.    Hyperlipidemia 02/25/2016    Impaired fasting glucose 02/25/2016    Lumbar degenerative disc disease 02/25/2016    Morbidly obese 10/19/2018    Multiple environmental allergies 08/21/2018    Patient has had allergy testing in the past which revealed allergies to molds and grass.  Immunotherapy for about 2 years in the 1980s.    Non-occlusive coronary artery disease, 05/16/2017--normal LM; proximal LAD mild LI; 50% mid LAD.  Mild distal LAD LI; normal Cx.  Mild LI marginal branches; normal RCA, nondominant.  EF 60%. 07/24/2017 05/16/2017--cardiac catheterization performed for abnormal stress test.  Normal LV with ejection fraction 60%.  Dilated aortic root.  No wall motion abnormalities.  Normal left main.  Ramus branch small caliber and normal.  Proximal LAD large caliber and mild luminal irregularities.  50% mid LAD.  Mild luminal irregularities distal LAD.  3 diagonal branches of  small caliber with mild luminal irregularities.  Normal septal branches.  Circumflex is large caliber and free of disease.  Marginal branches are medium caliber with mild luminal irregularities.  RCA is a medium caliber and free of disease.  It is nondominant.    Obstructive sleep apnea, tolerates CPAP well.  Previously failed oral appliance. 02/25/2016    Post traumatic stress disorder (PTSD) 02/25/2016    Primary hypothyroidism 02/25/2016    Primary osteoarthritis involving multiple joints 02/25/2016    Psoriasis 09/17/2014    Spinal stenosis of lumbar region with neurogenic claudication 02/15/2021    Traumatic complete tear of left rotator cuff 03/17/2021 March 14, 2021--patient was evaluated by the orthopedic surgeon after he slipped on ice and fell onto his left shoulder.  Work-up revealed complete rotator cuff tear involving 2 tendons.  Specifically, he has an acute left supraspinatus tear and acute left infraspinatus tear.  He also has left glenohumeral joint osteoarthritis.  Apparently this is not repairable and patient would need a shoulder r    Urinary incontinence without sensory awareness 03/17/2021    Vitamin D deficiency 08/21/2018         Past Surgical History:   Procedure Laterality Date    CARDIAC CATHETERIZATION N/A 5/16/2017    Procedure: Coronary angiography;  Surgeon: Malcolm Chen MD;  Location: [facility] CATH INVASIVE LOCATION;  Service:     CARDIAC CATHETERIZATION N/A 5/16/2017    Procedure: Left Heart Cath;  Surgeon: Malcolm Chen MD;  Location: [facility] CATH INVASIVE LOCATION;  Service:     CARDIAC CATHETERIZATION N/A 5/16/2017    Procedure: Left ventriculography;  Surgeon: Malcolm Chen MD;  Location: [facility] CATH INVASIVE LOCATION;  Service:     CARDIAC CATHETERIZATION N/A 4/26/2023    Procedure: Left Heart Cath;  Surgeon: Mary Reynolds MD;  Location: [facility] CATH INVASIVE LOCATION;  Service: Cardiovascular;  Laterality: N/A;    CARDIAC CATHETERIZATION N/A  4/26/2023    Procedure: Coronary angiography;  Surgeon: Mary Reynolds MD;  Location:  [facility] CATH INVASIVE LOCATION;  Service: Cardiovascular;  Laterality: N/A;    CARDIAC CATHETERIZATION N/A 4/26/2023    Procedure: Left ventriculography;  Surgeon: Mary Reynolds MD;  Location:  [facility] CATH INVASIVE LOCATION;  Service: Cardiovascular;  Laterality: N/A;    COLONOSCOPY  2005 2005--colonoscopy reportedly normal.  Records not available.    COLONOSCOPY N/A 6/13/2019    Procedure: COLONOSCOPY INTO CECUM WITH COLD BIOPSY POLYPECTOMY;  Surgeon: Ayaan Gallardo MD;  Location: [facility] ENDOSCOPY;  Service: General    LUMBAR DECOMPRESSION  2007 2007--lumbar decompression without fusion or hardware.    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    REVISION TOTAL KNEE ARTHROPLASTY Right 09/17/2018 09/17/2018--right total knee arthroplasty revision due to lateral subluxation of the patella due to multiple factors.    TOTAL KNEE ARTHROPLASTY Left 11/03/2017 11/03/2017--right total knee replacement complicated by patellofemoral subluxation.    TOTAL KNEE ARTHROPLASTY Left 06/2014 June 2014--left total knee replacement.    TOTAL KNEE ARTHROPLASTY REVISION Left 12/5/2022    Procedure: LEFT TOTAL KNEE REVISION Complex with Patella Tendon Reconstruction;  Surgeon: Sid Simon MD;  Location:  [facility] OR;  Service: Orthopedics;  Laterality: Left;         Allergies   Allergen Reactions    Hydrocodone Other (See Comments)     Severe vomiting - but CAN tolerate oxycodone per patient    Zocor  [Simvastatin]      MYALGIA           Current Outpatient Medications:     albuterol sulfate  (90 Base) MCG/ACT inhaler, , Disp: , Rfl:      amLODIPine (NORVASC) 10 MG tablet, TAKE 1 TABLET BY MOUTH DAILY, Disp: 90 tablet, Rfl: 0    amphetamine-dextroamphetamine (ADDERALL) 20 MG tablet, Take 1 tablet by mouth Daily., Disp: , Rfl:     aspirin 81 MG EC tablet, Take 1 p.o. daily for blood thinner/heart problem, Disp: , Rfl:     clobetasol (TEMOVATE) 0.05 % ointment, Apply twice a day as directed, Disp: , Rfl:     Coenzyme Q10 (Co Q-10) 400 MG capsule, Take 1 p.o. daily with food, Disp: , Rfl:     ezetimibe (Zetia) 10 MG tablet, Take 1 p.o. daily for high cholesterol, Disp: 90 tablet, Rfl: 3    FLUoxetine (PROzac) 60 MG tablet, Take 1 tablet by mouth Daily., Disp: , Rfl:     hydrOXYzine (ATARAX) 25 MG tablet, TAKE ONE TABLET BY MOUTH THREE TIMES A DAY AS NEEDED FOR ITCHING, Disp: 60 tablet, Rfl: 3    ibuprofen (ADVIL,MOTRIN) 800 MG tablet, Take 1 p.o. 3 times daily with food for arthritic and back pain, Disp: 90 tablet, Rfl: 1    losartan (COZAAR) 50 MG tablet, Take 1 tablet by mouth Daily for 360 days., Disp: 90 tablet, Rfl: 3    pravastatin (PRAVACHOL) 20 MG tablet, TAKE ONE TABLET BY MOUTH DAILY, Disp: 90 tablet, Rfl: 3    Synthroid 175 MCG tablet, TAKE 1 TABLET BY MOUTH DAILY FOR LOW THYROID, Disp: 90 tablet, Rfl: 0    TiZANidine (ZANAFLEX) 2 MG capsule, Take 1 capsule by mouth 3 (Three) Times a Day As Needed for Muscle Spasms., Disp: 30 capsule, Rfl: 0    vitamin D3 125 MCG (5000 UT) capsule capsule, 1 by mouth daily as directed, Disp: 30 capsule, Rfl: 6    zolpidem (AMBIEN) 10 MG tablet, Take 1 p.o. nightly as needed for insomnia, Disp: , Rfl:       Family History   Problem Relation Age of Onset    Coronary artery disease Mother         Status post CABG    Alzheimer's disease Mother     Stroke Father     Liver cancer Father         Father with biliary cancer including gallbladder    No Known Problems Sister     No Known Problems Brother          Social History     Socioeconomic History    Marital status:      Spouse name: jan Highest  "education level: Bachelor's degree (e.g., BA, AB, BS)   Tobacco Use    Smoking status: Never    Smokeless tobacco: Never    Tobacco comments:     CAFFEINE USE 2 CUPS COFFEE AND 1 GLASS TEA DAILY   Vaping Use    Vaping status: Never Used   Substance and Sexual Activity    Alcohol use: No    Drug use: Yes     Types: Marijuana     Comment: weekly    Sexual activity: Defer     Partners: Female         Vitals:    04/11/24 0927   BP: 132/78   Pulse: 60   Resp: 16   Temp: 96.4 °F (35.8 °C)   TempSrc: Temporal   SpO2: 98%   Weight: (!) 139 kg (307 lb)   Height: 180 cm (70.87\")        Body mass index is 42.98 kg/m².      Physical Exam:    General: Alert and oriented x 3.  No acute distress.  Normal affect.  HEENT: Pupils equal, round, reactive to light; extraocular movements intact; sclerae nonicteric; pharynx, ear canals and TMs normal.  Neck: Without JVD, thyromegaly, bruit, or adenopathy.  Lungs: Clear to auscultation in all fields.  Heart: Regular rate and rhythm without murmur, rub, gallop, or click.  Abdomen: Soft, nontender, without hepatosplenomegaly or hernia.  Bowel sounds normal.  : Deferred.  Rectal: Deferred.  Extremities: Without clubbing, cyanosis, edema, or pulse deficit.  Neurologic: Intact without focal deficit.  Normal station and gait observed during ingress and egress from the examination room.  Skin: Without significant lesion.  Musculoskeletal: Unremarkable.    Lab/other results:    CBC normal.  CPK elevated at 355.  CMP normal except glucose 124.  Hemoglobin A1c 6.2.  Total cholesterol 113, triglycerides 134, LDL particle #661, HDL particle number little low at 27.  TSH markedly elevated at 91.6.  Free T4 and free T3 are low.    Assessment/Plan:     Diagnosis Plan   1. Impaired fasting glucose  Comprehensive Metabolic Panel    Hemoglobin A1c    Urinalysis With Microscopic If Indicated (No Culture) - Urine, Clean Catch      2. Hyperlipidemia  CK    Comprehensive Metabolic Panel    NMR LipoProfile    "   3. Primary hypothyroidism  TSH    T4, Free    T3, Free    Ambulatory Referral to Endocrinology      4. Vitamin D deficiency  Vitamin D,25-Hydroxy      5. Benign prostatic hyperplasia with urinary frequency  PSA DIAGNOSTIC      6. Benign essential hypertension        7. History of pulmonary embolism        8. Morbid obesity        9. Non-occlusive coronary artery disease, 05/16/2017--normal LM; proximal LAD mild LI; 50% mid LAD.  Mild distal LAD LI; normal Cx.  Mild LI marginal branches; normal RCA, nondominant.  EF 60%.  Ambulatory Referral to Cardiology      10. Obstructive sleep apnea, tolerates CPAP well.  Previously failed oral appliance.        11. Primary osteoarthritis involving multiple joints        12. Spinal stenosis of lumbar region with neurogenic claudication        13. Urinary incontinence without sensory awareness        14. Bilateral lower extremity edema        15. Bipolar disorder, in full remission, most recent episode mixed        16. Chronic insomnia        17. Post traumatic stress disorder (PTSD)        18. Depression with anxiety        19. Psoriasis        20. Therapeutic drug monitoring  CBC (No Diff)        Patient has impaired fasting glucose and is at risk for development of overt diabetes particularly given his morbid obesity.  I once again encouraged him to follow a low carbohydrate diet and try to attain ideal body weight.  Hyperlipidemia is under excellent control.  His TSH is markedly elevated and patient is on more than sufficient dose of levothyroxine and reports that he is compliant.  His TSH has been off for quite some time now and I am wondering if there is actually something interfering with the assay.  I think he might benefit from endocrine referral.  Patient has BPH and also has urinary incontinence and has been treated by urology.  Currently symptoms are tolerable.  His vitamin D is in normal range with supplementation and his blood pressure seems to be controlled.  His  "coronary artery disease seems to be stable.  He has sleep apnea and tolerates CPAP well.  He has chronic lower back pain and various arthritic pain from degenerative arthritis being treated with ibuprofen which helps.  His psychiatric issues are dealt with by his therapist and psychologist and seems to be in remission.    Plan is as follows: Endocrinology referral.  I am not going to make any changes to his medical regimen at present time.  Will have him follow-up on or after November 27, 2024 for his subsequent Medicare wellness visit.  Recommend RSV vaccine.  Cardiology referral as patient does not have an established cardiologist.      This patient has a PCP that is the continuing focal point for all health care services, and the patient sees this physician to be evaluated for multiple medical problems. The inherent complexity that this code () captures is not in the clinical condition itself, but rather the cognitition of the continued responsibility of being the focal point for all needed services for this patient.\"       Procedures        "

## 2024-04-13 DIAGNOSIS — E78.2 MIXED HYPERLIPIDEMIA: Chronic | ICD-10-CM

## 2024-04-15 RX ORDER — EZETIMIBE 10 MG/1
TABLET ORAL
Qty: 90 TABLET | Refills: 3 | Status: SHIPPED | OUTPATIENT
Start: 2024-04-15

## 2024-04-17 ENCOUNTER — HOSPITAL ENCOUNTER (OUTPATIENT)
Dept: CARDIOLOGY | Facility: HOSPITAL | Age: 74
Discharge: HOME | End: 2024-04-17
Payer: MEDICARE

## 2024-04-17 ENCOUNTER — HOSPITAL ENCOUNTER (OUTPATIENT)
Dept: CARDIOLOGY | Facility: HOSPITAL | Age: 74
Discharge: HOME | End: 2024-04-17
Admitting: INTERNAL MEDICINE
Payer: MEDICARE

## 2024-04-17 ENCOUNTER — OFFICE VISIT (OUTPATIENT)
Dept: CARDIOLOGY | Facility: CLINIC | Age: 74
End: 2024-04-17
Payer: MEDICARE

## 2024-04-17 VITALS
DIASTOLIC BLOOD PRESSURE: 60 MMHG | HEIGHT: 73 IN | WEIGHT: 306.6 LBS | BODY MASS INDEX: 40.63 KG/M2 | HEART RATE: 52 BPM | SYSTOLIC BLOOD PRESSURE: 120 MMHG

## 2024-04-17 VITALS
HEIGHT: 73 IN | SYSTOLIC BLOOD PRESSURE: 120 MMHG | WEIGHT: 306.44 LBS | HEART RATE: 52 BPM | DIASTOLIC BLOOD PRESSURE: 60 MMHG | BODY MASS INDEX: 40.61 KG/M2

## 2024-04-17 DIAGNOSIS — R06.09 EXERTIONAL DYSPNEA: Primary | ICD-10-CM

## 2024-04-17 DIAGNOSIS — R06.09 EXERTIONAL DYSPNEA: ICD-10-CM

## 2024-04-17 LAB
NT-PROBNP SERPL-MCNC: 84 PG/ML (ref 0–900)
TROPONIN T SERPL HS-MCNC: 29 NG/L

## 2024-04-17 PROCEDURE — 84484 ASSAY OF TROPONIN QUANT: CPT | Performed by: INTERNAL MEDICINE

## 2024-04-17 PROCEDURE — 93356 MYOCRD STRAIN IMG SPCKL TRCK: CPT

## 2024-04-17 PROCEDURE — 83880 ASSAY OF NATRIURETIC PEPTIDE: CPT | Performed by: INTERNAL MEDICINE

## 2024-04-17 PROCEDURE — 99214 OFFICE O/P EST MOD 30 MIN: CPT | Performed by: INTERNAL MEDICINE

## 2024-04-17 PROCEDURE — 3078F DIAST BP <80 MM HG: CPT | Performed by: INTERNAL MEDICINE

## 2024-04-17 PROCEDURE — 93306 TTE W/DOPPLER COMPLETE: CPT

## 2024-04-17 PROCEDURE — 25510000001 PERFLUTREN (DEFINITY) 8.476 MG IN SODIUM CHLORIDE (PF) 0.9 % 10 ML INJECTION: Performed by: INTERNAL MEDICINE

## 2024-04-17 PROCEDURE — 3074F SYST BP LT 130 MM HG: CPT | Performed by: INTERNAL MEDICINE

## 2024-04-17 PROCEDURE — 93000 ELECTROCARDIOGRAM COMPLETE: CPT | Performed by: INTERNAL MEDICINE

## 2024-04-17 PROCEDURE — 1160F RVW MEDS BY RX/DR IN RCRD: CPT | Performed by: INTERNAL MEDICINE

## 2024-04-17 PROCEDURE — 1159F MED LIST DOCD IN RCRD: CPT | Performed by: INTERNAL MEDICINE

## 2024-04-17 RX ADMIN — PERFLUTREN 2 ML: 6.52 INJECTION, SUSPENSION INTRAVENOUS at 10:45

## 2024-04-17 NOTE — PROGRESS NOTES
[facility]      Patient Name: Durga Valenzuela  :1950  Age: 73 y.o.  Encounter Provider:  Shaka Arnett Jr, MD      Chief Complaint:   Chief Complaint   Patient presents with    Coronary artery disease involving native coronary artery of         Coronary Artery Disease  Pertinent negatives include no chest pain, dizziness, leg swelling or palpitations.     Durga Valenzuela is a 73 y.o. male past medical history of nonobstructive coronary artery disease, hypertension, dyslipidemia who presents for follow-up evaluation.      Last clinic visit note: Patient was evaluated by Dr. Deepthi Cline in 2017 and work-up for chest pain resulted in cardiac catheterization being performed.  He had a 50% mid LAD lesion with no other significant disease to speak of at the time.  He is fairly active gentleman with no chest pain or shortness of air.  No orthopnea, PND or edema.  No palpitations, dizziness or syncope.  Echocardiogram at the time showed normal left ventricular ejection fraction with grade 1 diastolic dysfunction and without significant valvular heart disease.  Aortic root was mildly dilated at 4.3 cm at the time.  Blood pressure and heart rate are well controlled today in clinic.  Patient is a non-smoker who denies alcohol or illicit drug use.  Family history was reviewed and is not pertinent to this clinic visit.    Patient had knee surgery in December.  He was on prophylactic dosing of 10 mg Xarelto for 30 days afterward per patient report.  He does have a history of clot formation with DVT and PE after orthopedic surgery in the past.  He is noting increased dyspnea on exertion and lower extremity edema.  He has occasional pleuritic type chest discomfort that has some temporal relationship to physical activity.  He has chronic stable orthopnea that he does not feel is increased over the past few weeks.  No palpitations, dizziness or syncope.  Social and family history was  "reviewed.    Patient has been noticing increased dyspnea on exertion.  No orthopnea or edema.  No palpitations, dizziness or syncope.  No angina.  Blood pressure and heart rate are well-controlled today in clinic.  His thyroid function testing was off the charts.  He has been referred to endocrinology.  He has significantly elevated total CK with diffuse myalgia complaints.  AST and ALT were normal.    The following portions of the patient's history were reviewed and updated as appropriate: allergies, current medications, past family history, past medical history, past social history, past surgical history and problem list.      Review of Systems   Constitutional: Negative for chills and fever.   HENT:  Negative for hoarse voice and sore throat.    Eyes:  Negative for double vision and photophobia.   Cardiovascular:  Negative for chest pain, leg swelling, near-syncope, orthopnea, palpitations, paroxysmal nocturnal dyspnea and syncope.   Respiratory:  Negative for cough and wheezing.    Skin:  Negative for poor wound healing and rash.   Musculoskeletal:  Negative for arthritis and joint swelling.   Gastrointestinal:  Negative for bloating, abdominal pain, hematemesis and hematochezia.   Neurological:  Negative for dizziness and focal weakness.   Psychiatric/Behavioral:  Negative for depression and suicidal ideas.        OBJECTIVE:   Vital Signs  Vitals:    04/17/24 0919   BP: 120/60   Pulse: 52     Estimated body mass index is 40.45 kg/m² as calculated from the following:    Height as of this encounter: 185.4 cm (73\").    Weight as of this encounter: 139 kg (306 lb 9.6 oz).    Vitals reviewed.   Constitutional:       Appearance: Healthy appearance. Not in distress.   Neck:      Vascular: No JVR. JVD normal.   Pulmonary:      Effort: Pulmonary effort is normal.      Breath sounds: Normal breath sounds. No wheezing. No rhonchi. No rales.   Chest:      Chest wall: Not tender to palpatation.   Cardiovascular:      PMI at " left midclavicular line. Normal rate. Regular rhythm. Normal S1. Normal S2.       Murmurs: There is no murmur.      No gallop.  No click. No rub.   Pulses:     Intact distal pulses.   Edema:     Peripheral edema absent.   Abdominal:      General: Bowel sounds are normal.      Palpations: Abdomen is soft.      Tenderness: There is no abdominal tenderness.   Musculoskeletal: Normal range of motion.         General: No tenderness. Skin:     General: Skin is warm and dry.   Neurological:      General: No focal deficit present.      Mental Status: Alert and oriented to person, place and time.         ECG 12 Lead    Date/Time: 4/17/2024 9:25 AM  Performed by: Shaka Arnett Jr., MD    Authorized by: Shaka Arnett Jr., MD            ASSESSMENT:     Nonobstructive coronary artery disease  Dyslipidemia  Hypertension  Dilated aortic root    PLAN OF CARE:     Nonobstructive coronary artery disease -50% mid LAD on cath in 2017.  No angina.  Continue aspirin, calcium channel blocker.  I am concerned for the persistently elevated creatinine kinase levels and I will check troponin, BNP and repeat CK.  AST and ALT were normal.  We will hold pravastatin for 2 weeks and check in on his symptoms.  We will repeat total CK at that time and if this remains elevated he will need consideration for rheumatological evaluation to exclude polymyositis.  Dyslipidemia  Hypertension  Dilated aortic root -stable size on echocardiogram in August 2022  Exertional dyspnea -this is a chronic complaint however with his significant thyroid dysfunction he has been  referred to endocrinology by Dr. Burt.  We did a stat echocardiogram today in clinic which shows stable findings.  No indication for ischemic workup as he has no angina.      Return to clinic 6 months             Discharge Medications            Accurate as of April 17, 2024  9:25 AM. If you have any questions, ask your nurse or doctor.                Continue These Medications         Instructions Start Date   albuterol sulfate  (90 Base) MCG/ACT inhaler  Commonly known as: PROVENTIL HFA;VENTOLIN HFA;PROAIR HFA       amLODIPine 10 MG tablet  Commonly known as: NORVASC   TAKE 1 TABLET BY MOUTH DAILY      amphetamine-dextroamphetamine 20 MG tablet  Commonly known as: ADDERALL   Take 1 tablet by mouth Daily.      aspirin 81 MG EC tablet   Take 1 p.o. daily for blood thinner/heart problem      clobetasol 0.05 % ointment  Commonly known as: TEMOVATE   Apply twice a day as directed      Co Q-10 400 MG capsule   Take 1 p.o. daily with food      ezetimibe 10 MG tablet  Commonly known as: ZETIA   TAKE ONE TABLET BY MOUTH DAILY AS NEEDED HIGH CHOLESTEROL      FLUoxetine 60 MG tablet  Commonly known as: PROzac   60 mg, Oral, Daily      hydrOXYzine 25 MG tablet  Commonly known as: ATARAX   TAKE ONE TABLET BY MOUTH THREE TIMES A DAY AS NEEDED FOR ITCHING      ibuprofen 800 MG tablet  Commonly known as: ADVIL,MOTRIN   Take 1 p.o. 3 times daily with food for arthritic and back pain      losartan 50 MG tablet  Commonly known as: COZAAR   50 mg, Oral, Daily      pravastatin 20 MG tablet  Commonly known as: PRAVACHOL   TAKE ONE TABLET BY MOUTH DAILY      Synthroid 175 MCG tablet  Generic drug: levothyroxine   TAKE 1 TABLET BY MOUTH DAILY FOR LOW THYROID      TiZANidine 2 MG capsule  Commonly known as: ZANAFLEX   2 mg, Oral, 3 Times Daily PRN      vitamin D3 125 MCG (5000 UT) capsule capsule   1 by mouth daily as directed      zolpidem 10 MG tablet  Commonly known as: AMBIEN   Take 1 p.o. nightly as needed for insomnia               Thank you for allowing me to participate in the care of your patient,      Sincerely,   Shaka Arnett MD  [facility]  04/17/24  09:25 EDT

## 2024-04-18 LAB
AORTIC ARCH: 2.5 CM
AORTIC DIMENSIONLESS INDEX: 0.6 (DI)
ASCENDING AORTA: 4 CM
BH CV ECHO LEFT VENTRICLE GLOBAL LONGITUDINAL STRAIN: -17.8 %
BH CV ECHO MEAS - ACS: 1.51 CM
BH CV ECHO MEAS - AI P1/2T: 910.4 MSEC
BH CV ECHO MEAS - AO MAX PG: 14.9 MMHG
BH CV ECHO MEAS - AO MEAN PG: 8 MMHG
BH CV ECHO MEAS - AO ROOT DIAM: 4.4 CM
BH CV ECHO MEAS - AO V2 MAX: 192.7 CM/SEC
BH CV ECHO MEAS - AO V2 VTI: 47.1 CM
BH CV ECHO MEAS - AVA(I,D): 2.33 CM2
BH CV ECHO MEAS - EDV(MOD-SP2): 187 ML
BH CV ECHO MEAS - EDV(MOD-SP4): 188 ML
BH CV ECHO MEAS - EF(MOD-BP): 61.6 %
BH CV ECHO MEAS - EF(MOD-SP2): 55.6 %
BH CV ECHO MEAS - EF(MOD-SP4): 63.8 %
BH CV ECHO MEAS - ESV(MOD-SP2): 83 ML
BH CV ECHO MEAS - ESV(MOD-SP4): 68 ML
BH CV ECHO MEAS - LAT PEAK E' VEL: 7.6 CM/SEC
BH CV ECHO MEAS - LV DIASTOLIC VOL/BSA (35-75): 73 CM2
BH CV ECHO MEAS - LV MAX PG: 4.3 MMHG
BH CV ECHO MEAS - LV MEAN PG: 2.48 MMHG
BH CV ECHO MEAS - LV SYSTOLIC VOL/BSA (12-30): 26.4 CM2
BH CV ECHO MEAS - LV V1 MAX: 103.4 CM/SEC
BH CV ECHO MEAS - LV V1 VTI: 28 CM
BH CV ECHO MEAS - LVOT AREA: 3.9 CM2
BH CV ECHO MEAS - LVOT DIAM: 2.23 CM
BH CV ECHO MEAS - MED PEAK E' VEL: 5.3 CM/SEC
BH CV ECHO MEAS - MV A DUR: 0.19 SEC
BH CV ECHO MEAS - MV A MAX VEL: 50.9 CM/SEC
BH CV ECHO MEAS - MV DEC SLOPE: 385.5 CM/SEC2
BH CV ECHO MEAS - MV DEC TIME: 0.19 SEC
BH CV ECHO MEAS - MV E MAX VEL: 66.2 CM/SEC
BH CV ECHO MEAS - MV E/A: 1.3
BH CV ECHO MEAS - MV MAX PG: 2.8 MMHG
BH CV ECHO MEAS - MV MEAN PG: 1.02 MMHG
BH CV ECHO MEAS - MV P1/2T: 62.9 MSEC
BH CV ECHO MEAS - MV V2 VTI: 38 CM
BH CV ECHO MEAS - MVA(P1/2T): 3.5 CM2
BH CV ECHO MEAS - MVA(VTI): 2.9 CM2
BH CV ECHO MEAS - PA ACC TIME: 0.1 SEC
BH CV ECHO MEAS - PA V2 MAX: 107.2 CM/SEC
BH CV ECHO MEAS - PI END-D VEL: 82.8 CM/SEC
BH CV ECHO MEAS - QP/QS: 0.86
BH CV ECHO MEAS - RV MAX PG: 0.99 MMHG
BH CV ECHO MEAS - RV V1 MAX: 49.7 CM/SEC
BH CV ECHO MEAS - RV V1 VTI: 14.3 CM
BH CV ECHO MEAS - RVOT DIAM: 2.9 CM
BH CV ECHO MEAS - SI(MOD-SP2): 40.4 ML/M2
BH CV ECHO MEAS - SI(MOD-SP4): 46.6 ML/M2
BH CV ECHO MEAS - SV(LVOT): 109.5 ML
BH CV ECHO MEAS - SV(MOD-SP2): 104 ML
BH CV ECHO MEAS - SV(MOD-SP4): 120 ML
BH CV ECHO MEAS - SV(RVOT): 94.3 ML
BH CV ECHO MEAS - TAPSE (>1.6): 2.49 CM
BH CV ECHO MEAS - TR MAX PG: 27.2 MMHG
BH CV ECHO MEAS - TR MAX VEL: 260.6 CM/SEC
BH CV ECHO MEAS RV FREE WALL STRAIN: -20.4 %
BH CV ECHO MEASUREMENTS AVERAGE E/E' RATIO: 10.26
BH CV XLRA - RV BASE: 3.9 CM
BH CV XLRA - RV LENGTH: 7.8 CM
BH CV XLRA - RV MID: 2.7 CM
BH CV XLRA - TDI S': 9.7 CM/SEC
LEFT ATRIUM VOLUME INDEX: 23.2 ML/M2
SINUS: 4.4 CM
STJ: 3.4 CM

## 2024-04-25 ENCOUNTER — OFFICE VISIT (OUTPATIENT)
Dept: ENDOCRINOLOGY | Age: 74
End: 2024-04-25
Payer: MEDICARE

## 2024-04-25 VITALS
WEIGHT: 304 LBS | DIASTOLIC BLOOD PRESSURE: 70 MMHG | SYSTOLIC BLOOD PRESSURE: 138 MMHG | OXYGEN SATURATION: 97 % | BODY MASS INDEX: 40.29 KG/M2 | HEIGHT: 73 IN | HEART RATE: 52 BPM

## 2024-04-25 DIAGNOSIS — E03.9 ACQUIRED HYPOTHYROIDISM: Primary | ICD-10-CM

## 2024-04-25 RX ORDER — LEVOTHYROXINE SODIUM 200 MCG
200 TABLET ORAL DAILY
Qty: 30 TABLET | Refills: 11 | Status: SHIPPED | OUTPATIENT
Start: 2024-04-25 | End: 2025-04-25

## 2024-04-25 NOTE — ASSESSMENT & PLAN NOTE
I explained to patient that he has severe hypothyroidism and he is at increased risk of myxedema coma: It is really important to be compliant with Synthroid and take it properly  Clinically and biochemically hypothyroid  Will increase the dose of Synthroid to 200 mcg daily   Proper pill technique discussed, to separate by 4 hours from multivitamin, calcium or iron, and take on empty stomach at least 30 minutes before breakfast.  Repeat TFT in 6 weeks and adjust the dose of Synthroid accordingly  Given history of hypothyroidism and vitamin D deficiency will screen for celiac disease  If despite proper pill intake and dose adjustment TFT is not improving might switch to Tirosint

## 2024-04-25 NOTE — PROGRESS NOTES
"Chief Complaint  Primary hypothyroidism    Subjective        Durga Valenzuela presents to [facility]  to establish care.       History of Present Illness      Referred for further management of hypothyroidism  Diagnosed with hypothyroidism > 10 years ago  Initially started taking levothyroxine and then switched to Synthroid    The current dose of Synthroid is 175 mcg daily, has been on the current dose for almost a year     Takes it on empty stomach with coffec and creamer     Does not take biotin, amiodarone, multivitamin, calcium and iron pills    Denies history of arrhythmia    Denies history of arrhythmia      Objective   Vital Signs:  /70 (BP Location: Left arm, Patient Position: Sitting)   Pulse 52   Ht 185 cm (72.84\")   Wt (!) 138 kg (304 lb)   SpO2 97%   BMI 40.29 kg/m²   Estimated body mass index is 40.29 kg/m² as calculated from the following:    Height as of this encounter: 185 cm (72.84\").    Weight as of this encounter: 138 kg (304 lb).               Review of Systems   Constitutional:  Positive for fatigue.   Cardiovascular:  Negative for palpitations.   Gastrointestinal:  Negative for constipation.   Endocrine: Positive for cold intolerance. Negative for heat intolerance.   Neurological:  Negative for dizziness and light-headedness.        Physical Exam  Constitutional:       Appearance: He is obese.   HENT:      Head: Normocephalic and atraumatic.   Eyes:      Extraocular Movements: Extraocular movements intact.   Cardiovascular:      Rate and Rhythm: Normal rate and regular rhythm.   Pulmonary:      Effort: Pulmonary effort is normal.      Breath sounds: No wheezing.   Abdominal:      Palpations: Abdomen is soft.      Tenderness: There is no abdominal tenderness.   Musculoskeletal:         General: No swelling. Normal range of motion.      Cervical back: Neck supple. No tenderness.   Neurological:      Mental Status: He is alert and oriented to person, " place, and time.   Psychiatric:         Mood and Affect: Mood normal.        Result Review :  The following data was reviewed by: Mahrokh Nokhbehzaeim, MD on 04/25/2024:  CMP          12/6/2023    09:18 1/25/2024    11:26 4/4/2024    09:03   CMP   Glucose 131  125  124    BUN 24  25  24    Creatinine 1.13  1.04  0.99    Sodium 141  140  139    Potassium 4.5  4.6  4.8    Chloride 101  102  101    Calcium 9.7  9.1  9.6    Total Protein 6.7  6.5  6.6    Albumin 4.6  4.4  4.3    Globulin 2.1  2.1  2.3    Total Bilirubin 0.5  0.3  0.4    Alkaline Phosphatase 61  60  67    AST (SGOT) 32  22  22    ALT (SGPT) 23  16  19    BUN/Creatinine Ratio 21  24  24      CBC          12/6/2023    09:18 1/25/2024    11:26 4/4/2024    09:03   CBC   WBC 6.0  6.5  6.8    RBC 4.31  4.32  4.48    Hemoglobin 13.4  13.6  13.6    Hematocrit 40.6  40.9  41.7    MCV 94  95  93    MCH 31.1  31.5  30.4    MCHC 33.0  33.3  32.6    RDW 16.3  13.8  13.3    Platelets 186  189  239      TSH          12/13/2023    11:17 1/25/2024    11:26 4/4/2024    09:03   TSH   TSH See below:  33.200  91.600      CMP          12/6/2023    09:18 1/25/2024    11:26 4/4/2024    09:03   CMP   Glucose 131  125  124    BUN 24 25  24    Creatinine 1.13  1.04  0.99    Sodium 141  140  139    Potassium 4.5  4.6  4.8    Chloride 101  102  101    Calcium 9.7  9.1  9.6    Total Protein 6.7  6.5  6.6    Albumin 4.6  4.4  4.3    Globulin 2.1  2.1  2.3    Total Bilirubin 0.5  0.3  0.4    Alkaline Phosphatase 61  60  67    AST (SGOT) 32  22  22    ALT (SGPT) 23  16  19    BUN/Creatinine Ratio 21  24  24       CBC          12/6/2023    09:18 1/25/2024    11:26 4/4/2024    09:03   CBC   WBC 6.0  6.5  6.8    RBC 4.31  4.32  4.48    Hemoglobin 13.4  13.6  13.6    Hematocrit 40.6  40.9  41.7    MCV 94  95  93    MCH 31.1  31.5  30.4    MCHC 33.0  33.3  32.6    RDW 16.3  13.8  13.3    Platelets 186  189  239       Lipid Panel          12/6/2023    09:18 1/25/2024    11:26 4/4/2024     09:03   Lipid Panel   Total Cholesterol 122  99  113    Triglycerides 124  100  134       Most Recent A1C          4/4/2024    09:03   HGBA1C Most Recent   Hemoglobin A1C 6.2       TSH          12/13/2023    11:17 1/25/2024    11:26 4/4/2024    09:03   TSH   TSH See below:  33.200  91.600       Free T4   Date Value Ref Range Status   04/04/2024 0.56 (L) 0.82 - 1.77 ng/dL Final      T3, Free   Date Value Ref Range Status   04/04/2024 2.1 2.0 - 4.4 pg/mL Final      25 Hydroxy, Vitamin D   Date Value Ref Range Status   01/25/2024 43.7 30.0 - 100.0 ng/mL Final     Comment:     Vitamin D deficiency has been defined by the [place] of  Medicine and an Endocrine Society practice guideline as a  level of serum 25-OH vitamin D less than 20 ng/mL (1,2).  The Endocrine Society went on to further define vitamin D  insufficiency as a level between 21 and 29 ng/mL (2).  1. IOM ([place] [employer]). 2010. Dietary reference     intakes for calcium and D. [place]: The     [employer].  2. Oscar MF, Linda NC, Lashonda PACHECO, et al.     Evaluation, treatment, and prevention of vitamin D     deficiency: an Endocrine Society clinical practice     guideline. JCEM. 2011 Jul; 96(7):1911-30.        Data reviewed : Radiologic studies MRI hip and CT head             Assessment and Plan   Diagnoses and all orders for this visit:    1. Acquired hypothyroidism (Primary)  Assessment & Plan:  I explained to patient that he has severe hypothyroidism and he is at increased risk of myxedema coma: It is really important to be compliant with Synthroid and take it properly  Clinically and biochemically hypothyroid  Will increase the dose of Synthroid to 200 mcg daily   Proper pill technique discussed, to separate by 4 hours from multivitamin, calcium or iron, and take on empty stomach at least 30 minutes before breakfast.  Repeat TFT in 6 weeks and adjust the dose of Synthroid accordingly  Given history of hypothyroidism  and vitamin D deficiency will screen for celiac disease  If despite proper pill intake and dose adjustment TFT is not improving might switch to Tirosint        Orders:  -     Synthroid 200 MCG tablet; Take 1 tablet by mouth Daily.  Dispense: 30 tablet; Refill: 11  -     T4, Free; Future  -     T3, Free; Future  -     Celiac Comprehensive Panel; Future  -     Thyroglobulin Antibody; Future  -     Thyroid Peroxidase Antibody; Future             Follow Up   Return in about 3 months (around 7/25/2024).  Patient was given instructions and counseling regarding his condition or for health maintenance advice. Please see specific information pulled into the AVS if appropriate.

## 2024-06-29 DIAGNOSIS — I10 BENIGN ESSENTIAL HYPERTENSION: Chronic | ICD-10-CM

## 2024-07-01 RX ORDER — AMLODIPINE BESYLATE 10 MG/1
TABLET ORAL
Qty: 90 TABLET | Refills: 0 | Status: SHIPPED | OUTPATIENT
Start: 2024-07-01

## 2024-07-05 DIAGNOSIS — E55.9 VITAMIN D DEFICIENCY: Chronic | ICD-10-CM

## 2024-07-05 NOTE — TELEPHONE ENCOUNTER
Rx Refill Note  Requested Prescriptions     Pending Prescriptions Disp Refills    vitamin D3 125 MCG (5000 UT) capsule capsule 30 capsule 1     Si by mouth daily as directed      Last office visit with prescribing clinician: 2024   Last telemedicine visit with prescribing clinician: Visit date not found   Next office visit with prescribing clinician: 12/3/2024                         Would you like a call back once the refill request has been completed: [] Yes [] No    If the office needs to give you a call back, can they leave a voicemail: [] Yes [] No    Sosa Cast MA  24, 13:07 EDT

## 2024-07-25 ENCOUNTER — OFFICE VISIT (OUTPATIENT)
Dept: ENDOCRINOLOGY | Age: 74
End: 2024-07-25
Payer: MEDICARE

## 2024-07-25 VITALS
BODY MASS INDEX: 38.73 KG/M2 | DIASTOLIC BLOOD PRESSURE: 74 MMHG | SYSTOLIC BLOOD PRESSURE: 138 MMHG | HEART RATE: 59 BPM | WEIGHT: 292.2 LBS | TEMPERATURE: 98.2 F | HEIGHT: 73 IN | OXYGEN SATURATION: 97 %

## 2024-07-25 DIAGNOSIS — E06.3 HASHIMOTO'S THYROIDITIS: Primary | ICD-10-CM

## 2024-07-25 DIAGNOSIS — E55.9 VITAMIN D DEFICIENCY: ICD-10-CM

## 2024-07-25 LAB
25(OH)D3+25(OH)D2 SERPL-MCNC: 32.7 NG/ML (ref 30–100)
TSH SERPL DL<=0.005 MIU/L-ACNC: 3.26 UIU/ML (ref 0.27–4.2)

## 2024-07-25 NOTE — PROGRESS NOTES
"Chief Complaint  Hypothyroidism    Subjective        Durga Valenzuela presents to [facility]  for follow up.       History of Present Illness      Diagnosed with hypothyroidism > 10 years ago  Initially started taking levothyroxine and then switched to Synthroid     The current dose of Synthroid is 200 mcg daily   Takes it on empty stomach before breakfast      Does not take biotin, calcium and iron pills     Denies history of arrhythmia  Takes Vitamin D 5000 units daily    Component      Latest Ref Rng 7/18/2024 7/25/2024   Gliadin Deamidated Peptide Ab, IgA      0 - 19 units 6     Deaminated Gliadin Ab IgG      0 - 19 units 3     Tissue Transglutaminase IgA      0 - 3 U/mL <2     Tissue Transglutaminase IgG      0 - 5 U/mL <2     Endomysial IgA      Negative  Negative     IgA      61 - 437 mg/dL 158     Free T4      0.92 - 1.68 ng/dL 1.23     T3, Free      2.0 - 4.4 pg/mL 2.5     Thyroglobulin Ab      0.0 - 0.9 IU/mL 111.1 (H)     Thyroid Peroxidase Antibody      0 - 34 IU/mL 9     TSH Baseline      0.270 - 4.200 uIU/mL  3.260    25 Hydroxy, Vitamin D      30.0 - 100.0 ng/ml  32.7           Objective   Vital Signs:  /74   Pulse 59   Temp 98.2 °F (36.8 °C) (Oral)   Ht 185 cm (72.84\")   Wt 133 kg (292 lb 3.2 oz)   SpO2 97%   BMI 38.73 kg/m²   Estimated body mass index is 38.73 kg/m² as calculated from the following:    Height as of this encounter: 185 cm (72.84\").    Weight as of this encounter: 133 kg (292 lb 3.2 oz).                   Physical Exam  Constitutional:       Appearance: He is obese.   HENT:      Head: Normocephalic and atraumatic.   Eyes:      Extraocular Movements: Extraocular movements intact.   Cardiovascular:      Rate and Rhythm: Normal rate and regular rhythm.   Pulmonary:      Effort: Pulmonary effort is normal.      Breath sounds: Normal breath sounds. No wheezing.   Abdominal:      Palpations: Abdomen is soft.      Tenderness: There is no abdominal " tenderness.   Musculoskeletal:      Cervical back: Neck supple. No tenderness.   Neurological:      Mental Status: He is alert and oriented to person, place, and time.   Psychiatric:         Mood and Affect: Mood normal.        Result Review :  The following data was reviewed by: Mahrokh Nokhbehzaeim, MD on 07/25/2024:  CMP          12/6/2023    09:18 1/25/2024    11:26 4/4/2024    09:03   CMP   Glucose 131  125  124    BUN 24  25  24    Creatinine 1.13  1.04  0.99    Sodium 141  140  139    Potassium 4.5  4.6  4.8    Chloride 101  102  101    Calcium 9.7  9.1  9.6    Total Protein 6.7  6.5  6.6    Albumin 4.6  4.4  4.3    Globulin 2.1  2.1  2.3    Total Bilirubin 0.5  0.3  0.4    Alkaline Phosphatase 61  60  67    AST (SGOT) 32  22  22    ALT (SGPT) 23  16  19    BUN/Creatinine Ratio 21  24  24      CMP          12/6/2023    09:18 1/25/2024    11:26 4/4/2024    09:03   CMP   Glucose 131  125  124    BUN 24  25  24    Creatinine 1.13  1.04  0.99    Sodium 141  140  139    Potassium 4.5  4.6  4.8    Chloride 101  102  101    Calcium 9.7  9.1  9.6    Total Protein 6.7  6.5  6.6    Albumin 4.6  4.4  4.3    Globulin 2.1  2.1  2.3    Total Bilirubin 0.5  0.3  0.4    Alkaline Phosphatase 61  60  67    AST (SGOT) 32  22  22    ALT (SGPT) 23  16  19    BUN/Creatinine Ratio 21  24  24       TSH          1/25/2024    11:26 4/4/2024    09:03 7/25/2024    10:36   TSH   TSH 33.200  91.600  3.260       Free T4   Date Value Ref Range Status   07/18/2024 1.23 0.92 - 1.68 ng/dL Final      T3, Free   Date Value Ref Range Status   07/18/2024 2.5 2.0 - 4.4 pg/mL Final      25 Hydroxy, Vitamin D   Date Value Ref Range Status   07/25/2024 32.7 30.0 - 100.0 ng/ml Final     Comment:     Reference Range for Total Vitamin D 25(OH)  Deficiency <20.0 ng/mL  Insufficiency 21-29 ng/mL  Sufficiency  ng/mL  Toxicity >100 ng/ml                     Assessment and Plan   Diagnoses and all orders for this visit:    1. Hashimoto's thyroiditis  (Primary)  Assessment & Plan:  Recent TSH, free T4 and free T3 are within the normal range  Continue the current dose of Synthroid 200 mcg daily    Orders:  -     TSH    2. Vitamin D deficiency  Assessment & Plan:  Recheck vitamin D level is normal    Orders:  -     Vitamin D,25-Hydroxy             Follow Up   Return in about 6 months (around 1/25/2025).  Patient was given instructions and counseling regarding his condition or for health maintenance advice. Please see specific information pulled into the AVS if appropriate.

## 2024-07-26 PROBLEM — E06.3 HASHIMOTO'S THYROIDITIS: Status: ACTIVE | Noted: 2024-07-26

## 2024-07-26 NOTE — ASSESSMENT & PLAN NOTE
Recent TSH, free T4 and free T3 are within the normal range  Continue the current dose of Synthroid 200 mcg daily

## 2024-09-10 DIAGNOSIS — E55.9 VITAMIN D DEFICIENCY: Chronic | ICD-10-CM

## 2024-09-14 DIAGNOSIS — E55.9 VITAMIN D DEFICIENCY: Chronic | ICD-10-CM

## 2024-09-16 DIAGNOSIS — E55.9 VITAMIN D DEFICIENCY: Chronic | ICD-10-CM

## 2024-09-18 ENCOUNTER — TELEPHONE (OUTPATIENT)
Dept: CARDIOLOGY | Facility: CLINIC | Age: 74
End: 2024-09-18
Payer: MEDICARE

## 2024-09-25 ENCOUNTER — TELEPHONE (OUTPATIENT)
Dept: INTERNAL MEDICINE | Facility: CLINIC | Age: 74
End: 2024-09-25
Payer: MEDICARE

## 2024-09-25 DIAGNOSIS — E55.9 VITAMIN D DEFICIENCY: Chronic | ICD-10-CM

## 2024-09-26 DIAGNOSIS — L40.9 PSORIASIS: Chronic | ICD-10-CM

## 2024-09-27 DIAGNOSIS — M48.062 SPINAL STENOSIS OF LUMBAR REGION WITH NEUROGENIC CLAUDICATION: Chronic | ICD-10-CM

## 2024-09-27 RX ORDER — IBUPROFEN 800 MG/1
TABLET, FILM COATED ORAL
Qty: 90 TABLET | Refills: 1 | Status: SHIPPED | OUTPATIENT
Start: 2024-09-27

## 2024-09-27 RX ORDER — HYDROXYZINE HYDROCHLORIDE 25 MG/1
25 TABLET, FILM COATED ORAL 3 TIMES DAILY PRN
Qty: 60 TABLET | Refills: 3 | Status: SHIPPED | OUTPATIENT
Start: 2024-09-27

## 2024-10-04 DIAGNOSIS — I10 BENIGN ESSENTIAL HYPERTENSION: Chronic | ICD-10-CM

## 2024-10-04 RX ORDER — AMLODIPINE BESYLATE 10 MG/1
TABLET ORAL
Qty: 90 TABLET | Refills: 3 | Status: SHIPPED | OUTPATIENT
Start: 2024-10-04

## 2024-10-10 DIAGNOSIS — I10 BENIGN ESSENTIAL HYPERTENSION: Chronic | ICD-10-CM

## 2024-10-10 RX ORDER — AMLODIPINE BESYLATE 10 MG/1
TABLET ORAL
Qty: 90 TABLET | Refills: 3 | OUTPATIENT
Start: 2024-10-10

## 2024-11-04 RX ORDER — LOSARTAN POTASSIUM 50 MG/1
50 TABLET ORAL DAILY
Qty: 90 TABLET | Refills: 3 | Status: SHIPPED | OUTPATIENT
Start: 2024-11-04

## 2024-11-05 RX ORDER — ALBUTEROL SULFATE 90 UG/1
1 INHALANT RESPIRATORY (INHALATION) AS NEEDED
Qty: 18 G | Refills: 3 | Status: SHIPPED | OUTPATIENT
Start: 2024-11-05 | End: 2024-11-07 | Stop reason: SDUPTHER

## 2024-11-06 ENCOUNTER — TELEPHONE (OUTPATIENT)
Dept: INTERNAL MEDICINE | Facility: CLINIC | Age: 74
End: 2024-11-06

## 2024-11-06 NOTE — TELEPHONE ENCOUNTER
Caller: Durga Valenzuela    Relationship: Self    Best call back number: [phone]     Who are you requesting to speak with (clinical staff, provider,  specific staff member): CLINICAL STAFF    What was the call regarding: PATIENT STATES THAT THE PHARMACY INFORMED HIM THAT HIS PRESCRIPTION FOR albuterol sulfate  (90 Base) MCG/ACT inhaler NEEDS A PRIOR AUTHORIZATION, AND THAT THEY SENT INFORMATION TO DR FONG. REQUESTS A CALL BACK TO FOLLOW UP ON STATUS

## 2024-11-07 DIAGNOSIS — R06.02 SHORTNESS OF BREATH ON EXERTION: Primary | ICD-10-CM

## 2024-11-07 RX ORDER — ALBUTEROL SULFATE 90 UG/1
1 INHALANT RESPIRATORY (INHALATION) EVERY 4 HOURS PRN
Qty: 18 G | Refills: 1 | Status: SHIPPED | OUTPATIENT
Start: 2024-11-07

## 2024-11-07 NOTE — TELEPHONE ENCOUNTER
Per insurance the Ventolin HFA is the preferred medication so per Dr Burt ok to switch. New script sent to the pharmacy and I also called and left a detailed message with the pharmacy to dispense the Ventolin inhaler. I also informed the patient.

## 2024-11-07 NOTE — TELEPHONE ENCOUNTER
I have done PA on cover my meds, waiting on response. Will call patient once I get approval or denial.

## 2024-12-23 ENCOUNTER — OFFICE VISIT (OUTPATIENT)
Dept: INTERNAL MEDICINE | Facility: CLINIC | Age: 74
End: 2024-12-23
Payer: MEDICARE

## 2024-12-23 VITALS
HEIGHT: 73 IN | HEART RATE: 57 BPM | SYSTOLIC BLOOD PRESSURE: 136 MMHG | WEIGHT: 281 LBS | TEMPERATURE: 99.1 F | RESPIRATION RATE: 16 BRPM | OXYGEN SATURATION: 95 % | BODY MASS INDEX: 37.24 KG/M2 | DIASTOLIC BLOOD PRESSURE: 68 MMHG

## 2024-12-23 DIAGNOSIS — E06.3 HASHIMOTO'S THYROIDITIS: Chronic | ICD-10-CM

## 2024-12-23 DIAGNOSIS — N39.42 URINARY INCONTINENCE WITHOUT SENSORY AWARENESS: Chronic | ICD-10-CM

## 2024-12-23 DIAGNOSIS — E66.01 MORBID OBESITY: Chronic | ICD-10-CM

## 2024-12-23 DIAGNOSIS — R60.0 BILATERAL LOWER EXTREMITY EDEMA: Chronic | ICD-10-CM

## 2024-12-23 DIAGNOSIS — G47.33 OBSTRUCTIVE SLEEP APNEA: Chronic | ICD-10-CM

## 2024-12-23 DIAGNOSIS — M51.360 DEGENERATION OF INTERVERTEBRAL DISC OF LUMBAR REGION WITH DISCOGENIC BACK PAIN: Chronic | ICD-10-CM

## 2024-12-23 DIAGNOSIS — I25.10 NON-OCCLUSIVE CORONARY ARTERY DISEASE: Chronic | ICD-10-CM

## 2024-12-23 DIAGNOSIS — E78.2 MIXED HYPERLIPIDEMIA: Chronic | ICD-10-CM

## 2024-12-23 DIAGNOSIS — Z86.711 HISTORY OF PULMONARY EMBOLISM: Chronic | ICD-10-CM

## 2024-12-23 DIAGNOSIS — I10 BENIGN ESSENTIAL HYPERTENSION: Chronic | ICD-10-CM

## 2024-12-23 DIAGNOSIS — Z86.0100 HISTORY OF COLON POLYPS: ICD-10-CM

## 2024-12-23 DIAGNOSIS — N40.1 BENIGN PROSTATIC HYPERPLASIA WITH URINARY FREQUENCY: Chronic | ICD-10-CM

## 2024-12-23 DIAGNOSIS — Z51.81 THERAPEUTIC DRUG MONITORING: ICD-10-CM

## 2024-12-23 DIAGNOSIS — Z12.11 COLON CANCER SCREENING: ICD-10-CM

## 2024-12-23 DIAGNOSIS — M48.062 SPINAL STENOSIS OF LUMBAR REGION WITH NEUROGENIC CLAUDICATION: Chronic | ICD-10-CM

## 2024-12-23 DIAGNOSIS — Z91.09 MULTIPLE ENVIRONMENTAL ALLERGIES: Chronic | ICD-10-CM

## 2024-12-23 DIAGNOSIS — F41.8 DEPRESSION WITH ANXIETY: Chronic | ICD-10-CM

## 2024-12-23 DIAGNOSIS — M15.0 PRIMARY OSTEOARTHRITIS INVOLVING MULTIPLE JOINTS: Chronic | ICD-10-CM

## 2024-12-23 DIAGNOSIS — F51.04 CHRONIC INSOMNIA: Chronic | ICD-10-CM

## 2024-12-23 DIAGNOSIS — R73.01 IMPAIRED FASTING GLUCOSE: Chronic | ICD-10-CM

## 2024-12-23 DIAGNOSIS — Z86.16 HISTORY OF 2019 NOVEL CORONAVIRUS DISEASE (COVID-19): Chronic | ICD-10-CM

## 2024-12-23 DIAGNOSIS — F31.78 BIPOLAR DISORDER, IN FULL REMISSION, MOST RECENT EPISODE MIXED: Chronic | ICD-10-CM

## 2024-12-23 DIAGNOSIS — R35.0 BENIGN PROSTATIC HYPERPLASIA WITH URINARY FREQUENCY: Chronic | ICD-10-CM

## 2024-12-23 DIAGNOSIS — E03.9 PRIMARY HYPOTHYROIDISM: Chronic | ICD-10-CM

## 2024-12-23 DIAGNOSIS — R35.1 BENIGN PROSTATIC HYPERPLASIA WITH NOCTURIA: ICD-10-CM

## 2024-12-23 DIAGNOSIS — F43.10 POST TRAUMATIC STRESS DISORDER (PTSD): Chronic | ICD-10-CM

## 2024-12-23 DIAGNOSIS — Z00.00 ENCOUNTER FOR SUBSEQUENT ANNUAL WELLNESS VISIT (AWV) IN MEDICARE PATIENT: Primary | ICD-10-CM

## 2024-12-23 DIAGNOSIS — N40.1 BENIGN PROSTATIC HYPERPLASIA WITH NOCTURIA: ICD-10-CM

## 2024-12-23 DIAGNOSIS — J30.1 CHRONIC SEASONAL ALLERGIC RHINITIS DUE TO POLLEN: Chronic | ICD-10-CM

## 2024-12-23 DIAGNOSIS — E55.9 VITAMIN D DEFICIENCY: Chronic | ICD-10-CM

## 2024-12-23 DIAGNOSIS — L40.9 PSORIASIS: Chronic | ICD-10-CM

## 2024-12-23 PROBLEM — S12.000K: Chronic | Status: RESOLVED | Noted: 2023-08-07 | Resolved: 2024-12-23

## 2024-12-23 PROBLEM — M48.07 FORAMINAL STENOSIS OF LUMBOSACRAL REGION: Chronic | Status: ACTIVE | Noted: 2024-06-13

## 2024-12-23 PROBLEM — M54.16 CHRONIC LEFT-SIDED LUMBAR RADICULOPATHY: Chronic | Status: ACTIVE | Noted: 2024-06-13

## 2024-12-23 PROBLEM — M48.07 FORAMINAL STENOSIS OF LUMBOSACRAL REGION: Status: ACTIVE | Noted: 2024-06-13

## 2024-12-23 PROBLEM — S76.301S: Status: RESOLVED | Noted: 2023-11-27 | Resolved: 2024-12-23

## 2024-12-23 PROBLEM — M54.16 CHRONIC LEFT-SIDED LUMBAR RADICULOPATHY: Status: ACTIVE | Noted: 2024-06-13

## 2024-12-23 PROCEDURE — G0439 PPPS, SUBSEQ VISIT: HCPCS | Performed by: INTERNAL MEDICINE

## 2024-12-23 PROCEDURE — 1170F FXNL STATUS ASSESSED: CPT | Performed by: INTERNAL MEDICINE

## 2024-12-23 PROCEDURE — 1125F AMNT PAIN NOTED PAIN PRSNT: CPT | Performed by: INTERNAL MEDICINE

## 2024-12-23 PROCEDURE — 3075F SYST BP GE 130 - 139MM HG: CPT | Performed by: INTERNAL MEDICINE

## 2024-12-23 PROCEDURE — 3078F DIAST BP <80 MM HG: CPT | Performed by: INTERNAL MEDICINE

## 2024-12-23 NOTE — PROGRESS NOTES
Subjective   The ABCs of the Annual Wellness Visit  Medicare Wellness Visit      Durga Valenzuela is a 74 y.o. patient who presents for a Medicare Wellness Visit.    The following portions of the patient's history were reviewed and   updated as appropriate: allergies, current medications, past family history, past medical history, past social history, past surgical history, and problem list.    Compared to one year ago, the patient's physical   health is better.  Compared to one year ago, the patient's mental   health is better.    Recent Hospitalizations:  He was admitted within the past 365 days at [facility].     Current Medical Providers:  Patient Care Team:  Florian Burt MD as PCP - General (Internal Medicine)    Outpatient Medications Prior to Visit   Medication Sig Dispense Refill    albuterol sulfate  (90 Base) MCG/ACT inhaler Inhale 1 puff Every 4 (Four) Hours As Needed for Wheezing or Shortness of Air. 18 g 1    amLODIPine (NORVASC) 10 MG tablet TAKE 1 TABLET BY MOUTH DAILY 90 tablet 3    amphetamine-dextroamphetamine (ADDERALL) 20 MG tablet Take 1 tablet by mouth As Needed.      aspirin 81 MG EC tablet Take 1 p.o. daily for blood thinner/heart problem      clobetasol (TEMOVATE) 0.05 % ointment Apply twice a day as directed      Coenzyme Q10 (Co Q-10) 400 MG capsule Take 1 p.o. daily with food      ezetimibe (ZETIA) 10 MG tablet TAKE ONE TABLET BY MOUTH DAILY AS NEEDED HIGH CHOLESTEROL 90 tablet 3    FLUoxetine (PROzac) 60 MG tablet Take 1 tablet by mouth Daily.      hydrOXYzine (ATARAX) 25 MG tablet Take 1 tablet by mouth 3 (Three) Times a Day As Needed for Itching. 60 tablet 3    ibuprofen (ADVIL,MOTRIN) 800 MG tablet Take 1 p.o. 3 times daily with food for arthritic and back pain 90 tablet 1    losartan (COZAAR) 50 MG tablet TAKE 1 TABLET BY MOUTH DAILY 90 tablet 3    Synthroid 200 MCG tablet Take 1 tablet by mouth Daily. 30 tablet 11    vitamin D3 125 MCG (5000 UT) capsule capsule  TAKE 1 CAPSULE BY MOUTH DAILY AS DIRECTED 30 capsule 1    zolpidem (AMBIEN) 10 MG tablet Take 1 p.o. nightly as needed for insomnia       No facility-administered medications prior to visit.     No opioid medication identified on active medication list. I have reviewed chart for other potential  high risk medication/s and harmful drug interactions in the elderly.      Aspirin is on active medication list. Aspirin use is not indicated based on review of current medical condition/s. Risk of harm outweighs potential benefits. Patient instructed to discontinue this medication.  .      Patient Active Problem List   Diagnosis    Post traumatic stress disorder (PTSD)    Benign prostatic hyperplasia with urinary frequency    Chronic insomnia    Lumbar degenerative disc disease    Impaired fasting glucose    Benign essential hypertension    Primary osteoarthritis involving multiple joints    Hyperlipidemia    Primary hypothyroidism    Obstructive sleep apnea, tolerates CPAP well.  Previously failed oral appliance.    History of pulmonary embolism    Non-occlusive coronary artery disease, 05/16/2017--normal LM; proximal LAD mild LI; 50% mid LAD.  Mild distal LAD LI; normal Cx.  Mild LI marginal branches; normal RCA, nondominant.  EF 60%.    Therapeutic drug monitoring    Vitamin D deficiency    Psoriasis    Allergic rhinitis    Depression with anxiety    Multiple environmental allergies    Bilateral lower extremity edema    Morbid obesity    Spinal stenosis of lumbar region with neurogenic claudication    Traumatic complete tear of left rotator cuff    Urinary incontinence without sensory awareness    Bipolar disorder, in full remission, most recent episode mixed    Hashimoto's thyroiditis    Foraminal stenosis of lumbosacral region    Chronic left-sided lumbar radiculopathy    History of colon polyps, 6/13/2019--adenomatous x 1.    History of 2019 novel coronavirus disease (COVID-19)     Advance Care Planning Advance  "Directive is on file.  ACP discussion was held with the patient during this visit. Patient has an advance directive in EMR which is still valid.             Objective   Vitals:    24 1210   BP: 136/68   Pulse: 57   Resp: 16   Temp: 99.1 °F (37.3 °C)   TempSrc: Oral   SpO2: 95%   Weight: 127 kg (281 lb)   Height: 185 cm (72.84\")       Estimated body mass index is 37.24 kg/m² as calculated from the following:    Height as of this encounter: 185 cm (72.84\").    Weight as of this encounter: 127 kg (281 lb).    Class 2 Severe Obesity (BMI >=35 and <=39.9). Obesity-related health conditions include the following: obstructive sleep apnea, hypertension, impaired fasting glucose, dyslipidemias, and osteoarthritis. Obesity is improving with lifestyle modifications. BMI is is above average; BMI management plan is completed. We discussed low calorie, low carb based diet program, portion control, and increasing exercise.           Does the patient have evidence of cognitive impairment? No                                                                                               Health  Risk Assessment    Smoking Status:  Social History     Tobacco Use   Smoking Status Never   Smokeless Tobacco Never   Tobacco Comments    CAFFEINE USE 2 CUPS COFFEE AND 1 GLASS TEA DAILY     Alcohol Consumption:  Social History     Substance and Sexual Activity   Alcohol Use No       Fall Risk Screen  STEADI Fall Risk Assessment was completed, and patient is at MODERATE risk for falls. Assessment completed on:2024    Depression Screening   Little interest or pleasure in doing things? Not at all   Feeling down, depressed, or hopeless? Not at all   PHQ-2 Total Score 0      Health Habits and Functional and Cognitive Screenin/23/2024    12:11 PM   Functional & Cognitive Status   Do you have difficulty preparing food and eating? No   Do you have difficulty bathing yourself, getting dressed or grooming yourself? No   Do you have " difficulty using the toilet? No   Do you have difficulty moving around from place to place? Yes   Do you have trouble with steps or getting out of a bed or a chair? Yes   Current Diet Well Balanced Diet   Dental Exam Up to date   Eye Exam Up to date   Exercise (times per week) 0 times per week   Current Exercises Include No Regular Exercise   Do you need help using the phone?  No   Are you deaf or do you have serious difficulty hearing?  No   Do you need help to go to places out of walking distance? Yes   Do you need help shopping? No   Do you need help preparing meals?  No   Do you need help with housework?  No   Do you need help with laundry? No   Do you need help taking your medications? No   Do you need help managing money? No   Do you ever drive or ride in a car without wearing a seat belt? No   Have you felt unusual stress, anger or loneliness in the last month? No   Who do you live with? Spouse   If you need help, do you have trouble finding someone available to you? No   Have you been bothered in the last four weeks by sexual problems? No   Do you have difficulty concentrating, remembering or making decisions? No           Age-appropriate Screening Schedule:  Refer to the list below for future screening recommendations based on patient's age, sex and/or medical conditions. Orders for these recommended tests are listed in the plan section. The patient has been provided with a written plan.    Health Maintenance List  Health Maintenance   Topic Date Due    COLORECTAL CANCER SCREENING  06/13/2024    ANNUAL WELLNESS VISIT  11/27/2024    RSV Vaccine - Adults (1 - 1-dose 75+ series) 04/11/2025 (Originally 9/7/2024)    LIPID PANEL  04/04/2025    BMI FOLLOWUP  12/23/2025    HEPATITIS C SCREENING  Completed    INFLUENZA VACCINE  Completed    Pneumococcal Vaccine 65+  Completed    AAA SCREEN (ONE-TIME)  Completed    COVID-19 Vaccine  Discontinued    TDAP/TD VACCINES  Discontinued    ZOSTER VACCINE  Discontinued                                                                                                                                                 CMS Preventative Services Quick Reference  Risk Factors Identified During Encounter  Chronic Pain: Natural history and expected course discussed. Questions answered.  Depression/Dysphoria: Current medication is effective, no change recommended  Fall Risk-High or Moderate: Discussed Fall Prevention in the home  Immunizations Discussed/Encouraged: Influenza  Urinary Incontinence:  Already evaluated by urology    The above risks/problems have been discussed with the patient.  Pertinent information has been shared with the patient in the After Visit Summary.  An After Visit Summary and PPPS were made available to the patient.    Follow Up:   Next Medicare Wellness visit to be scheduled in 1 year.     Assessment & Plan  Encounter for subsequent annual wellness visit (AWV) in Medicare patient         Impaired fasting glucose    Orders:    Comprehensive Metabolic Panel; Future    Hemoglobin A1c; Future    Urinalysis With Microscopic If Indicated (No Culture) - Urine, Clean Catch; Future    Hyperlipidemia       Orders:    CK; Future    Comprehensive Metabolic Panel; Future    NMR LipoProfile; Future    Benign essential hypertension      Orders:    Comprehensive Metabolic Panel; Future    Hashimoto's thyroiditis         Primary hypothyroidism    Orders:    TSH; Future    T4, Free; Future    T3, Free; Future    Benign prostatic hyperplasia with urinary frequency         Urinary incontinence without sensory awareness         Vitamin D deficiency    Orders:    Vitamin D,25-Hydroxy; Future    Bilateral lower extremity edema         Obstructive sleep apnea, tolerates CPAP well.  Previously failed oral appliance.         History of pulmonary embolism         Non-occlusive coronary artery disease, 05/16/2017--normal LM; proximal LAD mild LI; 50% mid LAD.  Mild distal LAD LI; normal Cx.  Mild LI  marginal branches; normal RCA, nondominant.  EF 60%.           Psoriasis         Allergic rhinitis         Multiple environmental allergies         Morbid obesity  Patient's (Body mass index is 37.24 kg/m².) indicates that they are obese (BMI >30) with health conditions that include obstructive sleep apnea, hypertension, impaired fasting glucose, dyslipidemias, osteoarthritis, and urinary stress incontinence . Weight is improving with lifestyle modifications. BMI  is above average; BMI management plan is completed. We discussed low calorie, low carb based diet program, portion control, and increasing exercise.          Chronic insomnia         Depression with anxiety           Bipolar disorder, in full remission, most recent episode mixed           Post traumatic stress disorder (PTSD)           Primary osteoarthritis involving multiple joints  Schedule fasting lab and follow-up sometime in June       Degeneration of intervertebral disc of lumbar region with discogenic back pain         Spinal stenosis of lumbar region with neurogenic claudication         Colon cancer screening    Orders:    Ambulatory Referral For Screening Colonoscopy    History of colon polyps, 6/13/2019--adenomatous x 1.    Orders:    Ambulatory Referral For Screening Colonoscopy    Therapeutic drug monitoring    Orders:    CBC (No Diff); Future    History of 2019 novel coronavirus disease (COVID-19)    Orders:    SARS-CoV-2 Antibodies, Nucleocapsid (Natural Immunity); Future    Benign prostatic hyperplasia with nocturia    Orders:    PSA DIAGNOSTIC; Future         Follow Up:   Return in about 6 months (around 6/23/2025) for Next scheduled follow up with lab prior.

## 2024-12-23 NOTE — ASSESSMENT & PLAN NOTE
Patient's (Body mass index is 37.24 kg/m².) indicates that they are obese (BMI >30) with health conditions that include obstructive sleep apnea, hypertension, impaired fasting glucose, dyslipidemias, osteoarthritis, and urinary stress incontinence . Weight is improving with lifestyle modifications. BMI  is above average; BMI management plan is completed. We discussed low calorie, low carb based diet program, portion control, and increasing exercise.

## 2024-12-23 NOTE — ASSESSMENT & PLAN NOTE
Orders:    Comprehensive Metabolic Panel; Future    Hemoglobin A1c; Future    Urinalysis With Microscopic If Indicated (No Culture) - Urine, Clean Catch; Future

## 2024-12-31 ENCOUNTER — READMISSION MANAGEMENT (OUTPATIENT)
Dept: CALL CENTER | Facility: HOSPITAL | Age: 74
End: 2024-12-31
Payer: MEDICARE

## 2024-12-31 NOTE — OUTREACH NOTE
Prep Survey      Flowsheet Row Responses   [facility] facility patient discharged from? Non-BH   Is LACE score < 7 ? Non-BH Discharge   Eligibility [facility]   Date of Admission 12/30/24   Date of Discharge 12/31/24   Discharge Disposition Home or Self Care   Discharge diagnosis Spinal stenosis of lumbar region with neurogenic claudication   Does the patient have one of the following disease processes/diagnoses(primary or secondary)? General Surgery   Does the patient have Home health ordered? No   Is there a DME ordered? No   Prep survey completed? Yes            Edyta GUTIÉRREZ - Registered Nurse

## 2025-01-02 ENCOUNTER — TRANSITIONAL CARE MANAGEMENT TELEPHONE ENCOUNTER (OUTPATIENT)
Dept: CALL CENTER | Facility: HOSPITAL | Age: 75
End: 2025-01-02
Payer: MEDICARE

## 2025-01-02 NOTE — OUTREACH NOTE
Call Center TCM Note      Flowsheet Row Responses   [facility] patient discharged from? Non-BH  [VELA]   Does the patient have one of the following disease processes/diagnoses(primary or secondary)? Other   TCM attempt successful? Yes  [SPOUSE _ VR]   Call start time 1133   Call end time 1139   Discharge diagnosis Spinal stenosis of lumbar region with neurogenic claudication   Meds reviewed with patient/caregiver? Yes   Is the patient having any side effects they believe may be caused by any medication additions or changes? No   Does the patient have all medications ordered at discharge? Yes   Is the patient taking all medications as directed (includes completed medication regime)? Yes   Comments Pt declined HOSP DC FU appt . Pt reports taking more pain meds than ordered without relief advised to to call surgeon office and education provided.   Does the patient have an appointment with their PCP within 7-14 days of discharge? No   Nursing Interventions Patient declined scheduling/rescheduling appointment at this time, Routed TCM call to PCP office, Patient desires to follow up with specialty only   Has home health visited the patient within 72 hours of discharge? Unsure   Psychosocial issues? No   Did the patient receive a copy of their discharge instructions? Yes   What is the patient's perception of their health status since discharge? Improving   Is the patient/caregiver able to teach back signs and symptoms related to disease process for when to call PCP? Yes   Is the patient/caregiver able to teach back signs and symptoms related to disease process for when to call 911? Yes   Is the patient/caregiver able to teach back the hierarchy of who to call/visit for symptoms/problems? PCP, Specialist, Home health nurse, Urgent Care, ED, 911 Yes   TCM call completed? Yes   Wrap up additional comments Pt reports improvement but states that his pain is not controlled. Pt reports he thinks the pain pills may be  "\"fake\". States he took 4 pills over 1 1/2 and it did nothing. Educated Pt on the direction of use and not to take more than ordered r/t adverse health effects and overdose. Pt RALPH . Advised Pt to call surgeon office at this time to report uncontrolled pain.   Call end time [id number]            Renae Farrar RN    1/2/2025, 11:40 EST        "

## 2025-01-21 ENCOUNTER — OFFICE VISIT (OUTPATIENT)
Dept: INTERNAL MEDICINE | Facility: CLINIC | Age: 75
End: 2025-01-21
Payer: MEDICARE

## 2025-01-21 VITALS
WEIGHT: 278 LBS | BODY MASS INDEX: 36.84 KG/M2 | TEMPERATURE: 98.5 F | OXYGEN SATURATION: 96 % | RESPIRATION RATE: 16 BRPM | HEIGHT: 73 IN | HEART RATE: 72 BPM | SYSTOLIC BLOOD PRESSURE: 128 MMHG | DIASTOLIC BLOOD PRESSURE: 72 MMHG

## 2025-01-21 DIAGNOSIS — R35.0 BENIGN PROSTATIC HYPERPLASIA WITH URINARY FREQUENCY: Chronic | ICD-10-CM

## 2025-01-21 DIAGNOSIS — N40.1 BENIGN PROSTATIC HYPERPLASIA WITH URINARY FREQUENCY: Chronic | ICD-10-CM

## 2025-01-21 DIAGNOSIS — N39.42 URINARY INCONTINENCE WITHOUT SENSORY AWARENESS: Chronic | ICD-10-CM

## 2025-01-21 DIAGNOSIS — H81.13 BENIGN PAROXYSMAL POSITIONAL VERTIGO DUE TO BILATERAL VESTIBULAR DISORDER: Primary | ICD-10-CM

## 2025-01-21 DIAGNOSIS — Z91.81 AT RISK FOR FALLS: ICD-10-CM

## 2025-01-21 PROCEDURE — 99214 OFFICE O/P EST MOD 30 MIN: CPT | Performed by: INTERNAL MEDICINE

## 2025-01-21 PROCEDURE — 3074F SYST BP LT 130 MM HG: CPT | Performed by: INTERNAL MEDICINE

## 2025-01-21 PROCEDURE — 1160F RVW MEDS BY RX/DR IN RCRD: CPT | Performed by: INTERNAL MEDICINE

## 2025-01-21 PROCEDURE — 1125F AMNT PAIN NOTED PAIN PRSNT: CPT | Performed by: INTERNAL MEDICINE

## 2025-01-21 PROCEDURE — 3078F DIAST BP <80 MM HG: CPT | Performed by: INTERNAL MEDICINE

## 2025-01-21 PROCEDURE — 1159F MED LIST DOCD IN RCRD: CPT | Performed by: INTERNAL MEDICINE

## 2025-01-21 NOTE — PROGRESS NOTES
01/21/2025    Patient Information  Durga Valenzuela                                                                                          [id number] [facility] [address]      1950  [unfilled]  There is no work phone number on file.    Chief Complaint:     Complaining of being lightheaded and off balance.    History of Present Illness:    Patient with a multitude of chronic medical problems as noted in the problem list presents today with a 1 month history of feeling off balance.  He is describing a spinning sensation/vertigo and this seems to come on with certain movements like when he first gets up.  He has to sit on the side of bed for a while for the dizziness to stop.  Rolling over in bed seems to aggravate the situation as well.  Patient has not been getting any better since onset a month ago and is worried that he might fall.  No other neurologic symptoms.    Review of Systems   Constitutional: Negative.   HENT: Negative.     Eyes: Negative.    Cardiovascular: Negative.    Respiratory: Negative.     Endocrine: Negative.    Hematologic/Lymphatic: Negative.    Skin: Negative.    Musculoskeletal: Negative.    Gastrointestinal: Negative.    Genitourinary: Negative.    Neurological: Negative.  Positive for dizziness, loss of balance and vertigo.   Psychiatric/Behavioral: Negative.     Allergic/Immunologic: Negative.        Active Problems:    Patient Active Problem List   Diagnosis    Post traumatic stress disorder (PTSD)    Benign prostatic hyperplasia with urinary frequency    Chronic insomnia    Lumbar degenerative disc disease    Impaired fasting glucose    Benign essential hypertension    Primary osteoarthritis involving multiple joints    Hyperlipidemia    Primary hypothyroidism    Obstructive sleep apnea, tolerates CPAP well.  Previously failed oral appliance.    History of pulmonary embolism    Non-occlusive coronary artery disease, 05/16/2017--normal LM; proximal LAD mild LI;  50% mid LAD.  Mild distal LAD LI; normal Cx.  Mild LI marginal branches; normal RCA, nondominant.  EF 60%.    Therapeutic drug monitoring    Vitamin D deficiency    Psoriasis    Allergic rhinitis    Depression with anxiety    Multiple environmental allergies    Bilateral lower extremity edema    Morbid obesity    Spinal stenosis of lumbar region with neurogenic claudication    Traumatic complete tear of left rotator cuff    Urinary incontinence without sensory awareness    Bipolar disorder, in full remission, most recent episode mixed    Hashimoto's thyroiditis    Foraminal stenosis of lumbosacral region    Chronic left-sided lumbar radiculopathy    History of colon polyps, 6/13/2019--adenomatous x 1.    History of 2019 novel coronavirus disease (COVID-19)    Benign paroxysmal positional vertigo due to bilateral vestibular disorder         Past Medical History:   Diagnosis Date    Allergic rhinitis 08/21/2018    Patient has had allergy testing in the past which revealed allergies to molds and grass.  Immunotherapy for about 2 years in the 1980s.    Benign essential hypertension 02/25/2016    Benign prostatic hyperplasia with urinary frequency 02/25/2016    Bilateral lower extremity edema 08/21/2018    Bipolar disorder, in full remission, most recent episode mixed 09/30/2021    Chronic insomnia 02/25/2016    Chronic left-sided lumbar radiculopathy 06/13/2024    Closed fracture of first cervical vertebra with nonunion - chronic 08/07/2023    Closed traumatic lateral subluxation of patellofemoral joint, right, initial encounter 08/13/2018    08/10/2018--patient was evaluated by the orthopedist for right knee pain and found to have lateral subluxation of the right patella associated with presence of right artificial knee joint.  Physical therapy no help.  Surgery is scheduled 09/17/2018    Depression with anxiety 08/21/2018    Foraminal stenosis of lumbosacral region 06/13/2024    Hashimoto's thyroiditis 07/26/2024     History of 2019 novel coronavirus disease (COVID-19) 12/23/2024    History of colon polyps, 6/13/2019--adenomatous x 1. 12/23/2024 06/13/2019--colonoscopy revealed 3 mm polyp was found in the cecum. The polyp was multi-lobulated. The polyp was removed with a cold biopsy forceps. Resection and retrieval were complete.  Pathology returned adenomatous polyp      History of deep venous thrombosis (DVT) of distal vein of right lower extremity 08/21/2018 04/26/2016--CTA of the chest performed for elevated d-dimer and progressive shortness of breath and chest pain for one month revealed bilateral occlusive in near occlusive segmental and subsegmental pulmonary emboli in all lobes of the right lung as well as within the left lower lobe.  No evidence of pulmonary infarct.  Nonspecific multifocal groundglass attenuation in the right pulmonary apex, perhaps representing pneumonitis or submental atelectasis.  Questionable cholelithiasis.  Doppler venous study was positive for DVT in the right popliteal vein.  Hypercoagulable workup was normal.  He was treated with Eliquis for a total of 6 months and was subsequently discontinued.    History of pulmonary embolism 06/10/2016    04/26/2016--CTA of the chest performed for elevated d-dimer and progressive shortness of breath and chest pain for one month revealed bilateral occlusive in near occlusive segmental and subsegmental pulmonary emboli in all lobes of the right lung as well as within the left lower lobe.  No evidence of pulmonary infarct.  Nonspecific multifocal groundglass attenuation in the right pulmonary apex, perhaps representing pneumonitis or submental atelectasis.  Questionable cholelithiasis.  Doppler venous study was positive for DVT in the right popliteal vein.  Hypercoagulable workup was normal.  He was treated with Eliquis for a total of 6 months and was subsequently discontinued.    Hyperlipidemia 02/25/2016    Impaired fasting glucose 02/25/2016    Lumbar  degenerative disc disease 02/25/2016    Morbidly obese 10/19/2018    Multiple environmental allergies 08/21/2018    Patient has had allergy testing in the past which revealed allergies to molds and grass.  Immunotherapy for about 2 years in the 1980s.    Non-occlusive coronary artery disease, 05/16/2017--normal LM; proximal LAD mild LI; 50% mid LAD.  Mild distal LAD LI; normal Cx.  Mild LI marginal branches; normal RCA, nondominant.  EF 60%. 07/24/2017 05/16/2017--cardiac catheterization performed for abnormal stress test.  Normal LV with ejection fraction 60%.  Dilated aortic root.  No wall motion abnormalities.  Normal left main.  Ramus branch small caliber and normal.  Proximal LAD large caliber and mild luminal irregularities.  50% mid LAD.  Mild luminal irregularities distal LAD.  3 diagonal branches of small caliber with mild luminal irregularities.  Normal septal branches.  Circumflex is large caliber and free of disease.  Marginal branches are medium caliber with mild luminal irregularities.  RCA is a medium caliber and free of disease.  It is nondominant.    Obstructive sleep apnea, tolerates CPAP well.  Previously failed oral appliance. 02/25/2016    Post traumatic stress disorder (PTSD) 02/25/2016    Primary hypothyroidism 02/25/2016    Primary osteoarthritis involving multiple joints 02/25/2016    Psoriasis 09/17/2014    Spinal stenosis of lumbar region with neurogenic claudication 02/15/2021    Traumatic complete tear of left rotator cuff 03/17/2021 March 14, 2021--patient was evaluated by the orthopedic surgeon after he slipped on ice and fell onto his left shoulder.  Work-up revealed complete rotator cuff tear involving 2 tendons.  Specifically, he has an acute left supraspinatus tear and acute left infraspinatus tear.  He also has left glenohumeral joint osteoarthritis.  Apparently this is not repairable and patient would need a shoulder r    Urinary incontinence without sensory awareness  03/17/2021    Vitamin D deficiency 08/21/2018         Past Surgical History:   Procedure Laterality Date    CARDIAC CATHETERIZATION N/A 05/16/2017    Procedure: Coronary angiography;  Surgeon: Malcolm Chen MD;  Location:  [facility];  Service:     CARDIAC CATHETERIZATION N/A 05/16/2017    Procedure: Left Heart Cath;  Surgeon: Malcolm Chen MD;  Location:  [facility];  Service:     CARDIAC CATHETERIZATION N/A 05/16/2017    Procedure: Left ventriculography;  Surgeon: Malcolm Chen MD;  Location:  [facility];  Service:     CARDIAC CATHETERIZATION N/A 04/26/2023    Procedure: Left Heart Cath;  Surgeon: Mary Reynolds MD;  Location: [facility] CATH [facility]VASIVE LOC[facility];  Service: Cardiovascular;  Laterality: N/A;    CARDIAC CATHETERIZATION N/A 04/26/2023    Procedure: Coronary angiography;  Surgeon: Mary Reynolds MD;  Location:  [facility]VAS[facility];  Service: Cardiovascular;  Laterality: N/A;    CARDIAC CATHETERIZATION N/A 04/26/2023    Procedure: Left ventriculography;  Surgeon: Mary Reynolds MD;  Location:  [facility];  Service: Cardiovascular;  Laterality: N/A;    COLONOSCOPY  2005 2005--colonoscopy reportedly normal.  Records not available.    COLONOSCOPY N/A 06/13/2019    Procedure: COLONOSCOPY INTO CECUM WITH COLD BIOPSY POLYPECTOMY;  Surgeon: Ayaan Gallardo MD;  Location: [facility] ENDOSCOPY;  Service: General    LUMBAR DECOMPRESSION  2007 2007--lumbar decompression without fusion or hardware.    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    REVISION TOTAL KNEE ARTHROPLASTY Right 09/17/2018 09/17/2018--right total  knee arthroplasty revision due to lateral subluxation of the patella due to multiple factors.    TOTAL KNEE ARTHROPLASTY Left 11/03/2017 11/03/2017--right total knee replacement complicated by patellofemoral subluxation.    TOTAL KNEE ARTHROPLASTY Left 06/2014 June 2014--left total knee replacement.    TOTAL KNEE ARTHROPLASTY REVISION Left 12/05/2022    Procedure: LEFT TOTAL KNEE REVISION Complex with Patella Tendon Reconstruction;  Surgeon: Sid Simon MD;  Location: [facility];  Service: Orthopedics;  Laterality: Left;         Allergies   Allergen Reactions    Hydrocodone Other (See Comments)     Severe vomiting - but CAN tolerate oxycodone per patient    Zocor  [Simvastatin]      MYALGIA           Current Outpatient Medications:     albuterol sulfate  (90 Base) MCG/ACT inhaler, Inhale 1 puff Every 4 (Four) Hours As Needed for Wheezing or Shortness of Air., Disp: 18 g, Rfl: 1    amLODIPine (NORVASC) 10 MG tablet, TAKE 1 TABLET BY MOUTH DAILY, Disp: 90 tablet, Rfl: 3    amphetamine-dextroamphetamine (ADDERALL) 20 MG tablet, Take 1 tablet by mouth As Needed., Disp: , Rfl:     aspirin 81 MG EC tablet, Take 1 p.o. daily for blood thinner/heart problem, Disp: , Rfl:     clobetasol (TEMOVATE) 0.05 % ointment, Apply twice a day as directed, Disp: , Rfl:     Coenzyme Q10 (Co Q-10) 400 MG capsule, Take 1 p.o. daily with food, Disp: , Rfl:     ezetimibe (ZETIA) 10 MG tablet, TAKE ONE TABLET BY MOUTH DAILY AS NEEDED HIGH CHOLESTEROL, Disp: 90 tablet, Rfl: 3    FLUoxetine (PROzac) 60 MG tablet, Take 1 tablet by mouth Daily., Disp: , Rfl:     hydrOXYzine (ATARAX) 25 MG tablet, Take 1 tablet by mouth 3 (Three) Times a Day As Needed for Itching., Disp: 60 tablet, Rfl: 3    ibuprofen (ADVIL,MOTRIN) 800 MG tablet, Take 1 p.o. 3 times daily with food for arthritic and back pain, Disp: 90 tablet, Rfl: 1    losartan (COZAAR) 50 MG tablet, TAKE 1 TABLET BY MOUTH DAILY, Disp: 90 tablet, Rfl: 3    Synthroid 200 MCG  "tablet, Take 1 tablet by mouth Daily., Disp: 30 tablet, Rfl: 11    vitamin D3 125 MCG (5000 UT) capsule capsule, TAKE 1 CAPSULE BY MOUTH DAILY AS DIRECTED, Disp: 30 capsule, Rfl: 1    zolpidem (AMBIEN) 10 MG tablet, Take 1 p.o. nightly as needed for insomnia, Disp: , Rfl:       Family History   Problem Relation Age of Onset    Coronary artery disease Mother         Status post CABG    Alzheimer's disease Mother     Stroke Father     Liver cancer Father         Father with biliary cancer including gallbladder    Cancer Father     No Known Problems Sister     No Known Problems Brother          Social History     Socioeconomic History    Marital status:      Spouse name: italo    Highest education level: Bachelor's degree (e.g., BA, AB, BS)   Tobacco Use    Smoking status: Never    Smokeless tobacco: Never    Tobacco comments:     CAFFEINE USE 2 CUPS COFFEE AND 1 GLASS TEA DAILY   Vaping Use    Vaping status: Never Used   Substance and Sexual Activity    Alcohol use: No    Drug use: Never     Types: Marijuana     Comment: weekly    Sexual activity: Yes     Partners: Female         Vitals:    01/21/25 1043   BP: 128/72   Pulse: 72   Resp: 16   Temp: 98.5 °F (36.9 °C)   TempSrc: Oral   SpO2: 96%   Weight: 126 kg (278 lb)   Height: 185 cm (72.84\")        Body mass index is 36.84 kg/m².      Physical Exam:    General: Alert and oriented x 3.  No acute distress.  Normal affect.  Obese.HEENT: Pupils equal, round, reactive to light; extraocular movements intact; sclerae nonicteric; pharynx, ear canals and TMs normal.  There is bilateral lateral endpoint nystagmus noted.  Neck: Without JVD, thyromegaly, bruit, or adenopathy.  Lungs: Clear to auscultation in all fields.  Heart: Regular rate and rhythm without murmur, rub, gallop, or click.  Abdomen: Soft, nontender, without hepatosplenomegaly or hernia.  Bowel sounds normal.  : Deferred.  Rectal: Deferred.  Extremities: Without clubbing, cyanosis, edema, or pulse " deficit.  Neurologic: Intact without focal deficit.  Patient ambulating with assistance of a cane.  Somewhat ataxic.  Skin: Without significant lesion.  Musculoskeletal: Unremarkable.    Lab/other results:      Assessment/Plan:     Diagnosis Plan   1. Benign paroxysmal positional vertigo due to bilateral vestibular disorder  Ambulatory Referral to Physical Therapy for Evaluation & Treatment      2. At risk for falls  Ambulatory Referral to Physical Therapy for Evaluation & Treatment      3. Benign prostatic hyperplasia with urinary frequency  Ambulatory Referral to Urology      4. Urinary incontinence without sensory awareness  Ambulatory Referral to Urology        Patient presents with symptoms that are classic for benign paroxysmal positional vertigo and I think he needs to have vestibular evaluation and therapy soon as possible.  He is at risk for falls.     Plan is as follows: Therapy referral for vestibular evaluation and treatment.  ASAP.    Addendum: Patient has BPH with urinary frequency and also urinary incontinence without sensory awareness.  He wants to be referred to a different urologist.  He had some problems with his last urologist.  Referral placed.      Procedures

## 2025-01-23 ENCOUNTER — TELEPHONE (OUTPATIENT)
Dept: ENDOCRINOLOGY | Age: 75
End: 2025-01-23
Payer: MEDICARE

## 2025-01-24 ENCOUNTER — TELEPHONE (OUTPATIENT)
Dept: ENDOCRINOLOGY | Age: 75
End: 2025-01-24

## 2025-01-24 ENCOUNTER — OFFICE VISIT (OUTPATIENT)
Dept: ENDOCRINOLOGY | Age: 75
End: 2025-01-24
Payer: MEDICARE

## 2025-01-24 ENCOUNTER — LAB (OUTPATIENT)
Facility: HOSPITAL | Age: 75
End: 2025-01-24
Payer: MEDICARE

## 2025-01-24 VITALS
DIASTOLIC BLOOD PRESSURE: 70 MMHG | SYSTOLIC BLOOD PRESSURE: 110 MMHG | BODY MASS INDEX: 37.16 KG/M2 | WEIGHT: 280.4 LBS | HEART RATE: 61 BPM | OXYGEN SATURATION: 98 % | TEMPERATURE: 97.5 F

## 2025-01-24 DIAGNOSIS — E06.3 HASHIMOTO'S THYROIDITIS: Primary | ICD-10-CM

## 2025-01-24 DIAGNOSIS — M81.0 AGE-RELATED OSTEOPOROSIS WITHOUT CURRENT PATHOLOGICAL FRACTURE: ICD-10-CM

## 2025-01-24 DIAGNOSIS — E55.9 VITAMIN D DEFICIENCY: ICD-10-CM

## 2025-01-24 LAB
T4 FREE SERPL-MCNC: 0.69 NG/DL (ref 0.92–1.68)
TSH SERPL DL<=0.05 MIU/L-ACNC: 29.3 UIU/ML (ref 0.27–4.2)

## 2025-01-24 PROCEDURE — 84439 ASSAY OF FREE THYROXINE: CPT | Performed by: STUDENT IN AN ORGANIZED HEALTH CARE EDUCATION/TRAINING PROGRAM

## 2025-01-24 PROCEDURE — 36415 COLL VENOUS BLD VENIPUNCTURE: CPT | Performed by: STUDENT IN AN ORGANIZED HEALTH CARE EDUCATION/TRAINING PROGRAM

## 2025-01-24 PROCEDURE — 84443 ASSAY THYROID STIM HORMONE: CPT | Performed by: STUDENT IN AN ORGANIZED HEALTH CARE EDUCATION/TRAINING PROGRAM

## 2025-01-24 RX ORDER — LEVOTHYROXINE SODIUM 25 UG/1
25 TABLET ORAL
Qty: 90 TABLET | Refills: 1 | Status: SHIPPED | OUTPATIENT
Start: 2025-01-24

## 2025-01-24 NOTE — PROGRESS NOTES
"Chief Complaint  Hashimoto's Thyroiditis    Subjective        Durga Valenzuela presents to [facility] ENDOCRINOLOGY for follow up     Hashimoto's Thyroiditis          Diagnosed with hypothyroidism > 10 years ago  Initially started taking levothyroxine and then switched to Synthroid  Positive thyroglobulin antibody    The current dose of Synthroid is 200 mcg daily   Takes it on empty stomach before breakfast      Does not take biotin, calcium and iron pills     Denies palpitations  Denies cold intolerance      Vitamin D 44.6   Takes Vitamin D 5000 units daily    Spinal stenosis s/p spinal fusion December 2024  Hx of C1 burst fracture     Component      Latest Ref Rng 7/18/2024   Gliadin Deamidated Peptide Ab, IgA      0 - 19 units 6    Deaminated Gliadin Ab IgG      0 - 19 units 3    Tissue Transglutaminase IgA      0 - 3 U/mL <2    Tissue Transglutaminase IgG      0 - 5 U/mL <2    Endomysial IgA      Negative  Negative    IgA      61 - 437 mg/dL 158    Free T4      0.92 - 1.68 ng/dL 1.23    T3, Free      2.0 - 4.4 pg/mL 2.5    Thyroglobulin Ab      0.0 - 0.9 IU/mL 111.1 (H)    Thyroid Peroxidase Antibody      0 - 34 IU/mL 9       Legend:  (H) High    Objective   Vital Signs:  /70   Pulse 61   Temp 97.5 °F (36.4 °C) (Oral)   Wt 127 kg (280 lb 6.4 oz)   SpO2 98%   BMI 37.16 kg/m²   Estimated body mass index is 37.16 kg/m² as calculated from the following:    Height as of 1/21/25: 185 cm (72.84\").    Weight as of this encounter: 127 kg (280 lb 6.4 oz).                   Physical Exam  Constitutional:       Appearance: He is obese.   Eyes:      Extraocular Movements: Extraocular movements intact.   Cardiovascular:      Rate and Rhythm: Normal rate.   Pulmonary:      Effort: Pulmonary effort is normal.      Breath sounds: Normal breath sounds. No wheezing.   Abdominal:      Palpations: Abdomen is soft.      Tenderness: There is no abdominal tenderness.   Musculoskeletal:         General: No " swelling. Normal range of motion.      Cervical back: Neck supple. No tenderness.   Neurological:      Mental Status: He is alert and oriented to person, place, and time.      Comments: No tremor   Psychiatric:         Mood and Affect: Mood normal.        Result Review :  The following data was reviewed by: Mahrokh Nokhbehzaeim, MD on 01/24/2025:  CMP          4/4/2024    09:03   CMP   Glucose 124    BUN 24    Creatinine 0.99    Sodium 139    Potassium 4.8    Chloride 101    Calcium 9.6    Total Protein 6.6    Albumin 4.3    Globulin 2.3    Total Bilirubin 0.4    Alkaline Phosphatase 67    AST (SGOT) 22    ALT (SGPT) 19    BUN/Creatinine Ratio 24      CMP          4/4/2024    09:03   CMP   Glucose 124    BUN 24    Creatinine 0.99    Sodium 139    Potassium 4.8    Chloride 101    Calcium 9.6    Total Protein 6.6    Albumin 4.3    Globulin 2.3    Total Bilirubin 0.4    Alkaline Phosphatase 67    AST (SGOT) 22    ALT (SGPT) 19    BUN/Creatinine Ratio 24       TSH          4/4/2024    09:03 7/25/2024    10:36   TSH   TSH 91.600  3.260       Free T4   Date Value Ref Range Status   07/18/2024 1.23 0.92 - 1.68 ng/dL Final      25 Hydroxy, Vitamin D   Date Value Ref Range Status   07/25/2024 32.7 30.0 - 100.0 ng/ml Final     Comment:     Reference Range for Total Vitamin D 25(OH)  Deficiency <20.0 ng/mL  Insufficiency 21-29 ng/mL  Sufficiency  ng/mL  Toxicity >100 ng/ml        Thyroglobulin Ab   Date Value Ref Range Status   07/18/2024 111.1 (H) 0.0 - 0.9 IU/mL Final     Comment:     Thyroglobulin Antibody measured by [employer]  It should be noted that the presence of thyroglobulin antibodies  may not be pathogenic nor diagnostic, especially at very low  levels. The assay  has found that four percent of  individuals without evidence of thyroid disease or autoimmunity  will have positive TgAb levels up to 4 IU/mL.        Thyroid Peroxidase Antibody   Date Value Ref Range Status    07/18/2024 9 0 - 34 IU/mL Final                 Assessment and Plan   Diagnoses and all orders for this visit:    1. Hashimoto's thyroiditis (Primary)  Assessment & Plan:  Clinically euthyroid  Continue the current dose of Synthroid 200 mcg daily  Repeat labs today    Orders:  -     TSH  -     T4, Free    2. Vitamin D deficiency  Assessment & Plan:  Continue the current dose of vitamin D supplements  Recheck labs before next visit    Orders:  -     DEXA Bone Density Axial    3. Age-related osteoporosis without current pathological fracture  Assessment & Plan:  Given age, thyroid disease and vitamin D deficiency DEXA scan ordered  Fall precaution discussed  Further testing and management based on results    Orders:  -     DEXA Bone Density Axial             Follow Up   Return in about 4 months (around 5/24/2025).  Patient was given instructions and counseling regarding his condition or for health maintenance advice. Please see specific information pulled into the AVS if appropriate.

## 2025-01-24 NOTE — ASSESSMENT & PLAN NOTE
Given age, thyroid disease and vitamin D deficiency DEXA scan ordered  Fall precaution discussed  Further testing and management based on results

## 2025-01-24 NOTE — TELEPHONE ENCOUNTER
Called and discussed the results  TSH 29.3 and free T40.69  Reports compliance with Synthroid 200 mcg daily  Will increase the dose of Synthroid to 225, he just picked up the prescription for 200 will send in a new prescription for 25 mcg daily  Repeat labs in 4 weeks, he will take 1 tablet of each dose daily morning before breakfast  Expressed understanding of the information provided

## 2025-02-07 DIAGNOSIS — M48.062 SPINAL STENOSIS OF LUMBAR REGION WITH NEUROGENIC CLAUDICATION: Chronic | ICD-10-CM

## 2025-02-10 ENCOUNTER — TELEPHONE (OUTPATIENT)
Dept: ENDOCRINOLOGY | Age: 75
End: 2025-02-10
Payer: MEDICARE

## 2025-02-10 RX ORDER — IBUPROFEN 800 MG/1
TABLET, FILM COATED ORAL
Qty: 90 TABLET | Refills: 1 | Status: SHIPPED | OUTPATIENT
Start: 2025-02-10

## 2025-02-10 NOTE — TELEPHONE ENCOUNTER
Called and left message for patient to see if he has been able to schedule a appointment to get Bone Density Scan done.

## 2025-03-10 ENCOUNTER — PRIOR AUTHORIZATION (OUTPATIENT)
Dept: ENDOCRINOLOGY | Age: 75
End: 2025-03-10
Payer: MEDICARE

## 2025-03-10 NOTE — TELEPHONE ENCOUNTER
Prior Authorization submitted for    Synthroid 200MCG tablets    Key: [id number]  PA Case ID #: [id number]  Rx #: [id number]    Request Reference Number: [id number]. SYNTHROID TAB 200MCG is approved through 12/31/2025. Your patient may now fill this prescription and it will be covered.  Effective Date: 3/10/2025  Authorization Expiration Date: 12/31/2025

## 2025-05-05 ENCOUNTER — TELEPHONE (OUTPATIENT)
Dept: INTERNAL MEDICINE | Facility: CLINIC | Age: 75
End: 2025-05-05

## 2025-05-05 NOTE — TELEPHONE ENCOUNTER
Caller: Durga Valenzuela    Relationship: Self    Best call back number: [phone]     What is the medical concern/diagnosis: VERTIGO ISSUES    What specialty or service is being requested: PT    What is the provider, practice or medical service name: [facility]    What is the office location: [address] [place] [address]    What is the office phone number: PH: [phone], FX: [phone]

## 2025-05-06 DIAGNOSIS — H81.13 BENIGN PAROXYSMAL POSITIONAL VERTIGO DUE TO BILATERAL VESTIBULAR DISORDER: Primary | ICD-10-CM

## 2025-05-12 ENCOUNTER — TELEPHONE (OUTPATIENT)
Dept: ENDOCRINOLOGY | Age: 75
End: 2025-05-12
Payer: MEDICARE

## 2025-05-15 DIAGNOSIS — Z12.11 SCREENING FOR COLON CANCER: Primary | ICD-10-CM

## 2025-05-20 ENCOUNTER — LAB (OUTPATIENT)
Facility: HOSPITAL | Age: 75
End: 2025-05-20
Payer: MEDICARE

## 2025-05-20 DIAGNOSIS — E06.3 HASHIMOTO'S THYROIDITIS: ICD-10-CM

## 2025-05-20 LAB
T4 FREE SERPL-MCNC: 1.35 NG/DL (ref 0.92–1.68)
TSH SERPL DL<=0.05 MIU/L-ACNC: 0.35 UIU/ML (ref 0.27–4.2)

## 2025-05-20 PROCEDURE — 36415 COLL VENOUS BLD VENIPUNCTURE: CPT

## 2025-05-20 PROCEDURE — 84443 ASSAY THYROID STIM HORMONE: CPT

## 2025-05-20 PROCEDURE — 84439 ASSAY OF FREE THYROXINE: CPT

## 2025-05-23 ENCOUNTER — OFFICE VISIT (OUTPATIENT)
Dept: ENDOCRINOLOGY | Age: 75
End: 2025-05-23
Payer: MEDICARE

## 2025-05-23 VITALS
DIASTOLIC BLOOD PRESSURE: 80 MMHG | OXYGEN SATURATION: 99 % | WEIGHT: 273.8 LBS | TEMPERATURE: 97.7 F | HEART RATE: 59 BPM | SYSTOLIC BLOOD PRESSURE: 124 MMHG | BODY MASS INDEX: 36.29 KG/M2

## 2025-05-23 DIAGNOSIS — E06.3 HASHIMOTO'S THYROIDITIS: Primary | ICD-10-CM

## 2025-05-23 DIAGNOSIS — E55.9 VITAMIN D DEFICIENCY: ICD-10-CM

## 2025-05-23 RX ORDER — LEVOTHYROXINE SODIUM 200 MCG
200 TABLET ORAL DAILY
Qty: 90 TABLET | Refills: 1 | Status: SHIPPED | OUTPATIENT
Start: 2025-05-23 | End: 2026-05-23

## 2025-05-23 NOTE — PROGRESS NOTES
"Chief Complaint  Hashimoto's Thyroiditis (Needs refill on Synthroid)    Subjective        Durga Valenzuela presents to [facility] ENDOCRINOLOGY  Hashimoto's Thyroiditis          Diagnosed with hypothyroidism > 10 years ago  Initially started taking levothyroxine and then switched to Synthroid  Positive thyroglobulin antibody     The current dose of Synthroid is 225 mcg daily   Takes it on empty stomach before breakfast   Does not take biotin, calcium and iron pills     Denies palpitations or unintentional weight loss  Denies cold intolerance     Takes Vitamin D 5000 units daily     Spinal stenosis s/p spinal fusion December 2024  Hx of C1 burst fracture   Dexa scan pending         Component      Latest Ref Rng 1/24/2025 5/20/2025   TSH Baseline      0.270 - 4.200 uIU/mL 29.300 (H)  0.353    Free T4      0.92 - 1.68 ng/dL 0.69 (L)  1.35       Legend:  (H) High  (L) Low    Objective   Vital Signs:  /80   Pulse 59   Temp 97.7 °F (36.5 °C) (Oral)   Wt 124 kg (273 lb 12.8 oz)   SpO2 99%   BMI 36.29 kg/m²   Estimated body mass index is 36.29 kg/m² as calculated from the following:    Height as of 1/21/25: 185 cm (72.84\").    Weight as of this encounter: 124 kg (273 lb 12.8 oz).                   Physical Exam  Constitutional:       Appearance: He is obese.   Cardiovascular:      Rate and Rhythm: Normal rate.   Pulmonary:      Effort: Pulmonary effort is normal.      Breath sounds: Normal breath sounds. No wheezing.   Abdominal:      Palpations: Abdomen is soft.      Tenderness: There is no abdominal tenderness.   Musculoskeletal:         General: No swelling.      Cervical back: Neck supple. No tenderness.   Neurological:      Mental Status: He is alert and oriented to person, place, and time.      Comments: No tremor   Psychiatric:         Mood and Affect: Mood normal.        Result Review :  The following data was reviewed by: Mahrokh Nokhbehzaeim, MD on 05/23/2025:          TSH          " 7/25/2024    10:36 1/24/2025    09:45 5/20/2025    10:26   TSH   TSH 3.260  29.300  0.353       Free T4   Date Value Ref Range Status   05/20/2025 1.35 0.92 - 1.68 ng/dL Final      25 Hydroxy, Vitamin D   Date Value Ref Range Status   07/25/2024 32.7 30.0 - 100.0 ng/ml Final     Comment:     Reference Range for Total Vitamin D 25(OH)  Deficiency <20.0 ng/mL  Insufficiency 21-29 ng/mL  Sufficiency  ng/mL  Toxicity >100 ng/ml                     Assessment and Plan   Diagnoses and all orders for this visit:    1. Hashimoto's thyroiditis (Primary)  Assessment & Plan:  Clinically and biochemically euthyroid  Continue the current dose of Synthroid 225 mcg daily  Repeat labs in 3 months or sooner if has hyperthyroidism symptoms-voiced understanding of the information provided  Synthroid sample provided    Orders:  -     TSH; Future  -     T4, Free; Future  -     Synthroid 200 MCG tablet; Take 1 tablet by mouth Daily.  Dispense: 90 tablet; Refill: 1    2. Vitamin D deficiency  Assessment & Plan:  Recheck vitamin D level in 3 months    Orders:  -     Vitamin D,25-Hydroxy; Future             Follow Up   Return in about 6 months (around 11/23/2025).  Patient was given instructions and counseling regarding his condition or for health maintenance advice. Please see specific information pulled into the AVS if appropriate.

## 2025-05-23 NOTE — ASSESSMENT & PLAN NOTE
Clinically and biochemically euthyroid  Continue the current dose of Synthroid 225 mcg daily  Repeat labs in 3 months or sooner if has hyperthyroidism symptoms-voiced understanding of the information provided  Synthroid sample provided

## 2025-06-08 DIAGNOSIS — E78.2 MIXED HYPERLIPIDEMIA: Chronic | ICD-10-CM

## 2025-06-09 RX ORDER — EZETIMIBE 10 MG/1
10 TABLET ORAL DAILY
Qty: 90 TABLET | Refills: 3 | Status: SHIPPED | OUTPATIENT
Start: 2025-06-09

## 2025-06-16 DIAGNOSIS — M48.062 SPINAL STENOSIS OF LUMBAR REGION WITH NEUROGENIC CLAUDICATION: Chronic | ICD-10-CM

## 2025-06-17 RX ORDER — IBUPROFEN 800 MG/1
TABLET, FILM COATED ORAL
Qty: 90 TABLET | Refills: 1 | Status: SHIPPED | OUTPATIENT
Start: 2025-06-17

## 2025-06-23 ENCOUNTER — OFFICE VISIT (OUTPATIENT)
Dept: INTERNAL MEDICINE | Facility: CLINIC | Age: 75
End: 2025-06-23
Payer: MEDICARE

## 2025-06-23 VITALS
OXYGEN SATURATION: 97 % | TEMPERATURE: 98.5 F | SYSTOLIC BLOOD PRESSURE: 118 MMHG | BODY MASS INDEX: 35.78 KG/M2 | WEIGHT: 270 LBS | RESPIRATION RATE: 16 BRPM | HEIGHT: 73 IN | DIASTOLIC BLOOD PRESSURE: 78 MMHG | HEART RATE: 67 BPM

## 2025-06-23 DIAGNOSIS — E55.9 VITAMIN D DEFICIENCY: Chronic | ICD-10-CM

## 2025-06-23 DIAGNOSIS — Z51.81 THERAPEUTIC DRUG MONITORING: ICD-10-CM

## 2025-06-23 DIAGNOSIS — E06.3 HASHIMOTO'S THYROIDITIS: Chronic | ICD-10-CM

## 2025-06-23 DIAGNOSIS — R35.0 BENIGN PROSTATIC HYPERPLASIA WITH URINARY FREQUENCY: Chronic | ICD-10-CM

## 2025-06-23 DIAGNOSIS — Z91.09 MULTIPLE ENVIRONMENTAL ALLERGIES: Chronic | ICD-10-CM

## 2025-06-23 DIAGNOSIS — F41.8 DEPRESSION WITH ANXIETY: Chronic | ICD-10-CM

## 2025-06-23 DIAGNOSIS — Z86.0100 HISTORY OF COLON POLYPS: Chronic | ICD-10-CM

## 2025-06-23 DIAGNOSIS — N39.42 URINARY INCONTINENCE WITHOUT SENSORY AWARENESS: Chronic | ICD-10-CM

## 2025-06-23 DIAGNOSIS — M48.062 SPINAL STENOSIS OF LUMBAR REGION WITH NEUROGENIC CLAUDICATION: Chronic | ICD-10-CM

## 2025-06-23 DIAGNOSIS — E78.2 MIXED HYPERLIPIDEMIA: Chronic | ICD-10-CM

## 2025-06-23 DIAGNOSIS — I25.10 NON-OCCLUSIVE CORONARY ARTERY DISEASE: Chronic | ICD-10-CM

## 2025-06-23 DIAGNOSIS — R60.0 BILATERAL LOWER EXTREMITY EDEMA: Chronic | ICD-10-CM

## 2025-06-23 DIAGNOSIS — M81.0 AGE-RELATED OSTEOPOROSIS WITHOUT CURRENT PATHOLOGICAL FRACTURE: Chronic | ICD-10-CM

## 2025-06-23 DIAGNOSIS — R73.01 IMPAIRED FASTING GLUCOSE: Primary | Chronic | ICD-10-CM

## 2025-06-23 DIAGNOSIS — I10 BENIGN ESSENTIAL HYPERTENSION: Chronic | ICD-10-CM

## 2025-06-23 DIAGNOSIS — D75.89 MACROCYTOSIS: ICD-10-CM

## 2025-06-23 DIAGNOSIS — M54.16 CHRONIC LEFT-SIDED LUMBAR RADICULOPATHY: Chronic | ICD-10-CM

## 2025-06-23 DIAGNOSIS — E66.01 MORBID OBESITY: Chronic | ICD-10-CM

## 2025-06-23 DIAGNOSIS — Z86.16 HISTORY OF 2019 NOVEL CORONAVIRUS DISEASE (COVID-19): Chronic | ICD-10-CM

## 2025-06-23 DIAGNOSIS — N40.1 BENIGN PROSTATIC HYPERPLASIA WITH URINARY FREQUENCY: Chronic | ICD-10-CM

## 2025-06-23 DIAGNOSIS — M15.0 PRIMARY OSTEOARTHRITIS INVOLVING MULTIPLE JOINTS: Chronic | ICD-10-CM

## 2025-06-23 DIAGNOSIS — E03.9 PRIMARY HYPOTHYROIDISM: Chronic | ICD-10-CM

## 2025-06-23 DIAGNOSIS — Z86.711 HISTORY OF PULMONARY EMBOLISM: Chronic | ICD-10-CM

## 2025-06-23 DIAGNOSIS — G47.33 OBSTRUCTIVE SLEEP APNEA: Chronic | ICD-10-CM

## 2025-06-23 DIAGNOSIS — F31.78 BIPOLAR DISORDER, IN FULL REMISSION, MOST RECENT EPISODE MIXED: Chronic | ICD-10-CM

## 2025-06-23 DIAGNOSIS — Z12.11 COLON CANCER SCREENING: ICD-10-CM

## 2025-06-23 PROCEDURE — G2211 COMPLEX E/M VISIT ADD ON: HCPCS | Performed by: INTERNAL MEDICINE

## 2025-06-23 PROCEDURE — 3078F DIAST BP <80 MM HG: CPT | Performed by: INTERNAL MEDICINE

## 2025-06-23 PROCEDURE — 1125F AMNT PAIN NOTED PAIN PRSNT: CPT | Performed by: INTERNAL MEDICINE

## 2025-06-23 PROCEDURE — 3074F SYST BP LT 130 MM HG: CPT | Performed by: INTERNAL MEDICINE

## 2025-06-23 PROCEDURE — 99214 OFFICE O/P EST MOD 30 MIN: CPT | Performed by: INTERNAL MEDICINE

## 2025-06-23 NOTE — PROGRESS NOTES
06/23/2025    Patient Information  Durga Valenzuela                                                                                          [id number] [facility] [address]      1950  [unfilled]  There is no work phone number on file.    Chief Complaint:     Follow-up chronic medical problems.  Recent blood work.  No new acute complaints.    History of Present Illness:    Patient with multiple chronic medical problems as outlined below in assessment and plan presents today for follow-up with lab prior in order to monitor his chronic medical issues.  His past medical history reviewed and updated were necessary including health maintenance parameters.  This reveals patient is due for colonoscopy.    Review of Systems   Constitutional: Negative.   HENT: Negative.     Eyes: Negative.    Cardiovascular: Negative.    Respiratory: Negative.     Endocrine: Negative.    Hematologic/Lymphatic: Negative.    Skin: Negative.    Musculoskeletal:  Positive for arthritis, back pain and joint pain.   Gastrointestinal: Negative.    Genitourinary: Negative.    Neurological: Negative.    Psychiatric/Behavioral: Negative.     Allergic/Immunologic: Negative.        Active Problems:    Patient Active Problem List   Diagnosis    Post traumatic stress disorder (PTSD)    Benign prostatic hyperplasia with urinary frequency    Chronic insomnia    Lumbar degenerative disc disease    Impaired fasting glucose    Benign essential hypertension    Primary osteoarthritis involving multiple joints    Hyperlipidemia    Primary hypothyroidism    Obstructive sleep apnea, tolerates CPAP well.  Previously failed oral appliance.    History of pulmonary embolism    Non-occlusive coronary artery disease, 05/16/2017--normal LM; proximal LAD mild LI; 50% mid LAD.  Mild distal LAD LI; normal Cx.  Mild LI marginal branches; normal RCA, nondominant.  EF 60%.    Therapeutic drug monitoring    Vitamin D deficiency    Psoriasis    Allergic  rhinitis    Depression with anxiety    Multiple environmental allergies    Bilateral lower extremity edema    Morbid obesity    Spinal stenosis of lumbar region with neurogenic claudication    Traumatic complete tear of left rotator cuff    Urinary incontinence without sensory awareness    Bipolar disorder, in full remission, most recent episode mixed    Hashimoto's thyroiditis    Foraminal stenosis of lumbosacral region    Chronic left-sided lumbar radiculopathy    History of colon polyps, 6/13/2019--adenomatous x 1.    History of 2019 novel coronavirus disease (COVID-19)    Benign paroxysmal positional vertigo due to bilateral vestibular disorder    Age-related osteoporosis without current pathological fracture    Macrocytosis         Past Medical History:   Diagnosis Date    Age-related osteoporosis without current pathological fracture 01/24/2025    Allergic rhinitis 08/21/2018    Patient has had allergy testing in the past which revealed allergies to molds and grass.  Immunotherapy for about 2 years in the 1980s.    Benign essential hypertension 02/25/2016    Benign prostatic hyperplasia with urinary frequency 02/25/2016    Bilateral lower extremity edema 08/21/2018    Bipolar disorder, in full remission, most recent episode mixed 09/30/2021    Chronic insomnia 02/25/2016    Chronic left-sided lumbar radiculopathy 06/13/2024    Closed fracture of first cervical vertebra with nonunion - chronic 08/07/2023    Closed traumatic lateral subluxation of patellofemoral joint, right, initial encounter 08/13/2018    08/10/2018--patient was evaluated by the orthopedist for right knee pain and found to have lateral subluxation of the right patella associated with presence of right artificial knee joint.  Physical therapy no help.  Surgery is scheduled 09/17/2018    Depression with anxiety 08/21/2018    Foraminal stenosis of lumbosacral region 06/13/2024    Hashimoto's thyroiditis 07/26/2024    History of 2019 novel  coronavirus disease (COVID-19) 12/23/2024    History of colon polyps, 6/13/2019--adenomatous x 1. 12/23/2024 06/13/2019--colonoscopy revealed 3 mm polyp was found in the cecum. The polyp was multi-lobulated. The polyp was removed with a cold biopsy forceps. Resection and retrieval were complete.  Pathology returned adenomatous polyp      History of deep venous thrombosis (DVT) of distal vein of right lower extremity 08/21/2018 04/26/2016--CTA of the chest performed for elevated d-dimer and progressive shortness of breath and chest pain for one month revealed bilateral occlusive in near occlusive segmental and subsegmental pulmonary emboli in all lobes of the right lung as well as within the left lower lobe.  No evidence of pulmonary infarct.  Nonspecific multifocal groundglass attenuation in the right pulmonary apex, perhaps representing pneumonitis or submental atelectasis.  Questionable cholelithiasis.  Doppler venous study was positive for DVT in the right popliteal vein.  Hypercoagulable workup was normal.  He was treated with Eliquis for a total of 6 months and was subsequently discontinued.    History of pulmonary embolism 06/10/2016    04/26/2016--CTA of the chest performed for elevated d-dimer and progressive shortness of breath and chest pain for one month revealed bilateral occlusive in near occlusive segmental and subsegmental pulmonary emboli in all lobes of the right lung as well as within the left lower lobe.  No evidence of pulmonary infarct.  Nonspecific multifocal groundglass attenuation in the right pulmonary apex, perhaps representing pneumonitis or submental atelectasis.  Questionable cholelithiasis.  Doppler venous study was positive for DVT in the right popliteal vein.  Hypercoagulable workup was normal.  He was treated with Eliquis for a total of 6 months and was subsequently discontinued.    Hyperlipidemia 02/25/2016    Impaired fasting glucose 02/25/2016    Lumbar degenerative disc  disease 02/25/2016    Morbidly obese 10/19/2018    Multiple environmental allergies 08/21/2018    Patient has had allergy testing in the past which revealed allergies to molds and grass.  Immunotherapy for about 2 years in the 1980s.    Non-occlusive coronary artery disease, 05/16/2017--normal LM; proximal LAD mild LI; 50% mid LAD.  Mild distal LAD LI; normal Cx.  Mild LI marginal branches; normal RCA, nondominant.  EF 60%. 07/24/2017 05/16/2017--cardiac catheterization performed for abnormal stress test.  Normal LV with ejection fraction 60%.  Dilated aortic root.  No wall motion abnormalities.  Normal left main.  Ramus branch small caliber and normal.  Proximal LAD large caliber and mild luminal irregularities.  50% mid LAD.  Mild luminal irregularities distal LAD.  3 diagonal branches of small caliber with mild luminal irregularities.  Normal septal branches.  Circumflex is large caliber and free of disease.  Marginal branches are medium caliber with mild luminal irregularities.  RCA is a medium caliber and free of disease.  It is nondominant.    Obstructive sleep apnea, tolerates CPAP well.  Previously failed oral appliance. 02/25/2016    Post traumatic stress disorder (PTSD) 02/25/2016    Primary hypothyroidism 02/25/2016    Primary osteoarthritis involving multiple joints 02/25/2016    Psoriasis 09/17/2014    Spinal stenosis of lumbar region with neurogenic claudication 02/15/2021    Traumatic complete tear of left rotator cuff 03/17/2021 March 14, 2021--patient was evaluated by the orthopedic surgeon after he slipped on ice and fell onto his left shoulder.  Work-up revealed complete rotator cuff tear involving 2 tendons.  Specifically, he has an acute left supraspinatus tear and acute left infraspinatus tear.  He also has left glenohumeral joint osteoarthritis.  Apparently this is not repairable and patient would need a shoulder r    Urinary incontinence without sensory awareness 03/17/2021    Vitamin D  deficiency 08/21/2018         Past Surgical History:   Procedure Laterality Date    CARDIAC CATHETERIZATION N/A 05/16/2017    Procedure: Coronary angiography;  Surgeon: Malcolm Chen MD;  Location:  [facility];  Service:     CARDIAC CATHETERIZATION N/A 05/16/2017    Procedure: Left Heart Cath;  Surgeon: Malcolm Chen MD;  Location:  [facility];  Service:     CARDIAC CATHETERIZATION N/A 05/16/2017    Procedure: Left ventriculography;  Surgeon: Malcolm Chen MD;  Location:  [facility];  Service:     CARDIAC CATHETERIZATION N/A 04/26/2023    Procedure: Left Heart Cath;  Surgeon: Mary Reynolds MD;  Location: [facility] CATH INVASIVE LOCATION;  Service: Cardiovascular;  Laterality: N/A;    CARDIAC CATHETERIZATION N/A 04/26/2023    Procedure: Coronary angiography;  Surgeon: Mary Reynolds MD;  Location:  [facility]VAS[facility];  Service: Cardiovascular;  Laterality: N/A;    CARDIAC CATHETERIZATION N/A 04/26/2023    Procedure: Left ventriculography;  Surgeon: Mary Reynolds MD;  Location:  [facility];  Service: Cardiovascular;  Laterality: N/A;    COLONOSCOPY  2005 2005--colonoscopy reportedly normal.  Records not available.    COLONOSCOPY N/A 06/13/2019    Procedure: COLONOSCOPY INTO CECUM WITH COLD BIOPSY POLYPECTOMY;  Surgeon: Ayaan Gallardo MD;  Location: [facility] ENDOSCOPY;  Service: General    EYE SURGERY  2020    LUMBAR DECOMPRESSION  2007 2007--lumbar decompression without fusion or hardware.    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    REVISION TOTAL KNEE ARTHROPLASTY Right 09/17/2018 09/17/2018--right total knee  arthroplasty revision due to lateral subluxation of the patella due to multiple factors.    TOTAL KNEE ARTHROPLASTY Left 11/03/2017 11/03/2017--right total knee replacement complicated by patellofemoral subluxation.    TOTAL KNEE ARTHROPLASTY Left 06/2014 June 2014--left total knee replacement.    TOTAL KNEE ARTHROPLASTY REVISION Left 12/05/2022    Procedure: LEFT TOTAL KNEE REVISION Complex with Patella Tendon Reconstruction;  Surgeon: Sid Simon MD;  Location: [facility];  Service: Orthopedics;  Laterality: Left;         Allergies   Allergen Reactions    Hydrocodone Other (See Comments)     Severe vomiting - but CAN tolerate oxycodone per patient    Zocor  [Simvastatin]      MYALGIA           Current Outpatient Medications:     albuterol sulfate  (90 Base) MCG/ACT inhaler, Inhale 1 puff Every 4 (Four) Hours As Needed for Wheezing or Shortness of Air., Disp: 18 g, Rfl: 1    amLODIPine (NORVASC) 10 MG tablet, TAKE 1 TABLET BY MOUTH DAILY, Disp: 90 tablet, Rfl: 3    amphetamine-dextroamphetamine (ADDERALL) 20 MG tablet, Take 1 tablet by mouth As Needed., Disp: , Rfl:     aspirin 81 MG EC tablet, Take 1 p.o. daily for blood thinner/heart problem, Disp: , Rfl:     clobetasol (TEMOVATE) 0.05 % ointment, Apply twice a day as directed, Disp: , Rfl:     Coenzyme Q10 (Co Q-10) 400 MG capsule, Take 1 p.o. daily with food, Disp: , Rfl:     ezetimibe (ZETIA) 10 MG tablet, TAKE 1 TABLET BY MOUTH DAILY, Disp: 90 tablet, Rfl: 3    FLUoxetine (PROzac) 60 MG tablet, Take 1 tablet by mouth Daily., Disp: , Rfl:     hydrOXYzine (ATARAX) 25 MG tablet, Take 1 tablet by mouth 3 (Three) Times a Day As Needed for Itching., Disp: 60 tablet, Rfl: 3    ibuprofen (ADVIL,MOTRIN) 800 MG tablet, TAKE 1 TABLET BY MOUTH 3 TIMES A DAY WITH FOOD ARTHRITIC AND BACK PAIN, Disp: 90 tablet, Rfl: 1    levothyroxine (Synthroid) 25 MCG tablet, Take 1 tablet by mouth Every Morning., Disp: 90 tablet, Rfl: 1    losartan (COZAAR) 50 MG  "tablet, TAKE 1 TABLET BY MOUTH DAILY, Disp: 90 tablet, Rfl: 3    Synthroid 200 MCG tablet, Take 1 tablet by mouth Daily., Disp: 90 tablet, Rfl: 1    vitamin D3 125 MCG (5000 UT) capsule capsule, TAKE 1 CAPSULE BY MOUTH DAILY AS DIRECTED, Disp: 30 capsule, Rfl: 1    zolpidem (AMBIEN) 10 MG tablet, Take 1 p.o. nightly as needed for insomnia, Disp: , Rfl:       Family History   Problem Relation Age of Onset    Coronary artery disease Mother         Status post CABG    Alzheimer's disease Mother     Depression Mother     Stroke Father     Liver cancer Father         Father with biliary cancer including gallbladder    Cancer Father     Arthritis Father     Hyperlipidemia Father     Hypertension Father     No Known Problems Sister     No Known Problems Brother          Social History     Socioeconomic History    Marital status:      Spouse name: italo    Highest education level: Bachelor's degree (e.g., BA, AB, BS)   Tobacco Use    Smoking status: Never    Smokeless tobacco: Never    Tobacco comments:     CAFFEINE USE 2 CUPS COFFEE AND 1 GLASS TEA DAILY   Vaping Use    Vaping status: Never Used   Substance and Sexual Activity    Alcohol use: No    Drug use: Never     Types: Marijuana     Comment: weekly    Sexual activity: Yes     Partners: Female         Vitals:    06/23/25 1111   BP: 118/78   Pulse: 67   Resp: 16   Temp: 98.5 °F (36.9 °C)   TempSrc: Oral   SpO2: 97%   Weight: 122 kg (270 lb)   Height: 185 cm (72.84\")        Body mass index is 35.78 kg/m².      Physical Exam:    General: Alert and oriented x 3.  No acute distress.  Obese.  Normal affect.  HEENT: Pupils equal, round, reactive to light; extraocular movements intact; sclerae nonicteric; pharynx, ear canals and TMs normal.  Neck: Without JVD, thyromegaly, bruit, or adenopathy.  Lungs: Clear to auscultation in all fields.  Heart: Regular rate and rhythm without murmur, rub, gallop, or click.  Abdomen: Soft, nontender, without hepatosplenomegaly or " hernia.  Bowel sounds normal.  : Deferred.  Rectal: Deferred.  Extremities: Without clubbing, cyanosis, or pulse deficit.  There is only trace bilateral lower extremity edema without signs of cellulitis.  Neurologic: Intact without focal deficit.  Normal station and gait observed during ingress and egress from the examination room.  Skin: Without significant lesion.  Musculoskeletal: Unremarkable.    Lab/other results:    Total cholesterol 132, triglycerides 77.  LDL particle #916.  HDL particle number low at 19.9.  CMP reveals a glucose of 103 but otherwise normal.  Urinalysis reveals trace WBC esterase.  Microscopic is pending.  CBC normal except MCV 98.  Hemoglobin A1c 5.6.  Thyroid function tests are normal.  PSA normal at 0.9.  Vitamin D normal at 57.8.  SARS antibodies are pending.  CK pending.    Assessment/Plan:     Diagnosis Plan   1. Impaired fasting glucose  Comprehensive Metabolic Panel    Hemoglobin A1c      2. Benign essential hypertension  Comprehensive Metabolic Panel      3. Hyperlipidemia  CK    Comprehensive Metabolic Panel    Homocysteine      4. Primary hypothyroidism  T3, Free    T4, Free    TSH      5. Benign prostatic hyperplasia with urinary frequency  PSA DIAGNOSTIC      6. Vitamin D deficiency  Vitamin D,25-Hydroxy      7. Bilateral lower extremity edema        8. Morbid obesity        9. Urinary incontinence without sensory awareness        10. Hashimoto's thyroiditis        11. History of colon polyps, 6/13/2019--adenomatous x 1.  Ambulatory Referral For Screening Colonoscopy      12. History of 2019 novel coronavirus disease (COVID-19)  SARS-CoV-2 Antibodies, Nucleocapsid (Natural Immunity)      13. Age-related osteoporosis without current pathological fracture  DEXA Bone Density Axial      14. Bipolar disorder, in full remission, most recent episode mixed        15. Chronic left-sided lumbar radiculopathy        16. Spinal stenosis of lumbar region with neurogenic claudication         17. Multiple environmental allergies        18. Depression with anxiety        19. Non-occlusive coronary artery disease, 05/16/2017--normal LM; proximal LAD mild LI; 50% mid LAD.  Mild distal LAD LI; normal Cx.  Mild LI marginal branches; normal RCA, nondominant.  EF 60%.        20. History of pulmonary embolism        21. Primary osteoarthritis involving multiple joints        22. Obstructive sleep apnea, tolerates CPAP well.  Previously failed oral appliance.        23. Therapeutic drug monitoring        24. Colon cancer screening  Ambulatory Referral For Screening Colonoscopy      25. Macrocytosis  CBC (No Diff)    Methylmalonic Acid, Serum    Homocysteine        Patient has impaired fasting glucose that does not require medication.  However, patient is obese and he is at risk for development of overt diabetes.  I have once again strongly recommend low carbohydrate diet, exercise, and weight loss.  Hypertension seems to be under good control.  Hyperlipidemia is under reasonable control.  His thyroid is therapeutic.  BPH symptoms not bad enough to warrant medication.  Vitamin D is in normal range which is important given his osteoporosis.  Patient needs a DEXA scan.  Lower extremity edema controlled.  He has urinary incontinence without sensory awareness and has been evaluated by urology.  He has a history of colon polyps and is due for colonoscopy.  He has bipolar disorder and depression as well as PTSD is being managed by psychiatry.  His back pain with lumbar radicular symptoms are tolerable.  He has nonocclusive coronary artery disease evaluated by cardiology and is stable.  He has sleep apnea and tolerates CPAP well.  New diagnosis of macrocytosis needs further evaluation.  Will check this at the next visit.    Plan is as follows: Diet and weight loss once again stressed.  Colonoscopy ordered.  DEXA scan ordered.  Patient will follow-up after December 23, 2025 for subsequent Medicare wellness visit.   Otherwise he will follow-up as needed.            Procedures

## 2025-07-07 ENCOUNTER — HOSPITAL ENCOUNTER (OUTPATIENT)
Facility: HOSPITAL | Age: 75
Discharge: HOME | End: 2025-07-07
Admitting: INTERNAL MEDICINE
Payer: MEDICARE

## 2025-07-07 PROCEDURE — 77080 DXA BONE DENSITY AXIAL: CPT

## 2025-07-14 ENCOUNTER — TELEPHONE (OUTPATIENT)
Dept: CARDIOLOGY | Age: 75
End: 2025-07-14

## 2025-07-15 ENCOUNTER — HOSPITAL ENCOUNTER (OUTPATIENT)
Facility: HOSPITAL | Age: 75
Setting detail: HOSPITAL OUTPATIENT SURGERY
Discharge: HOME | End: 2025-07-15
Attending: SURGERY | Admitting: SURGERY
Payer: MEDICARE

## 2025-07-15 ENCOUNTER — ANESTHESIA EVENT (OUTPATIENT)
Dept: GASTROENTEROLOGY | Facility: HOSPITAL | Age: 75
End: 2025-07-15
Payer: MEDICARE

## 2025-07-15 ENCOUNTER — ANESTHESIA (OUTPATIENT)
Dept: GASTROENTEROLOGY | Facility: HOSPITAL | Age: 75
End: 2025-07-15
Payer: MEDICARE

## 2025-07-15 VITALS
RESPIRATION RATE: 17 BRPM | DIASTOLIC BLOOD PRESSURE: 68 MMHG | BODY MASS INDEX: 35.25 KG/M2 | WEIGHT: 266 LBS | SYSTOLIC BLOOD PRESSURE: 104 MMHG | HEIGHT: 73 IN | OXYGEN SATURATION: 99 % | HEART RATE: 55 BPM

## 2025-07-15 DIAGNOSIS — Z12.11 SCREENING FOR COLON CANCER: ICD-10-CM

## 2025-07-15 PROCEDURE — 88305 TISSUE EXAM BY PATHOLOGIST: CPT | Performed by: SURGERY

## 2025-07-15 PROCEDURE — 25010000002 LIDOCAINE 2% SOLUTION: Performed by: NURSE ANESTHETIST, CERTIFIED REGISTERED

## 2025-07-15 PROCEDURE — 25810000003 LACTATED RINGERS PER 1000 ML: Performed by: SURGERY

## 2025-07-15 PROCEDURE — 25010000002 PROPOFOL 10 MG/ML EMULSION: Performed by: NURSE ANESTHETIST, CERTIFIED REGISTERED

## 2025-07-15 PROCEDURE — 25010000002 GLYCOPYRROLATE 0.2 MG/ML SOLUTION: Performed by: NURSE ANESTHETIST, CERTIFIED REGISTERED

## 2025-07-15 RX ORDER — SODIUM CHLORIDE, SODIUM LACTATE, POTASSIUM CHLORIDE, CALCIUM CHLORIDE 600; 310; 30; 20 MG/100ML; MG/100ML; MG/100ML; MG/100ML
30 INJECTION, SOLUTION INTRAVENOUS CONTINUOUS PRN
Status: DISCONTINUED | OUTPATIENT
Start: 2025-07-15 | End: 2025-07-15 | Stop reason: HOSPADM

## 2025-07-15 RX ORDER — PROPOFOL 10 MG/ML
VIAL (ML) INTRAVENOUS AS NEEDED
Status: DISCONTINUED | OUTPATIENT
Start: 2025-07-15 | End: 2025-07-15 | Stop reason: SURG

## 2025-07-15 RX ORDER — LIDOCAINE HYDROCHLORIDE 20 MG/ML
INJECTION, SOLUTION INFILTRATION; PERINEURAL AS NEEDED
Status: DISCONTINUED | OUTPATIENT
Start: 2025-07-15 | End: 2025-07-15 | Stop reason: SURG

## 2025-07-15 RX ORDER — GLYCOPYRROLATE 0.2 MG/ML
INJECTION INTRAMUSCULAR; INTRAVENOUS AS NEEDED
Status: DISCONTINUED | OUTPATIENT
Start: 2025-07-15 | End: 2025-07-15 | Stop reason: SURG

## 2025-07-15 RX ADMIN — PROPOFOL 60 MG: 10 INJECTION, EMULSION INTRAVENOUS at 09:21

## 2025-07-15 RX ADMIN — PROPOFOL 140 MCG/KG/MIN: 10 INJECTION, EMULSION INTRAVENOUS at 09:21

## 2025-07-15 RX ADMIN — GLYCOPYRROLATE 0.2 MG: 0.2 INJECTION INTRAMUSCULAR; INTRAVENOUS at 09:17

## 2025-07-15 RX ADMIN — PROPOFOL 10 MG: 10 INJECTION, EMULSION INTRAVENOUS at 09:24

## 2025-07-15 RX ADMIN — SODIUM CHLORIDE, POTASSIUM CHLORIDE, SODIUM LACTATE AND CALCIUM CHLORIDE 30 ML/HR: 600; 310; 30; 20 INJECTION, SOLUTION INTRAVENOUS at 09:00

## 2025-07-15 RX ADMIN — LIDOCAINE HYDROCHLORIDE 60 MG: 20 INJECTION, SOLUTION INFILTRATION; PERINEURAL at 09:21

## 2025-07-15 NOTE — H&P
Reason for Visit: Surveillance colonoscopy    HPI:  Patient presents for evaluation for colon cancer surveillance.  There is no family history of colon neoplasia. No family hx of gastric, ovarian, or uterine cancer.  Patient denies changes in bowel habits, diarrhea, constipation, abdominal pain, decreased caliber of stool, melena, brbpr, and weight loss.  There is no personal or family history of bleeding disorder or untoward reaction to anesthesia.  Patient has had a colonoscopy 6 year(s) ago.  Had a colonoscopy in 2019.  Was found to have and adenomatous polyp at the cecum       Past Medical History:   Diagnosis Date    Age-related osteoporosis without current pathological fracture 01/24/2025    Allergic rhinitis 08/21/2018    Patient has had allergy testing in the past which revealed allergies to molds and grass.  Immunotherapy for about 2 years in the 1980s.    Benign essential hypertension 02/25/2016    Benign prostatic hyperplasia with urinary frequency 02/25/2016    Bilateral lower extremity edema 08/21/2018    Bipolar disorder, in full remission, most recent episode mixed 09/30/2021    Chronic insomnia 02/25/2016    Chronic left-sided lumbar radiculopathy 06/13/2024    Closed fracture of first cervical vertebra with nonunion - chronic 08/07/2023    Closed traumatic lateral subluxation of patellofemoral joint, right, initial encounter 08/13/2018    08/10/2018--patient was evaluated by the orthopedist for right knee pain and found to have lateral subluxation of the right patella associated with presence of right artificial knee joint.  Physical therapy no help.  Surgery is scheduled 09/17/2018    Depression with anxiety 08/21/2018    Foraminal stenosis of lumbosacral region 06/13/2024    Hashimoto's thyroiditis 07/26/2024    History of 2019 novel coronavirus disease (COVID-19) 12/23/2024    History of colon polyps, 6/13/2019--adenomatous x 1. 12/23/2024 06/13/2019--colonoscopy revealed 3 mm polyp was found  in the cecum. The polyp was multi-lobulated. The polyp was removed with a cold biopsy forceps. Resection and retrieval were complete.  Pathology returned adenomatous polyp      History of deep venous thrombosis (DVT) of distal vein of right lower extremity 08/21/2018 04/26/2016--CTA of the chest performed for elevated d-dimer and progressive shortness of breath and chest pain for one month revealed bilateral occlusive in near occlusive segmental and subsegmental pulmonary emboli in all lobes of the right lung as well as within the left lower lobe.  No evidence of pulmonary infarct.  Nonspecific multifocal groundglass attenuation in the right pulmonary apex, perhaps representing pneumonitis or submental atelectasis.  Questionable cholelithiasis.  Doppler venous study was positive for DVT in the right popliteal vein.  Hypercoagulable workup was normal.  He was treated with Eliquis for a total of 6 months and was subsequently discontinued.    History of pulmonary embolism 06/10/2016    04/26/2016--CTA of the chest performed for elevated d-dimer and progressive shortness of breath and chest pain for one month revealed bilateral occlusive in near occlusive segmental and subsegmental pulmonary emboli in all lobes of the right lung as well as within the left lower lobe.  No evidence of pulmonary infarct.  Nonspecific multifocal groundglass attenuation in the right pulmonary apex, perhaps representing pneumonitis or submental atelectasis.  Questionable cholelithiasis.  Doppler venous study was positive for DVT in the right popliteal vein.  Hypercoagulable workup was normal.  He was treated with Eliquis for a total of 6 months and was subsequently discontinued.    Hyperlipidemia 02/25/2016    Impaired fasting glucose 02/25/2016    Lumbar degenerative disc disease 02/25/2016    Morbidly obese 10/19/2018    Multiple environmental allergies 08/21/2018    Patient has had allergy testing in the past which revealed allergies to  molds and grass.  Immunotherapy for about 2 years in the 1980s.    Non-occlusive coronary artery disease, 05/16/2017--normal LM; proximal LAD mild LI; 50% mid LAD.  Mild distal LAD LI; normal Cx.  Mild LI marginal branches; normal RCA, nondominant.  EF 60%. 07/24/2017 05/16/2017--cardiac catheterization performed for abnormal stress test.  Normal LV with ejection fraction 60%.  Dilated aortic root.  No wall motion abnormalities.  Normal left main.  Ramus branch small caliber and normal.  Proximal LAD large caliber and mild luminal irregularities.  50% mid LAD.  Mild luminal irregularities distal LAD.  3 diagonal branches of small caliber with mild luminal irregularities.  Normal septal branches.  Circumflex is large caliber and free of disease.  Marginal branches are medium caliber with mild luminal irregularities.  RCA is a medium caliber and free of disease.  It is nondominant.    Obstructive sleep apnea, tolerates CPAP well.  Previously failed oral appliance. 02/25/2016    Post traumatic stress disorder (PTSD) 02/25/2016    Primary hypothyroidism 02/25/2016    Primary osteoarthritis involving multiple joints 02/25/2016    Psoriasis 09/17/2014    Spinal stenosis of lumbar region with neurogenic claudication 02/15/2021    Traumatic complete tear of left rotator cuff 03/17/2021 March 14, 2021--patient was evaluated by the orthopedic surgeon after he slipped on ice and fell onto his left shoulder.  Work-up revealed complete rotator cuff tear involving 2 tendons.  Specifically, he has an acute left supraspinatus tear and acute left infraspinatus tear.  He also has left glenohumeral joint osteoarthritis.  Apparently this is not repairable and patient would need a shoulder r    Urinary incontinence without sensory awareness 03/17/2021    Vitamin D deficiency 08/21/2018       Past Surgical History:   Procedure Laterality Date    CARDIAC CATHETERIZATION N/A 05/16/2017    Procedure: Coronary angiography;  Surgeon:  Malcolm Chen MD;  Location:  [facility] CATH INVASIVE LOCATION;  Service:     CARDIAC CATHETERIZATION N/A 05/16/2017    Procedure: Left Heart Cath;  Surgeon: Malcolm Chen MD;  [facility] C[facility];  Service:     CARDIAC CATHETERIZATION N/A 05/16/2017    Procedure: Left ventriculography;  Surgeon: Malcolm Chen MD;  Location:  [facility] CATH INVASIVE LOCATION;  Service:     CARDIAC CATHETERIZATION N/A 04/26/2023    Procedure: Left Heart Cath;  Surgeon: Mary Reynolds MD;  [facility];  Service: Cardiovascular;  Laterality: N/A;    CARDIAC CATHETERIZATION N/A 04/26/2023    Procedure: Coronary angiography;  Surgeon: Mary Reynolds MD;  [facility];  Service: Cardiovascular;  Laterality: N/A;    CARDIAC CATHETERIZATION N/A 04/26/2023    Procedure: Left ventriculography;  Surgeon: Mary Reynolds MD;  Location: [facility] C[facility];  Service: Cardiovascular;  Laterality: N/A;    COLONOSCOPY  2005 2005--colonoscopy reportedly normal.  Records not available.    COLONOSCOPY N/A 06/13/2019    Procedure: COLONOSCOPY INTO CECUM WITH COLD BIOPSY POLYPECTOMY;  Surgeon: Ayaan Gallardo MD;  Location: [facility] ENDOSCOPY;  Service: General    EYE SURGERY  2020    LUMBAR DECOMPRESSION  2007 2007--lumbar decompression without fusion or hardware.    PATELLAR RECONSTRUCTION Left 12/05/2022    Procedure: PATELLAR Tendon RECONSTRUCTION;  Surgeon: Sid Simon MD;  Location: 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    REVISION TOTAL KNEE ARTHROPLASTY Right 09/17/2018 09/17/2018--right total knee arthroplasty revision due to lateral subluxation of the patella due to multiple factors.    TOTAL KNEE ARTHROPLASTY Left 11/03/2017 11/03/2017--right total knee  "replacement complicated by patellofemoral subluxation.    TOTAL KNEE ARTHROPLASTY Left 06/2014 June 2014--left total knee replacement.    TOTAL KNEE ARTHROPLASTY REVISION Left 12/05/2022    Procedure: LEFT TOTAL KNEE REVISION Complex with Patella Tendon Reconstruction;  Surgeon: Sid Simon MD;  Location: [facility];  Service: Orthopedics;  Laterality: Left;    TOTAL KNEE ARTHROPLASTY REVISION Right          Current Facility-Administered Medications:     lactated ringers infusion, 30 mL/hr, Intravenous, Continuous PRN, Ayaan Gallardo MD, Last Rate: 30 mL/hr at 07/15/25 0916, Currently Infusing at 07/15/25 0916    Allergies   Allergen Reactions    Hydrocodone Other (See Comments)     Severe vomiting - but CAN tolerate oxycodone per patient    Zocor  [Simvastatin]      MYALGIA       Family History   Problem Relation Age of Onset    Coronary artery disease Mother         Status post CABG    Alzheimer's disease Mother     Depression Mother     Stroke Father     Liver cancer Father         Father with biliary cancer including gallbladder    Cancer Father     Arthritis Father     Hyperlipidemia Father     Hypertension Father     No Known Problems Sister     No Known Problems Brother     Rozina Hyperthermia Neg Hx        Social History     Socioeconomic History    Marital status:      Spouse name: italo    Highest education level: Bachelor's degree (e.g., BA, AB, BS)   Tobacco Use    Smoking status: Never    Smokeless tobacco: Never    Tobacco comments:     CAFFEINE USE 2 CUPS COFFEE AND 1 GLASS TEA DAILY   Vaping Use    Vaping status: Never Used   Substance and Sexual Activity    Alcohol use: No    Drug use: Never     Types: Marijuana     Comment: weekly    Sexual activity: Yes     Partners: Female        Review Of Systems:  A comprehensive review of systems was negative.    Objective:   Physical exam:  /79   Pulse 54   Resp 16   Ht 185.4 cm (73\")   Wt 121 kg (266 lb)   SpO2 100%   " BMI 35.09 kg/m²     General Appearance:  awake, alert, oriented, in no acute distress  Lungs:  Breathing Pattern: regular, no distress  Heart:  Heart regular rate and rhythm  Abdomen:  Soft, non-tender, normal bowel sounds; no bruits, organomegaly or masses.           Assessment:    Durga Valenzuela is a 74 y.o. male who presents for evaluation for colon cancer surveillance.    Patient has increased risk factors or warning signs or symptoms.  I reviewed current [facility] guidelines for colon cancer screening:    A)  Flex sig q 5yrs with yearly fecal occult blood testing  B)  Virtual colonoscopy  C)  Colonoscopy with possible biopsy/polypectomy with IV sedation at appropriate       screening intervals    The patient has no family history of colon cancer.  He  has a personal history of colon polyp who presents for colon cancer surveillance evaluation.   I would advise a colonscopy.     The rationale for each option as well as all risks and benefits of each alternative were discussed with the patient in detail.  The patient understands and does wish to proceed with Colonoscopy, Diagnostic        The rationale for colonoscopy with possible biopsy, possible polypectomy, with IV sedation as well as all risks, benefits, and alternatives were discussed with the patient in detail. Risks including but not limited to perforation, bleeding,need for blood transfusion or emergent surgery ,and missed neoplasm were reviewed in detail with the patient The patient demonstrates full understanding and wishes to proceed with the colonoscopy.

## 2025-07-15 NOTE — ANESTHESIA POSTPROCEDURE EVALUATION
Patient: Durga Valenzuela    Procedure Summary       Date: 07/15/25 Room / Location: [facility] ENDOSCOPY 6 / [facility] ENDOSCOPY    Anesthesia Start: 0916 Anesthesia Stop: 0944    Procedure: COLONOSCOPY to the cecum with hot snare polypectomy and cold biopsy Diagnosis:       Screening for colon cancer      (Screening for colon cancer [Z12.11])    Surgeons: Ayaan Gallardo MD Provider: Myles Farooq MD    Anesthesia Type: MAC ASA Status: 3            Anesthesia Type: MAC    Vitals  Vitals Value Taken Time   /71 07/15/25 10:00   Temp     Pulse 55 07/15/25 10:08   Resp 16 07/15/25 09:57   SpO2 92 % 07/15/25 10:08   Vitals shown include unfiled device data.        Post Anesthesia Care and Evaluation    Patient location during evaluation: PACU  Patient participation: complete - patient participated  Level of consciousness: awake and alert  Pain management: adequate    Airway patency: patent  Anesthetic complications: No anesthetic complications    Cardiovascular status: acceptable  Respiratory status: acceptable  Hydration status: acceptable    Comments: --------------------            07/15/25               0957     --------------------   BP:       113/71     Pulse:      52       Resp:       16       SpO2:      100%     --------------------

## 2025-07-15 NOTE — ANESTHESIA PREPROCEDURE EVALUATION
Anesthesia Evaluation     Patient summary reviewed and Nursing notes reviewed                Airway   Mallampati: III  Dental      Pulmonary    (+) ,sleep apnea on CPAP  Cardiovascular     ECG reviewed  Rhythm: regular  Rate: normal    (+) hypertension, CAD (treated medically), hyperlipidemia      Neuro/Psych  (+) numbness, psychiatric history Anxiety and Depression  GI/Hepatic/Renal/Endo    (+) morbid obesity, thyroid problem hypothyroidism    Musculoskeletal     Abdominal    Substance History - negative use     OB/GYN negative ob/gyn ROS         Other   arthritis,                 Anesthesia Plan    ASA 3     MAC     intravenous induction     Anesthetic plan, risks, benefits, and alternatives have been provided, discussed and informed consent has been obtained with: patient and spouse/significant other.    CODE STATUS:

## 2025-07-15 NOTE — DISCHARGE INSTRUCTIONS
For the next 24 hours patient needs to be with a responsible adult.    For 24 hours DO NOT drive, operate machinery, appliances, drink alcohol, make important decisions or sign legal documents.    Start with a light or bland diet if you are feeling sick to your stomach otherwise advance to regular diet as tolerated.    Follow recommendations on procedure report if provided by your doctor.    Call Dr Gallardo for problems [phone]    Problems may include but not limited to: large amounts of bleeding, trouble breathing, repeated vomiting, severe unrelieved pain, fever or chills.

## 2025-07-16 ENCOUNTER — RESULTS FOLLOW-UP (OUTPATIENT)
Dept: CARDIOLOGY | Facility: HOSPITAL | Age: 75
End: 2025-07-16
Payer: MEDICARE

## 2025-07-16 ENCOUNTER — HOSPITAL ENCOUNTER (OUTPATIENT)
Dept: GENERAL RADIOLOGY | Facility: HOSPITAL | Age: 75
Discharge: HOME | End: 2025-07-16
Payer: MEDICARE

## 2025-07-16 ENCOUNTER — OFFICE VISIT (OUTPATIENT)
Dept: CARDIOLOGY | Age: 75
End: 2025-07-16
Payer: MEDICARE

## 2025-07-16 ENCOUNTER — HOSPITAL ENCOUNTER (OUTPATIENT)
Dept: CARDIOLOGY | Facility: HOSPITAL | Age: 75
Discharge: HOME | End: 2025-07-16
Payer: MEDICARE

## 2025-07-16 VITALS
OXYGEN SATURATION: 97 % | BODY MASS INDEX: 35.89 KG/M2 | HEART RATE: 46 BPM | SYSTOLIC BLOOD PRESSURE: 118 MMHG | WEIGHT: 272 LBS | DIASTOLIC BLOOD PRESSURE: 80 MMHG

## 2025-07-16 VITALS
WEIGHT: 272 LBS | SYSTOLIC BLOOD PRESSURE: 156 MMHG | DIASTOLIC BLOOD PRESSURE: 88 MMHG | BODY MASS INDEX: 36.05 KG/M2 | HEIGHT: 73 IN

## 2025-07-16 DIAGNOSIS — I77.810 DILATED AORTIC ROOT: Primary | ICD-10-CM

## 2025-07-16 DIAGNOSIS — R06.09 DOE (DYSPNEA ON EXERTION): ICD-10-CM

## 2025-07-16 DIAGNOSIS — E78.2 MIXED HYPERLIPIDEMIA: Chronic | ICD-10-CM

## 2025-07-16 DIAGNOSIS — I25.10 NON-OCCLUSIVE CORONARY ARTERY DISEASE: Chronic | ICD-10-CM

## 2025-07-16 DIAGNOSIS — I10 BENIGN ESSENTIAL HYPERTENSION: Chronic | ICD-10-CM

## 2025-07-16 DIAGNOSIS — R06.09 DOE (DYSPNEA ON EXERTION): Primary | ICD-10-CM

## 2025-07-16 LAB
ALBUMIN SERPL-MCNC: 3.7 G/DL (ref 3.5–5.2)
ALBUMIN/GLOB SERPL: 1.3 G/DL
ALP SERPL-CCNC: 95 U/L (ref 39–117)
ALT SERPL W P-5'-P-CCNC: 14 U/L (ref 1–41)
ANION GAP SERPL CALCULATED.3IONS-SCNC: 9.2 MMOL/L (ref 5–15)
AORTIC ARCH: 2.6 CM
AORTIC DIMENSIONLESS INDEX: 0.58 (DI)
ASCENDING AORTA: 3.9 CM
AST SERPL-CCNC: 18 U/L (ref 1–40)
AV MEAN PRESS GRAD SYS DOP V1V2: 6.6 MMHG
AV VMAX SYS DOP: 186 CM/SEC
BASOPHILS # BLD AUTO: 0.02 10*3/MM3 (ref 0–0.2)
BASOPHILS NFR BLD AUTO: 0.4 % (ref 0–1.5)
BH CV ECHO MEAS - ACS: 2.42 CM
BH CV ECHO MEAS - AI P1/2T: 1211 MSEC
BH CV ECHO MEAS - AO MAX PG: 13.8 MMHG
BH CV ECHO MEAS - AO ROOT DIAM: 4.7 CM
BH CV ECHO MEAS - AO V2 VTI: 46.8 CM
BH CV ECHO MEAS - AVA(I,D): 1.84 CM2
BH CV ECHO MEAS - EDV(CUBED): 121.7 ML
BH CV ECHO MEAS - EDV(MOD-SP2): 256 ML
BH CV ECHO MEAS - EDV(MOD-SP4): 227 ML
BH CV ECHO MEAS - EF(MOD-SP2): 70.7 %
BH CV ECHO MEAS - EF(MOD-SP4): 70 %
BH CV ECHO MEAS - ESV(CUBED): 46 ML
BH CV ECHO MEAS - ESV(MOD-SP2): 75 ML
BH CV ECHO MEAS - ESV(MOD-SP4): 68 ML
BH CV ECHO MEAS - FS: 27.7 %
BH CV ECHO MEAS - IVS/LVPW: 1.16 CM
BH CV ECHO MEAS - IVSD: 1.16 CM
BH CV ECHO MEAS - LAT PEAK E' VEL: 9.7 CM/SEC
BH CV ECHO MEAS - LV DIASTOLIC VOL/BSA (35-75): 92.6 CM2
BH CV ECHO MEAS - LV MASS(C)D: 198.1 GRAMS
BH CV ECHO MEAS - LV MAX PG: 4.1 MMHG
BH CV ECHO MEAS - LV MEAN PG: 2.16 MMHG
BH CV ECHO MEAS - LV SYSTOLIC VOL/BSA (12-30): 27.7 CM2
BH CV ECHO MEAS - LV V1 MAX: 101 CM/SEC
BH CV ECHO MEAS - LV V1 VTI: 27.1 CM
BH CV ECHO MEAS - LVIDD: 5 CM
BH CV ECHO MEAS - LVIDS: 3.6 CM
BH CV ECHO MEAS - LVOT AREA: 3.2 CM2
BH CV ECHO MEAS - LVOT DIAM: 2.01 CM
BH CV ECHO MEAS - LVPWD: 0.99 CM
BH CV ECHO MEAS - MED PEAK E' VEL: 7.7 CM/SEC
BH CV ECHO MEAS - MV A DUR: 0.14 SEC
BH CV ECHO MEAS - MV A MAX VEL: 82.6 CM/SEC
BH CV ECHO MEAS - MV DEC SLOPE: 240.8 CM/SEC2
BH CV ECHO MEAS - MV DEC TIME: 0.34 SEC
BH CV ECHO MEAS - MV E MAX VEL: 77.7 CM/SEC
BH CV ECHO MEAS - MV E/A: 0.94
BH CV ECHO MEAS - MV MAX PG: 2.9 MMHG
BH CV ECHO MEAS - MV MEAN PG: 0.93 MMHG
BH CV ECHO MEAS - MV P1/2T: 110.6 MSEC
BH CV ECHO MEAS - MV V2 VTI: 33.4 CM
BH CV ECHO MEAS - MVA(P1/2T): 1.99 CM2
BH CV ECHO MEAS - MVA(VTI): 2.6 CM2
BH CV ECHO MEAS - PA ACC TIME: 0.14 SEC
BH CV ECHO MEAS - PA V2 MAX: 88.2 CM/SEC
BH CV ECHO MEAS - PULM A REVS DUR: 0.14 SEC
BH CV ECHO MEAS - PULM A REVS VEL: 26.1 CM/SEC
BH CV ECHO MEAS - PULM DIAS VEL: 27.4 CM/SEC
BH CV ECHO MEAS - PULM S/D: 1.06
BH CV ECHO MEAS - PULM SYS VEL: 29.1 CM/SEC
BH CV ECHO MEAS - QP/QS: 0.58
BH CV ECHO MEAS - RAP SYSTOLE: 3 MMHG
BH CV ECHO MEAS - RV MAX PG: 1.42 MMHG
BH CV ECHO MEAS - RV V1 MAX: 59.6 CM/SEC
BH CV ECHO MEAS - RV V1 VTI: 15.9 CM
BH CV ECHO MEAS - RVOT DIAM: 2.01 CM
BH CV ECHO MEAS - SV(LVOT): 86 ML
BH CV ECHO MEAS - SV(MOD-SP2): 181 ML
BH CV ECHO MEAS - SV(MOD-SP4): 159 ML
BH CV ECHO MEAS - SV(RVOT): 50.2 ML
BH CV ECHO MEAS - SVI(LVOT): 35.1 ML/M2
BH CV ECHO MEAS - SVI(MOD-SP2): 73.8 ML/M2
BH CV ECHO MEAS - SVI(MOD-SP4): 64.8 ML/M2
BH CV ECHO MEASUREMENTS AVERAGE E/E' RATIO: 8.93
BH CV XLRA - TDI S': 12.3 CM/SEC
BILIRUB SERPL-MCNC: 0.4 MG/DL (ref 0–1.2)
BUN SERPL-MCNC: 14 MG/DL (ref 8–23)
BUN/CREAT SERPL: 10.9 (ref 7–25)
CALCIUM SPEC-SCNC: 9.3 MG/DL (ref 8.6–10.5)
CHLORIDE SERPL-SCNC: 105 MMOL/L (ref 98–107)
CO2 SERPL-SCNC: 25.8 MMOL/L (ref 22–29)
CREAT SERPL-MCNC: 1.29 MG/DL (ref 0.76–1.27)
D DIMER PPP FEU-MCNC: 0.31 MCGFEU/ML (ref 0–0.75)
DEPRECATED RDW RBC AUTO: 48.2 FL (ref 37–54)
EGFRCR SERPLBLD CKD-EPI 2021: 57.8 ML/MIN/1.73
EOSINOPHIL # BLD AUTO: 0.16 10*3/MM3 (ref 0–0.4)
EOSINOPHIL NFR BLD AUTO: 3.3 % (ref 0.3–6.2)
ERYTHROCYTE [DISTWIDTH] IN BLOOD BY AUTOMATED COUNT: 14.2 % (ref 12.3–15.4)
GLOBULIN UR ELPH-MCNC: 2.9 GM/DL
GLUCOSE SERPL-MCNC: 116 MG/DL (ref 65–99)
HCT VFR BLD AUTO: 39.2 % (ref 37.5–51)
HGB BLD-MCNC: 13.1 G/DL (ref 13–17.7)
IMM GRANULOCYTES # BLD AUTO: 0.01 10*3/MM3 (ref 0–0.05)
IMM GRANULOCYTES NFR BLD AUTO: 0.2 % (ref 0–0.5)
LEFT ATRIUM VOLUME INDEX: 29.5 ML/M2
LV EF BIPLANE MOD: 70.2 %
LYMPHOCYTES # BLD AUTO: 0.96 10*3/MM3 (ref 0.7–3.1)
LYMPHOCYTES NFR BLD AUTO: 19.5 % (ref 19.6–45.3)
MCH RBC QN AUTO: 31.8 PG (ref 26.6–33)
MCHC RBC AUTO-ENTMCNC: 33.4 G/DL (ref 31.5–35.7)
MCV RBC AUTO: 95.1 FL (ref 79–97)
MONOCYTES # BLD AUTO: 0.38 10*3/MM3 (ref 0.1–0.9)
MONOCYTES NFR BLD AUTO: 7.7 % (ref 5–12)
NEUTROPHILS NFR BLD AUTO: 3.39 10*3/MM3 (ref 1.7–7)
NEUTROPHILS NFR BLD AUTO: 68.9 % (ref 42.7–76)
NRBC BLD AUTO-RTO: 0 /100 WBC (ref 0–0.2)
NT-PROBNP SERPL-MCNC: 92 PG/ML (ref 0–1800)
PLATELET # BLD AUTO: 211 10*3/MM3 (ref 140–450)
PMV BLD AUTO: 8.8 FL (ref 6–12)
POTASSIUM SERPL-SCNC: 4.2 MMOL/L (ref 3.5–5.2)
PROT SERPL-MCNC: 6.6 G/DL (ref 6–8.5)
RBC # BLD AUTO: 4.12 10*6/MM3 (ref 4.14–5.8)
SINUS: 4.6 CM
SODIUM SERPL-SCNC: 140 MMOL/L (ref 136–145)
STJ: 3.5 CM
TROPONIN T SERPL HS-MCNC: 37 NG/L
WBC NRBC COR # BLD AUTO: 4.92 10*3/MM3 (ref 3.4–10.8)

## 2025-07-16 PROCEDURE — 71046 X-RAY EXAM CHEST 2 VIEWS: CPT

## 2025-07-16 PROCEDURE — 36415 COLL VENOUS BLD VENIPUNCTURE: CPT

## 2025-07-16 PROCEDURE — 83880 ASSAY OF NATRIURETIC PEPTIDE: CPT | Performed by: FAMILY MEDICINE

## 2025-07-16 PROCEDURE — 25510000001 PERFLUTREN 6.52 MG/ML SUSPENSION 2 ML VIAL: Performed by: FAMILY MEDICINE

## 2025-07-16 PROCEDURE — 93306 TTE W/DOPPLER COMPLETE: CPT

## 2025-07-16 PROCEDURE — 84484 ASSAY OF TROPONIN QUANT: CPT | Performed by: FAMILY MEDICINE

## 2025-07-16 PROCEDURE — 85025 COMPLETE CBC W/AUTO DIFF WBC: CPT | Performed by: FAMILY MEDICINE

## 2025-07-16 PROCEDURE — 85379 FIBRIN DEGRADATION QUANT: CPT | Performed by: FAMILY MEDICINE

## 2025-07-16 PROCEDURE — 80053 COMPREHEN METABOLIC PANEL: CPT | Performed by: FAMILY MEDICINE

## 2025-07-16 RX ADMIN — PERFLUTREN 2 ML: 6.52 INJECTION, SUSPENSION INTRAVENOUS at 15:03

## 2025-07-16 NOTE — PROGRESS NOTES
Date of Office Visit: 2025  Encounter Provider: TREY Connell  Place of Service: [facility] CARDIOLOGY  Established cardiologist: Shaka Arnett MD  Patient Name: Durga Valenzuela  :1950      Patient ID:  Durga Valenzuela is a 74 y.o. male is here for  followup    With a pertinent medical history of onobstructive coronary artery disease, hypertension, dyslipidemia     History of Present Illness  Patient was evaluated by Dr. Mon in 2017 and work-up for chest pain resulted in cardiac catheterization being performed. He had a 50% mid LAD lesion with no other significant disease. Echocardiogram at the time showed normal left ventricular ejection fraction with grade 1 diastolic dysfunction and without significant valvular heart disease. Aortic root was mildly dilated at 4.3 cm     Cardiac catheterization 2023 showed nonobstructive CAD with normal left main, 30 to 40% proximal LAD stenosis, 30 to 40% mid LAD stenosis, 40 to 50% distal LAD stenosis, 20 to 30% distal circumflex stenosis, 60 to 70% proximal RCA stenosis (non dominant).    He last saw Dr. Arnett in the office 2024 he had been noticing increased dyspnea with exertion.   Echocardiogram 2024 shows an ejection fraction 56 to 60%, GLS = -17.8%, borderline dilated LV, normal LV diastolic function, mildly calcified aortic valve mild aortic regurg, mild mitral regurg, mild tricuspid regurg, normal RVSP, mild pulmonic regurg.     Durga had colonoscopy yesterday. Tells me he tolerated procedure well and no issues following that.  He has had chronic shortness of breath.  Over the past few weeks to months he has noticed this has worsened.  He was previously working with physical therapy and they told him they would no longer work with him until he was evaluated for his shortness of breath as they had noticed also that it has worsened.  He is often having and puffing when he is walking he sits down  and feels quite fatigued as though he could just fall asleep most times of the day.  No chest pain or pressure.  No presyncope/syncope.  He has not felt heart racing or palpitations there has not been any edema.  He has had weight loss 1 year ago he was 300 pounds today 270 this has been without effort sometimes his appetite is decreased.     Current Outpatient Medications on File Prior to Visit   Medication Sig Dispense Refill    amLODIPine (NORVASC) 10 MG tablet TAKE 1 TABLET BY MOUTH DAILY (Patient taking differently: Take 1 tablet by mouth Every Night. TAKE 1 TABLET BY MOUTH DAILY) 90 tablet 3    aspirin 81 MG EC tablet Take 1 p.o. daily for blood thinner/heart problem (Patient taking differently: Take 1 tablet by mouth Daily. Take 1 p.o. daily for blood thinner/heart problem PT TO HOLD PER MD INSTRUCTIONS)      Coenzyme Q10 (Co Q-10) 400 MG capsule Take 1 p.o. daily with food (Patient taking differently: Take 1 tablet by mouth Daily. Take 1 p.o. daily with food HELD)      ezetimibe (ZETIA) 10 MG tablet TAKE 1 TABLET BY MOUTH DAILY (Patient taking differently: Take 1 tablet by mouth Every Night.) 90 tablet 3    FLUoxetine (PROzac) 60 MG tablet Take 1 tablet by mouth Every Night.      levothyroxine (Synthroid) 25 MCG tablet Take 1 tablet by mouth Every Morning. (Patient taking differently: Take 1 tablet by mouth Every Night.) 90 tablet 1    losartan (COZAAR) 50 MG tablet TAKE 1 TABLET BY MOUTH DAILY (Patient taking differently: Take 1 tablet by mouth Daily. PT TO HOLD 24 HOURS PRIOR TO SURGERY) 90 tablet 3    vitamin D3 125 MCG (5000 UT) capsule capsule TAKE 1 CAPSULE BY MOUTH DAILY AS DIRECTED (Patient taking differently: Take 1 capsule by mouth Daily. TAKE 1 CAPSULE BY MOUTH DAILY AS DIRECTED) 30 capsule 1    [DISCONTINUED] albuterol sulfate  (90 Base) MCG/ACT inhaler Inhale 1 puff Every 4 (Four) Hours As Needed for Wheezing or Shortness of Air. 18 g 1    [DISCONTINUED] amphetamine-dextroamphetamine  (ADDERALL) 20 MG tablet Take 1 tablet by mouth As Needed. TO HOLD 48 HOURS PRIOR TO PROCEDURE      [DISCONTINUED] clobetasol (TEMOVATE) 0.05 % ointment Apply twice a day as directed      [DISCONTINUED] hydrOXYzine (ATARAX) 25 MG tablet Take 1 tablet by mouth 3 (Three) Times a Day As Needed for Itching. 60 tablet 3    [DISCONTINUED] ibuprofen (ADVIL,MOTRIN) 800 MG tablet TAKE 1 TABLET BY MOUTH 3 TIMES A DAY WITH FOOD ARTHRITIC AND BACK PAIN (Patient taking differently: Take 1 tablet by mouth Every 8 (Eight) Hours As Needed. TAKE 1 TABLET BY MOUTH 3 TIMES A DAY WITH FOOD ARTHRITIC AND BACK PAIN) 90 tablet 1     No current facility-administered medications on file prior to visit.         Procedures    ECG 12 Lead    Date/Time: 7/16/2025 1:43 PM  Performed by: Kandice Mosqueda APRN    Authorized by: Kandice Mosqueda APRN  Comparison: compared with previous ECG from 5/5/2023  Comparison to previous ECG: Inferior ST depression and anterolateral ST depression is present  Rhythm: sinus bradycardia  BPM: 46            Objective:      Vitals:    07/16/25 1304   BP: 118/80   Pulse: (!) 46   SpO2: 97%   Weight: 123 kg (272 lb)     Body mass index is 35.89 kg/m².  Wt Readings from Last 3 Encounters:   07/16/25 123 kg (272 lb)   07/15/25 121 kg (266 lb)   06/23/25 122 kg (270 lb)         Constitutional:       Appearance: Not in distress.   Eyes:      Pupils: Pupils are equal, round, and reactive to light.   Neck:      Vascular: No JVD.   Pulmonary:      Effort: Pulmonary effort is normal.      Comments: Diminished breath sounds   Cardiovascular:      Normal rate. Regular rhythm.      Murmurs: There is no murmur.   Pulses:     Intact distal pulses.   Edema:     Peripheral edema absent.   Musculoskeletal:      Cervical back: Normal range of motion. Skin:     General: Skin is warm and dry.   Neurological:      Mental Status: Alert and oriented to person, place and time.   Psychiatric:         Speech: Speech normal.          Assessment:     1. FITZPATRICK (dyspnea on exertion)    2. Non-occlusive coronary artery disease, 05/16/2017--normal LM; proximal LAD mild LI; 50% mid LAD.  Mild distal LAD LI; normal Cx.  Mild LI marginal branches; normal RCA, nondominant.  EF 60%.    3. Benign essential hypertension    4. Hyperlipidemia      Nonobstructive coronary artery disease -50% mid LAD on cath in 2017.  [facility] 4/2023 again shows nonobstructive CAD.   Dyslipidemia  Hypertension  Dilated aortic root -stable size on echocardiogram in August 2022  Exertional dyspnea -this is a chronic complaint that has been progressively worsening over the past few weeks to months.     Plan:   No medication changes were made  Stat laboratory evaluation to be done today  Echocardiogram to be done today  Chest x-ray to be done today  He does need I feel CT of his chest if D-dimer comes back elevated we will complete this today if not it will be scheduled for the future he has had chronic short windedness that is worsened and weight loss without effort.     Thank you for allowing me to participate in this patient's care. Please call with any questions or concerns.        > 40 minutes in total pt care  I spent 5 minutes on the separately reported service of EKG. This time is not included in the time used to support the E/M service also reported today.    Dragon dictation device was utilized in this note.

## 2025-07-16 NOTE — TELEPHONE ENCOUNTER
I reviewed lab results with Durga over the phone   I am recommending he complete a stress test - ordered

## 2025-07-17 DIAGNOSIS — R06.09 DOE (DYSPNEA ON EXERTION): Primary | ICD-10-CM

## 2025-07-17 NOTE — TELEPHONE ENCOUNTER
Stable echocardiogram  Aortic root is dilated continue with plan for CT of his chest for further evaluation of this  Is he scheduled for stress test tomorrow?     Chest x-ray suggests there may be COPD.  Also finding of abnormally elevated diaphragm I would like him to call his PCP 's office today to review this with them given his ongoing shortness of breath.     Thanks

## 2025-07-18 ENCOUNTER — HOSPITAL ENCOUNTER (OUTPATIENT)
Dept: CARDIOLOGY | Facility: HOSPITAL | Age: 75
Discharge: HOME | End: 2025-07-18
Payer: MEDICARE

## 2025-07-18 ENCOUNTER — TELEPHONE (OUTPATIENT)
Dept: SURGERY | Facility: CLINIC | Age: 75
End: 2025-07-18
Payer: MEDICARE

## 2025-07-18 VITALS — BODY MASS INDEX: 35.94 KG/M2 | WEIGHT: 271.17 LBS | HEIGHT: 73 IN

## 2025-07-18 DIAGNOSIS — R06.09 DOE (DYSPNEA ON EXERTION): ICD-10-CM

## 2025-07-18 LAB
BH CV NUCLEAR PRIOR STUDY: 1
BH CV REST NUCLEAR ISOTOPE DOSE: 31.8 MCI
BH CV STRESS BP STAGE 1: NORMAL
BH CV STRESS COMMENTS STAGE 1: NORMAL
BH CV STRESS DOSE REGADENOSON STAGE 1: 0.4
BH CV STRESS DURATION MIN STAGE 1: 0
BH CV STRESS DURATION SEC STAGE 1: 10
BH CV STRESS HR STAGE 1: 60
BH CV STRESS NUCLEAR ISOTOPE DOSE: 31.8 MCI
BH CV STRESS PROTOCOL 1: NORMAL
BH CV STRESS RECOVERY BP: NORMAL MMHG
BH CV STRESS RECOVERY HR: 50 BPM
BH CV STRESS STAGE 1: 1
MAXIMAL PREDICTED HEART RATE: 145 BPM
PERCENT MAX PREDICTED HR: 41.38 %
SPECT HRT GATED+EF W RNC IV: 51 %
STRESS BASELINE BP: NORMAL MMHG
STRESS BASELINE HR: 43 BPM
STRESS PERCENT HR: 49 %
STRESS POST EXERCISE DUR SEC: 10 SEC
STRESS POST PEAK BP: NORMAL MMHG
STRESS POST PEAK HR: 60 BPM
STRESS TARGET HR: 123 BPM

## 2025-07-18 PROCEDURE — 93017 CV STRESS TEST TRACING ONLY: CPT

## 2025-07-18 PROCEDURE — A9555 RB82 RUBIDIUM: HCPCS | Performed by: FAMILY MEDICINE

## 2025-07-18 PROCEDURE — 78492 MYOCRD IMG PET MLT RST&STRS: CPT

## 2025-07-18 PROCEDURE — 25010000002 REGADENOSON 0.4 MG/5ML SOLUTION: Performed by: FAMILY MEDICINE

## 2025-07-18 PROCEDURE — 34310000005 RUBIDIUM CHLORIDE: Performed by: FAMILY MEDICINE

## 2025-07-18 RX ORDER — REGADENOSON 0.08 MG/ML
0.4 INJECTION, SOLUTION INTRAVENOUS
Status: COMPLETED | OUTPATIENT
Start: 2025-07-18 | End: 2025-07-18

## 2025-07-18 RX ADMIN — REGADENOSON 0.4 MG: 0.08 INJECTION, SOLUTION INTRAVENOUS at 12:39

## 2025-07-18 NOTE — TELEPHONE ENCOUNTER
Pt called wanting to know biopsy results. Please advise, thank you.     I called and spoke with Durga Valenzuela regarding his colonoscopy results.     Pathology report:   Final Diagnosis   1. Cecum, polyp, polypectomy:               A. Tubular adenoma.     2. Colon, hepatic flexure, biopsy:               A. Hyperplastic polyp.                B. No submucosal adipose tissue seen (multiple additional levels examined).       I recommend that he undergoes colonoscopy in 5 years for surveillance    He verbalized understanding and agreed    Ayaan Gallardo MD  General, Minimally Invasive and Endoscopic Surgery  [facility]    [address] [place] [address], [address]  [place], [place], [address]  P: [phone]  F: [phone]

## 2025-07-21 ENCOUNTER — TELEPHONE (OUTPATIENT)
Dept: INTERNAL MEDICINE | Facility: CLINIC | Age: 75
End: 2025-07-21

## 2025-07-23 ENCOUNTER — OFFICE VISIT (OUTPATIENT)
Dept: INTERNAL MEDICINE | Facility: CLINIC | Age: 75
End: 2025-07-23
Payer: MEDICARE

## 2025-07-23 VITALS
OXYGEN SATURATION: 97 % | RESPIRATION RATE: 16 BRPM | HEIGHT: 73 IN | TEMPERATURE: 98.4 F | HEART RATE: 67 BPM | WEIGHT: 275 LBS | DIASTOLIC BLOOD PRESSURE: 80 MMHG | BODY MASS INDEX: 36.45 KG/M2 | SYSTOLIC BLOOD PRESSURE: 132 MMHG

## 2025-07-23 DIAGNOSIS — M81.0 AGE-RELATED OSTEOPOROSIS WITHOUT CURRENT PATHOLOGICAL FRACTURE: Chronic | ICD-10-CM

## 2025-07-23 DIAGNOSIS — E03.9 PRIMARY HYPOTHYROIDISM: Primary | Chronic | ICD-10-CM

## 2025-07-23 DIAGNOSIS — I77.810 DILATED AORTIC ROOT: ICD-10-CM

## 2025-07-23 DIAGNOSIS — Z86.0100 HISTORY OF COLON POLYPS: Chronic | ICD-10-CM

## 2025-07-23 DIAGNOSIS — I35.1 MILD AORTIC INSUFFICIENCY: ICD-10-CM

## 2025-07-23 DIAGNOSIS — I10 BENIGN ESSENTIAL HYPERTENSION: Chronic | ICD-10-CM

## 2025-07-23 DIAGNOSIS — E78.2 MIXED HYPERLIPIDEMIA: Chronic | ICD-10-CM

## 2025-07-23 DIAGNOSIS — E55.9 VITAMIN D DEFICIENCY: Chronic | ICD-10-CM

## 2025-07-23 DIAGNOSIS — R73.01 IMPAIRED FASTING GLUCOSE: Chronic | ICD-10-CM

## 2025-07-23 DIAGNOSIS — I25.10 NON-OCCLUSIVE CORONARY ARTERY DISEASE: Chronic | ICD-10-CM

## 2025-07-23 DIAGNOSIS — E66.01 MORBID OBESITY: Chronic | ICD-10-CM

## 2025-07-23 DIAGNOSIS — G47.33 OBSTRUCTIVE SLEEP APNEA: Chronic | ICD-10-CM

## 2025-07-23 DIAGNOSIS — E06.3 HASHIMOTO'S THYROIDITIS: ICD-10-CM

## 2025-07-23 DIAGNOSIS — D75.89 MACROCYTOSIS: ICD-10-CM

## 2025-07-23 DIAGNOSIS — Z51.81 THERAPEUTIC DRUG MONITORING: ICD-10-CM

## 2025-07-23 DIAGNOSIS — R60.0 BILATERAL LOWER EXTREMITY EDEMA: Chronic | ICD-10-CM

## 2025-07-23 PROBLEM — I35.0 NONRHEUMATIC AORTIC VALVE STENOSIS: Status: ACTIVE | Noted: 2025-07-23

## 2025-07-23 PROCEDURE — 99215 OFFICE O/P EST HI 40 MIN: CPT | Performed by: INTERNAL MEDICINE

## 2025-07-23 PROCEDURE — 3075F SYST BP GE 130 - 139MM HG: CPT | Performed by: INTERNAL MEDICINE

## 2025-07-23 PROCEDURE — G2211 COMPLEX E/M VISIT ADD ON: HCPCS | Performed by: INTERNAL MEDICINE

## 2025-07-23 PROCEDURE — 3079F DIAST BP 80-89 MM HG: CPT | Performed by: INTERNAL MEDICINE

## 2025-07-23 PROCEDURE — 1125F AMNT PAIN NOTED PAIN PRSNT: CPT | Performed by: INTERNAL MEDICINE

## 2025-07-23 RX ORDER — LEVOTHYROXINE SODIUM 200 MCG
TABLET ORAL
Start: 2025-07-23

## 2025-07-23 RX ORDER — ASPIRIN 81 MG/1
TABLET ORAL
Start: 2025-07-23

## 2025-07-23 RX ORDER — LEVOTHYROXINE SODIUM 25 UG/1
TABLET ORAL
Start: 2025-07-23

## 2025-07-23 RX ORDER — EZETIMIBE 10 MG/1
TABLET ORAL
Start: 2025-07-23

## 2025-07-23 RX ORDER — LOSARTAN POTASSIUM 50 MG/1
TABLET ORAL
Start: 2025-07-23

## 2025-07-23 RX ORDER — AMLODIPINE BESYLATE 10 MG/1
TABLET ORAL
Start: 2025-07-23

## 2025-07-23 RX ORDER — LEVOTHYROXINE SODIUM 200 MCG
TABLET ORAL
COMMUNITY
Start: 2025-07-12 | End: 2025-07-23

## 2025-07-23 RX ORDER — DEXTROAMPHETAMINE SACCHARATE, AMPHETAMINE ASPARTATE, DEXTROAMPHETAMINE SULFATE AND AMPHETAMINE SULFATE 5; 5; 5; 5 MG/1; MG/1; MG/1; MG/1
TABLET ORAL
COMMUNITY
Start: 2025-07-21

## 2025-07-23 NOTE — PROGRESS NOTES
07/23/2025    Patient Information  Durga Valenzuela                                                                                          [id number] [facility] [id number]      1950  [unfilled]  There is no work phone number on file.    Chief Complaint:     Follow-up on several issues at the request of cardiologist.    History of Present Illness:    Patient with multiple chronic medical problems as noted below in assessment plan presents today for a follow-up.  He recently had an echocardiogram which we need to review and also a stress test.  The cardiologist sent patient a message and said he should contact me regarding some abnormalities noted.  1 of these is elevated diaphragm which is not true.  Patient has eventration of the diaphragm which is not significant.  The chest x-ray also revealed COPD.  The report indicates some hyperinflation which suggest COPD.  Correlate clinically.  Patient has never smoked.  He has no symptoms.  Of COPD such as chronic cough or sputum production.  He does have dyspnea on exertion but this is related to other issues including his weight and being out of shape.  Also patient had a recent DEXA scan which we will review.  He also had a colonoscopy which we will review.  He has a history of colon polyps.  Will also reviewed the stress test which revealed no evidence of ischemia.  Ejection fraction 51%.  Patient is scheduled for CTA of the chest.    Past medical history reviewed and updated were necessary including health maintenance parameters.  This reveals patient is up-to-date or else accounted for.  Patient has absolutely no new complaints.    Review of Systems   Constitutional: Negative.   HENT: Negative.     Eyes: Negative.    Cardiovascular:  Positive for dyspnea on exertion. Negative for chest pain, claudication, leg swelling, near-syncope, orthopnea, palpitations, paroxysmal nocturnal dyspnea and syncope.   Respiratory:  Positive for shortness of  breath. Negative for cough, hemoptysis, sleep disturbances due to breathing, snoring, sputum production and wheezing.    Endocrine: Negative.    Hematologic/Lymphatic: Negative.    Skin: Negative.    Musculoskeletal:  Positive for arthritis and joint pain.   Gastrointestinal: Negative.    Genitourinary: Negative.    Neurological: Negative.    Psychiatric/Behavioral: Negative.     Allergic/Immunologic: Negative.        Active Problems:    Patient Active Problem List   Diagnosis    Post traumatic stress disorder (PTSD)    Benign prostatic hyperplasia with urinary frequency    Chronic insomnia    Lumbar degenerative disc disease    Impaired fasting glucose    Benign essential hypertension    Primary osteoarthritis involving multiple joints    Hyperlipidemia    Primary hypothyroidism    Obstructive sleep apnea, tolerates CPAP well.  Previously failed oral appliance.    History of pulmonary embolism    Non-occlusive coronary artery disease, 05/16/2017--normal LM; proximal LAD mild LI; 50% mid LAD.  Mild distal LAD LI; normal Cx.  Mild LI marginal branches; normal RCA, nondominant.  EF 60%.    Therapeutic drug monitoring    Vitamin D deficiency    Psoriasis    Allergic rhinitis    Depression with anxiety    Multiple environmental allergies    Bilateral lower extremity edema    Morbid obesity    Spinal stenosis of lumbar region with neurogenic claudication    Traumatic complete tear of left rotator cuff    Urinary incontinence without sensory awareness    Bipolar disorder, in full remission, most recent episode mixed    Hashimoto's thyroiditis    Foraminal stenosis of lumbosacral region    Chronic left-sided lumbar radiculopathy    History of colon polyps, 7/15/2025--hyperplastic x 1.  6/13/2019--adenomatous x 1.    History of 2019 novel coronavirus disease (COVID-19)    Benign paroxysmal positional vertigo due to bilateral vestibular disorder    Macrocytosis    Mild aortic insufficiency    Dilated aortic root         Past  Medical History:   Diagnosis Date    Age-related osteoporosis without current pathological fracture 01/24/2025    Allergic rhinitis 08/21/2018    Patient has had allergy testing in the past which revealed allergies to molds and grass.  Immunotherapy for about 2 years in the 1980s.    Benign essential hypertension 02/25/2016    Benign prostatic hyperplasia with urinary frequency 02/25/2016    Bilateral lower extremity edema 08/21/2018    Bipolar disorder, in full remission, most recent episode mixed 09/30/2021    Chronic insomnia 02/25/2016    Chronic left-sided lumbar radiculopathy 06/13/2024    Closed fracture of first cervical vertebra with nonunion - chronic 08/07/2023    Closed traumatic lateral subluxation of patellofemoral joint, right, initial encounter 08/13/2018    08/10/2018--patient was evaluated by the orthopedist for right knee pain and found to have lateral subluxation of the right patella associated with presence of right artificial knee joint.  Physical therapy no help.  Surgery is scheduled 09/17/2018    Depression with anxiety 08/21/2018    Foraminal stenosis of lumbosacral region 06/13/2024    Hashimoto's thyroiditis 07/26/2024    History of 2019 novel coronavirus disease (COVID-19) 12/23/2024    History of colon polyps, 6/13/2019--adenomatous x 1. 12/23/2024 06/13/2019--colonoscopy revealed 3 mm polyp was found in the cecum. The polyp was multi-lobulated. The polyp was removed with a cold biopsy forceps. Resection and retrieval were complete.  Pathology returned adenomatous polyp      History of deep venous thrombosis (DVT) of distal vein of right lower extremity 08/21/2018 04/26/2016--CTA of the chest performed for elevated d-dimer and progressive shortness of breath and chest pain for one month revealed bilateral occlusive in near occlusive segmental and subsegmental pulmonary emboli in all lobes of the right lung as well as within the left lower lobe.  No evidence of pulmonary infarct.   Nonspecific multifocal groundglass attenuation in the right pulmonary apex, perhaps representing pneumonitis or submental atelectasis.  Questionable cholelithiasis.  Doppler venous study was positive for DVT in the right popliteal vein.  Hypercoagulable workup was normal.  He was treated with Eliquis for a total of 6 months and was subsequently discontinued.    History of pulmonary embolism 06/10/2016    04/26/2016--CTA of the chest performed for elevated d-dimer and progressive shortness of breath and chest pain for one month revealed bilateral occlusive in near occlusive segmental and subsegmental pulmonary emboli in all lobes of the right lung as well as within the left lower lobe.  No evidence of pulmonary infarct.  Nonspecific multifocal groundglass attenuation in the right pulmonary apex, perhaps representing pneumonitis or submental atelectasis.  Questionable cholelithiasis.  Doppler venous study was positive for DVT in the right popliteal vein.  Hypercoagulable workup was normal.  He was treated with Eliquis for a total of 6 months and was subsequently discontinued.    Hyperlipidemia 02/25/2016    Impaired fasting glucose 02/25/2016    Lumbar degenerative disc disease 02/25/2016    Morbidly obese 10/19/2018    Multiple environmental allergies 08/21/2018    Patient has had allergy testing in the past which revealed allergies to molds and grass.  Immunotherapy for about 2 years in the 1980s.    Non-occlusive coronary artery disease, 05/16/2017--normal LM; proximal LAD mild LI; 50% mid LAD.  Mild distal LAD LI; normal Cx.  Mild LI marginal branches; normal RCA, nondominant.  EF 60%. 07/24/2017 05/16/2017--cardiac catheterization performed for abnormal stress test.  Normal LV with ejection fraction 60%.  Dilated aortic root.  No wall motion abnormalities.  Normal left main.  Ramus branch small caliber and normal.  Proximal LAD large caliber and mild luminal irregularities.  50% mid LAD.  Mild luminal  irregularities distal LAD.  3 diagonal branches of small caliber with mild luminal irregularities.  Normal septal branches.  Circumflex is large caliber and free of disease.  Marginal branches are medium caliber with mild luminal irregularities.  RCA is a medium caliber and free of disease.  It is nondominant.    Obstructive sleep apnea, tolerates CPAP well.  Previously failed oral appliance. 02/25/2016    Post traumatic stress disorder (PTSD) 02/25/2016    Primary hypothyroidism 02/25/2016    Primary osteoarthritis involving multiple joints 02/25/2016    Psoriasis 09/17/2014    Spinal stenosis of lumbar region with neurogenic claudication 02/15/2021    Traumatic complete tear of left rotator cuff 03/17/2021 March 14, 2021--patient was evaluated by the orthopedic surgeon after he slipped on ice and fell onto his left shoulder.  Work-up revealed complete rotator cuff tear involving 2 tendons.  Specifically, he has an acute left supraspinatus tear and acute left infraspinatus tear.  He also has left glenohumeral joint osteoarthritis.  Apparently this is not repairable and patient would need a shoulder r    Urinary incontinence without sensory awareness 03/17/2021    Vitamin D deficiency 08/21/2018         Past Surgical History:   Procedure Laterality Date    CARDIAC CATHETERIZATION N/A 05/16/2017    Procedure: Coronary angiography;  Surgeon: Malcolm Chen MD;  Location: [facility] INVASIVE LOCATION;  Service:     CARDIAC CATHETERIZATION N/A 05/16/2017    Procedure: Left Heart Cath;  Surgeon: Malcolm Chen MD;  Location: [facility] CATH INVASIVE LOCATION;  Service:     CARDIAC CATHETERIZATION N/A 05/16/2017    Procedure: Left ventriculography;  Surgeon: Malcolm Chen MD;  Location: [facility] CATH INVASIVE LOCATION;  Service:     CARDIAC CATHETERIZATION N/A 04/26/2023    Procedure: Left Heart Cath;  Surgeon: Mary Reynolds MD;  Location: [facility] CATH INVASIVE LOCATION;  Service: Cardiovascular;   Laterality: N/A;    CARDIAC CATHETERIZATION N/A 04/26/2023    Procedure: Coronary angiography;  Surgeon: Mary Reynolds MD;  Location[facility] CATH INVASIVE LOC[facility];  Service: Cardiovascular;  Laterality: N/A;    CARDIAC CATHETERIZATION N/A 04/26/2023    Procedure: Left ventriculography;  Surgeon: Mary Reynolds MD;  Location:  [facility] CATH INVASIVE LOCATION;  Service: Cardiovascular;  Laterality: N/A;    COLONOSCOPY  2005 2005--colonoscopy reportedly normal.  Records not available.    COLONOSCOPY N/A 06/13/2019    Procedure: COLONOSCOPY INTO CECUM WITH COLD BIOPSY POLYPECTOMY;  Surgeon: Ayaan Gallardo MD;  Location:  [facility];  Service: General    COLONOSCOPY N/A 7/15/2025    Procedure: COLONOSCOPY to the cecum with hot snare polypectomy and cold biopsy;  Surgeon: Ayaan Gallardo MD;  Location:  [facility];  Service: General;  Laterality: N/A;  pre-hx of colon polyps  post-polyp; Lipoma; hemorrhoids    EYE SURGERY  2020    LUMBAR DECOMPRESSION  2007 2007--lumbar decompression without fusion or hardware.    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    REVISION TOTAL KNEE ARTHROPLASTY Right 09/17/2018 09/17/2018--right total knee arthroplasty revision due to lateral subluxation of the patella due to multiple factors.    TOTAL KNEE ARTHROPLASTY Left 11/03/2017 11/03/2017--right total knee replacement complicated by patellofemoral subluxation.    TOTAL KNEE ARTHROPLASTY Left 06/2014 June 2014--left total knee replacement.    TOTAL KNEE ARTHROPLASTY REVISION Left 12/05/2022    Procedure: LEFT TOTAL KNEE REVISION Complex with Patella Tendon Reconstruction;  Surgeon: Sid Simon MD;   Location: [facility];  Service: Orthopedics;  Laterality: Left;    TOTAL KNEE ARTHROPLASTY REVISION Right          Allergies   Allergen Reactions    Hydrocodone Other (See Comments)     Severe vomiting - but CAN tolerate oxycodone per patient    Zocor  [Simvastatin]      MYALGIA           Current Outpatient Medications:     amLODIPine (NORVASC) 10 MG tablet, Take 1 tablet (10 mg) every morning for high blood pressure, Disp: , Rfl:     amphetamine-dextroamphetamine (ADDERALL) 20 MG tablet, , Disp: , Rfl:     aspirin 81 MG EC tablet, Take 1 p.o. daily for blood thinner/heart problem, Disp: , Rfl:     Coenzyme Q10 (Co Q-10) 400 MG capsule, Take 1 tablet (400 mg) every day with food for high cholesterol and heart., Disp: , Rfl:     ezetimibe (ZETIA) 10 MG tablet, Take 1 tablet (10 mg) every day for high cholesterol, Disp: , Rfl:     FLUoxetine (PROzac) 60 MG tablet, Take 1 tablet by mouth Every Night., Disp: , Rfl:     levothyroxine (Synthroid) 25 MCG tablet, Take 1 tablet (25 mcg) every day for low thyroid, Disp: , Rfl:     losartan (COZAAR) 50 MG tablet, Take 1 tablet (50 mg) every morning for high blood pressure and heart, Disp: , Rfl:     vitamin D3 125 MCG (5000 UT) capsule capsule, Take 1 capsule (5000 units) every day for low vitamin D., Disp: , Rfl:     Synthroid 200 MCG tablet, Take 1 tablet (200 mcg) every day for low thyroid.  Take with Synthroid 25 mcg daily, Disp: , Rfl:       Family History   Problem Relation Age of Onset    Coronary artery disease Mother         Status post CABG    Alzheimer's disease Mother     Depression Mother     Stroke Father     Liver cancer Father         Father with biliary cancer including gallbladder    Cancer Father     Arthritis Father     Hyperlipidemia Father     Hypertension Father     No Known Problems Sister     No Known Problems Brother     Malig Hyperthermia Neg Hx          Social History     Socioeconomic History    Marital status:      Spouse name: jan     "Highest education level: Bachelor's degree (e.g., BA, AB, BS)   Tobacco Use    Smoking status: Never    Smokeless tobacco: Never    Tobacco comments:     CAFFEINE USE 2 CUPS COFFEE AND 1 GLASS TEA DAILY   Vaping Use    Vaping status: Never Used   Substance and Sexual Activity    Alcohol use: No    Drug use: Never     Types: Marijuana     Comment: weekly    Sexual activity: Yes     Partners: Female         Vitals:    07/23/25 0902   BP: 132/80   Pulse: 67   Resp: 16   Temp: 98.4 °F (36.9 °C)   TempSrc: Oral   SpO2: 97%   Weight: 125 kg (275 lb)   Height: 185.4 cm (72.99\")        Body mass index is 36.29 kg/m².      Physical Exam:    General: Alert and oriented x 3.  No acute distress. Obese. Normal affect.  HEENT: Pupils equal, round, reactive to light; extraocular movements intact; sclerae nonicteric; pharynx, ear canals and TMs normal.  Neck: Without JVD, thyromegaly, bruit, or adenopathy.  Lungs: Clear to auscultation in all fields.  Heart: Regular rate and rhythm without murmur, rub, gallop, or click.  Abdomen: Soft, nontender, without hepatosplenomegaly or hernia.  Bowel sounds normal.  : Deferred.  Rectal: Deferred.  Extremities: Without clubbing, cyanosis, edema, or pulse deficit.  Neurologic: Intact without focal deficit.  Normal station and gait observed during ingress and egress from the examination room.  Skin: Without significant lesion.  Musculoskeletal: Unremarkable.    Lab/other results:    I reviewed the chest x-ray which revealed eventration of the left hemidiaphragm.  There is some hyperinflation suggestive of COPD.  Nothing acute.  Aorta is tortuous.    I reviewed the stress test which was negative for ischemia.  Ejection fraction 51%.  No change from previous study in 2017.  Also reviewed the echocardiogram which revealed hyperdynamic left ventricle 70% ejection fraction.  There is mild concentric hypertrophy of the left ventricle.  Impaired relaxation of the left ventricle.  Moderate " calcification of aortic valve without hemodynamically significant stenosis.  There is mild aortic regurgitation.  There is moderate dilation of aortic root at 4.7 cm.  There is mild dilation of the proximal aorta 3.9 cm.    Patient also had a DEXA scan and apparently carries a diagnosis of osteoporosis.  However, the recent DEXA scan was normal.  We can resolve this issue.    July 15, 2025--colonoscopy revealed a 10 mm polyp in the cecum.  Multilobulated and removed.  There was a medium size lipoma at hepatic flexure which was biopsied.  Nonbleeding internal hemorrhoids.  Grade 1.  Pathology returned hyperplastic x 1.        Assessment/Plan:     Diagnosis Plan   1. Primary hypothyroidism  Synthroid 200 MCG tablet      2. Benign essential hypertension  amLODIPine (NORVASC) 10 MG tablet    losartan (COZAAR) 50 MG tablet      3. Non-occlusive coronary artery disease, 05/16/2017--normal LM; proximal LAD mild LI; 50% mid LAD.  Mild distal LAD LI; normal Cx.  Mild LI marginal branches; normal RCA, nondominant.  EF 60%.  aspirin 81 MG EC tablet      4. Hyperlipidemia  Coenzyme Q10 (Co Q-10) 400 MG capsule    ezetimibe (ZETIA) 10 MG tablet    levothyroxine (Synthroid) 25 MCG tablet      5. Hashimoto's thyroiditis        6. Vitamin D deficiency  vitamin D3 125 MCG (5000 UT) capsule capsule      7. History of colon polyps, 7/15/2025--hyperplastic x 1.  6/13/2019--adenomatous x 1.        8. Age-related osteoporosis without current pathological fracture        9. Mild aortic insufficiency        10. Dilated aortic root        11. Impaired fasting glucose        12. Obstructive sleep apnea, tolerates CPAP well.  Previously failed oral appliance.        13. Bilateral lower extremity edema        14. Morbid obesity        15. Macrocytosis        16. Therapeutic drug monitoring          Patient has hypertension which appears to be controlled although his diastolic is little borderline.  He may need medication adjustment but I will  hold off on that until he has a CTA of the chest to assess the dilated aortic root and ascending aorta.  He has nonocclusive coronary artery disease and as mentioned the stress test was negative for ischemia.  He has hyperlipidemia being treated with ezetimibe with good control.  He also has primary hypothyroidism which is being managed by endocrinology and he is on quite a large dose of 225 mcg of levothyroxine.  He has vitamin D deficiency and is on 5000 international units of vitamin D daily.  He has a history of colon polyps and recent colonoscopy revealed a tubular adenoma x 1.  Repeat colonoscopy in 5 years.  I am not sure where the diagnosis of osteoporosis came from but patient's bone density is normal and we can resolve this.  As mentioned he has mild aortic insufficiency and also has dilated aortic root that this is going to be evaluated with a CTA of the chest in the near future as ordered by the cardiologist.  He has impaired fasting glucose and I have encouraged him to follow a low carbohydrate diet to lose weight.  He is at risk for development of overt diabetes.  He has sleep apnea and tolerates CPAP well.  His lower extremity edema is well-controlled.  Macrocytosis has been evaluated in the past.    Plan is as follows: I am not going to make any changes to his current medical regimen and I am not going to order additional testing.  I do not think this is indicated.  I will be on the look out for the CTA of the chest.  Patient is scheduled for his Medicare wellness visit towards the end of December of this year and I will have him keep that appointment.  In the meantime he can follow-up if any issues come up.    Note: 42 minutes was spent evaluating this patient with several chronic medical problems and several recent test and greater than 50% of this time was spent counseling patient regarding this and any further workup needed.  [id number] level service justified      Procedures

## 2025-07-31 ENCOUNTER — HOSPITAL ENCOUNTER (OUTPATIENT)
Dept: CT IMAGING | Facility: HOSPITAL | Age: 75
Discharge: HOME | End: 2025-07-31
Admitting: FAMILY MEDICINE
Payer: MEDICARE

## 2025-07-31 DIAGNOSIS — I77.810 DILATED AORTIC ROOT: ICD-10-CM

## 2025-07-31 PROCEDURE — 71275 CT ANGIOGRAPHY CHEST: CPT

## 2025-07-31 PROCEDURE — 25510000001 IOPAMIDOL PER 1 ML: Performed by: FAMILY MEDICINE

## 2025-07-31 RX ORDER — IOPAMIDOL 755 MG/ML
100 INJECTION, SOLUTION INTRAVASCULAR
Status: COMPLETED | OUTPATIENT
Start: 2025-07-31 | End: 2025-07-31

## 2025-07-31 RX ADMIN — IOPAMIDOL 95 ML: 755 INJECTION, SOLUTION INTRAVENOUS at 09:36

## 2025-08-29 ENCOUNTER — LAB (OUTPATIENT)
Facility: HOSPITAL | Age: 75
End: 2025-08-29
Payer: MEDICARE

## 2025-08-29 DIAGNOSIS — E06.3 HASHIMOTO'S THYROIDITIS: ICD-10-CM

## 2025-08-29 LAB
T4 FREE SERPL-MCNC: 1.43 NG/DL (ref 0.92–1.68)
TSH SERPL DL<=0.05 MIU/L-ACNC: 1.71 UIU/ML (ref 0.27–4.2)

## 2025-08-29 PROCEDURE — 84439 ASSAY OF FREE THYROXINE: CPT

## 2025-08-29 PROCEDURE — 36415 COLL VENOUS BLD VENIPUNCTURE: CPT

## 2025-08-29 PROCEDURE — 84443 ASSAY THYROID STIM HORMONE: CPT

## (undated) DEVICE — PROXIMATE RH ROTATING HEAD SKIN STAPLERS (35 WIDE) CONTAINS 35 STAINLESS STEEL STAPLES: Brand: PROXIMATE

## (undated) DEVICE — GLV SURG SENSICARE PI LF PF 8 GRN STRL

## (undated) DEVICE — LN SMPL CO2 SHTRM SD STREAM W/M LUER

## (undated) DEVICE — KT ORCA ORCAPOD DISP STRL

## (undated) DEVICE — PREP SOL POVIDONE/IODINE BT 4OZ

## (undated) DEVICE — SINGLE-USE BIOPSY FORCEPS: Brand: RADIAL JAW 4

## (undated) DEVICE — MEDI-VAC YANK SUCT HNDL W/TPRD BULBOUS TIP: Brand: CARDINAL HEALTH

## (undated) DEVICE — SPONGE,DRAIN,NONWVN,4"X4",6PLY,STRL,LF: Brand: MEDLINE

## (undated) DEVICE — TUBING, SUCTION, 1/4" X 10', STRAIGHT: Brand: MEDLINE

## (undated) DEVICE — SPNG GZ WOVN 4X4IN 12PLY 10/BX STRL

## (undated) DEVICE — SUT VIC 0 CT1 CR8 18IN J840D

## (undated) DEVICE — CEMENT MIXING SYSTEM WITH FEMORAL BREAKWAY NOZZLE: Brand: REVOLUTION

## (undated) DEVICE — PICO 7 10CM X 30CM: Brand: PICO™ 7

## (undated) DEVICE — GLIDESHEATH SLENDER STAINLESS STEEL KIT: Brand: GLIDESHEATH SLENDER

## (undated) DEVICE — GLV SURG SENSICARE PI ORTHO SZ7.5 LF STRL

## (undated) DEVICE — MSK PROC CURAPLEX O2 2/ADAPT 7FT

## (undated) DEVICE — 3M™ STERI-DRAPE™ U-DRAPE 1015: Brand: STERI-DRAPE™

## (undated) DEVICE — SYR LUERLOK 30CC

## (undated) DEVICE — DISPOSABLE DRAPE, STERILE, FOR A CDS-3072 6 FOOT TABLE: Brand: PEDIGO PRODUCTS, INC.

## (undated) DEVICE — TBG PENCL TELESCP MEGADYNE SMOKE EVAC 10FT

## (undated) DEVICE — PK KN TOTL 90

## (undated) DEVICE — ELECTRD BLD EZ CLN STD 4IN

## (undated) DEVICE — THE TORRENT IRRIGATION SCOPE CONNECTOR IS USED WITH THE TORRENT IRRIGATION TUBING TO PROVIDE IRRIGATION FLUIDS SUCH AS STERILE WATER DURING GASTROINTESTINAL ENDOSCOPIC PROCEDURES WHEN USED IN CONJUNCTION WITH AN IRRIGATION PUMP (OR ELECTROSURGICAL UNIT).: Brand: TORRENT

## (undated) DEVICE — CATH VENT MIV RADL PIG ST TIP 5F 110CM

## (undated) DEVICE — THE SINGLE USE ETRAP – POLYP TRAP IS USED FOR SUCTION RETRIEVAL OF ENDOSCOPICALLY REMOVED POLYPS.: Brand: ETRAP

## (undated) DEVICE — KT DRN EVAC WND PVC 15F3/16IN 400CC

## (undated) DEVICE — BNDG ESMARK 6INX9FT STRL

## (undated) DEVICE — CATH DIAG IMPULSE FL3.5 5F 100CM

## (undated) DEVICE — PATIENT RETURN ELECTRODE, SINGLE-USE, CONTACT QUALITY MONITORING, ADULT, WITH 9FT CORD, FOR PATIENTS WEIGING OVER 33LBS. (15KG): Brand: MEGADYNE

## (undated) DEVICE — KT MANIFLD CARDIAC

## (undated) DEVICE — GW EMR FIX EXCHG J STD .035 3MM 260CM

## (undated) DEVICE — RECIPROCATING BLADE HEAVY DUTY LONG, OFFSET  (77.6 X 0.77 X 11.2MM)

## (undated) DEVICE — SUT ETHIB 1 CTX CR8 18IN CX30D

## (undated) DEVICE — CATH DIAG IMPULSE FR4 5F 100CM

## (undated) DEVICE — DISPOSABLE TOURNIQUET CUFF SINGLE BLADDER, SINGLE PORT AND QUICK CONNECT CONNECTOR: Brand: COLOR CUFF

## (undated) DEVICE — IMMOB KN 3PNL DLX CANVS 24IN BLU

## (undated) DEVICE — CONTAINER,SPECIMEN,OR STERILE,4OZ: Brand: MEDLINE

## (undated) DEVICE — GLIDESHEATH BASIC HYDROPHILIC COATED INTRODUCER SHEATH: Brand: GLIDESHEATH

## (undated) DEVICE — MAT FLR ABSORBENT LG 4FT 10 2.5FT

## (undated) DEVICE — INTENDED USE FOR SURGICAL MARKING ON INTACT SKIN, ALSO PROVIDES A PERMANENT METHOD OF IDENTIFYING OBJECTS IN THE OPERATING ROOM: Brand: WRITESITE® REGULAR TIP SKIN MARKER

## (undated) DEVICE — 3M™ STERI-DRAPE™ INSTRUMENT POUCH 1018: Brand: STERI-DRAPE™

## (undated) DEVICE — GLV SURG SENSICARE PI LF PF 7.5 GRN STRL

## (undated) DEVICE — SENSR O2 OXIMAX FNGR A/ 18IN NONSTR

## (undated) DEVICE — Device

## (undated) DEVICE — SNAR POLYP SENSATION STDOVL 27 240 BX40

## (undated) DEVICE — PK CATH CARD 40

## (undated) DEVICE — DECANT BG O JET

## (undated) DEVICE — SOL ISO/ALC RUB 70PCT 4OZ

## (undated) DEVICE — Device: Brand: DEFENDO AIR/WATER/SUCTION AND BIOPSY VALVE

## (undated) DEVICE — CANNULA,ADULT,SOFT-TOUCH,7'TUBE,UC: Brand: PENDING

## (undated) DEVICE — ADAPT CLN BIOGUARD AIR/H2O DISP

## (undated) DEVICE — THIN OFFSET (9.0 X 0.38 X 25.0MM)

## (undated) DEVICE — SUT ETHIB 0 CT1 CR8 18IN CX21D

## (undated) DEVICE — INTENDED FOR TISSUE SEPARATION, AND OTHER PROCEDURES THAT REQUIRE A SHARP SURGICAL BLADE TO PUNCTURE OR CUT.: Brand: BARD-PARKER ® STAINLESS STEEL BLADES

## (undated) DEVICE — TR BAND RADIAL ARTERY COMPRESSION DEVICE: Brand: TR BAND

## (undated) DEVICE — HOOD, PEEL-AWAY: Brand: FLYTE

## (undated) DEVICE — WEBRIL* CAST PADDING: Brand: DEROYAL

## (undated) DEVICE — TRAP FLD MINIVAC MEGADYNE 100ML

## (undated) DEVICE — WRAP KNEE COLD THERAPY

## (undated) DEVICE — GLV SURG SENSICARE PI ORTHO SZ8 LF STRL

## (undated) DEVICE — ADHS SKIN PREMIERPRO EXOFIN TOPICAL HI/VISC .5ML